# Patient Record
Sex: FEMALE | Race: WHITE | NOT HISPANIC OR LATINO | Employment: FULL TIME | URBAN - METROPOLITAN AREA
[De-identification: names, ages, dates, MRNs, and addresses within clinical notes are randomized per-mention and may not be internally consistent; named-entity substitution may affect disease eponyms.]

---

## 2017-07-21 ENCOUNTER — ALLSCRIPTS OFFICE VISIT (OUTPATIENT)
Dept: OTHER | Facility: OTHER | Age: 57
End: 2017-07-21

## 2017-07-21 DIAGNOSIS — R06.02 SHORTNESS OF BREATH: ICD-10-CM

## 2017-08-30 ENCOUNTER — TRANSCRIBE ORDERS (OUTPATIENT)
Dept: ADMINISTRATIVE | Facility: HOSPITAL | Age: 57
End: 2017-08-30

## 2017-08-30 DIAGNOSIS — R06.02 SHORTNESS OF BREATH: Primary | ICD-10-CM

## 2017-09-28 ENCOUNTER — HOSPITAL ENCOUNTER (OUTPATIENT)
Dept: NON INVASIVE DIAGNOSTICS | Facility: HOSPITAL | Age: 57
Discharge: HOME | End: 2017-09-28
Attending: INTERNAL MEDICINE
Payer: COMMERCIAL

## 2017-09-28 ENCOUNTER — HOSPITAL ENCOUNTER (OUTPATIENT)
Dept: ULTRASOUND IMAGING | Facility: HOSPITAL | Age: 57
Discharge: HOME | End: 2017-09-28
Attending: INTERNAL MEDICINE
Payer: COMMERCIAL

## 2017-09-28 DIAGNOSIS — R06.02 SHORTNESS OF BREATH: ICD-10-CM

## 2017-09-28 LAB
CHEST PAIN STATEMENT: NORMAL
MAX DIASTOLIC BP: 90 MMHG
MAX HEART RATE: 137 BPM
MAX PREDICTED HEART RATE: 161 BPM
MAX. SYSTOLIC BP: 190 MMHG
PROTOCOL NAME: NORMAL
TARGET HR FORMULA: NORMAL
TEST INDICATION: NORMAL
TIME IN EXERCISE PHASE: 350 S

## 2017-09-28 PROCEDURE — 93970 EXTREMITY STUDY: CPT

## 2017-09-28 PROCEDURE — 93350 STRESS TTE ONLY: CPT

## 2017-10-26 ENCOUNTER — ALLSCRIPTS OFFICE VISIT (OUTPATIENT)
Dept: OTHER | Facility: OTHER | Age: 57
End: 2017-10-26

## 2017-10-28 NOTE — PROGRESS NOTES
Assessment  Assessed    1  Hyperlipidemia (272 4) (E78 5)   2  Fatigue due to excessive exertion, subsequent encounter (V58 89,994 5) (T73 3XXD)   3  Abnormal stress echocardiography (794 39) (R94 39)    Plan  Abnormal stress echocardiography    · Follow-up visit in 6 months Evaluation and Treatment  Follow-up  Status: Hold For -  Scheduling  Requested for: [id number]   Ordered; For: Abnormal stress echocardiography; Ordered By: Mitzy Elena Performed:  Due: 37HEG5190    Discussion/Summary  Cardiology Discussion Summary Free Text Note Form Mart Pompa:   She is not having any anginal symptoms and I would recommend strict risk factor control  She need to get more active which I discussed with her  I will get her FLP and see if see would do better on Lipitor  No further cardiac testing is needed at this time  RTO 6 months  Chief Complaint  Chief Complaint Free Text Note Form: Patient is here for 4 month follow up  patient complaints on SOB, dizziness, swelling of left leg, donât have an appetite  History of Present Illness  Cardiology HPI Free Text Note Form [facility] Temo Morejon: She continues to c/o fatigue and is very inactive  She denies CP or chest pressure  Rest stress ECHO showed a normal EF with possible inferoseptal and apical septal ischemia  her BP is controlled  She just had an Hgb A1C and FLP done at [facility]  B/L LE venous doppler studies were negative for DVT  Review of Systems  Cardiology Female ROS:     Cardiac: No complaints of chest pain, no palpitations, no fainting  Skin: No complaints of nonhealing sores or skin rash  Genitourinary: No complaints of recurrent urinary tract infections, frequent urination at night, difficult urination, blood in urine, kidney stones, loss of bladder control, kidney problems, denies any birth control or hormone replacement, is not post menopausal, not currently pregnant     Psychological: No complaints of feeling depressed, anxiety, panic attacks, or difficulty concentrating  General: as noted in HPI  Respiratory: No complaints of shortness of breath, cough with sputum, or wheezing  HEENT: No complaints of serious problems, hearing problems, nose problems, throat problems, or snoring  Gastrointestinal: No complaints of liver problems, nausea, vomiting, heartburn, constipation, bloody stools, diarrhea, problems swallowing, adbominal pain, or rectal bleeding  Hematologic: No complaints of bleeding disorders, anemia, blood clots, or excessive brusing  Neurological: No complaints of numbness, tingling, dizziness, weakness, seizures, headaches, syncope or fainting, AM fatigue, daytime sleepiness, no witnessed apnea episodes  Musculoskeletal: No complaints of arthritis, back pain, or painfull swelling  ROS Reviewed:   ROS reviewed  Active Problems  Problems    1  Diabetes mellitus (250 00) (E11 9)   2  Dizziness (780 4) (R42)   3  Edema (782 3) (R60 9)   4  Hyperlipidemia (272 4) (E78 5)   5  Shortness of breath on exertion (786 05) (R06 02)    Past Medical History  Active Problems And Past Medical History Reviewed: The active problems and past medical history were reviewed and updated today  Surgical History  Surgical History Reviewed: The surgical history was reviewed and updated today  Family History  Mother    1  Family history of diabetes mellitus (V18 0) (Z83 3)   2  Family history of hypertension (V17 49) (Z82 49)   3  Family history of malignant neoplasm (V16 9) (Z80 9)   4  Family history of sudden cardiac death (SCD) (V17 41) (Z82 41)   5  Family history of High cholesterol  Father    6  Family history of diabetes mellitus (V18 0) (Z83 3)   7  Family history of malignant neoplasm (V16 9) (Z80 9)  Family History Reviewed: The family history was reviewed and updated today  Social History  Problems    · Never a smoker   · Single  Social History Reviewed: The social history was reviewed and updated today   The social history was reviewed and is unchanged  Current Meds   1  Aspirin 81 MG TABS; take 1 tablet every other day; Therapy: (Recorded:42Anx1962) to Recorded   2  Co Q 10 10 MG Oral Capsule; take 1 capsule daily; Therapy: (Recorded:26Oct2017) to Recorded   3  Januvia 100 MG Oral Tablet; TAKE 1 TABLET ONCE DAILY; Therapy: (9680 7842) to Recorded   4  MegaRed Omega-3 Krill Oil CAPS; take 1 capsule daily; Therapy: (Recorded:04Aiv8608) to Recorded   5  MetFORMIN HCl - 1000 MG Oral Tablet; TAKE 1 TABLET TWICE DAILY WITH MEALS; Therapy: (9680 7842) to Recorded   6  Multivitamins TABS; TAKE 3 TABLET Daily; Therapy: (9680 7842) to Recorded   7  Simvastatin 40 MG Oral Tablet; TAKE 1 TABLET DAILY AS DIRECTED; Therapy: (Recorded:48Jrs0145) to Recorded  Medication List Reviewed: The medication list was reviewed and updated today  Allergies  Medication    1  No Known Drug Allergies    Vitals  Vital Signs    Recorded: 77RPJ7194 04:03PM   Heart Rate 82   Systolic 799, RUE, Sitting   Diastolic 78, RUE, Sitting   Height 5 ft 6 in   Weight 178 lb    BMI Calculated 28 73   BSA Calculated 1 9     Physical Exam    Constitutional   General appearance: No acute distress, well appearing and well nourished  Eyes   Conjunctiva and Sclera examination: Conjunctiva pink, sclera anicteric  Ears, Nose, Mouth, and Throat - Oropharynx: Clear, nares are clear, mucous membranes are moist    Neck   Neck and thyroid: Normal, supple, trachea midline, no thyromegaly  Pulmonary   Respiratory effort: No increased work of breathing or signs of respiratory distress  Auscultation of lungs: Clear to auscultation, no rales, no rhonchi, no wheezing, good air movement  Cardiovascular   Auscultation of heart: Normal rate and rhythm, normal S1 and S2, no murmurs  Carotid pulses: Normal, 2+ bilaterally  Peripheral vascular exam: Normal pulses throughout, no tenderness, erythema or swelling      Pedal pulses: Normal, 2+ bilaterally  Examination of extremities for edema and/or varicosities: Normal     Abdomen   Abdomen: Non-tender and no distention  Liver and spleen: No hepatomegaly or splenomegaly  Musculoskeletal Gait and station: Normal gait  -- Digits and nails: Normal without clubbing or cyanosis  -- Inspection/palpation of joints, bones, and muscles: Normal, ROM normal     Skin - Skin and subcutaneous tissue: Normal without rashes or lesions  Skin is warm and well perfused, normal turgor  Neurologic - Cranial nerves: II - XII intact  -- Speech: Normal     Psychiatric - Orientation to person, place, and time: Normal -- Mood and affect: Normal       Signatures   Electronically signed by : VIOLA Manning ; Oct 27 2017  7:43AM EST                       (Author)

## 2018-01-14 VITALS
HEIGHT: 66 IN | SYSTOLIC BLOOD PRESSURE: 118 MMHG | DIASTOLIC BLOOD PRESSURE: 60 MMHG | WEIGHT: 180.13 LBS | HEART RATE: 82 BPM | BODY MASS INDEX: 28.95 KG/M2

## 2018-01-14 VITALS
DIASTOLIC BLOOD PRESSURE: 78 MMHG | WEIGHT: 178 LBS | BODY MASS INDEX: 28.61 KG/M2 | HEART RATE: 82 BPM | SYSTOLIC BLOOD PRESSURE: 126 MMHG | HEIGHT: 66 IN

## 2018-07-30 ENCOUNTER — OFFICE VISIT (OUTPATIENT)
Dept: CARDIOLOGY CLINIC | Facility: CLINIC | Age: 58
End: 2018-07-30
Payer: COMMERCIAL

## 2018-07-30 VITALS
OXYGEN SATURATION: 98 % | BODY MASS INDEX: 27.9 KG/M2 | DIASTOLIC BLOOD PRESSURE: 62 MMHG | SYSTOLIC BLOOD PRESSURE: 118 MMHG | WEIGHT: 173.6 LBS | HEART RATE: 60 BPM | HEIGHT: 66 IN

## 2018-07-30 DIAGNOSIS — T73.3XXD FATIGUE DUE TO EXCESSIVE EXERTION, SUBSEQUENT ENCOUNTER: Primary | ICD-10-CM

## 2018-07-30 DIAGNOSIS — R94.39 ABNORMAL STRESS ECHO: ICD-10-CM

## 2018-07-30 DIAGNOSIS — E78.2 MIXED HYPERLIPIDEMIA: ICD-10-CM

## 2018-07-30 PROBLEM — T73.3XXA FATIGUE DUE TO EXCESSIVE EXERTION: Status: ACTIVE | Noted: 2018-07-30

## 2018-07-30 PROCEDURE — 93000 ELECTROCARDIOGRAM COMPLETE: CPT | Performed by: INTERNAL MEDICINE

## 2018-07-30 PROCEDURE — 99213 OFFICE O/P EST LOW 20 MIN: CPT | Performed by: INTERNAL MEDICINE

## 2018-07-30 RX ORDER — INSULIN DEGLUDEC INJECTION 100 U/ML
20 INJECTION, SOLUTION SUBCUTANEOUS DAILY
Refills: 3 | COMMUNITY
Start: 2018-07-11 | End: 2020-05-07 | Stop reason: SDUPTHER

## 2018-07-30 RX ORDER — VITAMIN E 200 UNIT
1 CAPSULE ORAL DAILY
COMMUNITY
End: 2021-04-16

## 2018-07-30 RX ORDER — SIMVASTATIN 40 MG
40 TABLET ORAL DAILY
Refills: 3 | COMMUNITY
Start: 2018-05-21 | End: 2020-05-11 | Stop reason: SDUPTHER

## 2018-07-30 RX ORDER — ASCORBIC ACID 1000 MG
1 TABLET ORAL DAILY
COMMUNITY
End: 2021-04-16

## 2018-07-31 NOTE — PROGRESS NOTES
Cardiology Follow Up    Nader Forte  1960  [id number]  [facility] [id number]    1  Fatigue due to excessive exertion, subsequent encounter  POCT ECG   2  Mixed hyperlipidemia     3  Abnormal stress echo  POCT ECG         Discussion/Summary:  All of her assessed cardiac problems are stable  I have reviewed her medications and made no changes  No cardiac testing is ordered  RTO 1 year  Interval History:  She has not had any cardiac problems since her last OV  She is more active and doing a little bit more walking  She denies CP, SOB  Her BP and cholesterol levels are controlled  Hgb A1C is > 10  Stress ECHO in 2017 showed possible ischemia  Patient Active Problem List   Diagnosis    Fatigue due to excessive exertion    Mixed hyperlipidemia    Abnormal stress echo     Past Medical History:   Diagnosis Date    Diabetes ([facility] [address] [facility]     Dizziness     Edema     Hyperlipidemia      Social History     Social History    Marital status: Single     Spouse name: N/A    Number of children: N/A    Years of education: N/A     Occupational History    Not on file       Social History Main Topics    Smoking status: Never Smoker    Smokeless tobacco: Never Used    Alcohol use No    Drug use: No    Sexual activity: Not on file     Other Topics Concern    Not on file     Social History Narrative    No narrative on file      Family History   Problem Relation Age of Onset    Diabetes Mother     Hypertension Mother     Sudden death Mother         SCD    Hyperlipidemia Mother     Diabetes Father     Arrhythmia Father         pacemaker/ defib    Clotting disorder Father         eliquis    Heart failure Father     Anuerysm Neg Hx      Past Surgical History:   Procedure Laterality Date    BREAST LUMPECTOMY Right 2009    COLONOSCOPY  04/2018       Current Outpatient Prescriptions:     aspirin 81 MG tablet, Take 1 tablet by mouth every other day, Disp: , Rfl:     Coenzyme Q10 (CO Q 10) 10 MG CAPS, Take 1 capsule by mouth daily, Disp: , Rfl:     esomeprazole (NexIUM) 20 mg capsule, Take 20 mg by mouth every morning before breakfast, Disp: , Rfl:     MEGARED OMEGA-3 KRILL  MG CAPS, Take 1 capsule by mouth daily, Disp: , Rfl:     metFORMIN (GLUCOPHAGE) 1000 MG tablet, Take 1 tablet by mouth Twice daily, Disp: , Rfl:     Multiple Vitamin (MULTI VITAMIN DAILY PO), Take 2 tablets by mouth daily, Disp: , Rfl:     simvastatin (ZOCOR) 40 mg tablet, Take 40 mg by mouth daily, Disp: , Rfl: 3    sitaGLIPtin (JANUVIA) 100 mg tablet, Take 1 tablet by mouth daily, Disp: , Rfl:     TRESIBA FLEXTOUCH 100 units/mL injection pen, Inject 20 Units as directed daily, Disp: , Rfl: 3  No Known Allergies  Vitals:    07/30/18 1347   BP: 118/62   BP Location: Left arm   Patient Position: Sitting   Cuff Size: Adult   Pulse: 60   SpO2: 98%   Weight: 78 7 kg (173 lb 9 6 oz)   Height: 5' 6" (1 676 m)     Weight (last 2 days)     Date/Time   Weight    07/30/18 1347  78 7 (173 6)             Blood pressure 118/62, pulse 60, height 5' 6" (1 676 m), weight 78 7 kg (173 lb 9 6 oz), SpO2 98 %  , Body mass index is 28 02 kg/m²  Labs:  No visits with results within 6 Month(s) from this visit  Latest known visit with results is:   Hospital Outpatient Visit on 09/28/2017   Component Date Value    Protocol Name 09/28/2017 Inge Living Time In Exercise Phase 09/28/2017 350     MAX  SYSTOLIC BP 11/94/5397 891     Max Diastolic Bp 99/27/4668 90     Max Heart Rate 09/28/2017 137     Max Predicted Heart Rate 09/28/2017 161     Reason for Termination 09/28/2017                      Value:Fatigue  Leg discomfort      Test Indication 09/28/2017 BARROS/R/O CAD     Target Hr Formular 09/28/2017 (220 - Age)*100%     Chest Pain Statement 09/28/2017 none      Imaging: No results found      Review of Systems:  Review of Systems   Constitutional: Negative for diaphoresis, fatigue, fever and unexpected weight change  HENT: Negative  Respiratory: Negative for cough, shortness of breath and wheezing  Cardiovascular: Negative for chest pain, palpitations and leg swelling  Gastrointestinal: Negative for abdominal pain, diarrhea and nausea  Musculoskeletal: Negative for gait problem and myalgias  Skin: Negative for rash  Neurological: Negative for dizziness and numbness  Psychiatric/Behavioral: Negative  Physical Exam:  Physical Exam   Constitutional: She is oriented to person, place, and time  She appears well-developed and well-nourished  HENT:   Head: Normocephalic and atraumatic  Eyes: Pupils are equal, round, and reactive to light  Neck: Normal range of motion  Neck supple  No JVD present  Cardiovascular: Regular rhythm, S1 normal, S2 normal and normal pulses  Pulses:       Carotid pulses are 2+ on the right side, and 2+ on the left side  Pulmonary/Chest: Effort normal and breath sounds normal  She has no wheezes  She has no rales  Abdominal: Soft  Bowel sounds are normal  There is no tenderness  Musculoskeletal: Normal range of motion  She exhibits no edema or tenderness  Neurological: She is alert and oriented to person, place, and time  She has normal reflexes  No cranial nerve deficit  Skin: Skin is warm  Psychiatric: She has a normal mood and affect

## 2019-09-17 ENCOUNTER — OFFICE VISIT (OUTPATIENT)
Dept: CARDIOLOGY CLINIC | Facility: CLINIC | Age: 59
End: 2019-09-17
Payer: COMMERCIAL

## 2019-09-17 VITALS
SYSTOLIC BLOOD PRESSURE: 90 MMHG | BODY MASS INDEX: 27.64 KG/M2 | OXYGEN SATURATION: 96 % | DIASTOLIC BLOOD PRESSURE: 56 MMHG | WEIGHT: 172 LBS | HEART RATE: 85 BPM | HEIGHT: 66 IN

## 2019-09-17 DIAGNOSIS — E78.2 MIXED HYPERLIPIDEMIA: Primary | ICD-10-CM

## 2019-09-17 DIAGNOSIS — T73.3XXD FATIGUE DUE TO EXCESSIVE EXERTION, SUBSEQUENT ENCOUNTER: ICD-10-CM

## 2019-09-17 DIAGNOSIS — R94.39 ABNORMAL STRESS ECHO: ICD-10-CM

## 2019-09-17 PROCEDURE — 93000 ELECTROCARDIOGRAM COMPLETE: CPT | Performed by: INTERNAL MEDICINE

## 2019-09-17 PROCEDURE — 99213 OFFICE O/P EST LOW 20 MIN: CPT | Performed by: INTERNAL MEDICINE

## 2019-09-17 NOTE — PROGRESS NOTES
Cardiology Follow Up    Juan Francisco Lanier  1960 [address] [address] [address]  [address] [place] [address] [address]  [address]  [address]  [id number] [phone]    1  Mixed hyperlipidemia  POCT ECG   2  Fatigue due to excessive exertion, subsequent encounter  Stress test only, exercise   3  Abnormal stress echo  Stress test only, exercise         Discussion/Summary: I will order a regular EST to compare to her stress test from 9/2017  I have reviewed her medications and made no changes  Her Hgb A1C is 13 and she needs to get her DM under much better control  Interval History: She has not had any cardiac problems since her last OV  She is not very active  She get occasional heart burn mostly at night and SOB at low to moderate levels of activity  She had a stress echo study in 2017 -  5 minutes 50 seconds Doni protocol  EF 65%, ECG response negative for ischemia  Possible ischemia of the inferoseptal wall      Patient Active Problem List   Diagnosis    Fatigue due to excessive exertion    Mixed hyperlipidemia    Abnormal stress echo     Past Medical History:   Diagnosis Date    Diabetes ([facility] [address] )     Dizziness     Edema     Hyperlipidemia      Social History     Socioeconomic History    Marital status: Single     Spouse name: Not on file    Number of children: Not on file    Years of education: Not on file    Highest education level: Not on file   Occupational History    Not on file   Social Needs    Financial resource strain: Not on file    Food insecurity:     Worry: Not on file     Inability: Not on file    Transportation needs:     Medical: Not on file     Non-medical: Not on file   Tobacco Use    Smoking status: Never Smoker    Smokeless tobacco: Never Used   Substance and Sexual Activity    Alcohol use: No    Drug use: No    Sexual activity: Not on file   Lifestyle    Physical activity:     Days per week: Not on file     Minutes per session: Not on file    Stress: Not on file   Relationships    Social connections:     Talks on phone: Not on file     Gets together: Not on file     Attends Jewish service: Not on file     Active member of club or organization: Not on file     Attends meetings of clubs or organizations: Not on file     Relationship status: Not on file    Intimate partner violence:     Fear of current or ex partner: Not on file     Emotionally abused: Not on file     Physically abused: Not on file     Forced sexual activity: Not on file   Other Topics Concern    Not on file   Social History Narrative    Not on file      Family History   Problem Relation Age of Onset    Diabetes Mother     Hypertension Mother     Sudden death Mother         SCD    Hyperlipidemia Mother     Diabetes Father     Arrhythmia Father         pacemaker/ defib    Clotting disorder Father         eliquis    Heart failure Father     Anuerysm Neg Hx      Past Surgical History:   Procedure Laterality Date    BREAST LUMPECTOMY Right 2009    COLONOSCOPY  04/2018       Current Outpatient Medications:     aspirin 81 MG tablet, Take 1 tablet by mouth every other day, Disp: , Rfl:     Coenzyme Q10 (CO Q 10) 10 MG CAPS, Take 1 capsule by mouth daily, Disp: , Rfl:     esomeprazole (NexIUM) 20 mg capsule, Take 20 mg by mouth every morning before breakfast, Disp: , Rfl:     MEGARED OMEGA-3 KRILL  MG CAPS, Take 1 capsule by mouth daily, Disp: , Rfl:     metFORMIN (GLUCOPHAGE) 1000 MG tablet, Take 1 tablet by mouth Twice daily, Disp: , Rfl:     Multiple Vitamin (MULTI VITAMIN DAILY PO), Take 2 tablets by mouth daily, Disp: , Rfl:     simvastatin (ZOCOR) 40 mg tablet, Take 40 mg by mouth daily, Disp: , Rfl: 3    sitaGLIPtin (JANUVIA) 100 mg tablet, Take 1 tablet by mouth daily, Disp: , Rfl:     TRESIBA FLEXTOUCH 100 units/mL injection pen, Inject 20 Units as directed daily, Disp: , Rfl: 3  No Known Allergies  Vitals:    09/17/19 1452 BP: 90/56   BP Location: Right arm   Patient Position: Sitting   Cuff Size: Standard   Pulse: 85   SpO2: 96%   Weight: 78 kg (172 lb)   Height: 5' 6" (1 676 m)     Weight (last 2 days)     Date/Time   Weight    09/17/19 1452   78 (172)             Blood pressure 90/56, pulse 85, height 5' 6" (1 676 m), weight 78 kg (172 lb), SpO2 96 %  , Body mass index is 27 76 kg/m²  Labs:  No visits with results within 6 Month(s) from this visit  Latest known visit with results is:   Office Visit on 07/30/2018   Component Date Value    INTERPRETATIONS 07/31/2018       Imaging: No results found  Review of Systems:  Review of Systems   Constitutional: Negative for diaphoresis, fatigue, fever and unexpected weight change  HENT: Negative  Respiratory: Positive for shortness of breath  Negative for cough and wheezing  Cardiovascular: Negative for chest pain, palpitations and leg swelling  Gastrointestinal: Negative for abdominal pain, diarrhea and nausea  Musculoskeletal: Negative for gait problem and myalgias  Skin: Negative for rash  Neurological: Negative for dizziness and numbness  Psychiatric/Behavioral: Negative  Physical Exam:  Physical Exam   Constitutional: She is oriented to person, place, and time  She appears well-developed and well-nourished  HENT:   Head: Normocephalic and atraumatic  Eyes: Pupils are equal, round, and reactive to light  Neck: Normal range of motion  Neck supple  No JVD present  Cardiovascular: Regular rhythm, S1 normal, S2 normal and normal pulses  Pulses:       Carotid pulses are 2+ on the right side, and 2+ on the left side  Pulmonary/Chest: Effort normal and breath sounds normal  She has no wheezes  She has no rales  Abdominal: Soft  Bowel sounds are normal  There is no tenderness  Musculoskeletal: Normal range of motion  She exhibits no edema or tenderness  Neurological: She is alert and oriented to person, place, and time  She has normal reflexes  No cranial nerve deficit  Skin: Skin is warm  Psychiatric: She has a normal mood and affect

## 2019-11-11 ENCOUNTER — HOSPITAL ENCOUNTER (OUTPATIENT)
Dept: NON INVASIVE DIAGNOSTICS | Facility: CLINIC | Age: 59
Discharge: HOME | End: 2019-11-11
Payer: COMMERCIAL

## 2019-11-11 DIAGNOSIS — T73.3XXD FATIGUE DUE TO EXCESSIVE EXERTION, SUBSEQUENT ENCOUNTER: ICD-10-CM

## 2019-11-11 DIAGNOSIS — R94.39 ABNORMAL STRESS ECHO: ICD-10-CM

## 2019-11-11 LAB
CHEST PAIN STATEMENT: NORMAL
MAX DIASTOLIC BP: 86 MMHG
MAX HEART RATE: 139 BPM
MAX PREDICTED HEART RATE: 159 BPM
MAX. SYSTOLIC BP: 198 MMHG
PROTOCOL NAME: NORMAL
TARGET HR FORMULA: NORMAL
TEST INDICATION: NORMAL
TIME IN EXERCISE PHASE: NORMAL

## 2019-11-11 PROCEDURE — 93018 CV STRESS TEST I&R ONLY: CPT | Performed by: INTERNAL MEDICINE

## 2019-11-11 PROCEDURE — 93017 CV STRESS TEST TRACING ONLY: CPT

## 2019-11-11 PROCEDURE — 93016 CV STRESS TEST SUPVJ ONLY: CPT | Performed by: INTERNAL MEDICINE

## 2020-05-07 ENCOUNTER — TELEPHONE (OUTPATIENT)
Dept: FAMILY MEDICINE CLINIC | Facility: CLINIC | Age: 60
End: 2020-05-07

## 2020-05-07 DIAGNOSIS — Z79.4 CONTROLLED TYPE 2 DIABETES MELLITUS WITHOUT COMPLICATION, WITH LONG-TERM CURRENT USE OF INSULIN (HCC): Primary | ICD-10-CM

## 2020-05-07 DIAGNOSIS — E11.9 CONTROLLED TYPE 2 DIABETES MELLITUS WITHOUT COMPLICATION, WITH LONG-TERM CURRENT USE OF INSULIN (HCC): Primary | ICD-10-CM

## 2020-05-11 ENCOUNTER — TELEPHONE (OUTPATIENT)
Dept: FAMILY MEDICINE CLINIC | Facility: CLINIC | Age: 60
End: 2020-05-11

## 2020-05-11 DIAGNOSIS — E11.69 TYPE 2 DIABETES MELLITUS WITH OTHER SPECIFIED COMPLICATION, WITH LONG-TERM CURRENT USE OF INSULIN (HCC): Primary | ICD-10-CM

## 2020-05-11 DIAGNOSIS — Z79.4 TYPE 2 DIABETES MELLITUS WITH OTHER SPECIFIED COMPLICATION, WITH LONG-TERM CURRENT USE OF INSULIN (HCC): Primary | ICD-10-CM

## 2020-05-11 RX ORDER — SIMVASTATIN 40 MG
40 TABLET ORAL DAILY
Qty: 90 TABLET | Refills: 3 | Status: SHIPPED | OUTPATIENT
Start: 2020-05-11 | End: 2021-06-25

## 2020-05-12 RX ORDER — INSULIN DEGLUDEC INJECTION 100 U/ML
30 INJECTION, SOLUTION SUBCUTANEOUS DAILY
Qty: 5 PEN | Refills: 5 | Status: SHIPPED | OUTPATIENT
Start: 2020-05-12 | End: 2020-10-15

## 2020-05-22 DIAGNOSIS — I10 ESSENTIAL HYPERTENSION: Primary | ICD-10-CM

## 2020-05-22 RX ORDER — LISINOPRIL 2.5 MG/1
TABLET ORAL
Qty: 90 TABLET | Refills: 1 | Status: SHIPPED | OUTPATIENT
Start: 2020-05-22 | End: 2020-06-03 | Stop reason: SDUPTHER

## 2020-06-03 DIAGNOSIS — I10 ESSENTIAL HYPERTENSION: ICD-10-CM

## 2020-06-03 RX ORDER — LISINOPRIL 2.5 MG/1
2.5 TABLET ORAL DAILY
Qty: 90 TABLET | Refills: 1 | Status: SHIPPED | OUTPATIENT
Start: 2020-06-03 | End: 2020-10-15

## 2020-06-09 LAB
LEFT EYE DIABETIC RETINOPATHY: NORMAL
RIGHT EYE DIABETIC RETINOPATHY: NORMAL

## 2020-06-28 DIAGNOSIS — R60.0 LOCALIZED EDEMA: ICD-10-CM

## 2020-06-28 RX ORDER — FUROSEMIDE 20 MG/1
TABLET ORAL
Qty: 90 TABLET | Refills: 1 | Status: SHIPPED | OUTPATIENT
Start: 2020-06-28 | End: 2021-11-18 | Stop reason: SDUPTHER

## 2020-08-04 LAB
LEFT EYE DIABETIC RETINOPATHY: NORMAL
RIGHT EYE DIABETIC RETINOPATHY: NORMAL

## 2020-09-15 ENCOUNTER — TELEPHONE (OUTPATIENT)
Dept: FAMILY MEDICINE CLINIC | Facility: CLINIC | Age: 60
End: 2020-09-15

## 2020-09-15 DIAGNOSIS — Z00.00 ANNUAL PHYSICAL EXAM: ICD-10-CM

## 2020-09-15 DIAGNOSIS — E11.9 CONTROLLED TYPE 2 DIABETES MELLITUS WITHOUT COMPLICATION, WITH LONG-TERM CURRENT USE OF INSULIN (HCC): ICD-10-CM

## 2020-09-15 DIAGNOSIS — Z79.4 CONTROLLED TYPE 2 DIABETES MELLITUS WITHOUT COMPLICATION, WITH LONG-TERM CURRENT USE OF INSULIN (HCC): ICD-10-CM

## 2020-09-15 DIAGNOSIS — E78.2 MIXED HYPERLIPIDEMIA: Primary | ICD-10-CM

## 2020-09-15 NOTE — TELEPHONE ENCOUNTER
LM on our VM asking for A1C lab script OR if Jacquelyn can do it in our office, because she wants to schedule appt for yearly chk

## 2020-09-15 NOTE — TELEPHONE ENCOUNTER
I would think we can just do the A1c here and if she wants other labs (CBC  CMP, Lipid panel TSH and urine alb/cr) wed have to order

## 2020-09-16 ENCOUNTER — TRANSCRIBE ORDERS (OUTPATIENT)
Dept: LAB | Facility: HOSPITAL | Age: 60
End: 2020-09-16

## 2020-09-16 DIAGNOSIS — Z79.4 CONTROLLED TYPE 2 DIABETES MELLITUS WITHOUT COMPLICATION, WITH LONG-TERM CURRENT USE OF INSULIN (HCC): ICD-10-CM

## 2020-09-16 DIAGNOSIS — E11.9 CONTROLLED TYPE 2 DIABETES MELLITUS WITHOUT COMPLICATION, WITH LONG-TERM CURRENT USE OF INSULIN (HCC): ICD-10-CM

## 2020-09-16 DIAGNOSIS — Z00.00 ANNUAL PHYSICAL EXAM: ICD-10-CM

## 2020-09-16 DIAGNOSIS — E78.2 MIXED HYPERLIPIDEMIA: Primary | ICD-10-CM

## 2020-09-17 ENCOUNTER — APPOINTMENT (OUTPATIENT)
Dept: LAB | Facility: HOSPITAL | Age: 60
End: 2020-09-17
Payer: COMMERCIAL

## 2020-09-17 DIAGNOSIS — Z00.00 ANNUAL PHYSICAL EXAM: ICD-10-CM

## 2020-09-17 DIAGNOSIS — E78.2 MIXED HYPERLIPIDEMIA: ICD-10-CM

## 2020-09-17 DIAGNOSIS — Z79.4 CONTROLLED TYPE 2 DIABETES MELLITUS WITHOUT COMPLICATION, WITH LONG-TERM CURRENT USE OF INSULIN (HCC): ICD-10-CM

## 2020-09-17 DIAGNOSIS — E11.9 CONTROLLED TYPE 2 DIABETES MELLITUS WITHOUT COMPLICATION, WITH LONG-TERM CURRENT USE OF INSULIN (HCC): ICD-10-CM

## 2020-09-17 LAB
ALBUMIN SERPL BCP-MCNC: 3.7 G/DL (ref 3.4–4.8)
ALP SERPL-CCNC: 51.5 U/L (ref 35–140)
ALT SERPL W P-5'-P-CCNC: 18 U/L (ref 5–54)
ANION GAP SERPL CALCULATED.3IONS-SCNC: 6 MMOL/L (ref 4–13)
AST SERPL W P-5'-P-CCNC: 13 U/L (ref 15–41)
BASOPHILS # BLD AUTO: 0.02 THOUSANDS/ΜL (ref 0–0.1)
BASOPHILS NFR BLD AUTO: 0 % (ref 0–1)
BILIRUB SERPL-MCNC: 0.43 MG/DL (ref 0.3–1.2)
BUN SERPL-MCNC: 17 MG/DL (ref 6–20)
CALCIUM SERPL-MCNC: 9 MG/DL (ref 8.4–10.2)
CHLORIDE SERPL-SCNC: 101 MMOL/L (ref 96–108)
CHOLEST SERPL-MCNC: 92 MG/DL
CO2 SERPL-SCNC: 30 MMOL/L (ref 22–33)
CREAT SERPL-MCNC: 0.63 MG/DL (ref 0.4–1.1)
EOSINOPHIL # BLD AUTO: 0.12 THOUSAND/ΜL (ref 0–0.61)
EOSINOPHIL NFR BLD AUTO: 2 % (ref 0–6)
ERYTHROCYTE [DISTWIDTH] IN BLOOD BY AUTOMATED COUNT: 12.3 % (ref 11.6–15.1)
EST. AVERAGE GLUCOSE BLD GHB EST-MCNC: 246 MG/DL
GFR SERPL CREATININE-BSD FRML MDRD: 96 ML/MIN/1.73SQ M
GLUCOSE P FAST SERPL-MCNC: 145 MG/DL (ref 70–100)
HBA1C MFR BLD: 10.2 %
HCT VFR BLD AUTO: 38.6 % (ref 34.8–46.1)
HDLC SERPL-MCNC: 30 MG/DL
HGB BLD-MCNC: 13.2 G/DL (ref 11.5–15.4)
IMM GRANULOCYTES # BLD AUTO: 0.02 THOUSAND/UL (ref 0–0.2)
IMM GRANULOCYTES NFR BLD AUTO: 0 % (ref 0–2)
LDLC SERPL CALC-MCNC: 43 MG/DL (ref 0–100)
LYMPHOCYTES # BLD AUTO: 1.94 THOUSANDS/ΜL (ref 0.6–4.47)
LYMPHOCYTES NFR BLD AUTO: 26 % (ref 14–44)
MCH RBC QN AUTO: 30.2 PG (ref 26.8–34.3)
MCHC RBC AUTO-ENTMCNC: 34.2 G/DL (ref 31.4–37.4)
MCV RBC AUTO: 88 FL (ref 82–98)
MONOCYTES # BLD AUTO: 0.46 THOUSAND/ΜL (ref 0.17–1.22)
MONOCYTES NFR BLD AUTO: 6 % (ref 4–12)
NEUTROPHILS # BLD AUTO: 4.91 THOUSANDS/ΜL (ref 1.85–7.62)
NEUTS SEG NFR BLD AUTO: 66 % (ref 43–75)
NONHDLC SERPL-MCNC: 62 MG/DL
PLATELET # BLD AUTO: 242 THOUSANDS/UL (ref 149–390)
PMV BLD AUTO: 10.5 FL (ref 8.9–12.7)
POTASSIUM SERPL-SCNC: 3.8 MMOL/L (ref 3.5–5)
PROT SERPL-MCNC: 6.3 G/DL (ref 6.4–8.3)
RBC # BLD AUTO: 4.37 MILLION/UL (ref 3.81–5.12)
SODIUM SERPL-SCNC: 137 MMOL/L (ref 133–145)
TRIGL SERPL-MCNC: 95.5 MG/DL
TSH SERPL DL<=0.05 MIU/L-ACNC: 2.62 UIU/ML (ref 0.34–5.6)
WBC # BLD AUTO: 7.47 THOUSAND/UL (ref 4.31–10.16)

## 2020-09-17 PROCEDURE — 80053 COMPREHEN METABOLIC PANEL: CPT

## 2020-09-17 PROCEDURE — 84443 ASSAY THYROID STIM HORMONE: CPT

## 2020-09-17 PROCEDURE — 85025 COMPLETE CBC W/AUTO DIFF WBC: CPT

## 2020-09-17 PROCEDURE — 83036 HEMOGLOBIN GLYCOSYLATED A1C: CPT

## 2020-09-17 PROCEDURE — 80061 LIPID PANEL: CPT

## 2020-09-17 PROCEDURE — 36415 COLL VENOUS BLD VENIPUNCTURE: CPT

## 2020-09-18 ENCOUNTER — TELEPHONE (OUTPATIENT)
Dept: FAMILY MEDICINE CLINIC | Facility: CLINIC | Age: 60
End: 2020-09-18

## 2020-09-18 ENCOUNTER — APPOINTMENT (OUTPATIENT)
Dept: LAB | Facility: HOSPITAL | Age: 60
End: 2020-09-18
Payer: COMMERCIAL

## 2020-09-18 LAB
CREAT UR-MCNC: 100 MG/DL
MICROALBUMIN UR-MCNC: 283 MG/L (ref 0–20)
MICROALBUMIN/CREAT 24H UR: 283 MG/G CREATININE (ref 0–30)

## 2020-09-18 PROCEDURE — 82043 UR ALBUMIN QUANTITATIVE: CPT

## 2020-09-18 PROCEDURE — 82570 ASSAY OF URINE CREATININE: CPT

## 2020-10-09 ENCOUNTER — OFFICE VISIT (OUTPATIENT)
Dept: CARDIOLOGY CLINIC | Facility: CLINIC | Age: 60
End: 2020-10-09
Payer: COMMERCIAL

## 2020-10-09 VITALS
HEART RATE: 88 BPM | SYSTOLIC BLOOD PRESSURE: 130 MMHG | BODY MASS INDEX: 29.02 KG/M2 | OXYGEN SATURATION: 97 % | DIASTOLIC BLOOD PRESSURE: 68 MMHG | HEIGHT: 66 IN | WEIGHT: 180.6 LBS

## 2020-10-09 DIAGNOSIS — E78.2 MIXED HYPERLIPIDEMIA: ICD-10-CM

## 2020-10-09 DIAGNOSIS — I10 ESSENTIAL HYPERTENSION: ICD-10-CM

## 2020-10-09 DIAGNOSIS — R94.39 ABNORMAL STRESS ECHO: Primary | ICD-10-CM

## 2020-10-09 PROCEDURE — 99213 OFFICE O/P EST LOW 20 MIN: CPT | Performed by: INTERNAL MEDICINE

## 2020-10-15 ENCOUNTER — OFFICE VISIT (OUTPATIENT)
Dept: FAMILY MEDICINE CLINIC | Facility: CLINIC | Age: 60
End: 2020-10-15
Payer: COMMERCIAL

## 2020-10-15 VITALS
RESPIRATION RATE: 16 BRPM | HEIGHT: 66 IN | DIASTOLIC BLOOD PRESSURE: 80 MMHG | OXYGEN SATURATION: 98 % | BODY MASS INDEX: 29.27 KG/M2 | SYSTOLIC BLOOD PRESSURE: 122 MMHG | TEMPERATURE: 97.2 F | WEIGHT: 182.13 LBS | HEART RATE: 76 BPM

## 2020-10-15 DIAGNOSIS — R80.9 MICROALBUMINURIA: ICD-10-CM

## 2020-10-15 DIAGNOSIS — I10 ESSENTIAL HYPERTENSION: ICD-10-CM

## 2020-10-15 DIAGNOSIS — Z00.00 ANNUAL PHYSICAL EXAM: ICD-10-CM

## 2020-10-15 DIAGNOSIS — R94.39 ABNORMAL STRESS ECHO: ICD-10-CM

## 2020-10-15 DIAGNOSIS — R32 URINARY INCONTINENCE, UNSPECIFIED TYPE: ICD-10-CM

## 2020-10-15 DIAGNOSIS — Z23 INFLUENZA VACCINE ADMINISTERED: Primary | ICD-10-CM

## 2020-10-15 DIAGNOSIS — Z11.59 NEED FOR HEPATITIS C SCREENING TEST: ICD-10-CM

## 2020-10-15 DIAGNOSIS — E11.9 CONTROLLED TYPE 2 DIABETES MELLITUS WITHOUT COMPLICATION, WITH LONG-TERM CURRENT USE OF INSULIN (HCC): ICD-10-CM

## 2020-10-15 DIAGNOSIS — Z79.4 TYPE 2 DIABETES MELLITUS WITHOUT COMPLICATION, WITH LONG-TERM CURRENT USE OF INSULIN (HCC): ICD-10-CM

## 2020-10-15 DIAGNOSIS — Z79.4 CONTROLLED TYPE 2 DIABETES MELLITUS WITHOUT COMPLICATION, WITH LONG-TERM CURRENT USE OF INSULIN (HCC): ICD-10-CM

## 2020-10-15 DIAGNOSIS — E11.9 TYPE 2 DIABETES MELLITUS WITHOUT COMPLICATION, WITH LONG-TERM CURRENT USE OF INSULIN (HCC): ICD-10-CM

## 2020-10-15 DIAGNOSIS — E78.2 MIXED HYPERLIPIDEMIA: ICD-10-CM

## 2020-10-15 PROCEDURE — 90471 IMMUNIZATION ADMIN: CPT | Performed by: INTERNAL MEDICINE

## 2020-10-15 PROCEDURE — 99396 PREV VISIT EST AGE 40-64: CPT | Performed by: INTERNAL MEDICINE

## 2020-10-15 PROCEDURE — 90682 RIV4 VACC RECOMBINANT DNA IM: CPT | Performed by: INTERNAL MEDICINE

## 2020-10-15 RX ORDER — LISINOPRIL 2.5 MG/1
5 TABLET ORAL DAILY
Qty: 90 TABLET | Refills: 1 | Status: SHIPPED | OUTPATIENT
Start: 2020-10-15 | End: 2020-12-20

## 2020-10-15 RX ORDER — INSULIN DEGLUDEC INJECTION 100 U/ML
35 INJECTION, SOLUTION SUBCUTANEOUS DAILY
Qty: 5 PEN | Refills: 5 | Status: SHIPPED | OUTPATIENT
Start: 2020-10-15 | End: 2021-09-17 | Stop reason: SDUPTHER

## 2020-10-27 LAB
LEFT EYE DIABETIC RETINOPATHY: NORMAL
RIGHT EYE DIABETIC RETINOPATHY: NORMAL

## 2020-12-08 LAB
LEFT EYE DIABETIC RETINOPATHY: NORMAL
RIGHT EYE DIABETIC RETINOPATHY: NORMAL

## 2020-12-19 DIAGNOSIS — I10 ESSENTIAL HYPERTENSION: ICD-10-CM

## 2020-12-20 RX ORDER — LISINOPRIL 2.5 MG/1
TABLET ORAL
Qty: 90 TABLET | Refills: 1 | Status: SHIPPED | OUTPATIENT
Start: 2020-12-20 | End: 2021-02-15

## 2020-12-21 ENCOUNTER — TELEPHONE (OUTPATIENT)
Dept: FAMILY MEDICINE CLINIC | Facility: CLINIC | Age: 60
End: 2020-12-21

## 2020-12-21 DIAGNOSIS — Z20.822 EXPOSURE TO COVID-19 VIRUS: ICD-10-CM

## 2020-12-21 DIAGNOSIS — Z20.822 EXPOSURE TO COVID-19 VIRUS: Primary | ICD-10-CM

## 2020-12-21 PROCEDURE — U0003 INFECTIOUS AGENT DETECTION BY NUCLEIC ACID (DNA OR RNA); SEVERE ACUTE RESPIRATORY SYNDROME CORONAVIRUS 2 (SARS-COV-2) (CORONAVIRUS DISEASE [COVID-19]), AMPLIFIED PROBE TECHNIQUE, MAKING USE OF HIGH THROUGHPUT TECHNOLOGIES AS DESCRIBED BY CMS-2020-01-R: HCPCS | Performed by: INTERNAL MEDICINE

## 2020-12-22 ENCOUNTER — TELEPHONE (OUTPATIENT)
Dept: FAMILY MEDICINE CLINIC | Facility: CLINIC | Age: 60
End: 2020-12-22

## 2020-12-22 LAB — SARS-COV-2 RNA SPEC QL NAA+PROBE: NOT DETECTED

## 2020-12-22 NOTE — TELEPHONE ENCOUNTER
Pt called needing a letter to be released back to work tomorrow  Would like the letter to be emailed to her

## 2020-12-22 NOTE — RESULT ENCOUNTER NOTE
I called Ten Fernandez and let her know that her COVID-19 swab was negative  I advised her to continue social distancing procedures

## 2021-01-27 ENCOUNTER — TELEPHONE (OUTPATIENT)
Dept: FAMILY MEDICINE CLINIC | Facility: CLINIC | Age: 61
End: 2021-01-27

## 2021-01-27 DIAGNOSIS — Z20.822 EXPOSURE TO COVID-19 VIRUS: Primary | ICD-10-CM

## 2021-01-27 NOTE — TELEPHONE ENCOUNTER
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

## 2021-01-29 DIAGNOSIS — Z20.822 EXPOSURE TO COVID-19 VIRUS: ICD-10-CM

## 2021-01-29 PROCEDURE — 87635 SARS-COV-2 COVID-19 AMP PRB: CPT | Performed by: INTERNAL MEDICINE

## 2021-01-30 LAB — SARS-COV-2 RNA RESP QL NAA+PROBE: NEGATIVE

## 2021-02-10 ENCOUNTER — LAB (OUTPATIENT)
Dept: LAB | Facility: HOSPITAL | Age: 61
End: 2021-02-10
Attending: INTERNAL MEDICINE
Payer: COMMERCIAL

## 2021-02-10 DIAGNOSIS — E11.9 CONTROLLED TYPE 2 DIABETES MELLITUS WITHOUT COMPLICATION, WITH LONG-TERM CURRENT USE OF INSULIN (HCC): ICD-10-CM

## 2021-02-10 DIAGNOSIS — Z11.59 NEED FOR HEPATITIS C SCREENING TEST: ICD-10-CM

## 2021-02-10 DIAGNOSIS — Z79.4 CONTROLLED TYPE 2 DIABETES MELLITUS WITHOUT COMPLICATION, WITH LONG-TERM CURRENT USE OF INSULIN (HCC): ICD-10-CM

## 2021-02-10 DIAGNOSIS — Z00.00 ANNUAL PHYSICAL EXAM: ICD-10-CM

## 2021-02-10 LAB
ALBUMIN SERPL BCP-MCNC: 3.9 G/DL (ref 3.4–4.8)
ALP SERPL-CCNC: 46.7 U/L (ref 35–140)
ALT SERPL W P-5'-P-CCNC: 26 U/L (ref 5–54)
ANION GAP SERPL CALCULATED.3IONS-SCNC: 9 MMOL/L (ref 4–13)
AST SERPL W P-5'-P-CCNC: 23 U/L (ref 15–41)
BASOPHILS # BLD AUTO: 0.03 THOUSANDS/ΜL (ref 0–0.1)
BASOPHILS NFR BLD AUTO: 0 % (ref 0–1)
BILIRUB SERPL-MCNC: 0.55 MG/DL (ref 0.3–1.2)
BUN SERPL-MCNC: 17 MG/DL (ref 6–20)
CALCIUM SERPL-MCNC: 9 MG/DL (ref 8.4–10.2)
CHLORIDE SERPL-SCNC: 101 MMOL/L (ref 96–108)
CHOLEST SERPL-MCNC: 110 MG/DL
CO2 SERPL-SCNC: 29 MMOL/L (ref 22–33)
CREAT SERPL-MCNC: 0.76 MG/DL (ref 0.4–1.1)
CREAT UR-MCNC: 145 MG/DL
EOSINOPHIL # BLD AUTO: 0.12 THOUSAND/ΜL (ref 0–0.61)
EOSINOPHIL NFR BLD AUTO: 2 % (ref 0–6)
ERYTHROCYTE [DISTWIDTH] IN BLOOD BY AUTOMATED COUNT: 12.1 % (ref 11.6–15.1)
EST. AVERAGE GLUCOSE BLD GHB EST-MCNC: 229 MG/DL
GFR SERPL CREATININE-BSD FRML MDRD: 84 ML/MIN/1.73SQ M
GLUCOSE P FAST SERPL-MCNC: 99 MG/DL (ref 70–105)
HBA1C MFR BLD: 9.6 %
HCT VFR BLD AUTO: 40.9 % (ref 34.8–46.1)
HCV AB SER QL: NORMAL
HDLC SERPL-MCNC: 36 MG/DL
HGB BLD-MCNC: 13.7 G/DL (ref 11.5–15.4)
IMM GRANULOCYTES # BLD AUTO: 0.01 THOUSAND/UL (ref 0–0.2)
IMM GRANULOCYTES NFR BLD AUTO: 0 % (ref 0–2)
LDLC SERPL CALC-MCNC: 51 MG/DL (ref 0–100)
LYMPHOCYTES # BLD AUTO: 2.12 THOUSANDS/ΜL (ref 0.6–4.47)
LYMPHOCYTES NFR BLD AUTO: 28 % (ref 14–44)
MCH RBC QN AUTO: 30.2 PG (ref 26.8–34.3)
MCHC RBC AUTO-ENTMCNC: 33.5 G/DL (ref 31.4–37.4)
MCV RBC AUTO: 90 FL (ref 82–98)
MICROALBUMIN UR-MCNC: 251 MG/L (ref 0–20)
MICROALBUMIN/CREAT 24H UR: 173 MG/G CREATININE (ref 0–30)
MONOCYTES # BLD AUTO: 0.51 THOUSAND/ΜL (ref 0.17–1.22)
MONOCYTES NFR BLD AUTO: 7 % (ref 4–12)
NEUTROPHILS # BLD AUTO: 4.68 THOUSANDS/ΜL (ref 1.85–7.62)
NEUTS SEG NFR BLD AUTO: 63 % (ref 43–75)
NONHDLC SERPL-MCNC: 74 MG/DL
PLATELET # BLD AUTO: 223 THOUSANDS/UL (ref 149–390)
PMV BLD AUTO: 10.5 FL (ref 8.9–12.7)
POTASSIUM SERPL-SCNC: 3.6 MMOL/L (ref 3.5–5)
PROT SERPL-MCNC: 7 G/DL (ref 6.4–8.3)
RBC # BLD AUTO: 4.54 MILLION/UL (ref 3.81–5.12)
SODIUM SERPL-SCNC: 139 MMOL/L (ref 133–145)
TRIGL SERPL-MCNC: 113.3 MG/DL
TSH SERPL DL<=0.05 MIU/L-ACNC: 3.2 UIU/ML (ref 0.34–5.6)
WBC # BLD AUTO: 7.47 THOUSAND/UL (ref 4.31–10.16)

## 2021-02-10 PROCEDURE — 86803 HEPATITIS C AB TEST: CPT

## 2021-02-10 PROCEDURE — 80053 COMPREHEN METABOLIC PANEL: CPT

## 2021-02-10 PROCEDURE — 85025 COMPLETE CBC W/AUTO DIFF WBC: CPT

## 2021-02-10 PROCEDURE — 84443 ASSAY THYROID STIM HORMONE: CPT

## 2021-02-10 PROCEDURE — 82043 UR ALBUMIN QUANTITATIVE: CPT | Performed by: INTERNAL MEDICINE

## 2021-02-10 PROCEDURE — 80061 LIPID PANEL: CPT

## 2021-02-10 PROCEDURE — 83036 HEMOGLOBIN GLYCOSYLATED A1C: CPT

## 2021-02-10 PROCEDURE — 36415 COLL VENOUS BLD VENIPUNCTURE: CPT

## 2021-02-10 PROCEDURE — 82570 ASSAY OF URINE CREATININE: CPT | Performed by: INTERNAL MEDICINE

## 2021-02-15 ENCOUNTER — OFFICE VISIT (OUTPATIENT)
Dept: FAMILY MEDICINE CLINIC | Facility: CLINIC | Age: 61
End: 2021-02-15
Payer: COMMERCIAL

## 2021-02-15 VITALS
RESPIRATION RATE: 16 BRPM | HEIGHT: 66 IN | BODY MASS INDEX: 30.59 KG/M2 | WEIGHT: 190.38 LBS | TEMPERATURE: 97 F | OXYGEN SATURATION: 99 % | SYSTOLIC BLOOD PRESSURE: 118 MMHG | HEART RATE: 80 BPM | DIASTOLIC BLOOD PRESSURE: 60 MMHG

## 2021-02-15 DIAGNOSIS — E11.9 TYPE 2 DIABETES MELLITUS WITHOUT COMPLICATION, WITH LONG-TERM CURRENT USE OF INSULIN (HCC): ICD-10-CM

## 2021-02-15 DIAGNOSIS — Z23 NEED FOR DIPHTHERIA-TETANUS-PERTUSSIS (TDAP) VACCINE: ICD-10-CM

## 2021-02-15 DIAGNOSIS — I10 ESSENTIAL HYPERTENSION: ICD-10-CM

## 2021-02-15 DIAGNOSIS — E11.9 CONTROLLED TYPE 2 DIABETES MELLITUS WITHOUT COMPLICATION, WITH LONG-TERM CURRENT USE OF INSULIN (HCC): Primary | ICD-10-CM

## 2021-02-15 DIAGNOSIS — Z12.31 ENCOUNTER FOR SCREENING MAMMOGRAM FOR MALIGNANT NEOPLASM OF BREAST: ICD-10-CM

## 2021-02-15 DIAGNOSIS — Z12.11 COLON CANCER SCREENING: ICD-10-CM

## 2021-02-15 DIAGNOSIS — R60.0 BILATERAL LEG EDEMA: ICD-10-CM

## 2021-02-15 DIAGNOSIS — E78.2 MIXED HYPERLIPIDEMIA: ICD-10-CM

## 2021-02-15 DIAGNOSIS — Z79.4 TYPE 2 DIABETES MELLITUS WITHOUT COMPLICATION, WITH LONG-TERM CURRENT USE OF INSULIN (HCC): ICD-10-CM

## 2021-02-15 DIAGNOSIS — R80.9 MICROALBUMINURIA: ICD-10-CM

## 2021-02-15 DIAGNOSIS — M79.89 LEG SWELLING: ICD-10-CM

## 2021-02-15 DIAGNOSIS — Z79.4 CONTROLLED TYPE 2 DIABETES MELLITUS WITHOUT COMPLICATION, WITH LONG-TERM CURRENT USE OF INSULIN (HCC): Primary | ICD-10-CM

## 2021-02-15 PROCEDURE — 99214 OFFICE O/P EST MOD 30 MIN: CPT | Performed by: INTERNAL MEDICINE

## 2021-02-15 PROCEDURE — 90715 TDAP VACCINE 7 YRS/> IM: CPT | Performed by: INTERNAL MEDICINE

## 2021-02-15 PROCEDURE — 90471 IMMUNIZATION ADMIN: CPT | Performed by: INTERNAL MEDICINE

## 2021-02-15 RX ORDER — LISINOPRIL 5 MG/1
5 TABLET ORAL DAILY
Qty: 90 TABLET | Refills: 3 | Status: SHIPPED | OUTPATIENT
Start: 2021-02-15 | End: 2021-03-31 | Stop reason: SDUPTHER

## 2021-02-15 NOTE — ASSESSMENT & PLAN NOTE
Continue Giorgio Bleacher and metformin-will bump up lisinopril to 5 mg daily as I think she's only taking 2 5 mg currently daily-try to get some daily exercise

## 2021-02-15 NOTE — ASSESSMENT & PLAN NOTE
Lab Results   Component Value Date    HGBA1C 9 6 (H) 02/10/2021   A1c is improved to 9 6%-this is a great improvement!  Still have a ways to go but improving-continue consistent [place] use, metformin and Gabrielle Roya is going to watch her diet and try to get some exercise-sees ophthalmology

## 2021-02-15 NOTE — ASSESSMENT & PLAN NOTE
Pt would like to get doppler US done on right leg-also reinforced sodium restriction, leg elevation, weight loss and exercise

## 2021-02-15 NOTE — PROGRESS NOTES
BMI Counseling: Body mass index is 30 73 kg/m²  The BMI is above normal  Nutrition recommendations include reducing portion sizes, decreasing overall calorie intake, 3-5 servings of fruits/vegetables daily, reducing fast food intake, consuming healthier snacks, decreasing soda and/or juice intake, moderation in carbohydrate intake, increasing intake of lean protein, reducing intake of saturated fat and trans fat and reducing intake of cholesterol  Assessment/Plan:    Microalbuminuria  Continue Lysle Commander and metformin-will bump up lisinopril to 5 mg daily as I think she's only taking 2 5 mg currently daily-try to get some daily exercise     Type 2 diabetes mellitus without complication, with long-term current use of insulin (HCC)    Lab Results   Component Value Date    HGBA1C 9 6 (H) 02/10/2021   A1c is improved to 9 6%-this is a great improvement! Still have a ways to go but improving-continue consistent [place] use, metformin and Aubrey Lazcano is going to watch her diet and try to get some exercise-sees ophthalmology    Bilateral leg edema  Pt would like to get doppler US done on right leg-also reinforced sodium restriction, leg elevation, weight loss and exercise    Mixed hyperlipidemia  LDL is 51 on simvastatin 40 mg daily       Diagnoses and all orders for this visit:    Controlled type 2 diabetes mellitus without complication, with long-term current use of insulin (HCC)    Essential hypertension    Mixed hyperlipidemia    Need for diphtheria-tetanus-pertussis (Tdap) vaccine  -     TDAP VACCINE GREATER THAN OR EQUAL TO 6YO IM    Colon cancer screening  Comments:  Repeat colonoscopy in April  Orders:  -     Ambulatory referral for colonoscopy; Future    BMI 30 0-30 9,adult    Type 2 diabetes mellitus without complication, with long-term current use of insulin (HCC)    Microalbuminuria  -     lisinopril (ZESTRIL) 5 mg tablet;  Take 1 tablet (5 mg total) by mouth daily    Leg swelling  -     VAS lower limb venous duplex study, unilateral/limited; Future    Bilateral leg edema    Encounter for screening mammogram for malignant neoplasm of breast  Comments:  Mammogram in May       Is interested in getting Covid and Shingrix vaccines, so will look into that    Subjective:      Patient ID: Lita Flood is a 56 y o  female  Haven Epley here for her interval checkup -she has a hx of poorly controlled DM II, HL, peripheral edema, elevated alb/cr-doing ok-is now using her [place] consistently and her A1c is down to 9 6% from 10 2%-that's great-she did gain some weight but she attributes that to insulin and pandemic-her LDL cholesterol is better too at 51-she does complain about her chronic venous stasis changes and that her right leg sometimes looks more swollen and pinker than the left leg-told her we'd do a Doppler just to rule out DVT-she is interested in getting both the Covid and Shingrix vaccines and will look into them both-urine alb/cr ratio still high so will increase her lisinopril a bit-has podiatrist appointment this week, and is due for mammogram in May and repeat colonoscopy in April      The following portions of the patient's history were reviewed and updated as appropriate: allergies, past family history, past social history and past surgical history  Review of Systems   Constitutional: Negative  HENT: Negative  Respiratory: Negative  Cardiovascular: Positive for leg swelling  Negative for chest pain and palpitations  Endocrine: Negative  Genitourinary:        Stress incontinence   Musculoskeletal: Negative  Neurological: Negative  Hematological: Negative  Psychiatric/Behavioral: Negative            Objective:      /60 (BP Location: Left arm, Patient Position: Sitting, Cuff Size: Adult)   Pulse 80   Temp (!) 97 °F (36 1 °C) (Temporal)   Resp 16   Ht 5' 6" (1 676 m)   Wt 86 4 kg (190 lb 6 oz)   SpO2 99%   BMI 30 73 kg/m²          Physical Exam  Constitutional: Appearance: Normal appearance  She is obese  HENT:      Head: Normocephalic and atraumatic  Right Ear: External ear normal       Left Ear: External ear normal       Nose: Nose normal       Mouth/Throat:      Mouth: Mucous membranes are moist    Eyes:      General: No scleral icterus  Conjunctiva/sclera: Conjunctivae normal       Pupils: Pupils are equal, round, and reactive to light  Neck:      Musculoskeletal: Normal range of motion and neck supple  Vascular: No carotid bruit  Cardiovascular:      Rate and Rhythm: Normal rate and regular rhythm  Pulses: no weak pulses          Dorsalis pedis pulses are 2+ on the right side and 2+ on the left side  Posterior tibial pulses are 2+ on the right side and 2+ on the left side  Pulmonary:      Effort: Pulmonary effort is normal       Breath sounds: Normal breath sounds  Abdominal:      General: Abdomen is flat  Palpations: Abdomen is soft  Musculoskeletal:      Right lower leg: Edema present  Left lower leg: Edema present  Feet:      Right foot:      Skin integrity: Dry skin present  Left foot:      Skin integrity: Dry skin present  Skin:     Comments: Chronic venous stasis color changes legs b/l, dry skin   Neurological:      General: No focal deficit present  Mental Status: She is alert and oriented to person, place, and time  Psychiatric:         Mood and Affect: Mood normal          Behavior: Behavior normal          Thought Content: Thought content normal          Judgment: Judgment normal        Patient's shoes and socks removed  Right Foot/Ankle   Right Foot Inspection  Skin Exam: skin intact and dry skin                          Toe Exam: ROM and strength within normal limitsno swelling  Sensory       Monofilament testing: intact  Vascular  Capillary refills: < 3 seconds  The right DP pulse is 2+  The right PT pulse is 2+       Left Foot/Ankle  Left Foot Inspection  Skin Exam: skin intact and dry skin Sensory       Monofilament: intact  Vascular  Capillary refills: < 3 seconds  The left DP pulse is 2+  The left PT pulse is 2+  Assign Risk Category:  No deformity present; No loss of protective sensation;  No weak pulses       Risk: 0

## 2021-02-16 ENCOUNTER — TELEPHONE (OUTPATIENT)
Dept: GASTROENTEROLOGY | Facility: CLINIC | Age: 61
End: 2021-02-16

## 2021-02-16 NOTE — TELEPHONE ENCOUNTER
Pt returned call, ariana for me to call her back to schedule her procedure  I returned call, started to go over pt's allergies and meds and could not hear pt fully when going over them  Finally got through meds and asked another question and then got disconnected from pt  I will wait for her to call back as such a back connection  If do not hear back from her will call her again in one week to try to schedule

## 2021-02-16 NOTE — TELEPHONE ENCOUNTER
Received order from Dr Ryann Ross for pt to be scheduled for colon  Pt is due 4/11/21 for recall colon for hx of polyps with Dr Raudel Ahuja  I lmom for pt to please call back to schedule procedure with Dr Raudel Ahuja  Will call pt again in one week if do not hear back from her

## 2021-02-17 NOTE — TELEPHONE ENCOUNTER
Patient returned call asking for a call at work([phone])  I returned her call and had to leave message for her to call back

## 2021-02-22 ENCOUNTER — TELEPHONE (OUTPATIENT)
Dept: GASTROENTEROLOGY | Facility: CLINIC | Age: 61
End: 2021-02-22

## 2021-02-22 NOTE — TELEPHONE ENCOUNTER
Returned pts call to schedule colon with Dr Dee Hunter  It is her work izabella  I was unable to leave message  If she calls back please get her scheduled for colon  Recall for April 21  Hx of polyps

## 2021-02-23 DIAGNOSIS — Z79.4 TYPE 2 DIABETES MELLITUS WITHOUT COMPLICATION, WITH LONG-TERM CURRENT USE OF INSULIN (HCC): Primary | ICD-10-CM

## 2021-02-23 DIAGNOSIS — E11.9 TYPE 2 DIABETES MELLITUS WITHOUT COMPLICATION, WITH LONG-TERM CURRENT USE OF INSULIN (HCC): Primary | ICD-10-CM

## 2021-03-17 ENCOUNTER — TELEPHONE (OUTPATIENT)
Dept: FAMILY MEDICINE CLINIC | Facility: CLINIC | Age: 61
End: 2021-03-17

## 2021-03-24 ENCOUNTER — IMMUNIZATIONS (OUTPATIENT)
Dept: FAMILY MEDICINE CLINIC | Facility: HOSPITAL | Age: 61
End: 2021-03-24

## 2021-03-24 DIAGNOSIS — Z23 ENCOUNTER FOR IMMUNIZATION: Primary | ICD-10-CM

## 2021-03-24 PROCEDURE — 91300 SARS-COV-2 / COVID-19 MRNA VACCINE (PFIZER-BIONTECH) 30 MCG: CPT

## 2021-03-24 PROCEDURE — 0001A SARS-COV-2 / COVID-19 MRNA VACCINE (PFIZER-BIONTECH) 30 MCG: CPT

## 2021-03-31 ENCOUNTER — TELEPHONE (OUTPATIENT)
Dept: FAMILY MEDICINE CLINIC | Facility: CLINIC | Age: 61
End: 2021-03-31

## 2021-03-31 DIAGNOSIS — R80.9 MICROALBUMINURIA: ICD-10-CM

## 2021-03-31 RX ORDER — LISINOPRIL 5 MG/1
5 TABLET ORAL DAILY
Qty: 90 TABLET | Refills: 3 | Status: CANCELLED | OUTPATIENT
Start: 2021-03-31

## 2021-03-31 RX ORDER — LISINOPRIL 5 MG/1
5 TABLET ORAL DAILY
Qty: 90 TABLET | Refills: 3 | Status: SHIPPED | OUTPATIENT
Start: 2021-03-31 | End: 2021-09-03 | Stop reason: HOSPADM

## 2021-03-31 NOTE — TELEPHONE ENCOUNTER
[facility]      They need a new script for Lisinopril 5mg, 1x a day (90 day)    Patient ph # [phone]

## 2021-04-14 ENCOUNTER — IMMUNIZATIONS (OUTPATIENT)
Dept: FAMILY MEDICINE CLINIC | Facility: HOSPITAL | Age: 61
End: 2021-04-14

## 2021-04-14 DIAGNOSIS — Z23 ENCOUNTER FOR IMMUNIZATION: Primary | ICD-10-CM

## 2021-04-14 PROCEDURE — 0002A SARS-COV-2 / COVID-19 MRNA VACCINE (PFIZER-BIONTECH) 30 MCG: CPT

## 2021-04-14 PROCEDURE — 91300 SARS-COV-2 / COVID-19 MRNA VACCINE (PFIZER-BIONTECH) 30 MCG: CPT

## 2021-04-16 ENCOUNTER — HOSPITAL ENCOUNTER (OUTPATIENT)
Dept: GASTROENTEROLOGY | Facility: AMBULATORY SURGERY CENTER | Age: 61
Discharge: HOME | End: 2021-04-16
Payer: COMMERCIAL

## 2021-04-16 ENCOUNTER — ANESTHESIA (OUTPATIENT)
Dept: GASTROENTEROLOGY | Facility: AMBULATORY SURGERY CENTER | Age: 61
End: 2021-04-16

## 2021-04-16 ENCOUNTER — ANESTHESIA EVENT (OUTPATIENT)
Dept: GASTROENTEROLOGY | Facility: AMBULATORY SURGERY CENTER | Age: 61
End: 2021-04-16

## 2021-04-16 VITALS
WEIGHT: 180 LBS | OXYGEN SATURATION: 98 % | RESPIRATION RATE: 18 BRPM | DIASTOLIC BLOOD PRESSURE: 85 MMHG | HEART RATE: 73 BPM | SYSTOLIC BLOOD PRESSURE: 161 MMHG | BODY MASS INDEX: 28.93 KG/M2 | HEIGHT: 66 IN | TEMPERATURE: 95.4 F

## 2021-04-16 DIAGNOSIS — Z86.010 HX OF COLONIC POLYPS: ICD-10-CM

## 2021-04-16 PROBLEM — K22.70 BARRETT'S ESOPHAGUS WITHOUT DYSPLASIA: Status: RESOLVED | Noted: 2021-04-16 | Resolved: 2021-04-16

## 2021-04-16 PROBLEM — K63.5 COLON POLYPS: Status: ACTIVE | Noted: 2021-04-16

## 2021-04-16 PROBLEM — K25.9 GASTRIC ULCER: Status: ACTIVE | Noted: 2021-04-16

## 2021-04-16 PROBLEM — K31.7 MULTIPLE GASTRIC POLYPS: Status: RESOLVED | Noted: 2021-04-16 | Resolved: 2021-04-16

## 2021-04-16 PROBLEM — K21.00 GASTROESOPHAGEAL REFLUX DISEASE WITH ESOPHAGITIS WITHOUT HEMORRHAGE: Status: ACTIVE | Noted: 2021-04-16

## 2021-04-16 PROBLEM — K31.7 MULTIPLE GASTRIC POLYPS: Status: ACTIVE | Noted: 2021-04-16

## 2021-04-16 PROBLEM — K22.70 BARRETT'S ESOPHAGUS WITHOUT DYSPLASIA: Status: ACTIVE | Noted: 2021-04-16

## 2021-04-16 PROBLEM — K25.9 GASTRIC ULCER: Status: RESOLVED | Noted: 2021-04-16 | Resolved: 2021-04-16

## 2021-04-16 PROCEDURE — 88305 TISSUE EXAM BY PATHOLOGIST: CPT | Performed by: PATHOLOGY

## 2021-04-16 PROCEDURE — 45380 COLONOSCOPY AND BIOPSY: CPT | Performed by: INTERNAL MEDICINE

## 2021-04-16 PROCEDURE — 45385 COLONOSCOPY W/LESION REMOVAL: CPT | Performed by: INTERNAL MEDICINE

## 2021-04-16 PROCEDURE — 00811 ANES LWR INTST NDSC NOS: CPT | Performed by: NURSE ANESTHETIST, CERTIFIED REGISTERED

## 2021-04-16 RX ORDER — SODIUM CHLORIDE 9 MG/ML
INJECTION, SOLUTION INTRAVENOUS CONTINUOUS PRN
Status: DISCONTINUED | OUTPATIENT
Start: 2021-04-16 | End: 2021-04-16

## 2021-04-16 RX ORDER — PROPOFOL 10 MG/ML
INJECTION, EMULSION INTRAVENOUS AS NEEDED
Status: DISCONTINUED | OUTPATIENT
Start: 2021-04-16 | End: 2021-04-16

## 2021-04-16 RX ORDER — SODIUM CHLORIDE 9 MG/ML
30 INJECTION, SOLUTION INTRAVENOUS CONTINUOUS
Status: DISCONTINUED | OUTPATIENT
Start: 2021-04-16 | End: 2021-04-20 | Stop reason: HOSPADM

## 2021-04-16 RX ORDER — SODIUM CHLORIDE 9 MG/ML
20 INJECTION, SOLUTION INTRAVENOUS CONTINUOUS
Status: DISCONTINUED | OUTPATIENT
Start: 2021-04-16 | End: 2021-04-20 | Stop reason: HOSPADM

## 2021-04-16 RX ADMIN — PROPOFOL 50 MG: 10 INJECTION, EMULSION INTRAVENOUS at 08:39

## 2021-04-16 RX ADMIN — PROPOFOL 50 MG: 10 INJECTION, EMULSION INTRAVENOUS at 08:41

## 2021-04-16 RX ADMIN — SODIUM CHLORIDE: 9 INJECTION, SOLUTION INTRAVENOUS at 08:37

## 2021-04-16 RX ADMIN — PROPOFOL 50 MG: 10 INJECTION, EMULSION INTRAVENOUS at 08:49

## 2021-04-16 NOTE — ANESTHESIA PREPROCEDURE EVALUATION
Procedure:  COLONOSCOPY    Relevant Problems   CARDIO   (+) Essential hypertension   (+) Mixed hyperlipidemia      ENDO   (+) Type 2 diabetes mellitus without complication, with long-term current use of insulin (HCC)        Physical Exam    Airway    Mallampati score: II  TM Distance: <3 FB  Neck ROM: full     Dental   No notable dental hx     Cardiovascular  Rhythm: regular, Rate: normal, Cardiovascular exam normal    Pulmonary  Pulmonary exam normal     Other Findings        Anesthesia Plan  ASA Score- 2     Anesthesia Type- IV sedation with anesthesia with ASA Monitors  Additional Monitors:   Airway Plan:           Plan Factors-    Induction- intravenous  Postoperative Plan-     Informed Consent- Anesthetic plan and risks discussed with patient

## 2021-04-16 NOTE — DISCHARGE INSTRUCTIONS
Colonoscopy   WHAT YOU NEED TO KNOW:   A colonoscopy is a procedure to examine the inside of your colon (intestine) with a scope  Polyps or tissue growths may have been removed during your colonoscopy  It is normal to feel bloated and to have some abdominal discomfort  You should be passing gas  If you have hemorrhoids or you had polyps removed, you may have a small amount of bleeding  DISCHARGE INSTRUCTIONS:   Seek care immediately if:    You have sudden, severe abdominal pain   You have problems swallowing   You have a large amount of black, sticky bowel movements or blood in your bowel movements   You have sudden trouble breathing   You feel weak, lightheaded, or faint or your heart beats faster than normal for you  Contact your healthcare provider if:    You have a fever and chills   You have nausea or are vomiting   Your abdomen is bloated or feels full and hard   You have abdominal pain   You have black, sticky bowel movements or blood in your bowel movements   You have not had a bowel movement for 3 days after your procedure   You have rash or hives   You have questions or concerns about your procedure  Activity:    Do not lift, strain, or run for 24 hours after your procedure   Rest after your procedure  You have been given medicine to relax you  Do not drive or make important decisions until the day after your procedure  Return to your normal activity as directed   Relieve gas and discomfort from bloating by lying on your right side with a heating pad on your abdomen  You may need to take short walks to help the gas move out  Eat small meals until bloating is relieved  Follow up with your healthcare provider as directed: Write down your questions so you remember to ask them during your visits  If you take a blood thinner, please review the specific instructions from your endoscopist about when you should resume it   These can be found in the Recommendation and Your Medication list sections of this After Visit Summary  High Fiber Diet   WHAT YOU NEED TO KNOW:   What is a high-fiber diet? A high-fiber diet includes foods that have a high amount of fiber  Fiber is the part of fruits, vegetables, and grains that is not broken down by your body  Fiber keeps your bowel movements regular  Fiber can also help lower your cholesterol level, control blood sugar in people with diabetes, and relieve constipation  Fiber can also help you control your weight because it helps you feel full faster  Most adults should eat 25 to 35 grams of fiber each day  Talk to your dietitian or healthcare provider about the amount of fiber you need  What foods are good sources of fiber? · Foods with at least 4 grams of fiber per serving:      ? ? to ½ cup of high-fiber cereal (check the nutrition label on the box)    ? ½ cup of blackberries or raspberries    ? 4 dried prunes    ? 1 cooked artichoke    ? ½ cup of cooked legumes, such as lentils, or red, kidney, and churchill beans    · Foods with 1 to 3 grams of fiber per serving:      ? 1 slice of whole-wheat, pumpernickel, or rye bread    ? ½ cup of cooked brown rice    ? 4 whole-wheat crackers    ? 1 cup of oatmeal    ? ½ cup of cereal with 1 to 3 grams of fiber per serving (check the nutrition label on the box)    ? 1 small piece of fruit, such as an apple, banana, pear, kiwi, or orange    ? 3 dates    ? ½ cup of canned apricots, fruit cocktail, peaches, or pears    ? ½ cup of raw or cooked vegetables, such as carrots, cauliflower, cabbage, spinach, squash, or corn  What are some ways that I can increase fiber in my diet? · Choose brown or wild rice instead of white rice  · Use whole wheat flour in recipes instead of white or all-purpose flour  · Add beans and peas to casseroles or soups  · Choose fresh fruit and vegetables with peels or skins on instead of juices      What other guidelines should I follow? · Add fiber to your diet slowly  You may have abdominal discomfort, bloating, and gas if you add fiber to your diet too quickly  · Drink plenty of liquids as you add fiber to your diet  You may have nausea or develop constipation if you do not drink enough water  Ask how much liquid to drink each day and which liquids are best for you  CARE AGREEMENT:   You have the right to help plan your care  Discuss treatment options with your healthcare provider to decide what care you want to receive  You always have the right to refuse treatment  The above information is an  only  It is not intended as medical advice for individual conditions or treatments  Talk to your doctor, nurse or pharmacist before following any medical regimen to see if it is safe and effective for you  © Copyright [address] Information is for End User's use only and may not be sold, redistributed or otherwise used for commercial purposes  All illustrations and images included in CareNotes® are the copyrighted property of A D A M , Inc  or [address] [place], [place] [address]anila Barksdale   Diverticulosis   WHAT YOU NEED TO KNOW:   What is diverticulosis? Diverticulosis is a condition that causes small pockets called diverticula to form in your intestine  These pockets make it difficult for bowel movements to pass through your digestive system  What causes diverticulosis? Diverticula form when muscles have to work hard to move bowel movements through the intestine  The force causes bulges to form at weak areas in the intestine  This may happen if you eat foods that are low in fiber  Fiber helps give your bowel movements more bulk so they are larger and easier to move through your colon  The following may increase your risk of diverticulosis:  · A history of constipation    · Age 36 or older    · Obesity    · Lack of exercise    What are the signs and symptoms of diverticulosis?   Diverticulosis usually does not cause any signs or symptoms  It may cause any of the following in some people:  · Pain or discomfort in your lower abdomen    · Abdominal bloating    · Constipation or diarrhea    How is diverticulosis diagnosed? Your healthcare provider will examine you and ask about your bowel movements, diet, and symptoms  He or she will also ask about any medical conditions you have or medicines you take  You may need any of the following:  · Blood tests  may be done to check for signs of inflammation  · A barium enema  is an x-ray of your colon that may show diverticula  A tube is put into your anus, and a liquid called barium is put through the tube  Barium is used so that healthcare providers can see your colon more clearly  · Flexible sigmoidoscopy  is a test to look for any changes in your lower intestines and rectum  It may also show the cause of any bleeding or pain  A soft, bendable tube with a light on the end will be put into your anus  It will then be moved forward into your intestine  · A colonoscopy  is used to look at your whole colon  A scope (long bendable tube with a light on the end) is used to take pictures  This test may show diverticula  · A CT scan , or CAT scan, may show diverticula  You may be given contrast liquid before the scan  Tell the healthcare provider if you have ever had an allergic reaction to contrast liquid  How is diverticulosis managed? The goal of treatment is to manage any symptoms you have and prevent other problems such as diverticulitis  Diverticulitis is swelling or infection of the diverticula  Your healthcare provider may recommend any of the following:  · Eat a variety of high-fiber foods  High-fiber foods help you have regular bowel movements  High-fiber foods include cooked beans, fruits, vegetables, and some cereals  Most adults need 25 to 35 grams of fiber each day  Your healthcare provider may recommend that you have more   Ask your healthcare provider how much fiber you need  Increase fiber slowly  You may have abdominal discomfort, bloating, and gas if you add fiber to your diet too quickly  You may need to take a fiber supplement if you are not getting enough fiber from food  · Medicines  to soften your bowel movements may be given  You may also need medicines to treat symptoms such as bloating and pain  · Drink liquids as directed  You may need to drink 2 to 3 liters (8 to 12 cups) of liquids every day  Ask your healthcare provider how much liquid to drink each day and which liquids are best for you  · Apply heat  on your abdomen for 20 to 30 minutes every 2 hours for as many days as directed  Heat helps decrease pain and muscle spasms  How can I help prevent diverticulitis or other symptoms? The following may help decrease your risk for diverticulitis or symptoms, such as bleeding  Talk to your provider about these or other things you can do to prevent problems that may occur with diverticulosis  · Exercise regularly  Ask your healthcare provider about the best exercise plan for you  Exercise can help you have regular bowel movements  Get 30 minutes of exercise on most days of the week  · Maintain a healthy weight  Ask your healthcare provider how much you should weigh  Ask him or her to help you create a weight loss plan if you are overweight  · Do not smoke  Nicotine and other chemicals in cigarettes increase your risk for diverticulitis  Ask your healthcare provider for information if you currently smoke and need help to quit  E-cigarettes or smokeless tobacco still contain nicotine  Talk to your healthcare provider before you use these products  · Ask your healthcare provider if it is safe to take NSAIDs  NSAIDs may increase your risk of diverticulitis  When should I seek immediate care? · You have severe pain on the left side of your lower abdomen  · Your bowel movements are bright or dark red      When should I contact my healthcare provider? · You have a fever and chills  · You feel dizzy or lightheaded  · You have nausea, or you are vomiting  · You have a change in your bowel movements  · You have questions or concerns about your condition or care  CARE AGREEMENT:   You have the right to help plan your care  Learn about your health condition and how it may be treated  Discuss treatment options with your healthcare providers to decide what care you want to receive  You always have the right to refuse treatment  The above information is an  only  It is not intended as medical advice for individual conditions or treatments  Talk to your doctor, nurse or pharmacist before following any medical regimen to see if it is safe and effective for you  © Copyright [address] Information is for End User's use only and may not be sold, redistributed or otherwise used for commercial purposes  All illustrations and images included in CareNotes® are the copyrighted property of A D A M , Inc  or [address] [place] [address] Shamir   Colorectal Polyps   WHAT YOU NEED TO KNOW:   What are colorectal polyps? Colorectal polyps are small growths of tissue in the lining of the colon and rectum  Most polyps are hyperplastic polyps and are usually benign (noncancerous)  Certain types of polyps, called adenomatous polyps, may turn into cancer  What increases my risk of colorectal polyps? The exact cause of colorectal polyps is unknown  The following may increase your risk:  · Older age    · A diet of foods high in fat and low in fiber     · Family history of polyps    · Intestinal diseases, such as Crohn's disease or ulcerative colitis    · An unhealthy lifestyle, such as physical inactivity, smoking, or drinking alcohol    · Obesity    What are the signs and symptoms of colorectal polyps?    · Blood in your bowel movement or bleeding from the rectum    · Change in bowel movement habits, such as diarrhea and constipation    · Abdominal pain    How are colorectal polyps diagnosed? You should have fecal blood screening once a year for colorectal disease if you are over 48years old  You should be screened earlier if you have an intestinal disease or a family history of polyps or colorectal cancer  During this screening, a sample of your bowel movement is checked for blood, which may be an early sign of colorectal polyps or cancer  You may also need any of the following tests:  · Digital rectal exam:  Your healthcare provider will examine your anus and use a finger to check your rectum for polyps  · Barium enema: A barium enema is an x-ray of the colon  A tube is put into your anus, and a liquid called barium is put through the tube  Barium is used so that healthcare providers can see your colon better on the x-ray film  · Virtual colonoscopy: This is a CT scan that takes pictures of the inside of your colon and rectum  A small, flexible tube is put into your rectum and air or carbon dioxide (gas) is used to expand your colon  This lets healthcare providers clearly see your colon and any polyps on a monitor  · Colonoscopy or sigmoidoscopy: These procedures help your healthcare provider see the inside of your colon using a flexible tube with a small light and camera on the end  During a sigmoidoscopy, your healthcare provider will only look at rectum and lower colon  During a colonoscopy, healthcare providers will look at the full length of your colon  Healthcare providers may remove a small amount of tissue from the colon for a biopsy  How are colorectal polyps treated? A polypectomy is a minimally invasive procedure to remove your polyps  They may be removed during a colonoscopy or sigmoidoscopy  Your healthcare provider may need to remove the polyps with a laparoscope  Laparoscopy is done by inserting a small, flexible scope into incisions made on your abdomen  What are the risks of colorectal polyps? You may bleed during a colonoscopy procedure  Your bowel may be perforated (torn) when polyps are removed  This may lead to an open abdominal surgery  During surgery, you may bleed too much or get an infection  Adenomatous polyps that are not removed may turn into cancer and become more difficult to treat  Where can I find support and more information? · [address] ([facility])  [address] , [place] [address]  Phone: [phone]  Web Address: Merline Moron  [facility] gov    When should I contact my healthcare provider? · You have a fever  · You have chills, a cough, or feel weak and achy  · You have abdominal pain that does not go away or gets worse after you take medicine  · Your abdomen is swollen  · You are losing weight without trying  · You have questions or concerns about your condition or care  When should I seek immediate care or call 911? · You have sudden shortness of breath  · You have a fast heart rate, fast breathing, or are too dizzy to stand up  · You have severe abdominal pain  · You see blood in your bowel movement  CARE AGREEMENT:   You have the right to help plan your care  Learn about your health condition and how it may be treated  Discuss treatment options with your healthcare providers to decide what care you want to receive  You always have the right to refuse treatment  The above information is an  only  It is not intended as medical advice for individual conditions or treatments  Talk to your doctor, nurse or pharmacist before following any medical regimen to see if it is safe and effective for you  © Copyright [address] Information is for End User's use only and may not be sold, redistributed or otherwise used for commercial purposes   All illustrations and images included in CareNotes® are the copyrighted property of [employer]  or [facility] [employer]

## 2021-04-16 NOTE — ANESTHESIA POSTPROCEDURE EVALUATION
Post-Op Assessment Note    CV Status:  Stable  Pain Score: 0    Pain management: adequate     Mental Status:  Sleepy   Hydration Status:  Stable   PONV Controlled:  None   Airway Patency:  Patent      Post Op Vitals Reviewed: Yes      Staff: CRNA         No complications documented      BP   121/56   Temp     Pulse  72   Resp   14   SpO2   98

## 2021-05-25 LAB
LEFT EYE DIABETIC RETINOPATHY: NORMAL
RIGHT EYE DIABETIC RETINOPATHY: NORMAL

## 2021-06-15 ENCOUNTER — TELEPHONE (OUTPATIENT)
Dept: FAMILY MEDICINE CLINIC | Facility: CLINIC | Age: 61
End: 2021-06-15

## 2021-06-15 DIAGNOSIS — Z12.31 BREAST CANCER SCREENING BY MAMMOGRAM: Primary | ICD-10-CM

## 2021-06-25 DIAGNOSIS — E11.69 TYPE 2 DIABETES MELLITUS WITH OTHER SPECIFIED COMPLICATION, WITH LONG-TERM CURRENT USE OF INSULIN (HCC): ICD-10-CM

## 2021-06-25 DIAGNOSIS — Z79.4 TYPE 2 DIABETES MELLITUS WITH OTHER SPECIFIED COMPLICATION, WITH LONG-TERM CURRENT USE OF INSULIN (HCC): ICD-10-CM

## 2021-06-25 RX ORDER — SITAGLIPTIN 100 MG/1
TABLET, FILM COATED ORAL
Qty: 90 TABLET | Refills: 3 | Status: SHIPPED | OUTPATIENT
Start: 2021-06-25 | End: 2021-09-03 | Stop reason: HOSPADM

## 2021-06-25 RX ORDER — SIMVASTATIN 40 MG
TABLET ORAL
Qty: 90 TABLET | Refills: 3 | Status: SHIPPED | OUTPATIENT
Start: 2021-06-25 | End: 2021-09-03 | Stop reason: HOSPADM

## 2021-07-16 DIAGNOSIS — E11.9 TYPE 2 DIABETES MELLITUS WITHOUT COMPLICATIONS (HCC): ICD-10-CM

## 2021-07-21 ENCOUNTER — TELEPHONE (OUTPATIENT)
Dept: FAMILY MEDICINE CLINIC | Facility: CLINIC | Age: 61
End: 2021-07-21

## 2021-07-21 NOTE — TELEPHONE ENCOUNTER
Candice Elaine fell on 7/5, hurt L leg shin  Has brush burn & at times looks pink & infected  Is using Neosporin but not healing  She is asking if there's something else she should be doing differently  Offered her an appt OR V V  with someone else (you are booked) but she refused

## 2021-07-28 ENCOUNTER — HOSPITAL ENCOUNTER (OUTPATIENT)
Dept: RADIOLOGY | Age: 61
Discharge: HOME | End: 2021-07-28
Payer: COMMERCIAL

## 2021-07-28 VITALS — HEIGHT: 66 IN | WEIGHT: 180 LBS | BODY MASS INDEX: 28.93 KG/M2

## 2021-07-28 DIAGNOSIS — Z12.31 BREAST CANCER SCREENING BY MAMMOGRAM: ICD-10-CM

## 2021-07-28 PROCEDURE — 77063 BREAST TOMOSYNTHESIS BI: CPT

## 2021-07-28 PROCEDURE — 77067 SCR MAMMO BI INCL CAD: CPT

## 2021-08-05 ENCOUNTER — VBI (OUTPATIENT)
Dept: ADMINISTRATIVE | Facility: OTHER | Age: 61
End: 2021-08-05

## 2021-08-29 ENCOUNTER — HOSPITAL ENCOUNTER (INPATIENT)
Facility: HOSPITAL | Age: 61
LOS: 2 days | DRG: 286 | End: 2021-09-02
Attending: EMERGENCY MEDICINE | Admitting: INTERNAL MEDICINE
Payer: COMMERCIAL

## 2021-08-29 ENCOUNTER — APPOINTMENT (EMERGENCY)
Dept: CT IMAGING | Facility: HOSPITAL | Age: 61
DRG: 286 | End: 2021-08-29
Payer: COMMERCIAL

## 2021-08-29 ENCOUNTER — APPOINTMENT (EMERGENCY)
Dept: RADIOLOGY | Facility: HOSPITAL | Age: 61
DRG: 286 | End: 2021-08-29
Payer: COMMERCIAL

## 2021-08-29 DIAGNOSIS — R42 DIZZINESS: Primary | ICD-10-CM

## 2021-08-29 DIAGNOSIS — D32.9 MENINGIOMA (HCC): ICD-10-CM

## 2021-08-29 DIAGNOSIS — I44.7 LEFT BUNDLE BRANCH BLOCK: ICD-10-CM

## 2021-08-29 LAB
ALBUMIN SERPL BCP-MCNC: 3.6 G/DL (ref 3.5–5)
ALP SERPL-CCNC: 71 U/L (ref 46–116)
ALT SERPL W P-5'-P-CCNC: 40 U/L (ref 12–78)
ANION GAP SERPL CALCULATED.3IONS-SCNC: 11 MMOL/L (ref 4–13)
AST SERPL W P-5'-P-CCNC: 18 U/L (ref 5–45)
BASOPHILS # BLD AUTO: 0.04 THOUSANDS/ΜL (ref 0–0.1)
BASOPHILS NFR BLD AUTO: 1 % (ref 0–1)
BILIRUB SERPL-MCNC: 0.72 MG/DL (ref 0.2–1)
BUN SERPL-MCNC: 22 MG/DL (ref 5–25)
CALCIUM SERPL-MCNC: 9.3 MG/DL (ref 8.3–10.1)
CHLORIDE SERPL-SCNC: 102 MMOL/L (ref 100–108)
CO2 SERPL-SCNC: 25 MMOL/L (ref 21–32)
CREAT SERPL-MCNC: 1.05 MG/DL (ref 0.6–1.3)
CRP SERPL QL: <3 MG/L
EOSINOPHIL # BLD AUTO: 0.06 THOUSAND/ΜL (ref 0–0.61)
EOSINOPHIL NFR BLD AUTO: 1 % (ref 0–6)
ERYTHROCYTE [DISTWIDTH] IN BLOOD BY AUTOMATED COUNT: 12.1 % (ref 11.6–15.1)
GFR SERPL CREATININE-BSD FRML MDRD: 57 ML/MIN/1.73SQ M
GLUCOSE SERPL-MCNC: 223 MG/DL (ref 65–140)
GLUCOSE SERPL-MCNC: 390 MG/DL (ref 65–140)
HCT VFR BLD AUTO: 42.4 % (ref 34.8–46.1)
HGB BLD-MCNC: 14.3 G/DL (ref 11.5–15.4)
IMM GRANULOCYTES # BLD AUTO: 0.03 THOUSAND/UL (ref 0–0.2)
IMM GRANULOCYTES NFR BLD AUTO: 1 % (ref 0–2)
LYMPHOCYTES # BLD AUTO: 1.63 THOUSANDS/ΜL (ref 0.6–4.47)
LYMPHOCYTES NFR BLD AUTO: 29 % (ref 14–44)
MCH RBC QN AUTO: 30.3 PG (ref 26.8–34.3)
MCHC RBC AUTO-ENTMCNC: 33.7 G/DL (ref 31.4–37.4)
MCV RBC AUTO: 90 FL (ref 82–98)
MONOCYTES # BLD AUTO: 0.44 THOUSAND/ΜL (ref 0.17–1.22)
MONOCYTES NFR BLD AUTO: 8 % (ref 4–12)
NEUTROPHILS # BLD AUTO: 3.52 THOUSANDS/ΜL (ref 1.85–7.62)
NEUTS SEG NFR BLD AUTO: 60 % (ref 43–75)
NRBC BLD AUTO-RTO: 0 /100 WBCS
PLATELET # BLD AUTO: 190 THOUSANDS/UL (ref 149–390)
PMV BLD AUTO: 11.2 FL (ref 8.9–12.7)
POTASSIUM SERPL-SCNC: 4.7 MMOL/L (ref 3.5–5.3)
PROT SERPL-MCNC: 7.4 G/DL (ref 6.4–8.2)
RBC # BLD AUTO: 4.72 MILLION/UL (ref 3.81–5.12)
SODIUM SERPL-SCNC: 138 MMOL/L (ref 136–145)
TROPONIN I SERPL-MCNC: <0.02 NG/ML
TROPONIN I SERPL-MCNC: <0.02 NG/ML
WBC # BLD AUTO: 5.72 THOUSAND/UL (ref 4.31–10.16)

## 2021-08-29 PROCEDURE — 84484 ASSAY OF TROPONIN QUANT: CPT | Performed by: NURSE PRACTITIONER

## 2021-08-29 PROCEDURE — 83036 HEMOGLOBIN GLYCOSYLATED A1C: CPT | Performed by: NURSE PRACTITIONER

## 2021-08-29 PROCEDURE — 93005 ELECTROCARDIOGRAM TRACING: CPT

## 2021-08-29 PROCEDURE — 82948 REAGENT STRIP/BLOOD GLUCOSE: CPT

## 2021-08-29 PROCEDURE — 85025 COMPLETE CBC W/AUTO DIFF WBC: CPT | Performed by: EMERGENCY MEDICINE

## 2021-08-29 PROCEDURE — 36415 COLL VENOUS BLD VENIPUNCTURE: CPT

## 2021-08-29 PROCEDURE — 99285 EMERGENCY DEPT VISIT HI MDM: CPT

## 2021-08-29 PROCEDURE — 86140 C-REACTIVE PROTEIN: CPT | Performed by: NURSE PRACTITIONER

## 2021-08-29 PROCEDURE — 80053 COMPREHEN METABOLIC PANEL: CPT | Performed by: EMERGENCY MEDICINE

## 2021-08-29 PROCEDURE — 99220 PR INITIAL OBSERVATION CARE/DAY 70 MINUTES: CPT | Performed by: NURSE PRACTITIONER

## 2021-08-29 PROCEDURE — 84484 ASSAY OF TROPONIN QUANT: CPT | Performed by: EMERGENCY MEDICINE

## 2021-08-29 PROCEDURE — 99285 EMERGENCY DEPT VISIT HI MDM: CPT | Performed by: EMERGENCY MEDICINE

## 2021-08-29 PROCEDURE — 70498 CT ANGIOGRAPHY NECK: CPT

## 2021-08-29 PROCEDURE — G1004 CDSM NDSC: HCPCS

## 2021-08-29 PROCEDURE — 71045 X-RAY EXAM CHEST 1 VIEW: CPT

## 2021-08-29 PROCEDURE — 70496 CT ANGIOGRAPHY HEAD: CPT

## 2021-08-29 RX ORDER — ATORVASTATIN CALCIUM 40 MG/1
40 TABLET, FILM COATED ORAL
Status: DISCONTINUED | OUTPATIENT
Start: 2021-08-30 | End: 2021-09-02 | Stop reason: HOSPADM

## 2021-08-29 RX ORDER — INSULIN GLARGINE 100 [IU]/ML
35 INJECTION, SOLUTION SUBCUTANEOUS
Status: DISCONTINUED | OUTPATIENT
Start: 2021-08-29 | End: 2021-09-01

## 2021-08-29 RX ORDER — SODIUM CHLORIDE, SODIUM LACTATE, POTASSIUM CHLORIDE, CALCIUM CHLORIDE 600; 310; 30; 20 MG/100ML; MG/100ML; MG/100ML; MG/100ML
100 INJECTION, SOLUTION INTRAVENOUS ONCE
Status: COMPLETED | OUTPATIENT
Start: 2021-08-29 | End: 2021-08-30

## 2021-08-29 RX ORDER — ASPIRIN 81 MG/1
324 TABLET, CHEWABLE ORAL ONCE
Status: COMPLETED | OUTPATIENT
Start: 2021-08-29 | End: 2021-08-29

## 2021-08-29 RX ORDER — MECLIZINE HCL 12.5 MG/1
12.5 TABLET ORAL EVERY 8 HOURS PRN
Status: DISCONTINUED | OUTPATIENT
Start: 2021-08-29 | End: 2021-09-02 | Stop reason: HOSPADM

## 2021-08-29 RX ORDER — PANTOPRAZOLE SODIUM 40 MG/1
40 TABLET, DELAYED RELEASE ORAL
Status: DISCONTINUED | OUTPATIENT
Start: 2021-08-30 | End: 2021-09-02 | Stop reason: HOSPADM

## 2021-08-29 RX ORDER — ASPIRIN 81 MG/1
81 TABLET, CHEWABLE ORAL DAILY
Status: DISCONTINUED | OUTPATIENT
Start: 2021-08-30 | End: 2021-09-02 | Stop reason: HOSPADM

## 2021-08-29 RX ADMIN — IOHEXOL 85 ML: 350 INJECTION, SOLUTION INTRAVENOUS at 19:37

## 2021-08-29 RX ADMIN — INSULIN GLARGINE 35 UNITS: 100 INJECTION, SOLUTION SUBCUTANEOUS at 23:45

## 2021-08-29 RX ADMIN — SODIUM CHLORIDE, SODIUM LACTATE, POTASSIUM CHLORIDE, AND CALCIUM CHLORIDE 100 ML/HR: .6; .31; .03; .02 INJECTION, SOLUTION INTRAVENOUS at 23:52

## 2021-08-29 RX ADMIN — ASPIRIN 324 MG: 81 TABLET, CHEWABLE ORAL at 23:47

## 2021-08-29 RX ADMIN — INSULIN LISPRO 1 UNITS: 100 INJECTION, SOLUTION INTRAVENOUS; SUBCUTANEOUS at 23:46

## 2021-08-29 NOTE — ED PROVIDER NOTES
History  Chief Complaint   Patient presents with    Dizziness     pt states she started feeling lightheaded approx 1 5-2 hours ago with nausea, states drank water and vomited x 2      HPI     61yF with hx of DM2, HTN, HLD presenting to the ED with dizziness  Patient states she was sitting around her patio at 12:30 p m  when she suddenly became lightheaded and felt the room spinning for several hours  Her sisters were able to help her back into the room and she had 2 episodes of vomiting and nausea  Shortly after she received a bed in the ER, her lightheadedness symptoms started resolving  Denies vision changes, headache, LOC, SOB, chest pain, abdominal pain  Neuro exam was unremarkable with no deficits  Prior to Admission Medications   Prescriptions Last Dose Informant Patient Reported? Taking?    Januvia 100 MG tablet   No No   Sig: TAKE 1 TABLET BY MOUTH EVERY DAY   NovoFine 32G X 6 MM MISC   No No   Sig: USE AS DIRECTED ONCE A DAY WITH INSULIN   TURMERIC PO   Yes No   Sig: Take 1 capsule by mouth daily   Tresiba FlexTouch 100 units/mL injection pen   No No   Sig: Inject 35 Units under the skin daily   aspirin 81 MG tablet  Self Yes No   Sig: Take 1 tablet by mouth every other day   esomeprazole (NexIUM) 20 mg capsule  Self Yes No   Sig: Take 20 mg by mouth every morning before breakfast   furosemide (LASIX) 20 mg tablet  Self No No   Sig: TAKE 1 TABLET BY MOUTH DAILY AS NEEDED   lisinopril (ZESTRIL) 5 mg tablet   No No   Sig: Take 1 tablet (5 mg total) by mouth daily   metFORMIN (GLUCOPHAGE) 1000 MG tablet   No No   Sig: Take 1 tablet (1,000 mg total) by mouth 2 (two) times a day with meals   simvastatin (ZOCOR) 40 mg tablet   No No   Sig: TAKE 1 TABLET BY MOUTH EVERY DAY      Facility-Administered Medications: None       Past Medical History:   Diagnosis Date    Colon polyp     Dizziness     Edema     Hyperlipidemia     Hypertension     Type 2 diabetes mellitus (HCC)     Vitamin D deficiency     Wears glasses        Past Surgical History:   Procedure Laterality Date    BREAST EXCISIONAL BIOPSY Right 2009    COLONOSCOPY  2018    Colonoscpoy due in 3 years    EGD  2018    MAMMO (HISTORICAL) Bilateral 2020    MAMMO NEEDLE LOCALIZATION RIGHT (ALL INC) Right 2009    WISDOM TOOTH EXTRACTION         Family History   Problem Relation Age of Onset    Diabetes Mother     Hypertension Mother     Sudden death Mother         SCD    Hyperlipidemia Mother     Heart attack Mother     Breast cancer Mother 79    Diabetes Father     Arrhythmia Father         pacemaker/ defib    Clotting disorder Father         eliquis    Heart failure Father     Heart disease Father     Melanoma Father     Cancer Father [facility]        bladder cancer    Breast cancer Sister 44    BRCA1 Negative Sister     No Known Problems Maternal Grandmother     No Known Problems Maternal Grandfather     No Known Problems Paternal Grandmother     No Known Problems Paternal Grandfather     No Known Problems Sister     No Known Problems Sister     Breast cancer Maternal Aunt         unknown age   [employer] Esophageal cancer Maternal Aunt [facility]    Cancer Maternal Aunt         unknown age or type    No Known Problems Maternal Aunt     Lung cancer Paternal Aunt         unknonw age- in her [de-identified]    Colon cancer Maternal Uncle         unknwon age   [employer] Pancreatic cancer Maternal Uncle 61    Anuerysm Neg Hx      I have reviewed and agree with the history as documented      E-Cigarette/Vaping    E-Cigarette Use Never User      E-Cigarette/Vaping Substances    Nicotine No     THC No     CBD No     Flavoring No     Other No     Unknown No      Social History     Tobacco Use    Smoking status: Never Smoker    Smokeless tobacco: Never Used   Vaping Use    Vaping Use: Never used   Substance Use Topics    Alcohol use: No    Drug use: No        Review of Systems   Constitutional: Negative for chills and fever  HENT: Negative for ear pain and hearing loss  Eyes: Negative for pain and visual disturbance  Respiratory: Negative for chest tightness and shortness of breath  Cardiovascular: Negative for chest pain and leg swelling  Gastrointestinal: Negative for abdominal pain and constipation  Genitourinary: Negative for dysuria and hematuria  Musculoskeletal: Negative for back pain and neck pain  Neurological: Positive for dizziness and light-headedness  Negative for headaches  Psychiatric/Behavioral: Negative for confusion and hallucinations  Physical Exam  ED Triage Vitals   Temperature Pulse Respirations Blood Pressure SpO2   08/29/21 1423 08/29/21 1423 08/29/21 1423 08/29/21 1423 08/29/21 1423   98 4 °F (36 9 °C) 70 18 (!) 196/91 99 %      Temp Source Heart Rate Source Patient Position - Orthostatic VS BP Location FiO2 (%)   08/29/21 1423 08/29/21 1423 08/29/21 1915 08/29/21 1915 --   Oral Monitor Lying Right arm       Pain Score       --                    Orthostatic Vital Signs  Vitals:    08/29/21 1423 08/29/21 1915 08/29/21 2145   BP: (!) 196/91 (!) 178/81 (!) 171/78   Pulse: 70 68 66   Patient Position - Orthostatic VS:  Lying Lying       Physical Exam  Vitals and nursing note reviewed  Constitutional:       General: She is not in acute distress  Appearance: She is well-developed  HENT:      Head: Normocephalic and atraumatic  Eyes:      Conjunctiva/sclera: Conjunctivae normal    Cardiovascular:      Rate and Rhythm: Normal rate and regular rhythm  Heart sounds: No murmur heard  Pulmonary:      Effort: Pulmonary effort is normal  No respiratory distress  Breath sounds: Normal breath sounds  No wheezing  Abdominal:      Palpations: Abdomen is soft  Tenderness: There is no abdominal tenderness  There is no guarding  Musculoskeletal:      Cervical back: Neck supple  Skin:     General: Skin is warm and dry     Neurological:      General: No focal deficit present  Mental Status: She is alert and oriented to person, place, and time  Cranial Nerves: No cranial nerve deficit  Sensory: No sensory deficit  Motor: No weakness        Coordination: Coordination normal          ED Medications  Medications   iohexol (OMNIPAQUE) 350 MG/ML injection (SINGLE-DOSE) 85 mL (85 mL Intravenous Given 8/29/21 1937)       Diagnostic Studies  Results Reviewed     Procedure Component Value Units Date/Time    Troponin I [[id number]]  (Normal) Collected: 08/29/21 1427    Lab Status: Final result Specimen: Blood from Arm, Left Updated: 08/29/21 1516     Troponin I <0 02 ng/mL     Comprehensive metabolic panel [[id number]]  (Abnormal) Collected: 08/29/21 1427    Lab Status: Final result Specimen: Blood from Arm, Left Updated: 08/29/21 1515     Sodium 138 mmol/L      Potassium 4 7 mmol/L      Chloride 102 mmol/L      CO2 25 mmol/L      ANION GAP 11 mmol/L      BUN 22 mg/dL      Creatinine 1 05 mg/dL      Glucose 390 mg/dL      Calcium 9 3 mg/dL      AST 18 U/L      ALT 40 U/L      Alkaline Phosphatase 71 U/L      Total Protein 7 4 g/dL      Albumin 3 6 g/dL      Total Bilirubin 0 72 mg/dL      eGFR 57 ml/min/1 73sq m     Narrative:      Ashish guidelines for Chronic Kidney Disease (CKD):     Stage 1 with normal or high GFR (GFR > 90 mL/min/1 73 square meters)    Stage 2 Mild CKD (GFR = 60-89 mL/min/1 73 square meters)    Stage 3A Moderate CKD (GFR = 45-59 mL/min/1 73 square meters)    Stage 3B Moderate CKD (GFR = 30-44 mL/min/1 73 square meters)    Stage 4 Severe CKD (GFR = 15-29 mL/min/1 73 square meters)    Stage 5 End Stage CKD (GFR <15 mL/min/1 73 square meters)  Note: GFR calculation is accurate only with a steady state creatinine    CBC and differential [[id number]] Collected: 08/29/21 1427    Lab Status: Final result Specimen: Blood from Arm, Left Updated: 08/29/21 1448     WBC 5 72 Thousand/uL      RBC 4 72 Million/uL Hemoglobin 14 3 g/dL      Hematocrit 42 4 %      MCV 90 fL      MCH 30 3 pg      MCHC 33 7 g/dL      RDW 12 1 %      MPV 11 2 fL      Platelets 392 Thousands/uL      nRBC 0 /100 WBCs      Neutrophils Relative 60 %      Immat GRANS % 1 %      Lymphocytes Relative 29 %      Monocytes Relative 8 %      Eosinophils Relative 1 %      Basophils Relative 1 %      Neutrophils Absolute 3 52 Thousands/µL      Immature Grans Absolute 0 03 Thousand/uL      Lymphocytes Absolute 1 63 Thousands/µL      Monocytes Absolute 0 44 Thousand/µL      Eosinophils Absolute 0 06 Thousand/µL      Basophils Absolute 0 04 Thousands/µL                  CTA head and neck with and without contrast   Final Result by Tyrus Apley, MD (08/29 2114)         1  Right anterior clinoid process rim calcified 1 9 cm lesion, suggestive of a meningioma  No mass effect or vasogenic edema  Thrombosed aneurysm less likely in the absence of clear evidence of a vascular pedicle, though not entirely excluded  Recommend MRI of the brain with gadolinium for further evaluation  2   No evidence of acute infarct or intracranial hemorrhage  3   No stenosis, dissection or occlusion of the carotid or vertebral arteries or major vessels of the [place]  The study was marked in [facility] for immediate notification  Workstation performed: [id number]         XR chest 1 view portable   ED Interpretation by Noé Antony MD (08/29 2118)   No cardiopulmonary abnormalities  Procedures  Procedures      ED Course  ED Course as of Aug 29 2209   Ruthie Bush Aug 29, 2021   1849 XR chest 1 view portable   2031 CTA head and neck with and without contrast   2115 CTA head and neck with and without contrast   2118 XR chest 1 view portable         Due to pt's high risk and presentation, obtained CTA head and neck with observation admission for concerns of TIA   CTA showed a meningioma with mass effect and no evidence of acute infarct or intracranial hemorrhage  Will most likely need a MRI and neuro follow up  All findings explained to patient at bedside and pt verbalized understanding and all questions answered  Pt was admitted to observation with tele for further workup  SBIRT 20yo+      Most Recent Value   SBIRT (22 yo +)   In order to provide better care to our patients, we are screening all of our patients for alcohol and drug use  Would it be okay to ask you these screening questions? Yes Filed at: 08/29/2021 1749   Initial Alcohol Screen: US AUDIT-C    1  How often do you have a drink containing alcohol?  0 Filed at: 08/29/2021 1749   2  How many drinks containing alcohol do you have on a typical day you are drinking? 0 Filed at: 08/29/2021 1749   3a  Male UNDER 65: How often do you have five or more drinks on one occasion? 0 Filed at: 08/29/2021 1749   3b  FEMALE Any Age, or MALE 65+: How often do you have 4 or more drinks on one occassion? 0 Filed at: 08/29/2021 1749   Audit-C Score  0 Filed at: 08/29/2021 1749   HÉCTOR: How many times in the past year have you    Used an illegal drug or used a prescription medication for non-medical reasons?   Never Filed at: 08/29/2021 1749                MDM  Number of Diagnoses or Management Options     Amount and/or Complexity of Data Reviewed  Clinical lab tests: ordered and reviewed  Tests in the radiology section of CPT®: ordered and reviewed  Review and summarize past medical records: yes    Patient Progress  Patient progress: stable      Disposition  Final diagnoses:   Dizziness     Time reflects when diagnosis was documented in both MDM as applicable and the Disposition within this note     Time User Action Codes Description Comment    8/29/2021  9:51 PM Ryley Wilkes Add [R42] Dizziness       ED Disposition     ED Disposition Condition Date/Time Comment    Admit Stable Sun Aug 29, 2021  9:52 PM Case was discussed with Brittney Luis and the patient's admission status was agreed to be Admission Status: observation status to the service of Dr Aubrey Mills   Follow-up Information    None         Patient's Medications   Discharge Prescriptions    No medications on file     No discharge procedures on file  PDMP Review     None           ED Provider  Attending physically available and evaluated Prabha Larose I managed the patient along with the ED Attending      Electronically Signed by         Cesario Riedel, DO  08/29/21 3421

## 2021-08-29 NOTE — ED ATTENDING ATTESTATION
8/29/2021  IArminda MD, saw and evaluated the patient  I have discussed the patient with the resident/non-physician practitioner and agree with the resident's/non-physician practitioner's findings, Plan of Care, and MDM as documented in the resident's/non-physician practitioner's note, except where noted  All available labs and Radiology studies were reviewed  I was present for key portions of any procedure(s) performed by the resident/non-physician practitioner and I was immediately available to provide assistance  At this point I agree with the current assessment done in the Emergency Department  I have conducted an independent evaluation of this patient a history and physical is as follows:    58 y/o female with history of hypertension, hyperlipidemia, diabetes, presenting for evaluation of dizziness  The patient was sitting at home when she had sudden onset dizziness which she describes as both lightheadedness and a feeling of being off balance with subtle room spinning  She felt unsteady on her feet at the time but was able to ambulate with assistance from her sisters  Sensation of dizziness lasted for several hours before resolving  She had 2 episodes of vomiting and was nauseous when the symptoms were present  Denies change in her vision, headache, chest pain, or shortness of breath  No abdominal pain  No history of stroke or cardiac issues  I personally interpreted the patient's EKG which reveals normal rate, normal sinus rhythm, left axis deviation, prolonged QRS duration to 140 milliseconds, prolonged QTC to 496 milliseconds, left bundle branch block with Q-waves inferiorly and in leads V1 through V4, T-wave inversion in lead aVL, no ST segment deviation  No prior EKG available for comparison, however stress strips from stress testing in 2019 did not show evidence of left bundle-branch block  Labs reveal hyperglycemia 390 without evidence of DKA    Patient admits to not checking her blood sugar on any frequent basis  On exam heart is regular rate and rhythm, no murmurs, rubs, or gallops  No increased work of breathing and lungs are clear to auscultation bilaterally  Positive bowel sounds present without abdominal distention or tenderness to palpation in all 4 quadrants  5/5 strength in the bilateral upper and lower extremities  Normal finger-to-nose, rapid alternating movements, and heel-to-shin bilaterally  No pronator drift  Normal speech  Neuro exam unremarkable, doubt stroke, however sudden onset of symptoms with subtle sensation of movement lasting for several hours is concerning for TIA, especially given patient's risk factors  Plan to obtain CTA of the head and neck and admit for observation  Cardiogenic cause is also on the differential though thought to be less likely given description of symptoms  Troponin obtained in the setting of risk stratification which is undetectable          ED Course         Critical Care Time  Procedures

## 2021-08-30 ENCOUNTER — APPOINTMENT (OUTPATIENT)
Dept: MRI IMAGING | Facility: HOSPITAL | Age: 61
DRG: 286 | End: 2021-08-30
Payer: COMMERCIAL

## 2021-08-30 ENCOUNTER — APPOINTMENT (OUTPATIENT)
Dept: NON INVASIVE DIAGNOSTICS | Facility: HOSPITAL | Age: 61
DRG: 286 | End: 2021-08-30
Payer: COMMERCIAL

## 2021-08-30 LAB
ANION GAP SERPL CALCULATED.3IONS-SCNC: 11 MMOL/L (ref 4–13)
BUN SERPL-MCNC: 16 MG/DL (ref 5–25)
CALCIUM SERPL-MCNC: 8.6 MG/DL (ref 8.3–10.1)
CHLORIDE SERPL-SCNC: 105 MMOL/L (ref 100–108)
CHOLEST SERPL-MCNC: 119 MG/DL (ref 50–200)
CO2 SERPL-SCNC: 25 MMOL/L (ref 21–32)
CREAT SERPL-MCNC: 0.97 MG/DL (ref 0.6–1.3)
ERYTHROCYTE [DISTWIDTH] IN BLOOD BY AUTOMATED COUNT: 12.3 % (ref 11.6–15.1)
EST. AVERAGE GLUCOSE BLD GHB EST-MCNC: 246 MG/DL
GFR SERPL CREATININE-BSD FRML MDRD: 62 ML/MIN/1.73SQ M
GLUCOSE SERPL-MCNC: 198 MG/DL (ref 65–140)
GLUCOSE SERPL-MCNC: 235 MG/DL (ref 65–140)
GLUCOSE SERPL-MCNC: 253 MG/DL (ref 65–140)
GLUCOSE SERPL-MCNC: 262 MG/DL (ref 65–140)
GLUCOSE SERPL-MCNC: 286 MG/DL (ref 65–140)
HBA1C MFR BLD: 10.2 %
HCT VFR BLD AUTO: 37.5 % (ref 34.8–46.1)
HDLC SERPL-MCNC: 31 MG/DL
HGB BLD-MCNC: 12.8 G/DL (ref 11.5–15.4)
LDLC SERPL CALC-MCNC: 64 MG/DL (ref 0–100)
MCH RBC QN AUTO: 31.2 PG (ref 26.8–34.3)
MCHC RBC AUTO-ENTMCNC: 34.1 G/DL (ref 31.4–37.4)
MCV RBC AUTO: 92 FL (ref 82–98)
PLATELET # BLD AUTO: 183 THOUSANDS/UL (ref 149–390)
PMV BLD AUTO: 11 FL (ref 8.9–12.7)
POTASSIUM SERPL-SCNC: 3.7 MMOL/L (ref 3.5–5.3)
RBC # BLD AUTO: 4.1 MILLION/UL (ref 3.81–5.12)
SODIUM SERPL-SCNC: 141 MMOL/L (ref 136–145)
TRIGL SERPL-MCNC: 119 MG/DL
TROPONIN I SERPL-MCNC: <0.02 NG/ML
WBC # BLD AUTO: 6.34 THOUSAND/UL (ref 4.31–10.16)

## 2021-08-30 PROCEDURE — 99204 OFFICE O/P NEW MOD 45 MIN: CPT | Performed by: PHYSICIAN ASSISTANT

## 2021-08-30 PROCEDURE — A9585 GADOBUTROL INJECTION: HCPCS | Performed by: NURSE PRACTITIONER

## 2021-08-30 PROCEDURE — G1004 CDSM NDSC: HCPCS

## 2021-08-30 PROCEDURE — 82948 REAGENT STRIP/BLOOD GLUCOSE: CPT

## 2021-08-30 PROCEDURE — 80061 LIPID PANEL: CPT | Performed by: NURSE PRACTITIONER

## 2021-08-30 PROCEDURE — 70553 MRI BRAIN STEM W/O & W/DYE: CPT

## 2021-08-30 PROCEDURE — 97116 GAIT TRAINING THERAPY: CPT

## 2021-08-30 PROCEDURE — 97163 PT EVAL HIGH COMPLEX 45 MIN: CPT

## 2021-08-30 PROCEDURE — 36415 COLL VENOUS BLD VENIPUNCTURE: CPT | Performed by: NURSE PRACTITIONER

## 2021-08-30 PROCEDURE — 85027 COMPLETE CBC AUTOMATED: CPT | Performed by: NURSE PRACTITIONER

## 2021-08-30 PROCEDURE — 80048 BASIC METABOLIC PNL TOTAL CA: CPT | Performed by: NURSE PRACTITIONER

## 2021-08-30 PROCEDURE — 93005 ELECTROCARDIOGRAM TRACING: CPT

## 2021-08-30 PROCEDURE — 99225 PR SBSQ OBSERVATION CARE/DAY 25 MINUTES: CPT | Performed by: INTERNAL MEDICINE

## 2021-08-30 PROCEDURE — 99245 OFF/OP CONSLTJ NEW/EST HI 55: CPT | Performed by: PSYCHIATRY & NEUROLOGY

## 2021-08-30 PROCEDURE — 84484 ASSAY OF TROPONIN QUANT: CPT | Performed by: NURSE PRACTITIONER

## 2021-08-30 PROCEDURE — 93306 TTE W/DOPPLER COMPLETE: CPT

## 2021-08-30 RX ADMIN — INSULIN GLARGINE 35 UNITS: 100 INJECTION, SOLUTION SUBCUTANEOUS at 22:07

## 2021-08-30 RX ADMIN — INSULIN LISPRO 4 UNITS: 100 INJECTION, SOLUTION INTRAVENOUS; SUBCUTANEOUS at 17:04

## 2021-08-30 RX ADMIN — ASPIRIN 81 MG: 81 TABLET, CHEWABLE ORAL at 10:19

## 2021-08-30 RX ADMIN — GADOBUTROL 8 ML: 604.72 INJECTION INTRAVENOUS at 09:54

## 2021-08-30 RX ADMIN — INSULIN LISPRO 1 UNITS: 100 INJECTION, SOLUTION INTRAVENOUS; SUBCUTANEOUS at 22:07

## 2021-08-30 RX ADMIN — PANTOPRAZOLE SODIUM 40 MG: 40 TABLET, DELAYED RELEASE ORAL at 05:33

## 2021-08-30 RX ADMIN — ENOXAPARIN SODIUM 40 MG: 40 INJECTION SUBCUTANEOUS at 10:19

## 2021-08-30 RX ADMIN — INSULIN LISPRO 2 UNITS: 100 INJECTION, SOLUTION INTRAVENOUS; SUBCUTANEOUS at 10:22

## 2021-08-30 RX ADMIN — ATORVASTATIN CALCIUM 40 MG: 40 TABLET, FILM COATED ORAL at 17:29

## 2021-08-30 NOTE — ASSESSMENT & PLAN NOTE
Most recent blood pressure 163/79  On lisinopril 5 mg q d , Lasix 20 mg q d  As needed      · Hold antihypertensives and allow for permissive hypertension

## 2021-08-30 NOTE — ASSESSMENT & PLAN NOTE
Sudden onset lightheadedness and N/V which started 1-1/2 to 2 hours prior to presentation  Additionally felt cold, clammy, and unsteady  No visual changes, headache, loss of consciousness  Neuro exam was unremarkable the ED  Imaging was unremarkable with exception to CTA which demonstrated a 2 cm meningioma in the right anterior clinoid process  Symptoms resolved while in the ED  MRI was also negative for any diffusion restriction      Workup:  · CTA head and neck:  No acute findings, calcified 2 cm meningioma  · MRI brain: no acute findings, same as CTA  · Labs:  A1c of 10 2  · BP on admission >190 SBP    Impression: Most likely orthostatic versus vasovagal presycope versus HTN urgency, no evidence to suggest vertigo or other neurologic cause    Plan:  · Okay to continue with meclizine for any perception of vertigo  · Anti emetics  · Hydration and electrolytes as per primary team  · BP control to normotension  · Glucose control and home medication management  · Would recommend orthostatic vitals

## 2021-08-30 NOTE — CONSULTS
Consultation - Neurosurgery   Nader Forte 59 y o  female MRN: [id number]  Unit/Bed#: ED 29 Encounter: [id number]        Inpatient consult to Neurosurgery  Consult performed by: Gurjit Wade PA-C  Consult ordered by: Andres Shrestha DO          Assessment/Plan               Assessment:  1  Right paraclinoid meningioma  2  History of dizziness and lightheadedness      Plan:   · Exam:  Patient alert and oriented x3  PERRL, EOMI 2mm conj bilaterally  VF appears intact SF  Moves all extremities  Finger-to-nose tests normal and without drift bilaterally  Strength is 5/5 and sensation to light touch intact throughout  DTR 2+ without clonus bilaterally  · MRI of brain with and without contrast on 08/30/2021 demonstrates a 1 2 x 1 5 x 1 7 cm diffusely enhancing extra-axial mass right paraclinoid mass consistent with benign meningioma  · Pain control:  Per primary team  · DVT ppx: SCDs bilateral legs  · Seizure ppx:  None  · Activity:  As tolerated  · PT/OT evaluation & treatment  · Medical Mx:  Per primary team  · Neurocheck:  Routine  · NSx evaluated the patient  Patient reports 1 day history of dizziness, lightheadedness and nausea  History of similar episodes about 2-3 weeks ago  Otherwise, exam non-focal   MRI finding as described above  Discussed with Dr Travon Trevino  From Neurosurgery perspective, no emergency surgery is anticipated at this juncture  Recommend formal visual field tests prior to the 2-3 weeks follow-up appointment at [facility]  S/O  Call with questions or concerns  History of Present Illness     HPI: Nader Forte is a 59 y o  female with PMHx of diabetes mellitus complicated with diabetic retinopathy, getting injection and feeling better with her blurry vision  Patient experience id dizziness and lightheadedness associated with nausea 1 day  Similar episodes about 2-3 weeks ago that resolves spontaneously    Patient denies any headache, blurry vision, diplopia, weakness in the extremities, or gait instability  Denies swallowing or speech issues  Denies bowel/bladder dysfunction  Denies history of seizure, stroke, bleeding disorder or taking anticoagulant medication except baby aspirin  Scattered, if she had previous image of her brain patient denies  Patient denies history of fever, chills, rigors, cough or chest pain  She denies history of hypertension,, congestive heart failure, or bleeding disorder  Denies history of smoking cigarettes or drinking alcohol  Review of Systems   Constitutional: Negative for activity change, chills, fatigue and fever  HENT: Negative for trouble swallowing and voice change  Eyes: Negative for photophobia and visual disturbance  Respiratory: Negative for cough and wheezing  Cardiovascular: Negative for chest pain, palpitations and leg swelling  Gastrointestinal: Positive for nausea  Negative for vomiting  Genitourinary: Negative for difficulty urinating  Musculoskeletal: Negative for gait problem  Neurological: Positive for dizziness and light-headedness  Negative for tremors, seizures, syncope, facial asymmetry, speech difficulty, weakness, numbness and headaches  Hematological: Does not bruise/bleed easily  Psychiatric/Behavioral: Negative for confusion and decreased concentration         Historical Information   Past Medical History:   Diagnosis Date    Colon polyp     Dizziness     Edema     Hyperlipidemia     Hypertension     Type 2 diabetes mellitus ([facility] [address] )     Vitamin D deficiency     Wears glasses      Past Surgical History:   Procedure Laterality Date    BREAST EXCISIONAL BIOPSY Right 2009    COLONOSCOPY  04/11/2018    Colonoscpoy due in 3 years    EGD  04/11/2018    MAMMO (HISTORICAL) Bilateral 05/04/2020 2018    MAMMO NEEDLE LOCALIZATION RIGHT (ALL INC) Right 7/8/2009    WISDOM TOOTH EXTRACTION       Social History     Substance and Sexual Activity   Alcohol Use No     Social History     Substance and Sexual Activity   Drug Use No     Social History     Tobacco Use   Smoking Status Never Smoker   Smokeless Tobacco Never Used     Family History   Problem Relation Age of Onset    Diabetes Mother     Hypertension Mother     Sudden death Mother         SCD    Hyperlipidemia Mother     Heart attack Mother     Breast cancer Mother 79    Diabetes Father     Arrhythmia Father         pacemaker/ defib    Clotting disorder Father         eliquis    Heart failure Father     Heart disease Father     Melanoma Father     Cancer Father 61        bladder cancer    Breast cancer Sister 44    BRCA1 Negative Sister     No Known Problems Maternal Grandmother     No Known Problems Maternal Grandfather     No Known Problems Paternal Grandmother     No Known Problems Paternal Grandfather     No Known Problems Sister     No Known Problems Sister     Breast cancer Maternal Aunt         unknown age   [facility] Esophageal cancer Maternal Aunt 61    Cancer Maternal Aunt         unknown age or type    No Known Problems Maternal Aunt     Lung cancer Paternal Aunt         unknonw age- in her [de-identified]    Colon cancer Maternal Uncle         unknwon age   [facility] Pancreatic cancer Maternal Uncle 61    Anuerysm Neg Hx        Meds/Allergies   all current active meds have been reviewed, current meds:   Current Facility-Administered Medications   Medication Dose Route Frequency    aspirin chewable tablet 81 mg  81 mg Oral Daily    atorvastatin (LIPITOR) tablet 40 mg  40 mg Oral Daily With Dinner    enoxaparin (LOVENOX) subcutaneous injection 40 mg  40 mg Subcutaneous Daily    insulin glargine (LANTUS) subcutaneous injection 35 Units 0 35 mL  35 Units Subcutaneous HS    insulin lispro (HumaLOG) 100 units/mL subcutaneous injection 1-5 Units  1-5 Units Subcutaneous HS    insulin lispro (HumaLOG) 100 units/mL subcutaneous injection 1-6 Units  1-6 Units Subcutaneous TID AC    meclizine (ANTIVERT) tablet 12 5 mg  12 5 mg Oral Q8H PRN    pantoprazole (PROTONIX) EC tablet 40 mg  40 mg Oral Early Morning    and PTA meds:   Prior to Admission Medications   Prescriptions Last Dose Informant Patient Reported? Taking? Januvia 100 MG tablet   No No   Sig: TAKE 1 TABLET BY MOUTH EVERY DAY   NovoFine 32G X 6 MM MISC   No No   Sig: USE AS DIRECTED ONCE A DAY WITH INSULIN   TURMERIC PO   Yes No   Sig: Take 1 capsule by mouth daily   Tresiba FlexTouch 100 units/mL injection pen   No No   Sig: Inject 35 Units under the skin daily   aspirin 81 MG tablet  Self Yes No   Sig: Take 1 tablet by mouth every other day   esomeprazole (NexIUM) 20 mg capsule  Self Yes No   Sig: Take 20 mg by mouth every morning before breakfast   furosemide (LASIX) 20 mg tablet  Self No No   Sig: TAKE 1 TABLET BY MOUTH DAILY AS NEEDED   lisinopril (ZESTRIL) 5 mg tablet   No No   Sig: Take 1 tablet (5 mg total) by mouth daily   metFORMIN (GLUCOPHAGE) 1000 MG tablet   No No   Sig: Take 1 tablet (1,000 mg total) by mouth 2 (two) times a day with meals   simvastatin (ZOCOR) 40 mg tablet   No No   Sig: TAKE 1 TABLET BY MOUTH EVERY DAY      Facility-Administered Medications: None     No Known Allergies    Objective   I/O     None          Physical Exam  Constitutional:       Appearance: She is obese  HENT:      Head: Normocephalic and atraumatic  Eyes:      Extraocular Movements: Extraocular movements intact  Pupils: Pupils are equal, round, and reactive to light  Cardiovascular:      Rate and Rhythm: Normal rate and regular rhythm  Pulmonary:      Effort: Pulmonary effort is normal    Musculoskeletal:      Cervical back: Normal range of motion  Right lower leg: Edema present  Left lower leg: Edema present  Neurological:      General: No focal deficit present  Mental Status: She is alert and oriented to person, place, and time  GCS: GCS eye subscore is 4  GCS verbal subscore is 5  GCS motor subscore is 6  Cranial Nerves: Cranial nerves are intact  Sensory: Sensation is intact  Motor: Motor function is intact  Coordination: Finger-Nose-Finger Test normal       Deep Tendon Reflexes: Reflexes are normal and symmetric  Reflex Scores:       Tricep reflexes are 2+ on the right side and 2+ on the left side  Bicep reflexes are 2+ on the right side and 2+ on the left side  Brachioradialis reflexes are 2+ on the right side and 2+ on the left side  Patellar reflexes are 2+ on the right side and 2+ on the left side  Achilles reflexes are 2+ on the right side and 2+ on the left side  Psychiatric:         Speech: Speech normal        Neurologic Exam     Mental Status   Oriented to person, place, and time  Speech: speech is normal   Level of consciousness: alert    Cranial Nerves     CN III, IV, VI   Pupils are equal, round, and reactive to light  Right pupil: Size: 2 mm  Shape: regular  Reactivity: brisk  Left pupil: Size: 2 mm  Shape: regular  Reactivity: brisk  Nystagmus: none     CN XI   CN XI normal      Motor Exam   Muscle bulk: normal  Overall muscle tone: normal  Right arm tone: normal  Left arm tone: normal  Right arm pronator drift: absent  Left arm pronator drift: absent  Right leg tone: normal  Left leg tone: normal    Sensory Exam   Light touch normal      Gait, Coordination, and Reflexes     Coordination   Finger to nose coordination: normal    Reflexes   Right brachioradialis: 2+  Left brachioradialis: 2+  Right biceps: 2+  Left biceps: 2+  Right triceps: 2+  Left triceps: 2+  Right patellar: 2+  Left patellar: 2+  Right achilles: 2+  Left achilles: 2+  Right : 2+  Left : 2+  Right Marte: absent  Left Marte: absent  Right ankle clonus: absent  Left pendular knee jerk: absent      Vitals:Blood pressure 163/79, pulse 74, temperature 98 4 °F (36 9 °C), temperature source Oral, resp  rate 18, SpO2 96 %, not currently breastfeeding  ,There is no height or weight on file to calculate BMI       Lab Results: Results from last 7 days   Lab Units 08/30/21  0445 08/29/21  1427   WBC Thousand/uL 6 34 5 72   HEMOGLOBIN g/dL 12 8 14 3   HEMATOCRIT % 37 5 42 4   PLATELETS Thousands/uL 183 190   NEUTROS PCT %  --  60   MONOS PCT %  --  8     Results from last 7 days   Lab Units 08/30/21  0445 08/29/21  1427   POTASSIUM mmol/L 3 7 4 7   CHLORIDE mmol/L 105 102   CO2 mmol/L 25 25   BUN mg/dL 16 22   CREATININE mg/dL 0 97 1 05   CALCIUM mg/dL 8 6 9 3   ALK PHOS U/L  --  71   ALT U/L  --  40   AST U/L  --  18                 No results found for: TROPONINT  ABG:No results found for: PHART, CRV0KXC, PO2ART, LGZ9JZY, B5GYZQLI, BEART, SOURCE    Imaging Studies: I have personally reviewed pertinent reports   , I have personally reviewed pertinent films in PACS and I have personally reviewed pertinent films in PACS with a Radiologist     EKG, Pathology, and Other Studies: I have personally reviewed pertinent reports   , I have personally reviewed pertinent films in PACS and I have personally reviewed pertinent films in PACS with a Radiologist     VTE Prophylaxis: Sequential compression device (Venodyne)     Code Status: Level 1 - Full Code  Advance Directive and Living Will:      Power of :    POLST:      Counseling / Coordination of Care  I spent 20 minutes with the patient

## 2021-08-30 NOTE — ASSESSMENT & PLAN NOTE
Presents to ED with lightheadedness, nausea, vomiting that occurred while she was outside this afternoon  Complains of a burning sensation for several hours  On arrival to emergency department, symptoms have resolved  CTA head neck with likely meningioma  No evidence of infarct or intracranial hemorrhage  No stenosis, dissection, occlusion  · Telemetry monitoring  · Echocardiogram-follow up  · Neuro checks  · Change statin to atorvastatin  · PT OT, speech consult  · Check orthostatics  P r n  Meclizine  · Neurology consulted  · MRI (8/30) No acute intracranial pathology  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, as mentioned on recent CT    · Neurosurgery consulted

## 2021-08-30 NOTE — ED NOTES
Contacted Medical surgical floor 3 to perform NIH stroke scale        Kiel Peralta RN  08/29/21 8989

## 2021-08-30 NOTE — ASSESSMENT & PLAN NOTE
Presents to ED with lightheadedness, nausea, vomiting that occurred while she was outside this afternoon  Complains of a burning sensation for several hours  On arrival to emergency department, symptoms have resolved  CTA head neck with likely meningioma  No evidence of infarct or intracranial hemorrhage  No stenosis, dissection, occlusion  · Telemetry monitoring  · Echocardiogram, MRI  · Neuro checks  · Start aspirin  · Change statin to atorvastatin  · PT OT, speech consult  · Check orthostatics  P r n   Meclizine  · Neurology consult

## 2021-08-30 NOTE — PROGRESS NOTES
[facility]  Progress Note - Nahed Schneider 1960, 59 y o  female MRN: [id number]  Unit/Bed#: ED 29 Encounter: [id number]  Primary Care Provider: Sima Richey MD   Date and time admitted to hospital: 8/29/2021  5:48 PM    * Dizziness  Assessment & Plan  Presents to ED with lightheadedness, nausea, vomiting that occurred while she was outside this afternoon  Complains of a burning sensation for several hours  On arrival to emergency department, symptoms have resolved  CTA head neck with likely meningioma  No evidence of infarct or intracranial hemorrhage  No stenosis, dissection, occlusion  · Telemetry monitoring  · Echocardiogram-follow up  · Neuro checks  · Change statin to atorvastatin  · PT OT, speech consult  · Check orthostatics  P r n  Meclizine  · Neurology consulted  · MRI (8/30) No acute intracranial pathology  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, as mentioned on recent CT  · Neurosurgery consulted       Left bundle branch block  Assessment & Plan  Patient is without complaints of chest pain  There are no prior EKGs for evaluation  She does have chronic exertional shortness of breath which she notices mostly while going up steps  She notes this is not new or worsening  Normal stress test in 2017 and 2019  Family hx: father with MI at age 45, mother with MI age [de-identified]   · Troponin negative x3  · Telemetry monitoring  · Echocardiogram-Follow up     Essential hypertension  Assessment & Plan  Most recent blood pressure 163/79  On lisinopril 5 mg q d , Lasix 20 mg q d  As needed  · Hold antihypertensives and allow for permissive hypertension    Mixed hyperlipidemia  Assessment & Plan  · On simvastatin as outpatient    Change to atorvastatin 40 mg given stroke workup  ·  lipid panel (8/30)- HDL low 31, cholesterol, triglycerides, and LDL all within normal range        VTE Pharmacologic Prophylaxis: VTE Score: 7 High Risk (Score >/= 5) - Pharmacological DVT Prophylaxis Ordered: enoxaparin (Lovenox)  Sequential Compression Devices Ordered  Patient Centered Rounds: I performed bedside rounds with nursing staff today  Discussions with Specialists or Other Care Team Provider:  Neurology, Neurosurgery     Education and Discussions with Family / Patient: Updated  (sister) at bedside  Current Length of Stay: 0 day(s)  Current Patient Status: Observation   Discharge Plan: Anticipate discharge in 48-72 hrs to home  Code Status: Level 1 - Full Code    Subjective:   No over night events  The patient is feeling well today  She stated she felt dizzy when being brought down for her MRI  No complaints  She denies vision changes,headache, loss of consciousness, nausea and vomitting    Objective:     Vitals:   No data recorded  HR:  [62-74] 74  Resp:  [16-20] 18  BP: (144-180)/(68-86) 163/79  SpO2:  [95 %-98 %] 96 %  There is no height or weight on file to calculate BMI  Input and Output Summary (last 24 hours):   No intake or output data in the 24 hours ending 08/30/21 1458    Physical Exam:   Physical Exam  Vitals reviewed  Constitutional:       Appearance: Normal appearance  HENT:      Head: Normocephalic and atraumatic  Cardiovascular:      Rate and Rhythm: Normal rate and regular rhythm  Pulses: Normal pulses  Heart sounds: Normal heart sounds  Pulmonary:      Effort: Pulmonary effort is normal       Breath sounds: Normal breath sounds  Abdominal:      General: Abdomen is flat  Bowel sounds are normal       Palpations: Abdomen is soft  Musculoskeletal:         General: Normal range of motion  Skin:     General: Skin is warm and dry  Neurological:      General: No focal deficit present  Mental Status: She is alert and oriented to person, place, and time  Cranial Nerves: No cranial nerve deficit  Sensory: No sensory deficit  Motor: No weakness     Psychiatric:         Mood and Affect: Mood normal          Behavior: Behavior normal           Additional Data:     Labs:  Results from last 7 days   Lab Units 08/30/21  0445 08/29/21  1427   WBC Thousand/uL 6 34 5 72   HEMOGLOBIN g/dL 12 8 14 3   HEMATOCRIT % 37 5 42 4   PLATELETS Thousands/uL 183 190   NEUTROS PCT %  --  60   LYMPHS PCT %  --  29   MONOS PCT %  --  8   EOS PCT %  --  1     Results from last 7 days   Lab Units 08/30/21  0445 08/29/21  1427   SODIUM mmol/L 141 138   POTASSIUM mmol/L 3 7 4 7   CHLORIDE mmol/L 105 102   CO2 mmol/L 25 25   BUN mg/dL 16 22   CREATININE mg/dL 0 97 1 05   ANION GAP mmol/L 11 11   CALCIUM mg/dL 8 6 9 3   ALBUMIN g/dL  --  3 6   TOTAL BILIRUBIN mg/dL  --  0 72   ALK PHOS U/L  --  71   ALT U/L  --  40   AST U/L  --  18   GLUCOSE RANDOM mg/dL 235* 390*         Results from last 7 days   Lab Units 08/30/21  0748 08/29/21  2320   POC GLUCOSE mg/dl 253* 223*     Results from last 7 days   Lab Units 08/29/21  1427   HEMOGLOBIN A1C % 10 2*           Lines/Drains:  Invasive Devices     Peripheral Intravenous Line            Peripheral IV 08/29/21 Left Antecubital <1 day                  Telemetry:  Telemetry Orders (From admission, onward)             48 Hour Telemetry Monitoring  Continuous x 48 hours     Question:  Reason for 48 Hour Telemetry  Answer:  Acute CVA (<24 hrs old, hemispheric strokes, selected brainstem strokes, cardiac arrhythmias)                 Telemetry Reviewed: Normal Sinus Rhythm  Indication for Continued Telemetry Use: Syncope           Imaging: Reviewed radiology reports from this admission including: CT head and MRI brain    Recent Cultures (last 7 days):         Last 24 Hours Medication List:   Current Facility-Administered Medications   Medication Dose Route Frequency Provider Last Rate    aspirin  81 mg Oral Daily ASH Masters      [facility]  [facility]      enoxaparin  40 mg Subcutaneous Daily ASH Masters      insulin glargine  35 Units Subcutaneous HS Yusuf Pert, CRNP      insulin lispro  1-5 Units Subcutaneous HS Yusuf Pert, CRNP      insulin lispro  1-6 Units Subcutaneous TID [facility] Yusuf Pert, CRNP      meclizine  12 5 mg Oral Q8H PRN Yusuf Pert, CRNP      pantoprazole  40 mg Oral Early Morning JOSE MANUEL CrossNP          Today, Patient Was Seen By: Marychuy Herring    **Please Note: This note may have been constructed using a voice recognition system  **

## 2021-08-30 NOTE — ED NOTES
Pt eating breakfast now   FSBS not checked as pt was just given am dose due to delayed food trays     Fadumo Perez RN  08/30/21 [id number]

## 2021-08-30 NOTE — ASSESSMENT & PLAN NOTE
Lab Results   Component Value Date    HGBA1C 9 6 (H) 02/10/2021     · Follow-up repeat A1c  · Continue home dose of Lantus 35 units HS  · Add sliding scale insulin  · Hold p o   Metformin and Januvia, may resume on discharge  ·

## 2021-08-30 NOTE — H&P
[facility]  H&P- Nader Forte 1960, 59 y o  female MRN: [id number]  Unit/Bed#: ED 29 Encounter: [id number]  Primary Care Provider: Reena Gay MD   Date and time admitted to hospital: 8/29/2021  5:48 PM    * Dizziness  Assessment & Plan  Presents to ED with lightheadedness, nausea, vomiting that occurred while she was outside this afternoon  Complains of a burning sensation for several hours  On arrival to emergency department, symptoms have resolved  CTA head neck with likely meningioma  No evidence of infarct or intracranial hemorrhage  No stenosis, dissection, occlusion  · Telemetry monitoring  · Echocardiogram, MRI  · Neuro checks  · Start aspirin  · Change statin to atorvastatin  · PT OT, speech consult  · Check orthostatics  P r n  Meclizine  · Neurology consult    Left bundle branch block  Assessment & Plan  Patient is without complaints of chest pain  There are no prior EKGs for evaluation  She does have chronic exertional shortness of breath which she notices mostly while going up steps  She notes this is not new or worsening  Normal stress test in 2017 and 2019  Family hx: father with MI at age 45, mother with MI age [de-identified]   · Trend troponin, serial EKG  · Telemetry monitoring  · Echocardiogram    Type 2 diabetes mellitus without complication, with long-term current use of insulin [facility])  Assessment & Plan  Lab Results   Component Value Date    HGBA1C 9 6 (H) 02/10/2021     · Follow-up repeat A1c  · Continue home dose of Lantus 35 units HS  · Add sliding scale insulin  · Hold p o  Metformin and Januvia, may resume on discharge  ·       Essential hypertension  Assessment & Plan  Most recent blood pressure 170/70  On lisinopril 5 mg q d , Lasix 20 mg q d  As needed  · Hold antihypertensives and allow for permissive hypertension    Mixed hyperlipidemia  Assessment & Plan  · On simvastatin as outpatient    Change to atorvastatin 40 mg given stroke workup  · Follow-up fasting lipid panel in a m  VTE Pharmacologic Prophylaxis: VTE Score: 7 High Risk (Score >/= 5) - Pharmacological DVT Prophylaxis Ordered: enoxaparin (Lovenox)  Sequential Compression Devices Ordered  Code Status: Level 1 - Full Code full  Discussion with family: Patient declined call to   Anticipated Length of Stay: Patient will be admitted on an observation basis with an anticipated length of stay of less than 2 midnights secondary to Stroke workup  Total Time for Visit, including Counseling / Coordination of Care: 70 minutes Greater than 50% of this total time spent on direct patient counseling and coordination of care  Chief Complaint:  Dizziness    History of Present Illness: Rasheed Cabrera is a 59 y o  female with a PMH of hypertension, hyperlipidemia, type 2 diabetes, chronic lower extremity edema, who presents with dizziness, lightheadedness, room spinning sensation, nausea, vomiting that occurred this afternoon while she was outside  Her symptoms improved shortly after arriving to the emergency department  She notes very minimal dizziness when she first goes to ambulate  She also recalls a similar episode of lightheadedness and dizziness that occurred about a month ago  It lasted about a day and resolved with increased fluid intake  She reports chronic exertional shortness of breath but attributes this to lack of exercise  Otherwise, patient is without complaints  Denies any fevers, chills, chest pain, palpitations, abdominal pain, constipation, diarrhea, urinary symptoms, calf tenderness  Patient admitted as observation under stroke pathway  Neurology consult is requested  CT imaging reviewed with patient  Patient does inquire about coverage of inpatient MRI  CM is consulted  Needs MRI for stroke work up and new meningioma  Review of Systems:  Review of Systems   Gastrointestinal: Positive for nausea ( now resolved) and vomiting  Neurological: Positive for dizziness ( now resolved) and light-headedness (Now resolved)  Negative for tremors, seizures, syncope, facial asymmetry, speech difficulty, weakness, numbness and headaches  All other systems reviewed and are negative  Past Medical and Surgical History:   Past Medical History:   Diagnosis Date    Colon polyp     Dizziness     Edema     Hyperlipidemia     Hypertension     Type 2 diabetes mellitus ([address] )     Vitamin D deficiency     Wears glasses        Past Surgical History:   Procedure Laterality Date    BREAST EXCISIONAL BIOPSY Right 2009    COLONOSCOPY  04/11/2018    Colonoscpoy due in 3 years    EGD  04/11/2018    MAMMO (HISTORICAL) Bilateral 05/04/2020 2018    MAMMO NEEDLE LOCALIZATION RIGHT (ALL INC) Right 7/8/2009    WISDOM TOOTH EXTRACTION         Meds/Allergies:  Prior to Admission medications    Medication Sig Start Date End Date Taking?  Authorizing Provider   aspirin 81 MG tablet Take 1 tablet by mouth every other day    Historical Provider, MD   esomeprazole (NexIUM) 20 mg capsule Take 20 mg by mouth every morning before breakfast    Historical Provider, MD   furosemide (LASIX) 20 mg tablet TAKE 1 TABLET BY MOUTH DAILY AS NEEDED 6/28/20   Jacque Huerta MD   Januvia 100 MG tablet TAKE 1 TABLET BY MOUTH EVERY DAY 6/25/21   Jacque Huerta MD   lisinopril (ZESTRIL) 5 mg tablet Take 1 tablet (5 mg total) by mouth daily 3/31/21   Jacque Huerta MD   metFORMIN (GLUCOPHAGE) 1000 MG tablet Take 1 tablet (1,000 mg total) by mouth 2 (two) times a day with meals 2/23/21 5/24/21  Jacque Huerta MD   NovoFine 32G X 6 MM MISC USE AS DIRECTED ONCE A DAY WITH INSULIN 7/16/21   Jacque Huerta MD   simvastatin (ZOCOR) 40 mg tablet TAKE 1 TABLET BY MOUTH EVERY DAY 6/25/21   MD Ernesto Birchip Sat FlexTouch 100 units/mL injection pen Inject 35 Units under the skin daily 10/15/20   Jacque Huerta MD   TURMERIC PO Take 1 capsule by mouth daily    Historical Provider, MD     I have reviewed home medications with a medical source (PCP, Pharmacy, other)      Allergies: No Known Allergies    Social History:  Marital Status: Single   Occupation:   Patient Pre-hospital Living Situation: Home  Patient Pre-hospital Level of Mobility: walks  Patient Pre-hospital Diet Restrictions:   Substance Use History:   Social History     Substance and Sexual Activity   Alcohol Use No     Social History     Tobacco Use   Smoking Status Never Smoker   Smokeless Tobacco Never Used     Social History     Substance and Sexual Activity   Drug Use No       Family History:  Family History   Problem Relation Age of Onset    Diabetes Mother     Hypertension Mother     Sudden death Mother         SCD    Hyperlipidemia Mother     Heart attack Mother     Breast cancer Mother 79    Diabetes Father     Arrhythmia Father         pacemaker/ defib    Clotting disorder Father         eliquis    Heart failure Father     Heart disease Father     Melanoma Father     Cancer Father 61        bladder cancer    Breast cancer Sister 44    BRCA1 Negative Sister     No Known Problems Maternal Grandmother     No Known Problems Maternal Grandfather     No Known Problems Paternal Grandmother     No Known Problems Paternal Grandfather     No Known Problems Sister     No Known Problems Sister     Breast cancer Maternal Aunt         unknown age   Xenia Michael Esophageal cancer Maternal Aunt 61    Cancer Maternal Aunt         unknown age or type    No Known Problems Maternal Aunt     Lung cancer Paternal Aunt         unknonw age- in her [de-identified]    Colon cancer Maternal Uncle         unknwon age   Xenia Michael Pancreatic cancer Maternal Uncle 61    Anuerysm Neg Hx        Physical Exam:     Vitals:   Blood Pressure: 162/77 (21 2230)  Pulse: 66 (21 2230)  Temperature: 98 4 °F (36 9 °C) (21 1423)  Temp Source: Oral (21 1423)  Respirations: 18 (21 2230)  SpO2: 98 % (21 2230)    Physical Exam  Constitutional:       Appearance: Normal appearance  HENT:      Head: Normocephalic and atraumatic  Right Ear: External ear normal       Left Ear: External ear normal       Nose: Nose normal       Mouth/Throat:      Mouth: Mucous membranes are moist       Pharynx: Oropharynx is clear  Eyes:      General: No scleral icterus  Extraocular Movements: Extraocular movements intact  Conjunctiva/sclera: Conjunctivae normal       Pupils: Pupils are equal, round, and reactive to light  Cardiovascular:      Rate and Rhythm: Normal rate and regular rhythm  Pulses: Normal pulses  Heart sounds: Normal heart sounds  Pulmonary:      Effort: Pulmonary effort is normal       Breath sounds: Normal breath sounds  Abdominal:      General: Bowel sounds are normal       Palpations: Abdomen is soft  Musculoskeletal:         General: Normal range of motion  Cervical back: Normal range of motion  No rigidity  Right lower leg: No edema  Left lower leg: No edema  Skin:     General: Skin is warm and dry  Capillary Refill: Capillary refill takes less than 2 seconds  Neurological:      General: No focal deficit present  Mental Status: She is alert and oriented to person, place, and time  Mental status is at baseline  Cranial Nerves: No cranial nerve deficit  Sensory: No sensory deficit  Motor: No weakness     Psychiatric:         Mood and Affect: Mood normal          Behavior: Behavior normal           Additional Data:     Lab Results:  Results from last 7 days   Lab Units 08/29/21  1427   WBC Thousand/uL 5 72   HEMOGLOBIN g/dL 14 3   HEMATOCRIT % 42 4   PLATELETS Thousands/uL 190   NEUTROS PCT % 60   LYMPHS PCT % 29   MONOS PCT % 8   EOS PCT % 1     Results from last 7 days   Lab Units 08/29/21  1427   SODIUM mmol/L 138   POTASSIUM mmol/L 4 7   CHLORIDE mmol/L 102   CO2 mmol/L 25   BUN mg/dL 22   CREATININE mg/dL 1 05   ANION GAP mmol/L 11   CALCIUM mg/dL 9 3 ALBUMIN g/dL 3 6   TOTAL BILIRUBIN mg/dL 0 72   ALK PHOS U/L 71   ALT U/L 40   AST U/L 18   GLUCOSE RANDOM mg/dL 390*         Results from last 7 days   Lab Units 08/29/21  2320   POC GLUCOSE mg/dl 223*               Imaging: Reviewed radiology reports from this admission including: CT head  CTA head and neck with and without contrast   Final Result by Paulie Louis MD (08/29 2114)         1  Right anterior clinoid process rim calcified 1 9 cm lesion, suggestive of a meningioma  No mass effect or vasogenic edema  Thrombosed aneurysm less likely in the absence of clear evidence of a vascular pedicle, though not entirely excluded  Recommend MRI of the brain with gadolinium for further evaluation  2   No evidence of acute infarct or intracranial hemorrhage  3   No stenosis, dissection or occlusion of the carotid or vertebral arteries or major vessels of the [place]  The study was marked in [facility] for immediate notification  Workstation performed: [id number]         XR chest 1 view portable   ED Interpretation by Pippa Bartlett MD (08/29 2118)   No cardiopulmonary abnormalities  MRI Inpatient Order    (Results Pending)       EKG and Other Studies Reviewed on Admission:   · EKG: NSR  HR 68  Left bundle-branch block  Left axis deviation      · No prior EKGs for evaluation    ** Please Note: This note has been constructed using a voice recognition system   **

## 2021-08-30 NOTE — PLAN OF CARE
Problem: PHYSICAL THERAPY ADULT  Goal: Performs mobility at highest level of function for planned discharge setting  See evaluation for individualized goals  Description: Treatment/Interventions: Functional transfer training, Elevations, Therapeutic exercise, Endurance training, Patient/family training, Equipment eval/education, Bed mobility, Gait training  Equipment Recommended: Harvey [place]       See flowsheet documentation for full assessment, interventions and recommendations  Note: Prognosis: Good  Problem List: Decreased endurance, Impaired balance, Decreased mobility  Assessment: Nader Forte is a 59 y o  Female who [facility] on 8/29/2021 from home w/ c/o dizziness/lightheadedness and diagnosis of dizziness  Orders for PT eval and treat received, w/ activity orders of up w/ A   Pt presents w/ comorbidities of h/o dizziness, edema, HTN, DM, hyperlipidemia, and personal factors including: living in 3 story house, stair(s) to enter home, limited home support and positive fall history  At baseline, pt mobilizes independently w/ no AD, and reports 1 fall in the last 6 months  Upon evaluation, pt presents w/ the following deficits: impaired coordination, impaired balance, decreased endurance and gait deviations  Pt requires Mod I for bed mobility, supervision for transfers, and Min Ax/CGA fluctuating w/ close supervision for gait  Pt's clinical presentation is unstable/unpredictable due to gait deviations, fall risk, ongoing medical monitoring/management, need for input for mobility technique/safety, and recent history of falls  Pt is at an increased risk of falls due to impaired balance and decreased endurance  Given the above findings, discharge recommendation is for Home w/ OPPT at this time  During this admission, pt would benefit from skilled acute inpatient PT in order to address the abovementioned deficits to maximize function and mobility before DC from acute care        Barriers to Discharge Comments: Pt reports having 1 SAQIB through basement/garage and a stair-glide to 2nd floor living area; pt reports her bed/bathroom is on the 3rd floor, but there is a full bathroom and bedroom on the 2nd floor available to use upon DC  Pt lives alone; however, pt's sister who was present during eval reports she can stay w/ the pt upon DC home to provide assistance as needed     PT Discharge Recommendation: Home with outpatient rehabilitation          See flowsheet documentation for full assessment

## 2021-08-30 NOTE — PHYSICAL THERAPY NOTE
PHYSICAL THERAPY EVALUATION NOTE          Patient Name: Gordon Echeverria  YCGPX'A Date: 2021       AGE:   59 y o  Mrn:   [id number]  ADMIT DX:  No admission diagnoses are documented for this encounter  Past Medical History:   Diagnosis Date    Colon polyp     Dizziness     Edema     Hyperlipidemia     Hypertension     Type 2 diabetes mellitus (HCC)     Vitamin D deficiency     Wears glasses      Length Of Stay: 0  PHYSICAL THERAPY EVALUATION :   Patient's identity confirmed via 2 patient identifiers (full name and ) at start of session       21 1432   PT Last Visit   PT Visit Date 21   Note Type   Note type Evaluation   Pain Assessment   Pain Assessment Tool Pain Assessment not indicated - pt denies pain   Pain Score No Pain   Home Living   Type of Home House   Home Layout Multi-level  (3 floors; 1 step down through garage)   Bathroom Shower/Tub Walk-in shower   Bathroom Toilet Raised   Bathroom Equipment Grab bars in shower;Grab bars around toilet; Shower chair   Bathroom Accessibility Accessible   Home Equipment Walker;Stair glide;Grab bars  (rolling walker)   Additional Comments Pt reports living in 3 story house w/ 1 step down through the garage/basement entrance, pt reports stair-glide from basement to 2nd floor main living area  Pt confirmed there is a full bathroom and bedroom on the 2nd floor that she can use      Prior Function   Level of [place] Independent with ADLs and functional mobility   Lives With Alone   Receives Help From Family;Friend(s)   ADL Assistance Independent   IADLs Independent   Falls in the last 6 months 1 to 4  (1 fall outside in garden)   Vocational Full time employment  (Real estate )   Comments Pt's sister present during eval confirmed she is able to stay w/ the pt for a few days upon DC if needed  (pt drives)   Restrictions/Precautions   Weight Bearing Precautions Per Order No   Braces or Orthoses Other (Comment)  (none per pt)   Other Precautions Multiple lines; Fall Risk;Telemetry  (seen in ED on stretcher)   General   Additional Pertinent History Pt reports feeling "almost like I was car sick" when being transferred to MRI earlier today, but otherwise, pt reports symptoms have subsided since arriving to ED; pt BP prior to mobility: 175/88, RN updated post   Family/Caregiver Present Yes   Cognition   Overall Cognitive Status WFL   Arousal/Participation Alert   Orientation Level Oriented X4   Memory Within functional limits   Following Commands Follows all commands and directions without difficulty   Comments pt ID via name and ; pt agreeable to PT eval   Strength RLE   R Hip Flexion 3+/5   R Knee Flexion 3+/5   R Knee Extension 3+/5   R Ankle Dorsiflexion 4/5   R Ankle Plantar Flexion 4/5   Strength LLE   L Hip Flexion 3+/5   L Knee Flexion 3+/5   L Knee Extension 3+/5   L Ankle Dorsiflexion 4/5   L Ankle Plantar Flexion 4/5   Coordination   Movements are Fluid and Coordinated 0   Coordination and Movement Description pt mvmts are gaurded   Sensation WFL   Light Touch   RLE Light Touch Grossly intact   LLE Light Touch Grossly intact   Bed Mobility   Supine to Sit 6  Modified independent   Additional items HOB elevated; Increased time required;Verbal cues   Sit to Supine 6  Modified independent   Additional items HOB elevated; Increased time required;Verbal cues   Additional Comments Pt sat at EOB for about 5 min; pt denied feeling light-headed, dizzy, or SOB throughout or w/ change in positions   Transfers   Sit to Stand 5  Supervision   Additional items Increased time required;Verbal cues   Stand to Sit 5  Supervision   Additional items Increased time required;Verbal cues   Ambulation/Elevation   Gait pattern Improper Weight shift; Wide DONAL; Decreased foot clearance; Inconsistent sherly; Short stride; Excessively slow   Gait Assistance 4  Minimal assist Additional items Assist x 1;Verbal cues  (CGA; fluctuating w/ supervision)   Assistive Device None   Distance 80'   Balance   Static Sitting Good   Dynamic Sitting Good   Static Standing Fair   Dynamic Standing Fair   Ambulatory Poor +  (fluctuating w/ Fair -)   Endurance Deficit   Endurance Deficit Yes   Endurance Deficit Description pt w/ limited ambulatory endurance   Activity Tolerance   Activity Tolerance Patient limited by fatigue   Medical Staff Made Aware CM Movel; Spoke w/ Neurosurgery ALESSIA Walker   Nurse Made Aware FELIPA Thompson confirmed pt appropriate for PT eval; post eval/tx session pt returned supine in bed w/ HOB elevated, all needs w/in reach, in no apparent distress w/ Neurosurgery PA present in pt's room; FELIPA Thompson updated post   Assessment   Prognosis Good   Problem List Decreased endurance; Impaired balance;Decreased mobility   Assessment Bobbi Chen is a 59 y o  Female who [facility] on 8/29/2021 from home w/ c/o dizziness/lightheadedness and diagnosis of dizziness  Orders for PT eval and treat received, w/ activity orders of up w/ A   Pt presents w/ comorbidities of h/o dizziness, edema, HTN, DM, hyperlipidemia, and personal factors including: living in 3 story house, stair(s) to enter home, limited home support and positive fall history  At baseline, pt mobilizes independently w/ no AD, and reports 1 fall in the last 6 months  Upon evaluation, pt presents w/ the following deficits: impaired coordination, impaired balance, decreased endurance and gait deviations  Pt requires Mod I for bed mobility, supervision for transfers, and Min Ax/CGA fluctuating w/ close supervision for gait  Pt's clinical presentation is unstable/unpredictable due to gait deviations, fall risk, ongoing medical monitoring/management, need for input for mobility technique/safety, and recent history of falls  Pt is at an increased risk of falls due to impaired balance and decreased endurance   Given the above findings, discharge recommendation is for Home w/ OPPT at this time  During this admission, pt would benefit from skilled acute inpatient PT in order to address the abovementioned deficits to maximize function and mobility before DC from acute care  Barriers to Discharge Comments Pt reports having 1 SAQIB through basement/garage and a stair-glide to 2nd floor living area; pt reports her bed/bathroom is on the 3rd floor, but there is a full bathroom and bedroom on the 2nd floor available to use upon DC  Pt lives alone; however, pt's sister who was present during eval reports she can stay w/ the pt upon DC home to provide assistance as needed   Goals   Patient Goals to go home   STG Expiration Date 09/09/21   Short Term Goal #1 Pt will: perform bed mobility from a flat surface independently to decrease caregiver burden; perform transfers independently to increase OOB mobility; ambulate at least 350' w/ LRAD and Mod I to increase pt's ambulatory endurance; perform 1 step w/o UE support independently to facilitate return to previous living environment; increase balance ratings by 1 grade to decrease pt's risk of falls   PT Treatment Day 1  (PT tx note below)   Plan   Treatment/Interventions Functional transfer training;Elevations; Therapeutic exercise; Endurance training;Patient/family training;Equipment eval/education; Bed mobility;Gait training   PT Frequency 1-2x/wk   Recommendation   PT Discharge Recommendation Home with outpatient rehabilitation  (w/ family support/supervision)   Equipment Recommended [facility] Recommended Wheeled walker   Change/add to [employer]?  No   AM-PAC Basic Mobility Inpatient   Turning in Bed Without Bedrails 4   Lying on Back to Sitting on Edge of Flat Bed 4   Moving Bed to Chair 3   Standing Up From Chair 3   Walk in Room 3   Climb 3-5 Stairs 3   Basic Mobility Inpatient Raw Score 20   Basic Mobility Standardized Score 43 99       The patient's AM-PAC Basic Mobility Inpatient Short Form Raw Score is 20, Standardized Score is 43 99  A standardized score greater than 42 9 suggests the patient may benefit from discharge to home  Please also refer to the recommendation of the Physical Therapist for safe discharge planning  PHYSICAL THERAPY TREATMENT NOTE    Name: Jeremiah Pastrana  : 1960  Time in: 1501  Time out: 1515  Total treat time: 14 min    S: At end of eval, pt supine in bed in no apparent distress  Pt educated on use of RW for increased ambulatory balance/endurance w/ pt agreeable to trial     O: Pt performed bed mobility w/ Mod I and transfers w/ supervision  Pt ambulated 120' w/ supervision and no evidence of LOB  Pt demonstrated improved quality of gait using RW compared to no device  A: Pt demonstrated improved quality of gait using RW compared to no device  Pt denied feeling lightheaded, dizzy, or SOB throughout tx  Pt educated on using RW upon DC for increased ambulatory balance/endurance w/ pt confirming she understood and did feel "more steady" using RW  Pt again confirmed stair-glide access from basement garage to 2nd floor main living area where the pt does have a bedroom and full bathroom available   Post eval/tx session pt returned supine in bed w/ HOB elevated, all needs w/in reach, in no apparent distress w/ Neurosurgery PA present in pt's room; FELIPA Mar updated post    P: Continue per evaluation above    Skilled inpatient PT is recommended during this admission in order to progress patient toward goals so patient is able to maximize independence    DC rec: home w/ OPPT w/ family support/supervision        Jaylan Holley, PT, DPT  21

## 2021-08-30 NOTE — CONSULTS
NEUROLOGY RESIDENCY CONSULT NOTE     Name: Levon Fonseca   Age & Sex: 59 y o  female   MRN: [id number]  Unit/Bed#: ED 29   Encounter: [id number]  Length of Stay: 0    ASSESSMENT & PLAN     * Dizziness  Assessment & Plan  Sudden onset lightheadedness and N/V which started 1-1/2 to 2 hours prior to presentation  Additionally felt cold, clammy, and unsteady  No visual changes, headache, loss of consciousness  Neuro exam was unremarkable the ED  Imaging was unremarkable with exception to CTA which demonstrated a 2 cm meningioma in the right anterior clinoid process  Symptoms resolved while in the ED  MRI was also negative for any diffusion restriction  Workup:  · CTA head and neck:  No acute findings, calcified 2 cm meningioma  · MRI brain: no acute findings, same as CTA  · Labs:  A1c of 10 2  · BP on admission >190 SBP    Impression: Most likely orthostatic versus vasovagal presycope versus HTN urgency, no evidence to suggest vertigo or other neurologic cause    Plan:  · Okay to continue with meclizine for any perception of vertigo  · Anti emetics  · Hydration and electrolytes as per primary team  · BP control to normotension  · Glucose control and home medication management  · Would recommend orthostatic vitals      Recommendations for outpatient neurological follow up have yet to be determined  SUBJECTIVE     Reason for Consult / Principal Problem: lightheadedness  Hx and PE limited by: nothing    HPI    Levon Fonseca is a 59 y o  female who presented to the ED on 8/29/2021 1748 for lightheadedness which started approximately 1 and half to 2 hours prior to presentation associated with nausea and vomiting  She had been at her sisters house when she became acutely "unwell" and felt like perhaps she was dehydrated  She had this experience once previously approximately 3 weeks prior  She denied room spinning or other vertiginous signs  She also denied tinnitus or other hearing issues   At the time of onset she felt clammy, cold, pale and unsteady on her feet  On presentation to the ED she also had a BP of 196/91 which continued to remain hypertensive in the 150-170 range  However, shortly after arriving in the ER the lightheadedness and vertigo started to resolve  She denied vision changes, headache, loss of consciousness and per ED note neuro exam was unremarkable  CTA head and neck was performed and although did not demonstrate any acute findings or stenosis there was a right anterior clinoid process calcified less than 2 cm lesion likely a meningioma  Labs were grossly within normal limits with exception to glucose which was 223 and A1c of 10 2 reportedly down from a high point of 13 a year ago         Inpatient consult to Neurology  Consult performed by: Nidia Mckeon MD  Consult ordered by: ASH Triana        Historical Information   Past Medical History:   Diagnosis Date    Colon polyp     Dizziness     Edema     Hyperlipidemia     Hypertension     Type 2 diabetes mellitus ([facility] [address] )     Vitamin D deficiency     Wears glasses      Past Surgical History:   Procedure Laterality Date    BREAST EXCISIONAL BIOPSY Right 2009    COLONOSCOPY  04/11/2018    Colonoscpoy due in 3 years    EGD  04/11/2018    MAMMO (HISTORICAL) Bilateral 05/04/2020 2018    MAMMO NEEDLE LOCALIZATION RIGHT (ALL INC) Right 7/8/2009    WISDOM TOOTH EXTRACTION       Social History   Social History     Substance and Sexual Activity   Alcohol Use No     Social History     Substance and Sexual Activity   Drug Use No     E-Cigarette/Vaping    E-Cigarette Use Never User      E-Cigarette/Vaping Substances    Nicotine No     THC No     CBD No     Flavoring No     Other No     Unknown No      Social History     Tobacco Use   Smoking Status Never Smoker   Smokeless Tobacco Never Used     Family History:   Family History   Problem Relation Age of Onset    Diabetes Mother     Hypertension Mother     Sudden death Mother SCD    Hyperlipidemia Mother     Heart attack Mother     Breast cancer Mother 79    Diabetes Father     Arrhythmia Father         pacemaker/ defib    Clotting disorder Father         eliquis    Heart failure Father     Heart disease Father     Melanoma Father     Cancer Father 61        bladder cancer    Breast cancer Sister 44    BRCA1 Negative Sister     No Known Problems Maternal Grandmother     No Known Problems Maternal Grandfather     No Known Problems Paternal Grandmother     No Known Problems Paternal Grandfather     No Known Problems Sister     No Known Problems Sister     Breast cancer Maternal Aunt         unknown age   Dillon Hero Esophageal cancer Maternal Aunt 61    Cancer Maternal Aunt         unknown age or type    No Known Problems Maternal Aunt     Lung cancer Paternal Aunt         unknonw age- in her [de-identified]    Colon cancer Maternal Uncle         unknwon age   Dillon Hero Pancreatic cancer Maternal Uncle 61    Anuerysm Neg Hx      Meds/Allergies   all current active meds have been reviewed  No Known Allergies    Review of previous medical records was completed  Review of Systems   Constitutional: Negative for fever  Respiratory: Negative for shortness of breath  Cardiovascular: Negative for chest pain  Gastrointestinal: Negative for nausea  Neurological: Negative for dizziness, weakness and numbness  All other systems reviewed and are negative  OBJECTIVE     Patient ID: Balaji Blankenship is a 59 y o  female  Vitals:   Vitals:    21 1011 21 1200 21 1300 21 1500   BP: (!) 179/86 (!) 172/81 163/79 (!) 186/86   BP Location: Right arm      Pulse: 74 72 74 72   Resp: 20 18 18    Temp:       TempSrc: Oral      SpO2: 98% 98% 96% 96%      There is no height or weight on file to calculate BMI       Intake/Output Summary (Last 24 hours) at 2021 1720  Last data filed at 2021 1658  Gross per 24 hour   Intake 1000 ml   Output --   Net 1000 ml Physical Exam  Vitals and nursing note reviewed  Constitutional:       General: She is not in acute distress  Appearance: Normal appearance  She is well-developed  HENT:      Head: Normocephalic and atraumatic  Eyes:      Extraocular Movements: Extraocular movements intact and EOM normal       Pupils: Pupils are unequal    Cardiovascular:      Rate and Rhythm: Normal rate and regular rhythm  Heart sounds: Normal heart sounds  Pulmonary:      Effort: Pulmonary effort is normal    Musculoskeletal:         General: Normal range of motion  Cervical back: Normal range of motion  Right lower leg: Edema present  Left lower leg: Edema present  Skin:     General: Skin is warm and dry  Neurological:      Mental Status: She is alert and oriented to person, place, and time  Coordination: Finger-Nose-Finger Test and Heel to [facility] Test normal       Deep Tendon Reflexes: Strength normal    Psychiatric:         Mood and Affect: Mood normal          Speech: Speech normal          Behavior: Behavior normal           Neurologic Exam     Mental Status   Oriented to person, place, and time  Attention: normal  Concentration: normal    Speech: speech is normal   Level of consciousness: alert  Knowledge: good  Normal comprehension  Cranial Nerves     CN II   Visual fields full to confrontation  CN III, IV, VI   Extraocular motions are normal    Pupils: unequal (L > R)  Right pupil: Shape: regular  Left pupil: Shape: regular  CN V   Facial sensation intact  CN VII   Facial expression full, symmetric  CN VIII   CN VIII normal      CN IX, X   CN IX normal    CN X normal      CN XI   CN XI normal      CN XII   CN XII normal      Motor Exam   Muscle bulk: normal  Overall muscle tone: normal  Right arm pronator drift: absent  Left arm pronator drift: absent    Strength   Strength 5/5 throughout       Sensory Exam   Light touch normal    Pinprick normal      Gait, Coordination, and Reflexes     Coordination   Finger to nose coordination: normal  Heel to shin coordination: normal    Reflexes   Reflexes 2+ except as noted  Right plantar: normal  Left plantar: normal       LABORATORY DATA     Labs: I have personally reviewed pertinent films in PACS  Results from last 7 days   Lab Units 08/30/21 0445 08/29/21  1427   WBC Thousand/uL 6 34 5 72   HEMOGLOBIN g/dL 12 8 14 3   HEMATOCRIT % 37 5 42 4   PLATELETS Thousands/uL 183 190   NEUTROS PCT %  --  60   MONOS PCT %  --  8      Results from last 7 days   Lab Units 08/30/21  0445 08/29/21  1427   POTASSIUM mmol/L 3 7 4 7   CHLORIDE mmol/L 105 102   CO2 mmol/L 25 25   BUN mg/dL 16 22   CREATININE mg/dL 0 97 1 05   CALCIUM mg/dL 8 6 9 3   ALK PHOS U/L  --  71   ALT U/L  --  40   AST U/L  --  18                      Results from last 7 days   Lab Units 08/30/21 0445 08/29/21  2327 08/29/21  1427   TROPONIN I ng/mL <0 02 <0 02 <0 02       IMAGING & DIAGNOSTIC TESTING     Radiology Results: I have personally reviewed pertinent films in PACS  MRI brain w wo contrast   Final Result by Ifeoma Stephens MD (08/30 1101)      No acute intracranial pathology  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, as mentioned on recent CT  Other nonemergent findings above  Workstation performed: [id number]         CTA head and neck with and without contrast   Final Result by Olegario Homans, MD (08/29 2114)         1  Right anterior clinoid process rim calcified 1 9 cm lesion, suggestive of a meningioma  No mass effect or vasogenic edema  Thrombosed aneurysm less likely in the absence of clear evidence of a vascular pedicle, though not entirely excluded  Recommend MRI of the brain with gadolinium for further evaluation  2   No evidence of acute infarct or intracranial hemorrhage  3   No stenosis, dissection or occlusion of the carotid or vertebral arteries or major vessels of the [place]        The study was marked in [facility] for immediate notification  Workstation performed: [id number]         XR chest 1 view portable   ED Interpretation by Jaciel Bridges MD (08/29 2118)   No cardiopulmonary abnormalities  Final Result by Ralph Porras MD (08/30 1016)      No acute cardiopulmonary disease  Workstation performed: [id number]             Other Diagnostic Testing: I have personally reviewed pertinent films in PACS    ACTIVE MEDICATIONS     Current Facility-Administered Medications   Medication Dose Route Frequency    aspirin chewable tablet 81 mg  81 mg Oral Daily    atorvastatin (LIPITOR) tablet 40 mg  40 mg Oral Daily With Dinner    enoxaparin (LOVENOX) subcutaneous injection 40 mg  40 mg Subcutaneous Daily    insulin glargine (LANTUS) subcutaneous injection 35 Units 0 35 mL  35 Units Subcutaneous HS    insulin lispro (HumaLOG) 100 units/mL subcutaneous injection 1-5 Units  1-5 Units Subcutaneous HS    insulin lispro (HumaLOG) 100 units/mL subcutaneous injection 1-6 Units  1-6 Units Subcutaneous TID AC    meclizine (ANTIVERT) tablet 12 5 mg  12 5 mg Oral Q8H PRN    pantoprazole (PROTONIX) EC tablet 40 mg  40 mg Oral Early Morning       Prior to Admission medications    Medication Sig Start Date End Date Taking?  Authorizing Provider   aspirin 81 MG tablet Take 1 tablet by mouth every other day    Historical Provider, MD   esomeprazole (NexIUM) 20 mg capsule Take 20 mg by mouth every morning before breakfast    Historical Provider, MD   furosemide (LASIX) 20 mg tablet TAKE 1 TABLET BY MOUTH DAILY AS NEEDED 6/28/20   Padmini Ascencio MD   Januvia 100 MG tablet TAKE 1 TABLET BY MOUTH EVERY DAY 6/25/21   Padmini Ascencio MD   lisinopril (ZESTRIL) 5 mg tablet Take 1 tablet (5 mg total) by mouth daily 3/31/21   Padmini Ascencio MD   metFORMIN (GLUCOPHAGE) 1000 MG tablet Take 1 tablet (1,000 mg total) by mouth 2 (two) times a day with meals 2/23/21 5/24/21  Dalton Ag MD Burak   NovoFine 32G X 6 MM MISC USE AS DIRECTED ONCE A DAY WITH INSULIN 7/16/21   Letha Crockett MD   simvastatin (ZOCOR) 40 mg tablet TAKE 1 TABLET BY MOUTH EVERY DAY 6/25/21   Letha Crockett MD   [place] FlexTouch 100 units/mL injection pen Inject 35 Units under the skin daily 10/15/20   Letha Crockett MD   TURMERIC PO Take 1 capsule by mouth daily    Historical Provider, MD       CODE STATUS & ADVANCED DIRECTIVES     Code Status: Level 1 - Full Code  Advance Directive and Living Will:      Power of :    POLST:        VTE Pharmacologic Prophylaxis: Enoxaparin (Lovenox)  VTE Mechanical Prophylaxis: sequential compression device    ==  MD Marcial Beaulieu's Neurology Residency, PGY-3

## 2021-08-30 NOTE — ASSESSMENT & PLAN NOTE
Patient is without complaints of chest pain  There are no prior EKGs for evaluation  She does have chronic exertional shortness of breath which she notices mostly while going up steps  She notes this is not new or worsening     Normal stress test in 2017 and 2019  Family hx: father with MI at age 45, mother with MI age [de-identified]   · Trend troponin, serial EKG  · Telemetry monitoring  · Echocardiogram

## 2021-08-30 NOTE — ASSESSMENT & PLAN NOTE
Patient is without complaints of chest pain  There are no prior EKGs for evaluation  She does have chronic exertional shortness of breath which she notices mostly while going up steps  She notes this is not new or worsening     Normal stress test in 2017 and 2019  Family hx: father with MI at age 45, mother with MI age [de-identified]   · Troponin negative x3  · Telemetry monitoring  · Echocardiogram-Follow up

## 2021-08-30 NOTE — ASSESSMENT & PLAN NOTE
Most recent blood pressure 170/70  On lisinopril 5 mg q d , Lasix 20 mg q d  As needed      · Hold antihypertensives and allow for permissive hypertension

## 2021-08-30 NOTE — ASSESSMENT & PLAN NOTE
· On simvastatin as outpatient  Change to atorvastatin 40 mg given stroke workup  · Follow-up fasting lipid panel in a m

## 2021-08-30 NOTE — ASSESSMENT & PLAN NOTE
· On simvastatin as outpatient    Change to atorvastatin 40 mg given stroke workup  ·  lipid panel (8/30)- HDL low 31, cholesterol, triglycerides, and LDL all within normal range

## 2021-08-30 NOTE — UTILIZATION REVIEW
Initial Clinical Review    Admission: Date/Time/Statement:   Admission Orders (From admission, onward)     Ordered        08/29/21 2151  Place in Observation  Once                   Orders Placed This Encounter   Procedures    Place in Observation     Standing Status:   Standing     Number of Occurrences:   1     Order Specific Question:   Level of Care     Answer:   Med Surg [16]     Order Specific Question:   Bed request comments     Answer:   tele obs     ED Arrival Information     Expected Arrival Acuity    - 8/29/2021 14:19 Urgent         Means of arrival Escorted by Service Admission type    Walk-In Family Member Hospitalist Urgent         Arrival complaint    Lightheaded/N/V - family hx of Heart Attack        Chief Complaint   Patient presents with    Dizziness     pt states she started feeling lightheaded approx 1 5-2 hours ago with nausea, states drank water and vomited x 2        Initial Presentation: 59 y o  female with a PMH of hypertension, hyperlipidemia, type 2 diabetes, chronic lower extremity edema, who presents to ED from home  with dizziness, lightheadedness, room spinning sensation, nausea, vomiting that occurred this afternoon while she was outside  Her symptoms improved shortly after arriving to the emergency department  On exam,she notes minimal dizziness when she first goes to ambulate  reports chronic exertional SOB , alert,oriented with mental status at baseline, no focal neuro deficit  CT head suggestive of meningioma, no acute infarct, hemorrhage, or stenosis  Pt given ASA and IVF in ED  Pt admitted as OBS to telemetrywith dizziness and L Bundle branch block     Plan- neurology consult, stroke path- telemetry-Echocardiogram, MRI, neuro checks,obtain lipid panel start ASA, change statin to atorvastatin, PT/OT/ST, prn meclizine , checks orthostatics, trend troponin,hold antihypertensives and allow for permissive hypertension    Date: 8/30  ST-Pt passed nsg swallow screen; tolerating regular diet w/o s/s dysphagia or aspiration  No speech/language deficits reported  NIH score is 0  No need for formal speech/swallow eval at this time  ED Triage Vitals   Temperature Pulse Respirations Blood Pressure SpO2   08/29/21 1423 08/29/21 1423 08/29/21 1423 08/29/21 1423 08/29/21 1423   98 4 °F (36 9 °C) 70 18 (!) 196/91 99 %      Temp Source Heart Rate Source Patient Position - Orthostatic VS BP Location FiO2 (%)   08/29/21 1423 08/29/21 1423 08/29/21 1915 08/29/21 1915 --   Oral Monitor Lying Right arm       Pain Score       --                 Wt Readings from Last 1 Encounters:   08/30/21 88 6 kg (195 lb 5 2 oz)     Additional Vital Signs:   Date/Time  Temp  Pulse  Resp  BP  MAP (mmHg)  SpO2    08/30/21 0600  --  70  16  166/77  110  95 %    08/30/21 0400  --  64  16  168/75  112  97 %    08/30/21 0300  --  66  18  144/68  98  96 %    08/30/21 0245  --  66  --  --  --  96 %    08/30/21 0200  --  68  18  153/76  109  96 %    08/30/21 0100  --  70  18  164/78  112  95 %    08/30/21 0000  --  62  18  159/74  107  98 %    08/29/21 2230  --  66  18  162/77  110  98 %    08/29/21 2145  --  66  18  171/78Abnormal   111  97 %    08/29/21 1915  --  68  18  178/81Abnormal   116  98 %      Date and Time Eye Opening Best Verbal Response Best Motor Response [place] Coma Scale Score   08/30/21 0700 4 5 6 15   08/30/21 0500 4 5 6 15   08/30/21 0300 4 5 6 15   08/30/21 0200 4 5 6 15   08/30/21 0100 4 5 6 15   08/30/21 0000 4 5 6 15   08/29/21 2357 4 5 6 15   08/29/21 2300 4 5 6 15         Pertinent Labs/Diagnostic Test Results:   8/29  CTA head- 1  Right anterior clinoid process rim calcified 1 9 cm lesion, suggestive of a meningioma  No mass effect or vasogenic edema  Thrombosed aneurysm less likely in the absence of clear evidence of a vascular pedicle, though not entirely excluded  Recommend MRI of the brain with gadolinium for further evaluation    2   No evidence of acute infarct or intracranial hemorrhage  3   No stenosis, dissection or occlusion of the carotid or vertebral arteries or major vessels of the [place]     CXR-No acute cardiopulmonary disease  8/30  ECHO-not read yet  MRI brain-No acute intracranial pathology    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, as mentioned on recent CT          Results from last 7 days   Lab Units 08/30/21  0445 08/29/21  1427   WBC Thousand/uL 6 34 5 72   HEMOGLOBIN g/dL 12 8 14 3   HEMATOCRIT % 37 5 42 4   PLATELETS Thousands/uL 183 190   NEUTROS ABS Thousands/µL  --  3 52         Results from last 7 days   Lab Units 08/30/21  0445 08/29/21  1427   SODIUM mmol/L 141 138   POTASSIUM mmol/L 3 7 4 7   CHLORIDE mmol/L 105 102   CO2 mmol/L 25 25   ANION GAP mmol/L 11 11   BUN mg/dL 16 22   CREATININE mg/dL 0 97 1 05   EGFR ml/min/1 73sq m 62 57   CALCIUM mg/dL 8 6 9 3     Results from last 7 days   Lab Units 08/29/21  1427   AST U/L 18   ALT U/L 40   ALK PHOS U/L 71   TOTAL PROTEIN g/dL 7 4   ALBUMIN g/dL 3 6   TOTAL BILIRUBIN mg/dL 0 72     Results from last 7 days   Lab Units 08/30/21  0748 08/29/21  2320   POC GLUCOSE mg/dl 253* 223*     Results from last 7 days   Lab Units 08/30/21  0445 08/29/21  1427   GLUCOSE RANDOM mg/dL 235* 390*         Results from last 7 days   Lab Units 08/29/21  1427   HEMOGLOBIN A1C % 10 2*   EAG mg/dl 246         Results from last 7 days   Lab Units 08/30/21  0445 08/29/21  2327 08/29/21  1427   TROPONIN I ng/mL <0 02 <0 02 <0 02               Results from last 7 days   Lab Units 08/29/21  1427   CRP mg/L <3 0                 ED Treatment:   Medication Administration from 08/29/2021 1419 to 08/30/2021 0901       Date/Time Order Dose Route Action     08/29/2021 1937 iohexol (OMNIPAQUE) 350 MG/ML injection (SINGLE-DOSE) 85 mL 85 mL Intravenous Given     08/30/2021 0533 pantoprazole (PROTONIX) EC tablet 40 mg 40 mg Oral Given     08/29/2021 2345 insulin glargine (LANTUS) subcutaneous injection 35 Units 0 35 mL 35 Units Subcutaneous Given     08/29/2021 2347 aspirin chewable tablet 324 mg 324 mg Oral Given     08/29/2021 2352 lactated ringers infusion 100 mL/hr Intravenous New Bag     08/29/2021 2346 insulin lispro (HumaLOG) 100 units/mL subcutaneous injection 1-5 Units 1 Units Subcutaneous Given        Past Medical History:   Diagnosis Date    Colon polyp     Dizziness     Edema     Hyperlipidemia     Hypertension     Type 2 diabetes mellitus (HCC)     Vitamin D deficiency     Wears glasses      Present on Admission:   Mixed hyperlipidemia   Essential hypertension      Admitting Diagnosis: No admission diagnoses are documented for this encounter  Age/Sex: 59 y o  female  Admission Orders:  Scheduled Medications:  aspirin, 81 mg, Oral, Daily  atorvastatin, 40 mg, Oral, Daily With Dinner  enoxaparin, 40 mg, Subcutaneous, Daily  insulin glargine, 35 Units, Subcutaneous, HS  insulin lispro, 1-5 Units, Subcutaneous, HS  insulin lispro, 1-6 Units, Subcutaneous, TID AC  pantoprazole, 40 mg, Oral, Early Morning      Continuous IV Infusions:     PRN Meds:  meclizine, 12 5 mg, Oral, Q8H PRN    NIH stroke scale q 24 h x 2 days  neuro checks   Telemetry  SCD's  Nursing dyspahagia assess prior to diet      IP CONSULT TO NEUROLOGY  IP CONSULT TO NUTRITION SERVICES  IP CONSULT TO CASE MANAGEMENT    Network Utilization Review Department  ATTENTION: Please call with any questions or concerns to [phone] and carefully listen to the prompts so that you are directed to the right person  All voicemails are confidential   Emilia Bridges all requests for admission clinical reviews, approved or denied determinations and any other requests to dedicated fax number below belonging to the campus where the patient is receiving treatment   List of dedicated fax numbers for the Facilities:  [address] DENIALS (Administrative/Medical Necessity) [phone]   [address] (Maternity/NICU/Pediatrics) [phone] [address] [place] [address] [address] [place] [address] [address] [place] [facility] [address] [address] [place] [address] [address] [address] [place] [id number] [phone]

## 2021-08-31 ENCOUNTER — TELEPHONE (OUTPATIENT)
Dept: NEUROSURGERY | Facility: CLINIC | Age: 61
End: 2021-08-31

## 2021-08-31 LAB
ALBUMIN SERPL BCP-MCNC: 2.6 G/DL (ref 3.5–5)
ALP SERPL-CCNC: 56 U/L (ref 46–116)
ALT SERPL W P-5'-P-CCNC: 30 U/L (ref 12–78)
ANION GAP SERPL CALCULATED.3IONS-SCNC: 8 MMOL/L (ref 4–13)
AST SERPL W P-5'-P-CCNC: 15 U/L (ref 5–45)
ATRIAL RATE: 63 BPM
ATRIAL RATE: 71 BPM
BASOPHILS # BLD AUTO: 0.03 THOUSANDS/ΜL (ref 0–0.1)
BASOPHILS NFR BLD AUTO: 1 % (ref 0–1)
BILIRUB SERPL-MCNC: 0.31 MG/DL (ref 0.2–1)
BUN SERPL-MCNC: 14 MG/DL (ref 5–25)
CALCIUM ALBUM COR SERPL-MCNC: 9.5 MG/DL (ref 8.3–10.1)
CALCIUM SERPL-MCNC: 8.4 MG/DL (ref 8.3–10.1)
CHLORIDE SERPL-SCNC: 106 MMOL/L (ref 100–108)
CO2 SERPL-SCNC: 27 MMOL/L (ref 21–32)
CREAT SERPL-MCNC: 0.95 MG/DL (ref 0.6–1.3)
EOSINOPHIL # BLD AUTO: 0.08 THOUSAND/ΜL (ref 0–0.61)
EOSINOPHIL NFR BLD AUTO: 1 % (ref 0–6)
ERYTHROCYTE [DISTWIDTH] IN BLOOD BY AUTOMATED COUNT: 12.2 % (ref 11.6–15.1)
GFR SERPL CREATININE-BSD FRML MDRD: 64 ML/MIN/1.73SQ M
GLUCOSE SERPL-MCNC: 264 MG/DL (ref 65–140)
GLUCOSE SERPL-MCNC: 272 MG/DL (ref 65–140)
GLUCOSE SERPL-MCNC: 277 MG/DL (ref 65–140)
GLUCOSE SERPL-MCNC: 283 MG/DL (ref 65–140)
GLUCOSE SERPL-MCNC: 318 MG/DL (ref 65–140)
HCT VFR BLD AUTO: 41.2 % (ref 34.8–46.1)
HGB BLD-MCNC: 13.5 G/DL (ref 11.5–15.4)
IMM GRANULOCYTES # BLD AUTO: 0.02 THOUSAND/UL (ref 0–0.2)
IMM GRANULOCYTES NFR BLD AUTO: 0 % (ref 0–2)
LYMPHOCYTES # BLD AUTO: 2.23 THOUSANDS/ΜL (ref 0.6–4.47)
LYMPHOCYTES NFR BLD AUTO: 37 % (ref 14–44)
MCH RBC QN AUTO: 30.2 PG (ref 26.8–34.3)
MCHC RBC AUTO-ENTMCNC: 32.8 G/DL (ref 31.4–37.4)
MCV RBC AUTO: 92 FL (ref 82–98)
MONOCYTES # BLD AUTO: 0.44 THOUSAND/ΜL (ref 0.17–1.22)
MONOCYTES NFR BLD AUTO: 7 % (ref 4–12)
NEUTROPHILS # BLD AUTO: 3.25 THOUSANDS/ΜL (ref 1.85–7.62)
NEUTS SEG NFR BLD AUTO: 54 % (ref 43–75)
NRBC BLD AUTO-RTO: 0 /100 WBCS
NT-PROBNP SERPL-MCNC: 1033 PG/ML
P AXIS: 56 DEGREES
P AXIS: 63 DEGREES
PLATELET # BLD AUTO: 184 THOUSANDS/UL (ref 149–390)
PMV BLD AUTO: 11.3 FL (ref 8.9–12.7)
POTASSIUM SERPL-SCNC: 3.8 MMOL/L (ref 3.5–5.3)
PR INTERVAL: 152 MS
PR INTERVAL: 160 MS
PROT SERPL-MCNC: 5.9 G/DL (ref 6.4–8.2)
QRS AXIS: -54 DEGREES
QRS AXIS: -64 DEGREES
QRSD INTERVAL: 140 MS
QRSD INTERVAL: 144 MS
QT INTERVAL: 466 MS
QT INTERVAL: 482 MS
QTC INTERVAL: 493 MS
QTC INTERVAL: 496 MS
RBC # BLD AUTO: 4.47 MILLION/UL (ref 3.81–5.12)
SODIUM SERPL-SCNC: 141 MMOL/L (ref 136–145)
T WAVE AXIS: 86 DEGREES
T WAVE AXIS: 90 DEGREES
VENTRICULAR RATE: 63 BPM
VENTRICULAR RATE: 68 BPM
WBC # BLD AUTO: 6.05 THOUSAND/UL (ref 4.31–10.16)

## 2021-08-31 PROCEDURE — 97116 GAIT TRAINING THERAPY: CPT

## 2021-08-31 PROCEDURE — 85025 COMPLETE CBC W/AUTO DIFF WBC: CPT

## 2021-08-31 PROCEDURE — 93010 ELECTROCARDIOGRAM REPORT: CPT | Performed by: INTERNAL MEDICINE

## 2021-08-31 PROCEDURE — 99222 1ST HOSP IP/OBS MODERATE 55: CPT | Performed by: INTERNAL MEDICINE

## 2021-08-31 PROCEDURE — 80053 COMPREHEN METABOLIC PANEL: CPT

## 2021-08-31 PROCEDURE — 99232 SBSQ HOSP IP/OBS MODERATE 35: CPT | Performed by: INTERNAL MEDICINE

## 2021-08-31 PROCEDURE — 82948 REAGENT STRIP/BLOOD GLUCOSE: CPT

## 2021-08-31 PROCEDURE — 83880 ASSAY OF NATRIURETIC PEPTIDE: CPT | Performed by: PHYSICIAN ASSISTANT

## 2021-08-31 PROCEDURE — 93306 TTE W/DOPPLER COMPLETE: CPT | Performed by: INTERNAL MEDICINE

## 2021-08-31 RX ORDER — LISINOPRIL 5 MG/1
5 TABLET ORAL DAILY
Status: DISCONTINUED | OUTPATIENT
Start: 2021-09-01 | End: 2021-09-01

## 2021-08-31 RX ORDER — LISINOPRIL 5 MG/1
5 TABLET ORAL DAILY
Status: DISCONTINUED | OUTPATIENT
Start: 2021-08-31 | End: 2021-08-31

## 2021-08-31 RX ORDER — FUROSEMIDE 10 MG/ML
40 INJECTION INTRAMUSCULAR; INTRAVENOUS
Status: COMPLETED | OUTPATIENT
Start: 2021-08-31 | End: 2021-08-31

## 2021-08-31 RX ORDER — CARVEDILOL 3.12 MG/1
3.12 TABLET ORAL 2 TIMES DAILY WITH MEALS
Status: DISCONTINUED | OUTPATIENT
Start: 2021-08-31 | End: 2021-09-01

## 2021-08-31 RX ADMIN — INSULIN LISPRO 4 UNITS: 100 INJECTION, SOLUTION INTRAVENOUS; SUBCUTANEOUS at 08:31

## 2021-08-31 RX ADMIN — FUROSEMIDE 40 MG: 10 INJECTION, SOLUTION INTRAMUSCULAR; INTRAVENOUS at 15:40

## 2021-08-31 RX ADMIN — INSULIN LISPRO 4 UNITS: 100 INJECTION, SOLUTION INTRAVENOUS; SUBCUTANEOUS at 12:17

## 2021-08-31 RX ADMIN — INSULIN GLARGINE 35 UNITS: 100 INJECTION, SOLUTION SUBCUTANEOUS at 21:37

## 2021-08-31 RX ADMIN — INSULIN LISPRO 3 UNITS: 100 INJECTION, SOLUTION INTRAVENOUS; SUBCUTANEOUS at 17:05

## 2021-08-31 RX ADMIN — INSULIN LISPRO 2 UNITS: 100 INJECTION, SOLUTION INTRAVENOUS; SUBCUTANEOUS at 21:38

## 2021-08-31 RX ADMIN — CARVEDILOL 3.12 MG: 3.12 TABLET, FILM COATED ORAL at 15:39

## 2021-08-31 RX ADMIN — ENOXAPARIN SODIUM 40 MG: 40 INJECTION SUBCUTANEOUS at 08:31

## 2021-08-31 RX ADMIN — PANTOPRAZOLE SODIUM 40 MG: 40 TABLET, DELAYED RELEASE ORAL at 05:55

## 2021-08-31 RX ADMIN — ASPIRIN 81 MG: 81 TABLET, CHEWABLE ORAL at 08:31

## 2021-08-31 RX ADMIN — FUROSEMIDE 40 MG: 10 INJECTION, SOLUTION INTRAMUSCULAR; INTRAVENOUS at 12:16

## 2021-08-31 RX ADMIN — ATORVASTATIN CALCIUM 40 MG: 40 TABLET, FILM COATED ORAL at 15:39

## 2021-08-31 NOTE — ASSESSMENT & PLAN NOTE
· On simvastatin as outpatient  Change to atorvastatin 40 mg given stroke workup  · As per cardiology continue stain    ·  lipid panel (8/30)- HDL low 31, cholesterol, triglycerides, and LDL all within normal range

## 2021-08-31 NOTE — PROGRESS NOTES
[facility]  Progress Note - Riley Short 1960, 59 y o  female MRN: [id number]  Unit/Bed#: S -01 Encounter: [id number]  Primary Care Provider: Alejandra Conner MD   Date and time admitted to hospital: 8/29/2021  5:48 PM    * Dizziness  Assessment & Plan  Presents to ED with lightheadedness, nausea, vomiting that occurred while she was outside sitting on her patio  She reports a similar episode of lightheadedness 3 weeks ago when turning over in bed  On arrival to emergency department, symptoms have resolved  CTA head neck with likely meningioma  No evidence of infarct or intracranial hemorrhage  No stenosis, dissection, occlusion  · Telemetry monitoring  · PT OT, speech consult  · Check orthostatics  P r n  Meclizine  · Neurology consulted  · MRI (8/30) No acute intracranial pathology  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, as mentioned on recent CT  · As per neurosurgery, recommend visual file testing out patient  · Echocardiogram (8/30) Ejection fraction 37%  · Scheduled for cardiac catheterization tomorrow given new cardiomyopathy and concerns of ischemia   · Monitor I&Os, renal function, electrolytes and standing weight       Left bundle branch block  Assessment & Plan  Patient is without complaints of chest pain  There are no prior EKGs for evaluation  Previous stress tests strips in 2017 and 2019 do not show LBBB  She does have chronic exertional shortness of breath which she notices mostly while going up steps  She notes this is not new or worsening     Normal stress test in 2017 and 2019  Family hx: father with MI at age 45, mother with MI age [de-identified]   · Troponin negative x3  · Telemetry monitoring  · Echocardiogram-Ejection fraction 37%  · Scheduled for cardiac catheterization tomorrow given new cardiomyopathy and concerns of ischemia       Type 2 diabetes mellitus without complication, with long-term current use of insulin ([facility] [address] )  Assessment & Plan  Lab Results   Component Value Date    HGBA1C 10 2 (H) 2021     · Follow-up repeat A1c  · Continue home dose of Lantus 35 units HS  · Add sliding scale insulin  · Hold p o  Metformin and Januvia, may resume on discharge    · Patient can receive 20 units of Lantus prior to being transitioned to NPO for cardiac cath tonight  · Patient can result 35 units of Lantus after cardiac cath  Will consult Endocrinology for further recommendations     Essential hypertension  Assessment & Plan  Most recent blood pressure 178/84  · As per cardiology, carvedilol 3 125 mg BID and up titrate for BP optimization and restart lisinopril 5 mg daily tomorrow after catheterization  40 mg IV Lasix x2 today  She will need a maintenance diuretic at discharge      Mixed hyperlipidemia  Assessment & Plan  · On simvastatin as outpatient  Change to atorvastatin 40 mg given stroke workup  · As per cardiology continue stain  ·  lipid panel ()- HDL low 31, cholesterol, triglycerides, and LDL all within normal range        VTE Pharmacologic Prophylaxis: VTE Score: 7 High Risk (Score >/= 5) - Pharmacological DVT Prophylaxis Ordered: enoxaparin (Lovenox)  Sequential Compression Devices Ordered  Patient Centered Rounds: I performed bedside rounds with nursing staff today  Discussions with Specialists or Other Care Team Provider: Neurology, Neurosurgery, Cardiology    Education and Discussions with Family / Patient: Updated  (sister) at bedside  Current Length of Stay: 0 day(s)  Current Patient Status: Inpatient   Discharge Plan: Anticipate discharge in 48-72 hrs to home  Code Status: Level 1 - Full Code    Subjective:   No over night events  No complaints  She reports feeling better today and that her dizziness has resolved  She denies headache, light headedness, visual changes, and shortness of breath      Objective:     Vitals:   Temp (24hrs), Av 8 °F (36 6 °C), Min:97 7 °F (36 5 °C), Max:97 9 °F (36 6 °C)    Temp:  [97 7 °F (36 5 °C)-97 9 °F (36 6 °C)] 97 9 °F (36 6 °C)  HR:  [66-80] 66  Resp:  [16-18] 18  BP: (147-186)/(72-86) 178/84  SpO2:  [96 %-98 %] 96 %  Body mass index is 29 96 kg/m²  Input and Output Summary (last 24 hours): Intake/Output Summary (Last 24 hours) at 8/31/2021 1446  Last data filed at 8/30/2021 2140  Gross per 24 hour   Intake 1120 ml   Output --   Net 1120 ml       Physical Exam:   Physical Exam  Constitutional:       Appearance: Normal appearance  HENT:      Head: Normocephalic and atraumatic  Eyes:      Conjunctiva/sclera: Conjunctivae normal    Cardiovascular:      Rate and Rhythm: Normal rate and regular rhythm  Pulses: Normal pulses  Heart sounds: Normal heart sounds  Pulmonary:      Effort: Pulmonary effort is normal       Breath sounds: Normal breath sounds  Abdominal:      General: Abdomen is flat  Bowel sounds are normal       Palpations: Abdomen is soft  Musculoskeletal:         General: No swelling  Right lower leg: Swelling present  Left lower leg: Swelling present  Neurological:      General: No focal deficit present  Mental Status: She is alert and oriented to person, place, and time  Cranial Nerves: No cranial nerve deficit  Motor: No weakness        Gait: Gait normal          Additional Data:     Labs:  Results from last 7 days   Lab Units 08/31/21  0531   WBC Thousand/uL 6 05   HEMOGLOBIN g/dL 13 5   HEMATOCRIT % 41 2   PLATELETS Thousands/uL 184   NEUTROS PCT % 54   LYMPHS PCT % 37   MONOS PCT % 7   EOS PCT % 1     Results from last 7 days   Lab Units 08/31/21  0531   SODIUM mmol/L 141   POTASSIUM mmol/L 3 8   CHLORIDE mmol/L 106   CO2 mmol/L 27   BUN mg/dL 14   CREATININE mg/dL 0 95   ANION GAP mmol/L 8   CALCIUM mg/dL 8 4   ALBUMIN g/dL 2 6*   TOTAL BILIRUBIN mg/dL 0 31   ALK PHOS U/L 56   ALT U/L 30   AST U/L 15   GLUCOSE RANDOM mg/dL 318*         Results from last 7 days   Lab Units 08/31/21  1114 08/31/21  2366 08/30/21  2043 08/30/21  1834 08/30/21  1623 08/30/21  0748 08/29/21  2320   POC GLUCOSE mg/dl 272* 277* 198* 262* 286* 253* 223*     Results from last 7 days   Lab Units 08/29/21  1427   HEMOGLOBIN A1C % 10 2*           Lines/Drains:  Invasive Devices     Peripheral Intravenous Line            Peripheral IV 08/29/21 Left Antecubital 1 day                  Telemetry:  Telemetry Orders (From admission, onward)             48 Hour Telemetry Monitoring  Continuous x 48 hours     Expiring   Question:  Reason for 48 Hour Telemetry  Answer:  Acute CVA (<24 hrs old, hemispheric strokes, selected brainstem strokes, cardiac arrhythmias)                 Telemetry Reviewed: Normal Sinus Rhythm  Indication for Continued Telemetry Use: Acute CHF on >200 mg lasix/day or equivalent dose or with new reduced EF  Imaging: Reviewed radiology reports from this admission including: CT head, MRI brain and ECHO    Recent Cultures (last 7 days):         Last 24 Hours Medication List:   Current Facility-Administered Medications   Medication Dose Route Frequency Provider Last Rate    aspirin  81 mg Oral Daily [facility]      atorvastatin  40 mg Oral Daily With [employer], ASH      carvedilol  3 125 mg Oral BID With Meals Bret Novak PA-C      enoxaparin  40 mg Subcutaneous Daily [facility]      furosemide  40 mg Intravenous BID (diuretic) Bret Novak PA-C      insulin glargine  35 Units Subcutaneous HS Yusuf Gutierrez, CRNP      insulin lispro  1-5 Units Subcutaneous HS Yusuf Gutierrez, ASH      insulin lispro  1-6 Units Subcutaneous TID [facility]      [START ON 9/1/2021] lisinopril  5 mg Oral Daily Bret Novak PA-C      meclizine  12 5 mg Oral Q8H PRN [facility]      pantoprazole  40 mg Oral Early Morning [facility]          Today, Patient Was Seen By: Marychuy Herring    **Please Note: This note may have been constructed using a voice recognition system  **

## 2021-08-31 NOTE — UTILIZATION REVIEW
ASA and statin  Telemetry  Pt has +JVD, BLE edema with RLE erythema  Vital Signs:   Date/Time  Temp  Pulse  Resp  BP  MAP (mmHg)  SpO2    08/31/21 0700  97 9 °F (36 6 °C)  66  18  178/84Abnormal   --  96 %    08/30/21 2040  97 7 °F (36 5 °C)  71  18  147/79  100  98 %    08/30/21 1832  --  72  --  153/72  104  97 %    08/30/21 1700  --  80  16  156/73  105  97 %    08/30/21 1500  --  72  17  186/86Abnormal   124  96 %    08/30/21 1300  --  74  18  163/79  114  96 %    08/30/21 1200  --  72  18  172/81Abnormal   115  98 %          Pertinent Labs/Diagnostic Results:   8/30   ECHO-LEFT VENTRICLE: Size was normal  Systolic function was moderately reduced  Ejection fraction was estimated to be 37 %  There was hypokinesis of the mid anterior, basal-mid anteroseptal, basal-mid inferoseptal, entire inferior, and apical  septal wall(s)  Wall thickness was normal  DOPPLER: Doppler parameters were consistent with abnormal left ventricular relaxation (grade 1 diastolic dysfunction)    VENTRICULAR SEPTUM: There was mild dyssynergic motion  These changes are consistent with LBBB    RIGHT VENTRICLE: The size was normal  Systolic function was normal  Wall thickness was normal    LEFT ATRIUM: The atrium was mildly dilated     RIGHT ATRIUM: Size was normal             Results from last 7 days   Lab Units 08/31/21  0531 08/30/21  0445 08/29/21  1427   WBC Thousand/uL 6 05 6 34 5 72   HEMOGLOBIN g/dL 13 5 12 8 14 3   HEMATOCRIT % 41 2 37 5 42 4   PLATELETS Thousands/uL 184 183 190   NEUTROS ABS Thousands/µL 3 25  --  3 52         Results from last 7 days   Lab Units 08/31/21  0531 08/30/21  0445 08/29/21  1427   SODIUM mmol/L 141 141 138   POTASSIUM mmol/L 3 8 3 7 4 7   CHLORIDE mmol/L 106 105 102   CO2 mmol/L 27 25 25   ANION GAP mmol/L 8 11 11   BUN mg/dL 14 16 22   CREATININE mg/dL 0 95 0 97 1 05   EGFR ml/min/1 73sq m 64 62 57   CALCIUM mg/dL 8 4 8 6 9 3     Results from last 7 days   Lab Units 08/31/21  0531 08/29/21  1428 AST U/L 15 18   ALT U/L 30 40   ALK PHOS U/L 56 71   TOTAL PROTEIN g/dL 5 9* 7 4   ALBUMIN g/dL 2 6* 3 6   TOTAL BILIRUBIN mg/dL 0 31 0 72     Results from last 7 days   Lab Units 08/31/21  0758 08/30/21  2043 08/30/21  1834 08/30/21  1623 08/30/21  0748 08/29/21  2320   POC GLUCOSE mg/dl 277* 198* 262* 286* 253* 223*     Results from last 7 days   Lab Units 08/31/21  0531 08/30/21  0445 08/29/21  1427   GLUCOSE RANDOM mg/dL 318* 235* 390*         Results from last 7 days   Lab Units 08/29/21  1427   HEMOGLOBIN A1C % 10 2*   EAG mg/dl 246         Results from last 7 days   Lab Units 08/30/21  0445 08/29/21  2327 08/29/21  1427   TROPONIN I ng/mL <0 02 <0 02 <0 02               Results from last 7 days   Lab Units 08/29/21  1427   CRP mg/L <3 0               Medications:   Scheduled Medications:  aspirin, 81 mg, Oral, Daily  atorvastatin, 40 mg, Oral, Daily With Dinner  enoxaparin, 40 mg, Subcutaneous, Daily  insulin glargine, 35 Units, Subcutaneous, HS  insulin lispro, 1-5 Units, Subcutaneous, HS  insulin lispro, 1-6 Units, Subcutaneous, TID AC  pantoprazole, 40 mg, Oral, Early Morning      Continuous IV Infusions:     PRN Meds:  meclizine, 12 5 mg, Oral, Q8H PRN        Discharge Plan: D    Network Utilization Review Department  ATTENTION: Please call with any questions or concerns to [phone] and carefully listen to the prompts so that you are directed to the right person  All voicemails are confidential   Saroj Ortiz all requests for admission clinical reviews, approved or denied determinations and any other requests to dedicated fax number below belonging to the campus where the patient is receiving treatment   List of dedicated fax numbers for the Facilities:  [address] [place] [address] DENIALS (Administrative/Medical Necessity) [phone]   [address] (Maternity/NICU/Pediatrics) [phone]   [address] [address] [facility] [address] [address] [place] [address] [address] [place] [address] 951 [phone]

## 2021-08-31 NOTE — ASSESSMENT & PLAN NOTE
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

## 2021-08-31 NOTE — PLAN OF CARE
Problem: Potential for Falls  Goal: Patient will remain free of falls  Description: INTERVENTIONS:  - Educate patient/family on patient safety including physical limitations  - Instruct patient to call for assistance with activity   - Consult OT/PT to assist with strengthening/mobility   - Keep Call bell within reach  - Keep bed low and locked with side rails adjusted as appropriate  - Keep care items and personal belongings within reach  - Initiate and maintain comfort rounds  - Make Fall Risk Sign visible to staff  - Offer Toileting every 4 Hours, in advance of need  - Initiate/Maintain bed alarm  - Apply yellow socks and bracelet for high fall risk patients  - Consider moving patient to room near nurses station  8/31/2021 1308 by Matthew Brumfield RN  Outcome: Progressing  8/31/2021 1307 by Matthew Brumfield RN  Outcome: Progressing     Problem: CARDIOVASCULAR - ADULT  Goal: Maintains optimal cardiac output and hemodynamic stability  Description: INTERVENTIONS:  - Monitor I/O, vital signs and rhythm  - Monitor for S/S and trends of decreased cardiac output  - Administer and titrate ordered vasoactive medications to optimize hemodynamic stability  - Assess quality of pulses, skin color and temperature  - Assess for signs of decreased coronary artery perfusion  - Instruct patient to report change in severity of symptoms  Outcome: Progressing  Goal: Absence of cardiac dysrhythmias or at baseline rhythm  Description: INTERVENTIONS:  - Continuous cardiac monitoring, vital signs, obtain 12 lead EKG if ordered  - Administer antiarrhythmic and heart rate control medications as ordered  - Monitor electrolytes and administer replacement therapy as ordered  Outcome: Progressing     Problem: SAFETY ADULT  Goal: Patient will remain free of falls  Description: INTERVENTIONS:  - Educate patient/family on patient safety including physical limitations  - Instruct patient to call for assistance with activity   - Consult OT/PT to assist with strengthening/mobility   - Keep Call bell within reach  - Keep bed low and locked with side rails adjusted as appropriate  - Keep care items and personal belongings within reach  - Initiate and maintain comfort rounds  - Make Fall Risk Sign visible to staff  - Offer Toileting every 4 Hours, in advance of need  - Initiate/Maintain bed alarm  - Apply yellow socks and bracelet for high fall risk patients  - Consider moving patient to room near nurses station  8/31/2021 1308 by Ramy Sam RN  Outcome: Progressing  8/31/2021 1307 by Ramy Sam RN  Outcome: Progressing  Goal: Maintain or return to baseline ADL function  Description: INTERVENTIONS:  -  Assess patient's ability to carry out ADLs; assess patient's baseline for ADL function and identify physical deficits which impact ability to perform ADLs (bathing, care of mouth/teeth, toileting, grooming, dressing, etc )  - Assess/evaluate cause of self-care deficits   - Assess range of motion  - Assess patient's mobility; develop plan if impaired  - Assess patient's need for assistive devices and provide as appropriate  - Encourage maximum independence but intervene and supervise when necessary  - Involve family in performance of ADLs  - Assess for home care needs following discharge   - Consider OT consult to assist with ADL evaluation and planning for discharge  - Provide patient education as appropriate  Outcome: Progressing  Goal: Maintains/Returns to pre admission functional level  Description: INTERVENTIONS:  - Perform BMAT or MOVE assessment daily    - Set and communicate daily mobility goal to care team and patient/family/caregiver  - Collaborate with rehabilitation services on mobility goals if consulted  - Perform Range of Motion 3 times a day  - Reposition patient every 2 hours    - Dangle patient 3 times a day  - Stand patient 3 times a day  - Ambulate patient 3 times a day  - Out of bed to chair 3 times a day   - Out of bed for meals 3 times a day  - Out of bed for toileting  - Record patient progress and toleration of activity level   Outcome: Progressing

## 2021-08-31 NOTE — ASSESSMENT & PLAN NOTE
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

## 2021-08-31 NOTE — ASSESSMENT & PLAN NOTE
Lab Results   Component Value Date    HGBA1C 10 2 (H) 08/29/2021     · Follow-up repeat A1c  · Continue home dose of Lantus 35 units HS  · Add sliding scale insulin  · Hold p o   Metformin and Januvia, may resume on discharge    · Patient can receive 20 units of Lantus prior to being transitioned to NPO for cardiac cath tonight  · Patient can result 35 units of Lantus after cardiac cath  Will consult Endocrinology for further recommendations

## 2021-08-31 NOTE — CONSULTS
Consultation - Cardiology Team One  Bobbi Chen 59 y o  female MRN: [id number]  Unit/Bed#: S -01 Encounter: [id number]    Inpatient consult to Cardiology  Consult performed by: Patrice Scott PA-C  Consult ordered by: Yadi Mckeon MD          Physician Requesting Consult: Ed Farley MD  Reason for Consult / Principal Problem: LBBB    Assessment:    1  New Cardiomyopathy:  EF 37% with RWMA  Reports chronic dyspnea on exertion that has slightly worsened in the last 6 months  Also an episode of dizziness with diaphoresis nausea and vomiting as described below  Also reports slow worsening of chronic lower extremity edema for which she takes PRN Lasix 20 mg daily  Denies any orthopnea or PND  Appears volume overloaded on exam with lower extremity edema and JVD  No evidence of low output  Chart review shows baseline weight around 180 lb  No weight this admission  2  LBBB:  Noted on EKG this admission  No prior EKG available comparison  Troponin negative x3  Patient denies any chest pain but has chronic dyspnea on exertion  Regular stress test 11/2019 was negative for ischemia  3   Dizziness:  Presented with lightheadedness, nausea, vomiting and diaphoresis  CTA and MRI with findings of meningioma  Patient was evaluated by Neurology and Neurosurgery with no urgent surgical intervention required  Currently on PRN meclizine  4  Hypertensive urgency:  BP on admission was 196/90  Typically takes lisinopril 5 mg daily which has been held for permissive hypertension with average /80  5  Hyperlipidemia: , , HDL 31, LDL 64 typically maintained on simvastatin 40 mg daily switched to atorvastatin 40 mg daily this admission    6  Uncontrolled Type 2 DM:  Hemoglobin A1c 10 point team   Management per primary team   7  Family history premature CAD:  Father had an MI at the age of 45      Plan/Recommendations:  · Given new cardiomyopathy and concerns of ischemia would recommend definitive evaluation with cardiac catheterization tomorrow  · NPO after midnight  · Given her risk factors and suspicion of multivessel CAD will avoid loading with Plavix or Brilinta  · Continue aspirin and statin  · Add carvedilol 3 125 mg BID and up titrate for BP optimization  · Restart lisinopril 5 mg daily tomorrow after catheterization for GDMT  · Will check NT proBNP  · Ask RN to obtain standing weight  · Will give 40 mg IV Lasix x2 today  She will need a maintenance diuretic at discharge  · Monitor I&Os, renal function, electrolytes and standing weight  __________________________________________________________________________________    CC:  Dizziness      History of Present Illness   HPI: Prabha Larose is a 59y o  year old female who has essential hypertension, hyperlipidemia, type 2 DM who follows with cardiologist Dr Alexandre Grubbs  Patient presented to the emergency room at [facility] on 08/29/2021 with complaints of dizziness and vomiting  Patient reported sitting on her porch when she started to feel lightheaded as it this room was spinning  Her sister helps her to her room where she had nausea and 2 episodes of vomiting  By the time she arrived to the ER her symptoms were slowly resolving  On arrival to the ED BP was 196/91 with oxygen saturation 99% on room air  In the ED EKG revealed sinus rhythm with LBBB  CXR revealed no acute cardiopulmonary disease  CTA head/neck revealed a right anterior clinoid process rim calcified 1 9 cm lesion with no evidence of acute infarct, hemorrhage stenosis, dissection or occlusion  Labs revealed glucose 390 otherwise stable CMP, stable CBC, negative troponin x3, hemoglobin A1c 10 2  MRI reported no acute intracranial pathology with right paraclinoid meningioma  Patient was evaluated by Neurology and felt her symptoms were secondary to hypertensive urgency    Patient was evaluated by Neurosurgery with no plan for urgent surgery was recommended to follow-up in the outpatient setting  An echocardiogram revealed severe LV dysfunction with an EF of 37% with RWMA  Cardiology has been consulted for abnormal echocardiogram and LBBB  Home medication regimen includes aspirin 81 mg daily, Lasix 20 mg PRN, lisinopril 5 mg daily, simvastatin 40 mg daily  Echocardiogram 08/30/2021:  EF 37% with hypokinesis of the mid anterior, basal-mid anteroseptal, basal-mid inferoseptal, entire inferior and apical septal wall, G1DD  Normal RV size and function  Mild-moderate MR  Regular stress test 11/11/2019:  Patient exercised for 6 minutes achieving 7 Mets and 87% of MPHR  There was no evidence of ischemia after maximal exercise without reproduction of symptoms  Stress echocardiogram 09/28/2017:  Cannot rule out stress-induced ischemia of the mid-distal inferoseptal wall  EKG reviewed personally: 8/28/2021-normal sinus rhythm at a rate of 63 beats per minute with LBBB  No prior EKG available for comparison  Telemetry reviewed personally:  Sinus rhythm        Review of Systems   Constitutional: Positive for malaise/fatigue  Negative for chills  Cardiovascular: Positive for dyspnea on exertion and leg swelling  Negative for chest pain, near-syncope, orthopnea, palpitations, paroxysmal nocturnal dyspnea and syncope  Respiratory: Negative  Negative for cough, shortness of breath and wheezing  Endocrine: Negative  Hematologic/Lymphatic: Negative  Skin: Negative  Musculoskeletal: Negative  Gastrointestinal: Negative  Negative for diarrhea, nausea and vomiting  Neurological: Negative for dizziness, light-headedness and weakness  Psychiatric/Behavioral: Negative  Negative for altered mental status  All other systems reviewed and are negative      Historical Information   Past Medical History:   Diagnosis Date    Colon polyp     Dizziness     Edema     Hyperlipidemia     Hypertension     Type 2 diabetes mellitus (HCC)     Vitamin D deficiency     Wears glasses      Past Surgical History:   Procedure Laterality Date    BREAST EXCISIONAL BIOPSY Right 2009    COLONOSCOPY  2018    Colonoscpoy due in 3 years    EGD  2018    MAMMO (HISTORICAL) Bilateral 2020    MAMMO NEEDLE LOCALIZATION RIGHT (ALL INC) Right 2009    WISDOM TOOTH EXTRACTION       Social History     Substance and Sexual Activity   Alcohol Use No     Social History     Substance and Sexual Activity   Drug Use No     Social History     Tobacco Use   Smoking Status Never Smoker   Smokeless Tobacco Never Used     Family History:   Family History   Problem Relation Age of Onset    Diabetes Mother     Hypertension Mother     Sudden death Mother         SCD    Hyperlipidemia Mother     Heart attack Mother     Breast cancer Mother 79    Diabetes Father     Arrhythmia Father         pacemaker/ defib    Clotting disorder Father         eliquis    Heart failure Father     Heart disease Father     Melanoma Father     Cancer Father 61        bladder cancer    Breast cancer Sister 44    BRCA1 Negative Sister     No Known Problems Maternal Grandmother     No Known Problems Maternal Grandfather     No Known Problems Paternal Grandmother     No Known Problems Paternal Grandfather     No Known Problems Sister     No Known Problems Sister     Breast cancer Maternal Aunt         unknown age   Traore Esophageal cancer Maternal Aunt 61    Cancer Maternal Aunt         unknown age or type    No Known Problems Maternal Aunt     Lung cancer Paternal Aunt         unknonw age- in her [de-identified]    Colon cancer Maternal Uncle         unknwon age   Traore Pancreatic cancer Maternal Uncle 61    Anuerysm Neg Hx        Meds/Allergies   all current active meds have been reviewed, current meds:   Current Facility-Administered Medications   Medication Dose Route Frequency    aspirin chewable tablet 81 mg  81 mg Oral Daily    atorvastatin (LIPITOR) tablet 40 mg  40 mg Oral Daily With Dinner    enoxaparin (LOVENOX) subcutaneous injection 40 mg  40 mg Subcutaneous Daily    insulin glargine (LANTUS) subcutaneous injection 35 Units 0 35 mL  35 Units Subcutaneous HS    insulin lispro (HumaLOG) 100 units/mL subcutaneous injection 1-5 Units  1-5 Units Subcutaneous HS    insulin lispro (HumaLOG) 100 units/mL subcutaneous injection 1-6 Units  1-6 Units Subcutaneous TID AC    meclizine (ANTIVERT) tablet 12 5 mg  12 5 mg Oral Q8H PRN    pantoprazole (PROTONIX) EC tablet 40 mg  40 mg Oral Early Morning    and PTA meds:   Prior to Admission Medications   Prescriptions Last Dose Informant Patient Reported? Taking? Januvia 100 MG tablet   No No   Sig: TAKE 1 TABLET BY MOUTH EVERY DAY   NovoFine 32G X 6 MM MISC   No No   Sig: USE AS DIRECTED ONCE A DAY WITH INSULIN   TURMERIC PO   Yes No   Sig: Take 1 capsule by mouth daily   Tresiba FlexTouch 100 units/mL injection pen   No No   Sig: Inject 35 Units under the skin daily   aspirin 81 MG tablet  Self Yes No   Sig: Take 1 tablet by mouth every other day   esomeprazole (NexIUM) 20 mg capsule  Self Yes No   Sig: Take 20 mg by mouth every morning before breakfast   furosemide (LASIX) 20 mg tablet  Self No No   Sig: TAKE 1 TABLET BY MOUTH DAILY AS NEEDED   lisinopril (ZESTRIL) 5 mg tablet   No No   Sig: Take 1 tablet (5 mg total) by mouth daily   metFORMIN (GLUCOPHAGE) 1000 MG tablet   No No   Sig: Take 1 tablet (1,000 mg total) by mouth 2 (two) times a day with meals   simvastatin (ZOCOR) 40 mg tablet   No No   Sig: TAKE 1 TABLET BY MOUTH EVERY DAY      Facility-Administered Medications: None          No Known Allergies    Objective   Vitals: Blood pressure (!) 178/84, pulse 66, temperature 97 9 °F (36 6 °C), temperature source Oral, resp  rate 18, SpO2 96 %, not currently breastfeeding  ,     There is no height or weight on file to calculate BMI ,     Systolic (42RQW), WQJ:365 , Min:147 , KZC:694     Diastolic (34ROM), VMH:88, Min:72, Max:86    Wt Readings from Last 3 Encounters:   08/30/21 88 6 kg (195 lb 5 2 oz)   07/28/21 81 6 kg (180 lb)   04/16/21 81 6 kg (180 lb)      Lab Results   Component Value Date    CREATININE 0 95 08/31/2021    CREATININE 0 97 08/30/2021    CREATININE 1 05 08/29/2021             Intake/Output Summary (Last 24 hours) at 8/31/2021 1093  Last data filed at 8/30/2021 2140  Gross per 24 hour   Intake 1120 ml   Output --   Net 1120 ml     Weight (last 2 days)     None        Invasive Devices     Peripheral Intravenous Line            Peripheral IV 08/29/21 Left Antecubital 1 day                  Physical Exam  Vitals and nursing note reviewed  Constitutional:       General: She is not in acute distress  Appearance: She is well-developed  She is obese  Comments: On RA in NAD   HENT:      Head: Normocephalic and atraumatic  Neck:      Vascular: JVD present  Cardiovascular:      Rate and Rhythm: Normal rate and regular rhythm  Heart sounds: Normal heart sounds  No murmur heard  No friction rub  Pulmonary:      Effort: Pulmonary effort is normal  No respiratory distress  Breath sounds: Normal breath sounds  No wheezing or rales  Abdominal:      General: Bowel sounds are normal  There is no distension  Palpations: Abdomen is soft  Tenderness: There is no abdominal tenderness  Musculoskeletal:         General: No tenderness  Normal range of motion  Cervical back: Normal range of motion and neck supple  Right lower leg: Edema present  Left lower leg: Edema present  Skin:     General: Skin is warm and dry  Findings: Erythema present  Comments: Right lower extremity erythema noted   Neurological:      Mental Status: She is alert and oriented to person, place, and time  Psychiatric:         Mood and Affect: Mood normal          Behavior: Behavior normal          Thought Content:  Thought content normal          Judgment: Judgment normal            LABORATORY RESULTS:  Results from last 7 days   Lab Units 08/30/21  0445 08/29/21  2327 08/29/21  1427   TROPONIN I ng/mL <0 02 <0 02 <0 02     CBC with diff:   Results from last 7 days   Lab Units 08/31/21  0531 08/30/21  0445 08/29/21  1427   WBC Thousand/uL 6 05 6 34 5 72   HEMOGLOBIN g/dL 13 5 12 8 14 3   HEMATOCRIT % 41 2 37 5 42 4   MCV fL 92 92 90   PLATELETS Thousands/uL 184 183 190   MCH pg 30 2 31 2 30 3   MCHC g/dL 32 8 34 1 33 7   RDW % 12 2 12 3 12 1   MPV fL 11 3 11 0 11 2   NRBC AUTO /100 WBCs 0  --  0       CMP:  Results from last 7 days   Lab Units 08/31/21  0531 08/30/21  0445 08/29/21  1427   POTASSIUM mmol/L 3 8 3 7 4 7   CHLORIDE mmol/L 106 105 102   CO2 mmol/L 27 25 25   BUN mg/dL 14 16 22   CREATININE mg/dL 0 95 0 97 1 05   CALCIUM mg/dL 8 4 8 6 9 3   AST U/L 15  --  18   ALT U/L 30  --  40   ALK PHOS U/L 56  --  71   EGFR ml/min/1 73sq m 64 62 57       BMP:  Results from last 7 days   Lab Units 08/31/21  0531 08/30/21  0445 08/29/21  1427   POTASSIUM mmol/L 3 8 3 7 4 7   CHLORIDE mmol/L 106 105 102   CO2 mmol/L 27 25 25   BUN mg/dL 14 16 22   CREATININE mg/dL 0 95 0 97 1 05   CALCIUM mg/dL 8 4 8 6 9 3          No results found for: NTBNP                Results from last 7 days   Lab Units 08/29/21  1427   HEMOGLOBIN A1C % 10 2*                  Lipid Profile:   No results found for: CHOL  Lab Results   Component Value Date    HDL 31 (L) 08/30/2021    HDL 36 (L) 02/10/2021    HDL 30 (L) 09/17/2020     Lab Results   Component Value Date    LDLCALC 64 08/30/2021    LDLCALC 51 02/10/2021    LDLCALC 43 09/17/2020     Lab Results   Component Value Date    TRIG 119 08/30/2021    TRIG 113 3 02/10/2021    TRIG 95 5 09/17/2020         Cardiac testing:   Results for orders placed during the hospital encounter of 08/29/21    Echo complete with contrast if indicated    Narrative  [facility] [facility]  [phone]    Transthoracic Echocardiogram  2D, M-mode, Doppler, and Color Doppler    Study date:  30-Aug-2021    Patient: Tylor Miranda  MR number: [id number]  Account number: [de-identified]  : 1960  Age: 59 years  Gender: Female  Status: Outpatient  Location: Emergency room  Height: 66 in  Weight: 195 lb  BP: 166/ 77 mmHg    Indications: CVA  Diagnoses: I63 9 - Cerebral infarction, unspecified    Sonographer:  JESSIE Fay  Primary Physician:  Rosa Isela Varghese MD  Referring Physician:  Adarsh Green[place]  Group:  [facility]  Interpreting Physician:  Zoey Pond MD    SUMMARY    LEFT VENTRICLE:  Systolic function was moderately reduced  Ejection fraction was estimated to be 37 %  There was hypokinesis of the mid anterior, basal-mid anteroseptal, basal-mid inferoseptal, entire inferior, and apical septal wall(s)  Doppler parameters were consistent with abnormal left ventricular relaxation (grade 1 diastolic dysfunction)  VENTRICULAR SEPTUM:  There was mild dyssynergic motion  These changes are consistent with LBBB  LEFT ATRIUM:  The atrium was mildly dilated  MITRAL VALVE:  There was mild to moderate regurgitation  AORTIC VALVE:  There was trace regurgitation  TRICUSPID VALVE:  There was trace regurgitation  PERICARDIUM:  A very small pericardial effusion was identified anterior to the heart  The fluid had no internal echoes  HISTORY: PRIOR HISTORY: Hyperlipidemia, LBBB, Abnormal Stress Echo, Hypertension, Lower Leg Edema, Diabetes Mellitus II  PROCEDURE: The procedure was performed in the emergency room  This was a routine study  The transthoracic approach was used  The study included complete 2D imaging, M-mode, complete spectral Doppler, and color Doppler  The heart rate was  70 bpm, at the start of the study  Images were obtained from the parasternal, apical, subcostal, and suprasternal notch acoustic windows  Image quality was adequate  LEFT VENTRICLE: Size was normal  Systolic function was moderately reduced  Ejection fraction was estimated to be 37 %  There was hypokinesis of the mid anterior, basal-mid anteroseptal, basal-mid inferoseptal, entire inferior, and apical  septal wall(s)  Wall thickness was normal  DOPPLER: Doppler parameters were consistent with abnormal left ventricular relaxation (grade 1 diastolic dysfunction)  VENTRICULAR SEPTUM: There was mild dyssynergic motion  These changes are consistent with LBBB  RIGHT VENTRICLE: The size was normal  Systolic function was normal  Wall thickness was normal     LEFT ATRIUM: The atrium was mildly dilated  RIGHT ATRIUM: Size was normal     MITRAL VALVE: Valve structure was normal  There was normal leaflet separation  DOPPLER: The transmitral velocity was within the normal range  There was no evidence for stenosis  There was mild to moderate regurgitation  AORTIC VALVE: The valve was trileaflet  Leaflets exhibited normal thickness and normal cuspal separation  DOPPLER: Transaortic velocity was within the normal range  There was no evidence for stenosis  There was trace regurgitation  TRICUSPID VALVE: The valve structure was normal  There was normal leaflet separation  DOPPLER: The transtricuspid velocity was within the normal range  There was no evidence for stenosis  There was trace regurgitation  PULMONIC VALVE: Leaflets exhibited normal thickness, no calcification, and normal cuspal separation  DOPPLER: The transpulmonic velocity was within the normal range  There was no significant regurgitation  PERICARDIUM: A very small pericardial effusion was identified anterior to the heart  The fluid had no internal echoes  The pericardium was normal in appearance  AORTA: The root exhibited normal size  SYSTEMIC VEINS: IVC: The inferior vena cava was normal in size      SYSTEM MEASUREMENT TABLES    2D  %FS: 17 9 %  Ao Diam: 2 86 cm  EDV(Teich): 106 05 ml  EF(Teich): 37 18 %  ESV(Teich): 66 62 ml  HR_2Ch_Q: 69 77 BPM  HR_4Ch_Q: 69 1 BPM  IVSd: 0 91 cm  LA Area: 18 23 cm2  LA Diam: 3 96 cm  LVCO_2Ch_Q: 3 91 L/min  LVCO_4Ch_Q: 3 24 L/min  LVCO_BiP_Q: 3 58 L/min  LVEDV MOD A4C: 122 92 ml  LVEF MOD A4C: 38 87 %  LVEF_2Ch_Q: 36 48 %  LVEF_4Ch_Q: 37 23 %  LVEF_BiP_Q: 37 25 %  LVESV MOD A4C: 75 14 ml  LVIDd: 4 77 cm  LVIDs: 3 92 cm  LVLd A4C: 8 14 cm  LVLd_2Ch_Q: 8 14 cm  LVLd_4Ch_Q: 7 56 cm  LVLs A4C: 6 97 cm  LVLs_2Ch_Q: 7 04 cm  LVLs_4Ch_Q: 6 71 cm  LVPWd: 0 91 cm  LVSV_2Ch_Q: 56 ml  LVSV_4Ch_Q: 46 95 ml  LVSV_BiP_Q: 52 19 ml  LVVED_2Ch_Q: 153 53 ml  LVVED_4Ch_Q: 126 09 ml  LVVED_BiP_Q: 140 11 ml  LVVES_2Ch_Q: 97 52 ml  LVVES_4Ch_Q: 79 14 ml  LVVES_BiP_Q: 87 91 ml  RA Area: 14 cm2  RVIDd: 3 29 cm  SV MOD A4C: 47 78 ml  SV(Teich): 39 44 ml    CF  MR Als  Marko: 0 42 m/s  MR Flow: 40 81 ml/s  MR Rad: 0 39 cm    CW  AV MaxP 23 mmHg  AV Vmax: 1 34 m/s  MR VTI: 234 05 cm  MR Vmax: 5 73 m/s  MR Vmean: 4 59 m/s  MR maxP 57 mmHg  MR meanP 94 mmHg  TR MaxP 21 mmHg  TR Vmax: 1 82 m/s    MM  TAPSE: 2 59 cm    PW  E' Sept: 0 05 m/s  E/E' Sept: 13 24  LVOT Vmax: 0 85 m/s  LVOT maxP 89 mmHg  MV A Marko: 1 13 m/s  MV Dec Refugio: 3 87 m/s2  MV DecT: 168 95 ms  MV E Marko: 0 65 m/s  MV E/A Ratio: 0 58  MV PHT: 49 ms  MVA By PHT: 4 49 cm2    [employer] Accredited Echocardiography Laboratory    Prepared and electronically signed by    Mimi Nguyen MD  Signed 31-Aug-2021 08:45:43    No results found for this or any previous visit  No valid procedures specified  No results found for this or any previous visit  Imaging: I have personally reviewed pertinent reports  Echo complete with contrast if indicated    Result Date: 2021  Narrative:  [address] [place] [address], [address] [place] [address] [phone] Transthoracic Echocardiogram 2D, M-mode, Doppler, and Color Doppler Study date:  30-Aug-2021 Patient: So Sepulveda MR number: [id number] Account number: [de-identified] : 1960 Age: 59 years Gender: Female Status: Outpatient Location: Emergency room Height: 66 in Weight: 195 lb BP: 166/ 77 mmHg Indications: CVA  Diagnoses: I63 9 - Cerebral infarction, unspecified Sonographer:  JESSIE Porter Primary Physician:  Abeba Morris MD Referring Physician:  Kaylan Mancilla Group:  [facility] Interpreting Physician:  Adolfo Negro MD SUMMARY LEFT VENTRICLE: Systolic function was moderately reduced  Ejection fraction was estimated to be 37 %  There was hypokinesis of the mid anterior, basal-mid anteroseptal, basal-mid inferoseptal, entire inferior, and apical septal wall(s)  Doppler parameters were consistent with abnormal left ventricular relaxation (grade 1 diastolic dysfunction)  VENTRICULAR SEPTUM: There was mild dyssynergic motion  These changes are consistent with LBBB  LEFT ATRIUM: The atrium was mildly dilated  MITRAL VALVE: There was mild to moderate regurgitation  AORTIC VALVE: There was trace regurgitation  TRICUSPID VALVE: There was trace regurgitation  PERICARDIUM: A very small pericardial effusion was identified anterior to the heart  The fluid had no internal echoes  HISTORY: PRIOR HISTORY: Hyperlipidemia, LBBB, Abnormal Stress Echo, Hypertension, Lower Leg Edema, Diabetes Mellitus II  PROCEDURE: The procedure was performed in the emergency room  This was a routine study  The transthoracic approach was used  The study included complete 2D imaging, M-mode, complete spectral Doppler, and color Doppler  The heart rate was 70 bpm, at the start of the study  Images were obtained from the parasternal, apical, subcostal, and suprasternal notch acoustic windows  Image quality was adequate  LEFT VENTRICLE: Size was normal  Systolic function was moderately reduced  Ejection fraction was estimated to be 37 %  There was hypokinesis of the mid anterior, basal-mid anteroseptal, basal-mid inferoseptal, entire inferior, and apical septal wall(s)   Wall thickness was normal  DOPPLER: Doppler parameters were consistent with abnormal left ventricular relaxation (grade 1 diastolic dysfunction)  VENTRICULAR SEPTUM: There was mild dyssynergic motion  These changes are consistent with LBBB  RIGHT VENTRICLE: The size was normal  Systolic function was normal  Wall thickness was normal  LEFT ATRIUM: The atrium was mildly dilated  RIGHT ATRIUM: Size was normal  MITRAL VALVE: Valve structure was normal  There was normal leaflet separation  DOPPLER: The transmitral velocity was within the normal range  There was no evidence for stenosis  There was mild to moderate regurgitation  AORTIC VALVE: The valve was trileaflet  Leaflets exhibited normal thickness and normal cuspal separation  DOPPLER: Transaortic velocity was within the normal range  There was no evidence for stenosis  There was trace regurgitation  TRICUSPID VALVE: The valve structure was normal  There was normal leaflet separation  DOPPLER: The transtricuspid velocity was within the normal range  There was no evidence for stenosis  There was trace regurgitation  PULMONIC VALVE: Leaflets exhibited normal thickness, no calcification, and normal cuspal separation  DOPPLER: The transpulmonic velocity was within the normal range  There was no significant regurgitation  PERICARDIUM: A very small pericardial effusion was identified anterior to the heart  The fluid had no internal echoes  The pericardium was normal in appearance  AORTA: The root exhibited normal size  SYSTEMIC VEINS: IVC: The inferior vena cava was normal in size   SYSTEM MEASUREMENT TABLES 2D %FS: 17 9 % Ao Diam: 2 86 cm EDV(Teich): 106 05 ml EF(Teich): 37 18 % ESV(Teich): 66 62 ml HR_2Ch_Q: 69 77 BPM HR_4Ch_Q: 69 1 BPM IVSd: 0 91 cm LA Area: 18 23 cm2 LA Diam: 3 96 cm LVCO_2Ch_Q: 3 91 L/min LVCO_4Ch_Q: 3 24 L/min LVCO_BiP_Q: 3 58 L/min LVEDV MOD A4C: 122 92 ml LVEF MOD A4C: 38 87 % LVEF_2Ch_Q: 36 48 % LVEF_4Ch_Q: 37 23 % LVEF_BiP_Q: 37 25 % LVESV MOD A4C: 75 14 ml LVIDd: 4 77 cm LVIDs: 3 92 cm LVLd A4C: 8 14 cm LVLd_2Ch_Q: 8 14 cm LVLd_4Ch_Q: 7 56 cm LVLs A4C: 6 97 cm LVLs_2Ch_Q: 7 04 cm LVLs_4Ch_Q: 6 71 cm LVPWd: 0 91 cm LVSV_2Ch_Q: 56 ml LVSV_4Ch_Q: 46 95 ml LVSV_BiP_Q: 52 19 ml LVVED_2Ch_Q: 153 53 ml LVVED_4Ch_Q: 126 09 ml LVVED_BiP_Q: 140 11 ml LVVES_2Ch_Q: 97 52 ml LVVES_4Ch_Q: 79 14 ml LVVES_BiP_Q: 87 91 ml RA Area: 14 cm2 RVIDd: 3 29 cm SV MOD A4C: 47 78 ml SV(Teich): 39 44 ml CF MR Als  Makro: 0 42 m/s MR Flow: 40 81 ml/s MR Rad: 0 39 cm CW AV MaxP 23 mmHg AV Vmax: 1 34 m/s MR VTI: 234 05 cm MR Vmax: 5 73 m/s MR Vmean: 4 59 m/s MR maxP 57 mmHg MR meanP 94 mmHg TR MaxP 21 mmHg TR Vmax: 1 82 m/s MM TAPSE: 2 59 cm PW E' Sept: 0 05 m/s E/E' Sept: 13 24 LVOT Vmax: 0 85 m/s LVOT maxP 89 mmHg MV A Marko: 1 13 m/s MV Dec Long: 3 87 m/s2 MV DecT: 168 95 ms MV E Marko: 0 65 m/s MV E/A Ratio: 0 58 MV PHT: 49 ms MVA By PHT: 4 49 cm2 [employer]tersoci[employer] Commission Accredited Echocardiography Laboratory Prepared and electronically signed by Rui Morse MD Signed 31-Aug-2021 08:45:43     CTA head and neck with and without contrast    Result Date: 2021  Narrative: INDICATION: Transient ischemic attack COMPARISON:   None  TECHNIQUE:  Routine CT imaging of the Brain without contrast   Post contrast imaging was performed after administration of iodinated contrast through the neck and brain  Post contrast axial 0 625 mm images timed to opacify the arterial system  3D rendering was performed on an independent workstation  MIP reconstructions performed  Coronal reconstructions were performed of the noncontrast portion of the brain  Radiation dose length product (DLP) for this visit:  1328 mGy-cm   This examination, like all CT scans performed in the [facility], was performed utilizing techniques to minimize radiation dose exposure, including the use of iterative reconstruction and automated exposure control     IV Contrast:  85 mL of iohexol (OMNIPAQUE)  IMAGE QUALITY:   Diagnostic FINDINGS: NONCONTRAST BRAIN PARENCHYMA:  Partially rim calcified lesion along the right clinoid process measuring 1 9 x 1 8 x 1 4 cm  Lesion is isointense to brain parenchyma  Questionable mild homogeneous enhancement on postcontrast images  No surrounding vasogenic edema or mass effect  Periventricular and subcortical hypoattenuating foci consistent with microangiopathic disease  No intracranial hemorrhage or mass effect  VENTRICLES AND EXTRA-AXIAL SPACES:  No hydrocephalus or extra-axial collection VISUALIZED ORBITS AND PARANASAL SINUSES:  Intact globes and orbits  Clear paranasal sinuses  CERVICAL VASCULATURE AORTIC ARCH AND GREAT VESSELS:  Three-vessel configuration aortic arch  No stenosis in the subclavian arteries  RIGHT VERTEBRAL ARTERY CERVICAL SEGMENT:  Normal origin  The vessel is normal in caliber throughout the neck  LEFT VERTEBRAL ARTERY CERVICAL SEGMENT:  Normal origin  The vessel is normal in caliber throughout the neck  RIGHT EXTRACRANIAL CAROTID SEGMENT:  Normal caliber common carotid artery  Normal bifurcation and cervical internal carotid artery  No stenosis or dissection  LEFT EXTRACRANIAL CAROTID SEGMENT:  Normal caliber common carotid artery  Normal bifurcation and cervical internal carotid artery  No stenosis or dissection  NASCET criteria was used to determine the degree of internal carotid artery diameter stenosis  INTRACRANIAL VASCULATURE INTERNAL CAROTID ARTERIES:  Supraclinoid aspect of the right ICA is adjacent to the medial aspect of the adjacent lesion without evidence of a vascular pedicle  No stenosis in the bilateral carotid siphons  Patent ophthalmic arteries ANTERIOR CIRCULATION:  Symmetric A1 segments and anterior cerebral arteries with normal enhancement  Normal anterior communicating artery  MIDDLE CEREBRAL ARTERY CIRCULATION:  Minimal posterior displacement of the right M1 segment by the clinoid mass without vessel narrowing  No stenosis in the middle cerebral arteries   DISTAL VERTEBRAL ARTERIES:  Normal distal vertebral arteries  Posterior inferior cerebellar arteries are patent  BASILAR ARTERY:  Basilar artery is normal in caliber  Patent superior cerebellar arteries  POSTERIOR CEREBRAL ARTERIES: Fetal type left posterior cerebral artery  Right posterior communicating artery identified  No stenosis in the bilateral posterior cerebral arteries  DURAL VENOUS SINUSES:  Patent  Possible small draining vein along the posterior lateral border of the right infrahyoid mass  NON VASCULAR ANATOMY BONY STRUCTURES:  No lytic or blastic lesion  SOFT TISSUES OF THE NECK:  No mass or lymphadenopathy  THORACIC INLET:  Clear lung apices  Impression: 1  Right anterior clinoid process rim calcified 1 9 cm lesion, suggestive of a meningioma  No mass effect or vasogenic edema  Thrombosed aneurysm less likely in the absence of clear evidence of a vascular pedicle, though not entirely excluded  Recommend MRI of the brain with gadolinium for further evaluation  2   No evidence of acute infarct or intracranial hemorrhage  3   No stenosis, dissection or occlusion of the carotid or vertebral arteries or major vessels of the [place]  The study was marked in [facility] for immediate notification  Workstation performed: [id number]     XR chest 1 view portable    Result Date: 8/30/2021  Narrative: CHEST INDICATION:   dizziness, elevated bp  COMPARISON:  None EXAM PERFORMED/VIEWS:  XR CHEST PORTABLE Single view FINDINGS: Cardiomediastinal silhouette appears unremarkable  The lungs are clear  No pneumothorax or pleural effusion  Osseous structures appear within normal limits for patient age  Impression: No acute cardiopulmonary disease  Workstation performed: [id number]     MRI brain w wo contrast    Result Date: 8/30/2021  Narrative: MRI BRAIN WITH AND WITHOUT CONTRAST INDICATION:  Neuro deficit, acute, stroke suspected Stroke workup, dizziness   COMPARISON:  CTA of the head and neck from 8/29/2021 TECHNIQUE:  Sagittal T1, axial T2, axial FLAIR, axial T1  Axial diffusion-weighted imaging  Axial [place] Axial [id number]  BRAVO:BRAVO/fspgr/Bravo with coronal recons  IV Contrast:  8 mL of Gadobutrol injection (SINGLE-DOSE) IMAGE QUALITY:  Diagnostic  FINDINGS: BRAIN PARENCHYMA:  No intracranial hemorrhage, extra-axial fluid collection, mass effect or midline shift  Mild, focal patchy periventricular and subcortical white matter foci of abnormal T2 and FLAIR hyperintensity are nonspecific, but usually secondary to changes of microangiopathy  No restricted diffusion  1 2 x 1 5 x 1 7 cm (length X depth X width) diffusely enhancing extra-axial mass right paraclinoid mass correlates with CT and is typical of a benign meningioma  Incidental note made of right anterior clinoid pneumatization  Otherwise no suspicious enhancement  No cerebellopontine angle mass VENTRICLES:  Within normal limits for age  SELLA AND PITUITARY GLAND:  Midline structures are within normal limits  ORBITS:  Within normal limits  PARANASAL SINUSES:  Paranasal sinus mucosal thickening  Opacity in the right lateral sphenoid most compatible with complicated mucosal retention cyst, no suspicious enhancement or osseous expansion (10/12, 8/7 and 12/7)  VASCULATURE:  Skull base flow voids are preserved  CALVARIUM AND SKULL BASE:  Within normal limits  Mastoid air cells clear  EXTRACRANIAL SOFT TISSUES:  Within normal limits  Impression: No acute intracranial pathology  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, as mentioned on recent CT  Other nonemergent findings above  Workstation performed: [id number]     Counseling / Coordination of Care  Total floor / unit time spent today 45 minutes  Greater than 50% of total time was spent with the patient and / or family counseling and / or coordination of care    A description of the counseling / coordination of care: Review of history, current assessment, development of a plan       Code Status: Level 1 - Full Code    ** Please Note: Dragon 360 Dictation voice to text software may have been used in the creation of this document   **

## 2021-08-31 NOTE — ASSESSMENT & PLAN NOTE
Most recent blood pressure 178/84  · As per cardiology, carvedilol 3 125 mg BID and up titrate for BP optimization and restart lisinopril 5 mg daily tomorrow after catheterization  40 mg IV Lasix x2 today    She will need a maintenance diuretic at discharge

## 2021-08-31 NOTE — PHYSICAL THERAPY NOTE
PHYSICAL THERAPY NOTE    Patient Name: Buck Hernandez  RZRMY'T Date: 21 1504   PT Last Visit   PT Visit Date 21   Note Type   Note Type Treatment   Pain Assessment   Pain Assessment Tool Pain Assessment not indicated - pt denies pain   Pain Score No Pain   Restrictions/Precautions   Weight Bearing Precautions Per Order No   Other Precautions Fall Risk;Telemetry   General   Family/Caregiver Present Yes  (daughter)   Subjective   Subjective Patient standing in room and is agreeable to participate in therapy session  Pt identifers obtained from name &   Bed Mobility   Supine to Sit Unable to assess   Sit to Supine Unable to assess   Additional Comments Patient seated in chair post session with call bell and belongings in reach  Transfers   Sit to Stand 6  Modified independent   Stand to Sit 6  Modified independent   Ambulation/Elevation   Gait pattern Improper Weight shift; Wide DONAL; Short stride; Excessively slow   Gait Assistance 5  Supervision   Additional items Assist x 1;Verbal cues   Assistive Device None   Distance 250' x2   Stair Management Assistance 5  Supervision   Additional items Assist x 1;Verbal cues; Increased time required   Stair Management Technique One rail L;Step to pattern; Foreward   Number of Stairs 8  (multiple trials)   Balance   Static Sitting Good   Dynamic Sitting Good   Static Standing Fair +   Dynamic Standing Fair   Ambulatory Fair -   Activity Tolerance   Activity Tolerance Patient tolerated treatment well   Nurse Made Aware Spoke to RN   Assessment   Prognosis Good   Problem List Decreased endurance; Impaired balance;Decreased mobility   Assessment Patient agreeable to participate in therapy session  Patient demonstrates progression with functional mobility with sit<>stand to mod I with good technique and safety   Patient able to ambulate increased gait distance with no assistive device and supervision  Continues with wide DONAL and slow sherly, presents with occasional lateral side step with no LOB  Pt able to ascend and descend multiple steps with unilaterl handrail and close supervision with increased time and verbal instruction for technique  Continue to focus on OOB mobility with ambulation progression and continues stair training trials as appropriate  Goals   Patient Goals none stated   STG Expiration Date 09/09/21   PT Treatment Day 2   Plan   Treatment/Interventions Functional transfer training;Elevations; Therapeutic exercise; Endurance training;Patient/family training;Equipment eval/education; Bed mobility;Gait training;Spoke to nursing   PT Frequency 1-2x/wk   Recommendation   PT Discharge Recommendation Home with outpatient rehabilitation   AM-PAC Basic Mobility Inpatient   Turning in Bed Without Bedrails 4   Lying on Back to Sitting on Edge of Flat Bed 4   Moving Bed to Chair 3   Standing Up From Chair 4   Walk in Room 3   Climb 3-5 Stairs 3   Basic Mobility Inpatient Raw Score 21   Basic Mobility Standardized Score 45 55     The patient's AM-PAC Basic Mobility Inpatient Short Form Raw Score is 21, Standardized Score is 45 55  A standardized score greater than 42 9 suggests the patient may benefit from discharge to home  Please also refer to the recommendation of the Physical Therapist for safe discharge planning          Yao Multani, PTA

## 2021-08-31 NOTE — PLAN OF CARE
Problem: PHYSICAL THERAPY ADULT  Goal: Performs mobility at highest level of function for planned discharge setting  See evaluation for individualized goals  Description: Treatment/Interventions: Functional transfer training, Elevations, Therapeutic exercise, Endurance training, Patient/family training, Equipment eval/education, Bed mobility, Gait training, Spoke to nursing  Equipment Recommended: Lebron Martin       See flowsheet documentation for full assessment, interventions and recommendations  Outcome: Progressing  Note: Prognosis: Good  Problem List: Decreased endurance, Impaired balance, Decreased mobility  Assessment: Patient agreeable to participate in therapy session  Patient demonstrates progression with functional mobility with sit<>stand to mod I with good technique and safety  Patient able to ambulate increased gait distance with no assistive device and supervision  Continues with wide DONAL and slow sherly, presents with occasional lateral side step with no LOB  Pt able to ascend and descend multiple steps with unilaterl handrail and close supervision with increased time and verbal instruction for technique  Continue to focus on OOB mobility with ambulation progression and continues stair training trials as appropriate  Barriers to Discharge Comments: Pt reports having 1 SAQIB through basement/garage and a stair-glide to 2nd floor living area; pt reports her bed/bathroom is on the 3rd floor, but there is a full bathroom and bedroom on the 2nd floor available to use upon DC  Pt lives alone; however, pt's sister who was present during eval reports she can stay w/ the pt upon DC home to provide assistance as needed     PT Discharge Recommendation: Home with outpatient rehabilitation          See flowsheet documentation for full assessment

## 2021-08-31 NOTE — ASSESSMENT & PLAN NOTE
Patient is without complaints of chest pain  There are no prior EKGs for evaluation  Previous stress tests strips in 2017 and 2019 do not show LBBB  She does have chronic exertional shortness of breath which she notices mostly while going up steps  She notes this is not new or worsening     Normal stress test in 2017 and 2019  Family hx: father with MI at age 45, mother with MI age [de-identified]   · Troponin negative x3  · Telemetry monitoring  · Echocardiogram-Ejection fraction 37%  · Scheduled for cardiac catheterization tomorrow given new cardiomyopathy and concerns of ischemia

## 2021-08-31 NOTE — TELEPHONE ENCOUNTER
9/2/21:   PATIENT BEING TRANSFERRED TO [facility]    9/1/21: [id number] Carmen Jose Maria    8/31/21: [id number] Carmen Jose Maria    8/31/21 / Thom Ac 8/30/21    HOSPITAL FOLLOW UP   3 WEEK HENRRY/ LINDA  9/22/21 / 2:Nicholas / LASHONDA    *VISUAL FIELDS TEST NEEDS TO BE COMPLETED*

## 2021-09-01 ENCOUNTER — APPOINTMENT (INPATIENT)
Dept: NON INVASIVE DIAGNOSTICS | Facility: HOSPITAL | Age: 61
DRG: 286 | End: 2021-09-01
Payer: COMMERCIAL

## 2021-09-01 PROBLEM — I16.0 HYPERTENSIVE URGENCY: Status: ACTIVE | Noted: 2020-10-09

## 2021-09-01 PROBLEM — I25.5 ISCHEMIC CARDIOMYOPATHY: Status: ACTIVE | Noted: 2021-09-01

## 2021-09-01 PROBLEM — I25.10 CAD (CORONARY ARTERY DISEASE): Status: ACTIVE | Noted: 2021-09-01

## 2021-09-01 PROBLEM — D32.9 MENINGIOMA (HCC): Status: ACTIVE | Noted: 2021-09-01

## 2021-09-01 LAB
ANION GAP SERPL CALCULATED.3IONS-SCNC: 8 MMOL/L (ref 4–13)
BUN SERPL-MCNC: 17 MG/DL (ref 5–25)
CALCIUM SERPL-MCNC: 8.7 MG/DL (ref 8.3–10.1)
CHLORIDE SERPL-SCNC: 105 MMOL/L (ref 100–108)
CO2 SERPL-SCNC: 29 MMOL/L (ref 21–32)
CREAT SERPL-MCNC: 0.94 MG/DL (ref 0.6–1.3)
GFR SERPL CREATININE-BSD FRML MDRD: 65 ML/MIN/1.73SQ M
GLUCOSE SERPL-MCNC: 206 MG/DL (ref 65–140)
GLUCOSE SERPL-MCNC: 212 MG/DL (ref 65–140)
GLUCOSE SERPL-MCNC: 221 MG/DL (ref 65–140)
GLUCOSE SERPL-MCNC: 246 MG/DL (ref 65–140)
GLUCOSE SERPL-MCNC: 256 MG/DL (ref 65–140)
MAGNESIUM SERPL-MCNC: 1.9 MG/DL (ref 1.6–2.6)
POTASSIUM SERPL-SCNC: 3.4 MMOL/L (ref 3.5–5.3)
SODIUM SERPL-SCNC: 142 MMOL/L (ref 136–145)

## 2021-09-01 PROCEDURE — 99152 MOD SED SAME PHYS/QHP 5/>YRS: CPT | Performed by: INTERNAL MEDICINE

## 2021-09-01 PROCEDURE — 99152 MOD SED SAME PHYS/QHP 5/>YRS: CPT | Performed by: PHYSICIAN ASSISTANT

## 2021-09-01 PROCEDURE — C1769 GUIDE WIRE: HCPCS | Performed by: PHYSICIAN ASSISTANT

## 2021-09-01 PROCEDURE — 99223 1ST HOSP IP/OBS HIGH 75: CPT | Performed by: HOSPITALIST

## 2021-09-01 PROCEDURE — 80048 BASIC METABOLIC PNL TOTAL CA: CPT

## 2021-09-01 PROCEDURE — 93454 CORONARY ARTERY ANGIO S&I: CPT | Performed by: INTERNAL MEDICINE

## 2021-09-01 PROCEDURE — 99232 SBSQ HOSP IP/OBS MODERATE 35: CPT | Performed by: INTERNAL MEDICINE

## 2021-09-01 PROCEDURE — 82948 REAGENT STRIP/BLOOD GLUCOSE: CPT

## 2021-09-01 PROCEDURE — C1894 INTRO/SHEATH, NON-LASER: HCPCS | Performed by: PHYSICIAN ASSISTANT

## 2021-09-01 PROCEDURE — 93005 ELECTROCARDIOGRAM TRACING: CPT

## 2021-09-01 PROCEDURE — 93454 CORONARY ARTERY ANGIO S&I: CPT | Performed by: PHYSICIAN ASSISTANT

## 2021-09-01 PROCEDURE — 83735 ASSAY OF MAGNESIUM: CPT | Performed by: INTERNAL MEDICINE

## 2021-09-01 PROCEDURE — B2111ZZ FLUOROSCOPY OF MULTIPLE CORONARY ARTERIES USING LOW OSMOLAR CONTRAST: ICD-10-PCS | Performed by: INTERNAL MEDICINE

## 2021-09-01 RX ORDER — FENTANYL CITRATE 50 UG/ML
INJECTION, SOLUTION INTRAMUSCULAR; INTRAVENOUS CODE/TRAUMA/SEDATION MEDICATION
Status: COMPLETED | OUTPATIENT
Start: 2021-09-01 | End: 2021-09-01

## 2021-09-01 RX ORDER — ATORVASTATIN CALCIUM 40 MG/1
40 TABLET, FILM COATED ORAL
Status: CANCELLED | OUTPATIENT
Start: 2021-09-01

## 2021-09-01 RX ORDER — POTASSIUM CHLORIDE 14.9 MG/ML
20 INJECTION INTRAVENOUS ONCE
Status: COMPLETED | OUTPATIENT
Start: 2021-09-01 | End: 2021-09-01

## 2021-09-01 RX ORDER — CARVEDILOL 6.25 MG/1
6.25 TABLET ORAL 2 TIMES DAILY WITH MEALS
Status: CANCELLED | OUTPATIENT
Start: 2021-09-01

## 2021-09-01 RX ORDER — SODIUM CHLORIDE 9 MG/ML
INJECTION, SOLUTION INTRAVENOUS
Status: COMPLETED | OUTPATIENT
Start: 2021-09-01 | End: 2021-09-01

## 2021-09-01 RX ORDER — SODIUM CHLORIDE 9 MG/ML
125 INJECTION, SOLUTION INTRAVENOUS CONTINUOUS
Status: DISPENSED | OUTPATIENT
Start: 2021-09-01 | End: 2021-09-01

## 2021-09-01 RX ORDER — HEPARIN SODIUM 1000 [USP'U]/ML
INJECTION, SOLUTION INTRAVENOUS; SUBCUTANEOUS CODE/TRAUMA/SEDATION MEDICATION
Status: COMPLETED | OUTPATIENT
Start: 2021-09-01 | End: 2021-09-01

## 2021-09-01 RX ORDER — POTASSIUM CHLORIDE 20 MEQ/1
40 TABLET, EXTENDED RELEASE ORAL ONCE
Status: COMPLETED | OUTPATIENT
Start: 2021-09-01 | End: 2021-09-01

## 2021-09-01 RX ORDER — CARVEDILOL 6.25 MG/1
6.25 TABLET ORAL 2 TIMES DAILY WITH MEALS
Status: DISCONTINUED | OUTPATIENT
Start: 2021-09-01 | End: 2021-09-02 | Stop reason: HOSPADM

## 2021-09-01 RX ORDER — LIDOCAINE HYDROCHLORIDE 10 MG/ML
INJECTION, SOLUTION EPIDURAL; INFILTRATION; INTRACAUDAL; PERINEURAL CODE/TRAUMA/SEDATION MEDICATION
Status: COMPLETED | OUTPATIENT
Start: 2021-09-01 | End: 2021-09-01

## 2021-09-01 RX ORDER — PANTOPRAZOLE SODIUM 40 MG/1
40 TABLET, DELAYED RELEASE ORAL
Status: CANCELLED | OUTPATIENT
Start: 2021-09-02

## 2021-09-01 RX ORDER — VERAPAMIL HYDROCHLORIDE 2.5 MG/ML
INJECTION, SOLUTION INTRAVENOUS CODE/TRAUMA/SEDATION MEDICATION
Status: COMPLETED | OUTPATIENT
Start: 2021-09-01 | End: 2021-09-01

## 2021-09-01 RX ORDER — FUROSEMIDE 10 MG/ML
40 INJECTION INTRAMUSCULAR; INTRAVENOUS ONCE
Status: DISCONTINUED | OUTPATIENT
Start: 2021-09-01 | End: 2021-09-01

## 2021-09-01 RX ORDER — INSULIN GLARGINE 100 [IU]/ML
40 INJECTION, SOLUTION SUBCUTANEOUS
Status: DISCONTINUED | OUTPATIENT
Start: 2021-09-01 | End: 2021-09-02 | Stop reason: HOSPADM

## 2021-09-01 RX ORDER — ASPIRIN 81 MG/1
324 TABLET, CHEWABLE ORAL ONCE
Status: COMPLETED | OUTPATIENT
Start: 2021-09-01 | End: 2021-09-01

## 2021-09-01 RX ORDER — INSULIN GLARGINE 100 [IU]/ML
40 INJECTION, SOLUTION SUBCUTANEOUS
Status: CANCELLED | OUTPATIENT
Start: 2021-09-01

## 2021-09-01 RX ORDER — FUROSEMIDE 10 MG/ML
40 INJECTION INTRAMUSCULAR; INTRAVENOUS ONCE
Status: CANCELLED | OUTPATIENT
Start: 2021-09-01

## 2021-09-01 RX ORDER — NITROGLYCERIN 20 MG/100ML
INJECTION INTRAVENOUS CODE/TRAUMA/SEDATION MEDICATION
Status: COMPLETED | OUTPATIENT
Start: 2021-09-01 | End: 2021-09-01

## 2021-09-01 RX ORDER — MIDAZOLAM HYDROCHLORIDE 2 MG/2ML
INJECTION, SOLUTION INTRAMUSCULAR; INTRAVENOUS CODE/TRAUMA/SEDATION MEDICATION
Status: COMPLETED | OUTPATIENT
Start: 2021-09-01 | End: 2021-09-01

## 2021-09-01 RX ORDER — ASPIRIN 81 MG/1
81 TABLET, CHEWABLE ORAL DAILY
Status: CANCELLED | OUTPATIENT
Start: 2021-09-02

## 2021-09-01 RX ADMIN — NITROGLYCERIN 200 MCG: 20 INJECTION INTRAVENOUS at 11:36

## 2021-09-01 RX ADMIN — HEPARIN SODIUM 4000 UNITS: 1000 INJECTION INTRAVENOUS; SUBCUTANEOUS at 11:37

## 2021-09-01 RX ADMIN — POTASSIUM CHLORIDE 40 MEQ: 1500 TABLET, EXTENDED RELEASE ORAL at 09:19

## 2021-09-01 RX ADMIN — VERAPAMIL HYDROCHLORIDE 2.5 MG: 2.5 INJECTION, SOLUTION INTRAVENOUS at 11:36

## 2021-09-01 RX ADMIN — SODIUM CHLORIDE 125 ML/HR: 0.9 INJECTION, SOLUTION INTRAVENOUS at 12:13

## 2021-09-01 RX ADMIN — INSULIN LISPRO 3 UNITS: 100 INJECTION, SOLUTION INTRAVENOUS; SUBCUTANEOUS at 13:08

## 2021-09-01 RX ADMIN — ATORVASTATIN CALCIUM 40 MG: 40 TABLET, FILM COATED ORAL at 15:52

## 2021-09-01 RX ADMIN — POTASSIUM CHLORIDE 20 MEQ: 14.9 INJECTION, SOLUTION INTRAVENOUS at 09:19

## 2021-09-01 RX ADMIN — INSULIN LISPRO 2 UNITS: 100 INJECTION, SOLUTION INTRAVENOUS; SUBCUTANEOUS at 13:08

## 2021-09-01 RX ADMIN — INSULIN LISPRO 1 UNITS: 100 INJECTION, SOLUTION INTRAVENOUS; SUBCUTANEOUS at 21:01

## 2021-09-01 RX ADMIN — INSULIN LISPRO 3 UNITS: 100 INJECTION, SOLUTION INTRAVENOUS; SUBCUTANEOUS at 18:24

## 2021-09-01 RX ADMIN — LIDOCAINE HYDROCHLORIDE 1 ML: 10 INJECTION, SOLUTION EPIDURAL; INFILTRATION; INTRACAUDAL; PERINEURAL at 11:30

## 2021-09-01 RX ADMIN — CARVEDILOL 6.25 MG: 6.25 TABLET, FILM COATED ORAL at 15:52

## 2021-09-01 RX ADMIN — INSULIN GLARGINE 40 UNITS: 100 INJECTION, SOLUTION SUBCUTANEOUS at 21:00

## 2021-09-01 RX ADMIN — FENTANYL CITRATE 25 MCG: 50 INJECTION, SOLUTION INTRAMUSCULAR; INTRAVENOUS at 11:20

## 2021-09-01 RX ADMIN — FUROSEMIDE 40 MG: 10 INJECTION, SOLUTION INTRAMUSCULAR; INTRAVENOUS at 15:52

## 2021-09-01 RX ADMIN — IOHEXOL 50 ML: 350 INJECTION, SOLUTION INTRAVENOUS at 11:45

## 2021-09-01 RX ADMIN — SODIUM CHLORIDE 100 ML/HR: 0.9 INJECTION, SOLUTION INTRAVENOUS at 11:19

## 2021-09-01 RX ADMIN — CARVEDILOL 3.12 MG: 3.12 TABLET, FILM COATED ORAL at 07:31

## 2021-09-01 RX ADMIN — MIDAZOLAM HYDROCHLORIDE 2 MG: 1 INJECTION, SOLUTION INTRAMUSCULAR; INTRAVENOUS at 11:20

## 2021-09-01 RX ADMIN — ASPIRIN 324 MG: 81 TABLET, CHEWABLE ORAL at 05:10

## 2021-09-01 RX ADMIN — ASPIRIN 81 MG: 81 TABLET, CHEWABLE ORAL at 07:31

## 2021-09-01 RX ADMIN — PANTOPRAZOLE SODIUM 40 MG: 40 TABLET, DELAYED RELEASE ORAL at 05:10

## 2021-09-01 NOTE — ASSESSMENT & PLAN NOTE
Lab Results   Component Value Date    HGBA1C 10 2 (H) 08/29/2021     · Lantus increased to 40 units  · Sliding scale insulin  · Hold p o   Metformin and Januvia, may resume on discharge  · Will consult nutrition

## 2021-09-01 NOTE — ASSESSMENT & PLAN NOTE
Lab Results   Component Value Date    HGBA1C 10 2 (H) 08/29/2021     · Continue Lantus 40 units, humalog 3 U TIDAC  · Sliding scale insulin  · Hold p o   Metformin and Januvia, may resume on discharge  · OP Endo referral

## 2021-09-01 NOTE — PLAN OF CARE
Problem: Potential for Falls  Goal: Patient will remain free of falls  Description: INTERVENTIONS:  - Educate patient/family on patient safety including physical limitations  - Instruct patient to call for assistance with activity   - Consult OT/PT to assist with strengthening/mobility   - Keep Call bell within reach  - Keep bed low and locked with side rails adjusted as appropriate  - Keep care items and personal belongings within reach  - Initiate and maintain comfort rounds  - Make Fall Risk Sign visible to staff  - Offer Toileting every 2 Hours, in advance of need  - Initiate/Maintain 2alarm  - Obtain necessary fall risk management equipment: 2  - Apply yellow socks and bracelet for high fall risk patients  - Consider moving patient to room near nurses station  Outcome: Progressing     Problem: CARDIOVASCULAR - ADULT  Goal: Maintains optimal cardiac output and hemodynamic stability  Description: INTERVENTIONS:  - Monitor I/O, vital signs and rhythm  - Monitor for S/S and trends of decreased cardiac output  - Administer and titrate ordered vasoactive medications to optimize hemodynamic stability  - Assess quality of pulses, skin color and temperature  - Assess for signs of decreased coronary artery perfusion  - Instruct patient to report change in severity of symptoms  Outcome: Progressing  Goal: Absence of cardiac dysrhythmias or at baseline rhythm  Description: INTERVENTIONS:  - Continuous cardiac monitoring, vital signs, obtain 12 lead EKG if ordered  - Administer antiarrhythmic and heart rate control medications as ordered  - Monitor electrolytes and administer replacement therapy as ordered  Outcome: Progressing     Problem: SAFETY ADULT  Goal: Patient will remain free of falls  Description: INTERVENTIONS:  - Educate patient/family on patient safety including physical limitations  - Instruct patient to call for assistance with activity   - Consult OT/PT to assist with strengthening/mobility   - Keep Call bell within reach  - Keep bed low and locked with side rails adjusted as appropriate  - Keep care items and personal belongings within reach  - Initiate and maintain comfort rounds  - Make Fall Risk Sign visible to staff  - Offer Toileting every 2 Hours, in advance of need  - Initiate/Maintain 2alarm  - Obtain necessary fall risk management equipment: 2  - Apply yellow socks and bracelet for high fall risk patients  - Consider moving patient to room near nurses station  Outcome: Progressing  Goal: Maintain or return to baseline ADL function  Description: INTERVENTIONS:  -  Assess patient's ability to carry out ADLs; assess patient's baseline for ADL function and identify physical deficits which impact ability to perform ADLs (bathing, care of mouth/teeth, toileting, grooming, dressing, etc )  - Assess/evaluate cause of self-care deficits   - Assess range of motion  - Assess patient's mobility; develop plan if impaired  - Assess patient's need for assistive devices and provide as appropriate  - Encourage maximum independence but intervene and supervise when necessary  - Involve family in performance of ADLs  - Assess for home care needs following discharge   - Consider OT consult to assist with ADL evaluation and planning for discharge  - Provide patient education as appropriate  Outcome: Progressing  Goal: Maintains/Returns to pre admission functional level  Description: INTERVENTIONS:  - Perform BMAT or MOVE assessment daily    - Set and communicate daily mobility goal to care team and patient/family/caregiver  - Collaborate with rehabilitation services on mobility goals if consulted  - Perform Range of Motion 2 times a day  - Reposition patient every 2 hours    - Dangle patient 2 times a day  - Stand patient 2 times a day  - Ambulate patient 2 times a day  - Out of bed to chair 2 times a day   - Out of bed for meals 2 times a day  - Out of bed for toileting  - Record patient progress and toleration of activity level   Outcome: Progressing     Problem: Nutrition/Hydration-ADULT  Goal: Nutrient/Hydration intake appropriate for improving, restoring or maintaining nutritional needs  Description: Monitor and assess patient's nutrition/hydration status for malnutrition  Collaborate with interdisciplinary team and initiate plan and interventions as ordered  Monitor patient's weight and dietary intake as ordered or per policy  Utilize nutrition screening tool and intervene as necessary  Determine patient's food preferences and provide high-protein, high-caloric foods as appropriate       INTERVENTIONS:  - Monitor oral intake, urinary output, labs, and treatment plans  - Assess nutrition and hydration status and recommend course of action  - Evaluate amount of meals eaten  - Assist patient with eating if necessary   - Allow adequate time for meals  - Recommend/ encourage appropriate diets, oral nutritional supplements, and vitamin/mineral supplements  - Order, calculate, and assess calorie counts as needed  - Recommend, monitor, and adjust tube feedings and TPN/PPN based on assessed needs  - Assess need for intravenous fluids  - Provide specific nutrition/hydration education as appropriate  - Include patient/family/caregiver in decisions related to nutrition  Outcome: Progressing

## 2021-09-01 NOTE — ASSESSMENT & PLAN NOTE
Presents to ED with lightheadedness, nausea, vomiting that occurred while she was outside sitting on her patio  She reports a similar episode of lightheadedness 3 weeks ago when turning over in bed  On arrival to emergency department, symptoms have resolved  CTA head neck with likely meningioma  No evidence of infarct or intracranial hemorrhage  No stenosis, dissection, occlusion  · Telemetry monitoring  · PT OT, speech consult  · P r n  Meclizine  · Neurology consulted  · MRI (8/30) No acute intracranial pathology  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, as mentioned on recent CT    · As per neurosurgery, recommend visual field testing out patient with ophthalmology before neurosurgery appointment in 2-3 weeks  · Echocardiogram (8/30) Ejection fraction 37%  · Monitor I&Os, renal function, electrolytes and standing weight

## 2021-09-01 NOTE — DISCHARGE SUMMARY
[facility]  Discharge- Juan Francisco Lanier 1960, 59 y o  female MRN: [id number]  Unit/Bed#: S -01 Encounter: [id number]  Primary Care Provider: Pedrito Baez MD   Date and time admitted to hospital: 8/29/2021  5:48 PM    No new Assessment & Plan notes have been filed under this hospital service since the last note was generated  Service: Hospitalist      Medical Problems     Resolved Problems  Date Reviewed: 9/1/2021    None              Discharging Resident: El Maxwell, DO  Discharging Attending: Keyana Marquez MD  PCP: Pedrito Baez MD  Admission Date:   Admission Orders (From admission, onward)     Ordered        08/31/21 1203  Inpatient Admission  Once         08/29/21 2151  Place in Observation  Once                   Discharge/Transfer Date: 09/02/21    Disposition:   Transfer to: [facility]  Reason for Transfer: PCI with rotational atherectomy or shockwave to the mid LAD lesion  Accepting Provider at [facility]: TBD    Consultations During Hospital Stay:  · Neurology, Neurosurgery, and Cardiology    Procedures Performed:   · Cardiac cath    Significant Findings / Test Results:   · New Ischemic Cardiomyopathy/Systolic CHF, CAD 81% LAD, 70% D2    Incidental Findings:   · Meningioma on CT/MRI     Test Results Pending at Discharge (will require follow up):   · none     Outpatient Tests Requested:  · Formal visual fields test     Complications:  none    Reason for Admission: dizziness    Hospital Course: Juan Francisco Lanier is a 59 y o  female patient who originally presented to the hospital on 8/29/2021 due to sudden onset dizziness, lightheadedness, vertigo sensation, nausea/vomiting while she was sitting on her patio  In the ED, her CTA head neck showed a meningioma with no mass effect, no ischemia, no hemorrhage  EKG showed LBBB, with no prior for comparison, no cardiac complaints  Patient was admitted for concerns of TIA workup    Neurology was consulted with MRI showing no acute findings  Neurosurgery was consulted for the incidental meningioma finding with no plan for urgent surgery and follow-up outpatient  Cardiology was consulted with echo showing severe LV dysfunction with EF 37%  On 09/01 cardiac catheterization was performed in the setting of new onset cardiomyopathy, LBBB on EKG, and high risk factors of uncontrolled type 2 DM, HTN/HLD  Cardiac catheterization showed 90% occlusion of mid LAD and 70% occlusion in the 2nd diagonal artery  Patient was transferred to [facility] for interventional cardiology assessment for rotational atherectomy or shockwave therapy to the mid LAD      Please see above list of diagnoses and related plan for additional information  Condition at Discharge: stable    Discharge Day Visit / Exam:   Subjective:  Patient has no complaints this evening  Discussed with patient at bedside about her cardiac catheterization finding and plans to transfer her to [address] [place] [address]  Vitals: Blood Pressure: 146/67 (09/02/21 0725)  Pulse: 69 (09/02/21 0725)  Temperature: 98 1 °F (36 7 °C) (09/02/21 0725)  Temp Source: Oral (09/02/21 0725)  Respirations: 18 (09/02/21 0725)  Height: 5' 6" (167 6 cm) (08/31/21 1546)  Weight - Scale: 84 2 kg (185 lb 10 oz) (08/31/21 1546)  SpO2: 98 % (09/02/21 0725)  Exam:      Discussion with Family: Updated  (friend) at bedside  ** Please Note: This note may have been constructed using a voice recognition system  **

## 2021-09-01 NOTE — ASSESSMENT & PLAN NOTE
Cardiac catheterization 90% mid LAD, 70% D2  Plan:  · Transfer to [place] for PCI with rotational atherectomy or shockwave to the mid LAD lesion

## 2021-09-01 NOTE — CASE MANAGEMENT
CM made aware patient is transferring to [facility] for cardiology intervention  Medical necessity completed and placed in chart  No transportation authorization required if in network provider is used for transportation  CM called and spoke to [facility] with PACS to make him aware of in network providers  CM will be available for further care coordination needs

## 2021-09-01 NOTE — UTILIZATION REVIEW
Continued Stay Review    Date: 9/1/21                      Current Patient Class: IP  Current Level of Care: MS    HPI:61 y o  female initially admitted on 8/29 as OBS , converted to IP 8/31 with new onset cardiomyopathy, likely ischemic  Plan for cardiac cath 9/1    Assessment/Plan: 9/1  Cardiology-Output 24 hours +190 mL  Net output +1 3 L   Weight 185 lb today  Pt feels her LE edema has improved  Denies chest pain  Cardiac catheterization 90% mid LAD, 70% D2   Recommended for transfer to [place] for PCI with rotational atherectomy or shockwave to the mid LAD lesion  Increase carvedilol dosing to 6 25  mg BID  Give a dose of IV Lasix 40 mg now  Hold Lisinopril until after intervention    Medicine-  Continue with anti-platelet, statin, beta-blocker, diuretics  ACE-inhibitor per Cardiology  Insulin regimen adjusted based on blood glucose  levels    Vital Signs:   Date/Time  Temp  Pulse  Resp  BP  MAP (mmHg)  SpO2    09/01/21 1432  --  72  --  127/71  94  97 %    09/01/21 1400  --  68  --  169/83  112  --    09/01/21 1330  --  68  --  137/68  103  --    09/01/21 1315  --  68  16  137/78  103  --    09/01/21 1300  --  70  16  119/70  91  --    09/01/21 1231  --  67  --  133/79  101  --    09/01/21 1215  --  69  16  128/76  97  --    09/01/21 0700  97 7 °F (36 5 °C)  73  16  153/74  --  97 %          Pertinent Labs/Diagnostic Results:   9/1 cardiac cathPROCEDURES PERFORMED   --  Left coronary angiography  --  Right coronary angiography  CORONARY CIRCULATION:  Left main: The vessel was large sized  Angiography showed minor luminal irregularities  LAD: The vessel was medium sized  Mid LAD: There was a tubular 90 % stenosis just after D1  The lesion was heavily calcified and without evidence of thrombus  There was BANDAR grade 3 flow through the vessel (brisk flow)  This is a likely culprit for the patient's clinical  presentation  It appears amenable to percutaneous intervention   In a second lesion, there was a discrete 80 % stenosis at the origin of S2  The lesion was mildly calcified and without evidence of thrombus  There was BANDAR grade 3 flow  through the vessel (brisk flow)  2nd diagonal: There was a tubular 70 % stenosis at the ostium of the vessel segment  Circumflex: The vessel was medium sized  Angiography showed mild atherosclerosis  RCA: The vessel was medium to large sized  Angiography showed mild atherosclerosis  RECOMMENDATIONS  The patient has a severe, heavily calcified mid LAD lesion   The patient will require PCI with rotational atherectomy or shock wave     Results from last 7 days   Lab Units 08/31/21  0531 08/30/21  0445 08/29/21  1427   WBC Thousand/uL 6 05 6 34 5 72   HEMOGLOBIN g/dL 13 5 12 8 14 3   HEMATOCRIT % 41 2 37 5 42 4   PLATELETS Thousands/uL 184 183 190   NEUTROS ABS Thousands/µL 3 25  --  3 52         Results from last 7 days   Lab Units 09/01/21  0432 08/31/21  0531 08/30/21  0445 08/29/21  1427   SODIUM mmol/L 142 141 141 138   POTASSIUM mmol/L 3 4* 3 8 3 7 4 7   CHLORIDE mmol/L 105 106 105 102   CO2 mmol/L 29 27 25 25   ANION GAP mmol/L 8 8 11 11   BUN mg/dL 17 14 16 22   CREATININE mg/dL 0 94 0 95 0 97 1 05   EGFR ml/min/1 73sq m 65 64 62 57   CALCIUM mg/dL 8 7 8 4 8 6 9 3   MAGNESIUM mg/dL 1 9  --   --   --      Results from last 7 days   Lab Units 08/31/21  0531 08/29/21  1427   AST U/L 15 18   ALT U/L 30 40   ALK PHOS U/L 56 71   TOTAL PROTEIN g/dL 5 9* 7 4   ALBUMIN g/dL 2 6* 3 6   TOTAL BILIRUBIN mg/dL 0 31 0 72     Results from last 7 days   Lab Units 09/01/21  1213 09/01/21  0740 08/31/21  2057 08/31/21  1551 08/31/21  1114 08/31/21  0758 08/30/21  2043 08/30/21  1834 08/30/21  1623 08/30/21  0748 08/29/21  2320   POC GLUCOSE mg/dl 246* 221* 283* 264* 272* 277* 198* 262* 286* 253* 223*     Results from last 7 days   Lab Units 09/01/21  0432 08/31/21  0531 08/30/21  0445 08/29/21  1427   GLUCOSE RANDOM mg/dL 206* 318* 235* 390*         Results from last 7 days   Lab Units 08/29/21  1427   HEMOGLOBIN A1C % 10 2*   EAG mg/dl 246       Results from last 7 days   Lab Units 08/30/21  0445 08/29/21  2327 08/29/21  1427   TROPONIN I ng/mL <0 02 <0 02 <0 02           Results from last 7 days   Lab Units 08/31/21  0531   NT-PRO BNP pg/mL 1,033*           Results from last 7 days   Lab Units 08/29/21  1427   CRP mg/L <3 0           Medications:   Scheduled Medications:  aspirin, 81 mg, Oral, Daily  atorvastatin, 40 mg, Oral, Daily With Dinner  carvedilol, 6 25 mg, Oral, BID With Meals  enoxaparin, 40 mg, Subcutaneous, Daily  furosemide, 40 mg, Intravenous, Once  insulin glargine, 40 Units, Subcutaneous, HS  insulin lispro, 1-5 Units, Subcutaneous, HS  insulin lispro, 1-6 Units, Subcutaneous, TID AC  insulin lispro, 3 Units, Subcutaneous, TID With Meals  pantoprazole, 40 mg, Oral, Early Morning  potassium chloride 20 mEq IVPB (premix)   Dose: 20 mEq  Freq: Once Route: IV x1 9/1  potassium chloride (K-DUR,KLOR-CON) CR tablet 40 mEq   Dose: 40 mEq  Freq: Once Route: PO x1 9/1  furosemide (LASIX) injection 40 mg   Dose: 40 mg  Freq: Once Route: IV x1 9/1  aspirin chewable tablet 324 mg   Dose: 324 mg  Freq: Once Route: PO x1 9/1      Continuous IV Infusions:  sodium chloride, 125 mL/hr, Intravenous, Continuous -d/c @1612 9/1      PRN Meds:  meclizine, 12 5 mg, Oral, Q8H PRN        Discharge Plan: TBD  Network Utilization Review Department  ATTENTION: Please call with any questions or concerns to [phone] and carefully listen to the prompts so that you are directed to the right person  All voicemails are confidential   Ila Query all requests for admission clinical reviews, approved or denied determinations and any other requests to dedicated fax number below belonging to the campus where the patient is receiving treatment   List of dedicated fax numbers for the Facilities:  [address] [place] [address] DENIALS (Administrative/Medical Necessity) [phone]   [address] (Maternity/NICU/Pediatrics) 261 [facility] [facility] [address] [place] [address] [place] [address] Sydni Lopez 1481 [address] [facility] [address] [facility] [phone]

## 2021-09-01 NOTE — ASSESSMENT & PLAN NOTE
MRI of brain with and without contrast on 08/30/2021 demonstrates a 1 2 x 1 5 x 1 7 cm diffusely enhancing extra-axial mass right paraclinoid mass   Neurosurgery consulted at [facility]: with recommendations of no emergency surgery is anticipated at this juncture  Recommend formal visual field test prior to the 2-3 weeks follow-up appointment at outpatient neurosurgery clinic

## 2021-09-01 NOTE — ASSESSMENT & PLAN NOTE
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

## 2021-09-01 NOTE — ASSESSMENT & PLAN NOTE
EF 37% with RWMA   Reports chronic dyspnea on exertion that has slightly worsened in the last 6 months   Also an episode of dizziness with diaphoresis nausea and vomiting as described below   Also reports slow worsening of chronic lower extremity edema for which she takes PRN Lasix 20 mg daily    NT proBNP 1033  Baseline weight around 180 lb    Started on IV Lasix 40 mg BID  then 40 IV x 1 yesterday  · Monitor I/os, weight  · Appears euvolemic  · F/u cardio recs on OP diuretics  · Cont PO coreg, home lisinopril 5 mg on hold, consider restart tomorrow

## 2021-09-01 NOTE — ASSESSMENT & PLAN NOTE
MRI of brain with and without contrast on 08/30/2021 demonstrates a 1 2 x 1 5 x 1 7 cm diffusely enhancing extra-axial mass right paraclinoid mass     Neurosurgery consulted with recommendations of no emergency surgery is anticipated at this juncture  Plan:  · Recommend formal visual field test prior to the 2-3 weeks follow-up appointment at outpatient neurosurgery clinic

## 2021-09-01 NOTE — ASSESSMENT & PLAN NOTE
Patient is without complaints of chest pain  There are no prior EKGs for evaluation  Previous stress tests strips in 2017 and 2019 do not show LBBB  She does have chronic exertional shortness of breath which she notices mostly while going up steps  She notes this is not new or worsening     Normal stress test in 2017 and 2019  Family hx: father with MI at age 45, mother with MI age [de-identified]   · Troponin negative x3  · Telemetry monitoring  Echocardiogram-Ejection fraction 37%

## 2021-09-01 NOTE — PROGRESS NOTES
[facility]  Progress Note - Joey Patel 1960, 59 y o  female MRN: [id number]  Unit/Bed#: S -01 Encounter: [id number]  Primary Care Provider: Nitish Oliver MD   Date and time admitted to hospital: 8/29/2021  5:48 PM    * Dizziness  Assessment & Plan  Presents to ED with lightheadedness, nausea, vomiting that occurred while she was outside sitting on her patio  She reports a similar episode of lightheadedness 3 weeks ago when turning over in bed  On arrival to emergency department, symptoms have resolved  CTA head neck with likely meningioma  No evidence of infarct or intracranial hemorrhage  No stenosis, dissection, occlusion  · Telemetry monitoring  · PT OT, speech consult  · Check orthostatics  P r n  Meclizine  · Neurology consulted  · MRI (8/30) No acute intracranial pathology  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, as mentioned on recent CT  · As per neurosurgery, recommend visual field testing out patient with ophthalmology before neurosurgery appointment in 2-3 weeks  · Echocardiogram (8/30) Ejection fraction 37%  · Scheduled for cardiac catheterization today given new cardiomyopathy and concerns of ischemia   · Monitor I&Os, renal function, electrolytes and standing weight       Left bundle branch block  Assessment & Plan  Patient is without complaints of chest pain  There are no prior EKGs for evaluation  Previous stress tests strips in 2017 and 2019 do not show LBBB  She does have chronic exertional shortness of breath which she notices mostly while going up steps  She notes this is not new or worsening     Normal stress test in 2017 and 2019  Family hx: father with MI at age 45, mother with MI age [de-identified]   · Troponin negative x3  · Telemetry monitoring  · Echocardiogram-Ejection fraction 37%  · Scheduled for cardiac catheterization today given new cardiomyopathy and concerns of ischemia       Type 2 diabetes mellitus without complication, with long-term current use of insulin [facility])  Assessment & Plan  Lab Results   Component Value Date    HGBA1C 10 2 (H) 2021     · Lantus increased to 40 units  · Sliding scale insulin  · Hold p o  Metformin and Januvia, may resume on discharge  · Will consult nutrition    Essential hypertension  Assessment & Plan  Most recent blood pressure 161/77  · As per cardiology:  ·  increase carvedilol dosing to 6 25  mg BID   · restart lisinopril 5 mg daily tomorrow for GDMT  · Continue IV Lasix 80 mg BID starting this afternoon  · She will need a maintenance diuretic at discharge      Mixed hyperlipidemia  Assessment & Plan  · On simvastatin as outpatient  Change to atorvastatin 40 mg given stroke workup  · As per cardiology continue stain  ·  lipid panel ()- HDL low 31, cholesterol, triglycerides, and LDL all within normal range        VTE Pharmacologic Prophylaxis: VTE Score: 7 High Risk (Score >/= 5) - Pharmacological DVT Prophylaxis Ordered: enoxaparin (Lovenox)  Sequential Compression Devices Ordered  Patient Centered Rounds: I performed bedside rounds with nursing staff today  Discussions with Specialists or Other Care Team Provider:  Neurology, Neurosurgery, Cardiology    Education and Discussions with Family / Patient: Updated  (sister) at bedside  Current Length of Stay: 1 day(s)  Current Patient Status: Inpatient   Discharge Plan: Anticipate discharge in 24-48 hrs to home  Code Status: Level 1 - Full Code    Subjective:   Patient seen and examined  She reports feeling dizzy last night when walking around her room  She denies shortness of breath and chest pain  Objective:     Vitals:   Temp (24hrs), Av 2 °F (36 8 °C), Min:97 7 °F (36 5 °C), Max:98 4 °F (36 9 °C)    Temp:  [97 7 °F (36 5 °C)-98 4 °F (36 9 °C)] 97 7 °F (36 5 °C)  HR:  [69-81] 69  Resp:  [16-18] 16  BP: (128-164)/(74-84) 128/76  SpO2:  [95 %-98 %] 97 %  Body mass index is 29 96 kg/m²       Input and Output Summary (last 24 hours): Intake/Output Summary (Last 24 hours) at 9/1/2021 1223  Last data filed at 9/1/2021 0434  Gross per 24 hour   Intake 390 ml   Output 200 ml   Net 190 ml       Physical Exam:   Physical Exam  Constitutional:       Appearance: Normal appearance  HENT:      Head: Normocephalic and atraumatic  Eyes:      Conjunctiva/sclera: Conjunctivae normal    Cardiovascular:      Rate and Rhythm: Normal rate and regular rhythm  Pulses: Normal pulses  Heart sounds: Normal heart sounds  Pulmonary:      Effort: Pulmonary effort is normal       Breath sounds: Normal breath sounds  Abdominal:      General: Abdomen is flat  Bowel sounds are normal       Palpations: Abdomen is soft  Musculoskeletal:      Right lower leg: Swelling present  Left lower leg: Swelling present  Skin:     General: Skin is warm and dry  Neurological:      General: No focal deficit present  Mental Status: She is alert and oriented to person, place, and time     Psychiatric:         Mood and Affect: Mood normal          Behavior: Behavior normal          Additional Data:     Labs:  Results from last 7 days   Lab Units 08/31/21  0531   WBC Thousand/uL 6 05   HEMOGLOBIN g/dL 13 5   HEMATOCRIT % 41 2   PLATELETS Thousands/uL 184   NEUTROS PCT % 54   LYMPHS PCT % 37   MONOS PCT % 7   EOS PCT % 1     Results from last 7 days   Lab Units 09/01/21  0432 08/31/21  0531   SODIUM mmol/L 142 141   POTASSIUM mmol/L 3 4* 3 8   CHLORIDE mmol/L 105 106   CO2 mmol/L 29 27   BUN mg/dL 17 14   CREATININE mg/dL 0 94 0 95   ANION GAP mmol/L 8 8   CALCIUM mg/dL 8 7 8 4   ALBUMIN g/dL  --  2 6*   TOTAL BILIRUBIN mg/dL  --  0 31   ALK PHOS U/L  --  56   ALT U/L  --  30   AST U/L  --  15   GLUCOSE RANDOM mg/dL 206* 318*         Results from last 7 days   Lab Units 09/01/21  1213 09/01/21  0740 08/31/21  2057 08/31/21  1551 08/31/21  1114 08/31/21  0758 08/30/21  2043 08/30/21  1834 08/30/21  1623 08/30/21  9536 08/29/21  2320   POC GLUCOSE mg/dl 246* 221* 283* 264* 272* 277* 198* 262* 286* 253* 223*     Results from last 7 days   Lab Units 08/29/21  1427   HEMOGLOBIN A1C % 10 2*           Lines/Drains:  Invasive Devices     Peripheral Intravenous Line            Peripheral IV 08/29/21 Left Antecubital 2 days                  Telemetry:  Telemetry Orders (From admission, onward)             48 Hour Telemetry Monitoring  Continuous x 48 hours     Question:  Reason for 48 Hour Telemetry  Answer:  Acute Decompensated CHF (continuous diuretic infusion or total diuretic dose > 200 mg daily, associated electrolyte derangement, ionotropic drip, history of ventricular arrhythmia, or new EF <35%)                 Telemetry Reviewed: Normal Sinus Rhythm  Indication for Continued Telemetry Use: Acute CHF on >200 mg lasix/day or equivalent dose or with new reduced EF              Imaging: Reviewed radiology reports from this admission including: CT head, MRI brain and ECHO    Recent Cultures (last 7 days):         Last 24 Hours Medication List:   Current Facility-Administered Medications   Medication Dose Route Frequency Provider Last Rate    aspirin  81 mg Oral Daily ASH Masters      atorvastatin  40 mg Oral Daily With [employer], ASH      carvedilol  3 125 mg Oral BID With Meals Paul Munoz PA-C      enoxaparin  40 mg Subcutaneous Daily ASH Masters      insulin glargine  40 Units Subcutaneous HS Elijah Marrufo MD      insulin lispro  1-5 Units Subcutaneous HS ASH Masters      insulin lispro  1-6 Units Subcutaneous TID [facility] ASH Masters      insulin lispro  3 Units Subcutaneous TID With Meals Elijah Marrufo MD      lisinopril  5 mg Oral Daily Paul Munoz PA-C      meclizine  12 5 mg Oral Q8H PRN ASH Masters      pantoprazole  40 mg Oral Early Morning ASH Masters      sodium chloride  125 mL/hr Intravenous Continuous Van Mack  mL/hr (09/01/21 1213)        Today, Patient Was Seen By: Ivone Post    **Please Note: This note may have been constructed using a voice recognition system  **

## 2021-09-01 NOTE — ASSESSMENT & PLAN NOTE
Patient is without complaints of chest pain  There are no prior EKGs for evaluation  Previous stress tests strips in 2017 and 2019 do not show LBBB  She does have chronic exertional shortness of breath which she notices mostly while going up steps  She notes this is not new or worsening     Normal stress test in 2017 and 2019  Family hx: father with MI at age 45, mother with MI age [de-identified]   · Troponin negative x3  · Telemetry monitoring  · Echocardiogram-Ejection fraction 37%  · Scheduled for cardiac catheterization today given new cardiomyopathy and concerns of ischemia

## 2021-09-01 NOTE — ASSESSMENT & PLAN NOTE
Cardiac catheterization 90% mid LAD, 70% D2  Transfer to [facility] for PCI with rotational atherectomy or shockwave to the mid LAD lesion    S/p [facility] with balloon angio to mid LAD, PCI with HUNTER to mid & prox LAD  Cont aspirin, plavix added, cont statin

## 2021-09-01 NOTE — H&P
[address] [facility]  H&P- Ida Hummel 1960, 59 y o  female MRN: [id number]  Unit/Bed#: CW2 216-02 Encounter: [id number]  Primary Care Provider: Vanessa Ibarra MD   Date and time admitted to hospital: 9/2/2021 11:06 AM    * CAD (coronary artery disease)  Assessment & Plan  Cardiac catheterization 90% mid LAD, 70% D2  Transfer to [place] for PCI with rotational atherectomy or shockwave to the mid LAD lesion  S/p [facility] with balloon angio to mid LAD, PCI with HUNTER to mid & prox LAD  Cont aspirin, plavix added, cont statin      Right paraclinoid meningioma  Assessment & Plan  MRI of brain with and without contrast on 08/30/2021 demonstrates a 1 2 x 1 5 x 1 7 cm diffusely enhancing extra-axial mass right paraclinoid mass   Neurosurgery consulted at [facility]: with recommendations of no emergency surgery is anticipated at this juncture  Recommend formal visual field test prior to the 2-3 weeks follow-up appointment at outpatient neurosurgery clinic  New Ischemic Cardiomyopathy/Systolic CHF  Assessment & Plan  EF 37% with RWMA   Reports chronic dyspnea on exertion that has slightly worsened in the last 6 months   Also an episode of dizziness with diaphoresis nausea and vomiting as described below   Also reports slow worsening of chronic lower extremity edema for which she takes PRN Lasix 20 mg daily    NT proBNP 1033  Baseline weight around 180 lb  Started on IV Lasix 40 mg BID  then 40 IV x 1 yesterday  · Monitor I/os, weight  · Appears euvolemic  · F/u cardio recs on OP diuretics  · Cont PO coreg, home lisinopril 5 mg on hold, consider restart tomorrow      Left bundle branch block  Assessment & Plan  Patient is without complaints of chest pain  There are no prior EKGs for evaluation  Previous stress tests strips in 2017 and 2019 do not show LBBB  She does have chronic exertional shortness of breath which she notices mostly while going up steps    She notes this is not new or worsening  Normal stress test in 2017 and 2019  Family hx: father with MI at age 45, mother with MI age [de-identified]   · Troponin negative x3  · Telemetry monitoring  Echocardiogram-Ejection fraction 37%      Dizziness  Assessment & Plan  Presents to ED with lightheadedness, nausea, vomiting that occurred while she was outside sitting on her patio  She reports a similar episode of lightheadedness 3 weeks ago when turning over in bed  On arrival to emergency department, symptoms have resolved  CTA head neck with likely meningioma  No evidence of infarct or intracranial hemorrhage  No stenosis, dissection, occlusion  · Telemetry monitoring  · PT OT, speech consult  · P r n  Meclizine  · Neurology consulted  · MRI (8/30) No acute intracranial pathology  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, as mentioned on recent CT  · As per neurosurgery, recommend visual field testing out patient with ophthalmology before neurosurgery appointment in 2-3 weeks  · Echocardiogram (8/30) Ejection fraction 37%  · Monitor I&Os, renal function, electrolytes and standing weight       Type 2 diabetes mellitus without complication, with long-term current use of insulin ([facility])  Assessment & Plan  Lab Results   Component Value Date    HGBA1C 10 2 (H) 08/29/2021     · Continue Lantus 40 units, humalog 3 U TIDAC  · Sliding scale insulin  · Hold p o  Metformin and Januvia, may resume on discharge  · OP Endo referral    VTE Prophylaxis: Enoxaparin (Lovenox)  / sequential compression device   Code Status: FC  POLST: There is no POLST form on file for this patient (pre-hospital)  Discussion with family: sister    Anticipated Length of Stay:  Patient will be admitted on an Inpatient basis with an anticipated length of stay of  > 2 midnights  Justification for Hospital Stay: high risk PCI    Total Time for Visit, including Counseling / Coordination of Care: 45 minutes    Greater than 50% of this total time spent on direct patient counseling and coordination of care  Chief Complaint:   Transfer for high risk PCI    History of Present Illness: Bobbi Chen is a 59 y o  female originally presented to [address] [place] [address] with complaint of dizziness, lightheadedness  Patient initially was admitted under stroke pathway neurology was consulted  Under stroke workup, patient underwent echocardiogram that revealed ejection fraction of 37% with global hypokinesis which prompted patient to undergo cardiac catheterization  She underwent cardiac catheterization today that revealed high risk LAD lesion  Patient has been transferred to [facility] for high risk PCI  Underwent cath after arrival, underwent PCI  Saw her post procedure, has no complains, feels well      Review of Systems:    Review of Systems   Constitutional: Negative for activity change, appetite change, chills and fever  HENT: Negative for congestion and rhinorrhea  Respiratory: Negative for cough and shortness of breath  Cardiovascular: Negative for chest pain, palpitations and leg swelling  Gastrointestinal: Negative for abdominal pain, nausea and vomiting  Genitourinary: Negative for difficulty urinating and dysuria  Neurological: Positive for headaches  Negative for dizziness  All other systems reviewed and are negative        Past Medical and Surgical History:     Past Medical History:   Diagnosis Date    Colon polyp     Dizziness     Edema     Hyperlipidemia     Hypertension     Type 2 diabetes mellitus ([facility] [address] )     Vitamin D deficiency     Wears glasses        Past Surgical History:   Procedure Laterality Date    BREAST EXCISIONAL BIOPSY Right 2009    COLONOSCOPY  04/11/2018    Colonoscpoy due in 3 years    EGD  04/11/2018    MAMMO (HISTORICAL) Bilateral 05/04/2020 2018    MAMMO NEEDLE LOCALIZATION RIGHT (ALL INC) Right 7/8/2009    WISDOM TOOTH EXTRACTION         Meds/Allergies:    Prior to Admission medications Medication Sig Start Date End Date Taking? Authorizing Provider   aspirin 81 MG tablet Take 1 tablet by mouth every other day    Historical Provider, MD   esomeprazole (NexIUM) 20 mg capsule Take 20 mg by mouth every morning before breakfast    Historical Provider, MD   furosemide (LASIX) 20 mg tablet TAKE 1 TABLET BY MOUTH DAILY AS NEEDED 6/28/20   Dequan Laguna MD   Januvia 100 MG tablet TAKE 1 TABLET BY MOUTH EVERY DAY 6/25/21   Dequan Laguna MD   lisinopril (ZESTRIL) 5 mg tablet Take 1 tablet (5 mg total) by mouth daily 3/31/21   Dequan Laguna MD   metFORMIN (GLUCOPHAGE) 1000 MG tablet Take 1 tablet (1,000 mg total) by mouth 2 (two) times a day with meals 2/23/21 5/24/21  Dequan Laguna MD   NovoFine 32G X 6 MM MISC USE AS DIRECTED ONCE A DAY WITH INSULIN 7/16/21   Dequan Laguna MD   simvastatin (ZOCOR) 40 mg tablet TAKE 1 TABLET BY MOUTH EVERY DAY 6/25/21   MD Laverne Braden FlexTouch 100 units/mL injection pen Inject 35 Units under the skin daily 10/15/20   MD ANITA Braden PO Take 1 capsule by mouth daily    Historical Provider, MD SKELTON have reviewed home medications using allscripts      Allergies: No Known Allergies    Social History:     Marital Status: Single     Substance Use History:   Social History     Substance and Sexual Activity   Alcohol Use No     Social History     Tobacco Use   Smoking Status Never Smoker   Smokeless Tobacco Never Used     Social History     Substance and Sexual Activity   Drug Use No       Family History:    Family History   Problem Relation Age of Onset    Diabetes Mother     Hypertension Mother    Cayden Craig Sudden death Mother         SCD    Hyperlipidemia Mother     Heart attack Mother     Breast cancer Mother 79    Diabetes Father     Arrhythmia Father         pacemaker/ defib    Clotting disorder Father         eliquis    Heart failure Father     Heart disease Father     Melanoma Father     Cancer Father 61        bladder cancer    Breast cancer Sister 44    BRCA1 Negative Sister     No Known Problems Maternal Grandmother     No Known Problems Maternal Grandfather     No Known Problems Paternal Grandmother     No Known Problems Paternal Grandfather     No Known Problems Sister     No Known Problems Sister     Breast cancer Maternal Aunt         unknown age   Areta Emmer Esophageal cancer Maternal Aunt 61    Cancer Maternal Aunt         unknown age or type    No Known Problems Maternal Aunt     Lung cancer Paternal Aunt         unknonw age- in her [de-identified]    Colon cancer Maternal Uncle         unknwon age   Areta Emmer Pancreatic cancer Maternal Uncle 61    Anuerysm Neg Hx        Physical Exam:     Vitals:   Blood Pressure: 143/76 (21 1700)  Pulse: 72 (21 1700)  Temperature: 98 1 °F (36 7 °C) (21 1630)  Respirations: 17 (21 1630)  Height: 5' 6" (167 6 cm) (21 1700)  SpO2: 99 % (21 170)    Physical Exam  Vitals reviewed  Constitutional:       Appearance: Normal appearance  HENT:      Head: Normocephalic and atraumatic  Mouth/Throat:      Mouth: Mucous membranes are moist    Eyes:      Conjunctiva/sclera: Conjunctivae normal    Cardiovascular:      Rate and Rhythm: Normal rate and regular rhythm  Heart sounds: Normal heart sounds  Pulmonary:      Effort: Pulmonary effort is normal  No respiratory distress  Breath sounds: Normal breath sounds  Abdominal:      General: There is no distension  Palpations: Abdomen is soft  Tenderness: There is no abdominal tenderness  Musculoskeletal:      Right lower leg: No edema  Left lower leg: No edema  Skin:     General: Skin is warm  Neurological:      Mental Status: She is alert and oriented to person, place, and time  Psychiatric:         Mood and Affect: Mood normal            Additional Data:     Lab Results: I have personally reviewed pertinent reports        Results from last 7 days   Lab Units 21  0531   WBC Thousand/uL 6  05   HEMOGLOBIN g/dL 13 5   HEMATOCRIT % 41 2   PLATELETS Thousands/uL 184   NEUTROS PCT % 54   LYMPHS PCT % 37   MONOS PCT % 7   EOS PCT % 1     Results from last 7 days   Lab Units 09/02/21  1307 08/31/21  0531   SODIUM mmol/L 140 141   POTASSIUM mmol/L 3 8 3 8   CHLORIDE mmol/L 107 106   CO2 mmol/L 29 27   BUN mg/dL 16 14   CREATININE mg/dL 0 76 0 95   ANION GAP mmol/L 4 8   CALCIUM mg/dL 8 6 8 4   ALBUMIN g/dL  --  2 6*   TOTAL BILIRUBIN mg/dL  --  0 31   ALK PHOS U/L  --  56   ALT U/L  --  30   AST U/L  --  15   GLUCOSE RANDOM mg/dL 147* 318*         Results from last 7 days   Lab Units 09/02/21  1631 09/02/21  1115 09/02/21  0730 09/01/21  2045 09/01/21  1602 09/01/21  1213 09/01/21  0740 08/31/21  2057 08/31/21  1551 08/31/21  1114 08/31/21  0758 08/30/21  2043   POC GLUCOSE mg/dl 131 171* 167* 212* 256* 246* 221* 283* 264* 272* 277* 198*     Results from last 7 days   Lab Units 08/29/21  1427   HEMOGLOBIN A1C % 10 2*           Imaging: I have personally reviewed pertinent reports  No orders to display       ·     [employer] / [employer] Records Reviewed: Yes     ** Please Note: This note has been constructed using a voice recognition system   **

## 2021-09-01 NOTE — PROGRESS NOTES
General Cardiology   Progress Note -  Team One   Fior Herrera 59 y o  female MRN: [id number]    Unit/Bed#: S -01 Encounter: [id number]    Assessment:    1  New Ischemic Cardiomyopathy/Systolic CHF:  EF 82% with RWMA  Reports chronic dyspnea on exertion that has slightly worsened in the last 6 months  Also an episode of dizziness with diaphoresis nausea and vomiting as described below  Also reports slow worsening of chronic lower extremity edema for which she takes PRN Lasix 20 mg daily  NT proBNP 1033  Baseline weight around 180 lb  Started on IV Lasix 40 mg BID    · Output 24 hours +190 mL  Net output +1 3 L  · Weight 185 lb today  · Appears volume overloaded on exam with lower extremity edema and JVD  No evidence of low output  2  CAD:  Cardiac catheterization 90% mid LAD, 70% D2   Recommended for transfer to [place] for PCI with rotational atherectomy or shockwave to the mid LAD lesion  3  LBBB:  Noted on EKG this admission  No prior EKG available comparison  Troponin negative x3    4   Dizziness:  Presented with lightheadedness, nausea, vomiting and diaphoresis  CTA and MRI with findings of meningioma  Patient was evaluated by Neurology and Neurosurgery with no urgent surgical intervention required  Currently on PRN meclizine  5  Hypertensive urgency:  BP on admission was 196/90  Average /77 with initiation of carvedilol 3 125 mg BID  6  Hyperlipidemia: , , HDL 31, LDL 64 typically maintained on simvastatin 40 mg daily switched to atorvastatin 40 mg daily this admission    7  Uncontrolled Type 2 DM:  Hemoglobin A1c 10 point team   Management per primary team   8  Family history premature CAD:  Father had an MI at the age of 45        Plan/Recommendations:  · Transferred to [facility] today for LAD intervention with rotational atherectomy or shockwave  · Continue aspirin and statin  · Increase carvedilol dosing to 6 25  mg BID   · Hold lisinopril until after intervention · Give a dose of IV Lasix 40 mg now  · Monitor I&Os, renal function, electrolytes and standing weight  __________________________________________________________________________________    Subjective    Patient seen and examined  No acute events overnight  She denies any chest pain, shortness of breath palpitations  She feels her lower extremity edema has improved  Review of Systems   Constitutional: Negative  Negative for chills  Cardiovascular: Positive for leg swelling  Negative for chest pain, dyspnea on exertion, near-syncope, orthopnea, palpitations, paroxysmal nocturnal dyspnea and syncope  Respiratory: Negative  Negative for cough, shortness of breath and wheezing  Endocrine: Negative  Hematologic/Lymphatic: Negative  Skin: Negative  Musculoskeletal: Negative  Gastrointestinal: Negative  Negative for diarrhea, nausea and vomiting  Neurological: Negative for dizziness, light-headedness and weakness  Psychiatric/Behavioral: Negative  Negative for altered mental status  All other systems reviewed and are negative  Objective:   Vitals: Blood pressure 153/74, pulse 73, temperature 97 7 °F (36 5 °C), temperature source Oral, resp  rate 16, height 5' 6" (1 676 m), weight 84 2 kg (185 lb 10 oz), SpO2 97 %, not currently breastfeeding ,     Wt Readings from Last 3 Encounters:   21 84 2 kg (185 lb 10 oz)   21 88 6 kg (195 lb 5 2 oz)   21 81 6 kg (180 lb)        Lab Results   Component Value Date    CREATININE 0 94 2021    CREATININE 0 95 2021    CREATININE 0 97 2021         Body mass index is 29 96 kg/m²  ,     Systolic (96MFV), BZ , Min:146 , QMF:751     Diastolic (37MOJ), SOLITARIO:18, Min:74, Max:84          Intake/Output Summary (Last 24 hours) at 2021 1040  Last data filed at 2021 0434  Gross per 24 hour   Intake 390 ml   Output 200 ml   Net 190 ml     Weight (last 2 days)     Date/Time   Weight    21 1546   84 2 (185 63) 08/31/21 1222   84 2 (185 63)                Telemetry Review: No significant arrhythmias seen on telemetry review  Physical Exam  Vitals and nursing note reviewed  Constitutional:       General: She is not in acute distress  Appearance: She is well-developed  She is obese  Comments: On RA in NAD   HENT:      Head: Normocephalic and atraumatic  Neck:      Vascular: JVD present  Cardiovascular:      Rate and Rhythm: Normal rate and regular rhythm  Heart sounds: Normal heart sounds  No murmur heard  No friction rub  Pulmonary:      Effort: Pulmonary effort is normal  No respiratory distress  Breath sounds: Normal breath sounds  No wheezing or rales  Abdominal:      General: Bowel sounds are normal  There is no distension  Palpations: Abdomen is soft  Tenderness: There is no abdominal tenderness  Musculoskeletal:         General: No tenderness  Normal range of motion  Cervical back: Normal range of motion and neck supple  Right lower leg: Edema present  Left lower leg: Edema present  Skin:     General: Skin is warm and dry  Findings: No erythema  Neurological:      Mental Status: She is alert and oriented to person, place, and time  Psychiatric:         Mood and Affect: Mood normal          Behavior: Behavior normal          Thought Content:  Thought content normal          Judgment: Judgment normal          LABORATORY RESULTS  Results from last 7 days   Lab Units 08/30/21  0445 08/29/21  2327 08/29/21  1427   TROPONIN I ng/mL <0 02 <0 02 <0 02     CBC with diff:   Results from last 7 days   Lab Units 08/31/21  0531 08/30/21  0445 08/29/21  1427   WBC Thousand/uL 6 05 6 34 5 72   HEMOGLOBIN g/dL 13 5 12 8 14 3   HEMATOCRIT % 41 2 37 5 42 4   MCV fL 92 92 90   PLATELETS Thousands/uL 184 183 190   MCH pg 30 2 31 2 30 3   MCHC g/dL 32 8 34 1 33 7   RDW % 12 2 12 3 12 1   MPV fL 11 3 11 0 11 2   NRBC AUTO /100 WBCs 0  --  0       CMP:  Results from last 7 days   Lab Units 21  0432 21  0531 21  0445 21  1427   POTASSIUM mmol/L 3 4* 3 8 3 7 4 7   CHLORIDE mmol/L 105 106 105 102   CO2 mmol/L 29 27 25 25   BUN mg/dL 17 14 16 22   CREATININE mg/dL 0 94 0 95 0 97 1 05   CALCIUM mg/dL 8 7 8 4 8 6 9 3   AST U/L  --  15  --  18   ALT U/L  --  30  --  40   ALK PHOS U/L  --  56  --  71   EGFR ml/min/1 73sq m 65 64 62 57       BMP:  Results from last 7 days   Lab Units 21  0432 21  0531 21  0445 21  1427   POTASSIUM mmol/L 3 4* 3 8 3 7 4 7   CHLORIDE mmol/L 105 106 105 102   CO2 mmol/L 29 27 25 25   BUN mg/dL 17 14 16 22   CREATININE mg/dL 0 94 0 95 0 97 1 05   CALCIUM mg/dL 8 7 8 4 8 6 9 3       Lab Results   Component Value Date    NTBNP 1,033 (H) 2021             Results from last 7 days   Lab Units 21  0432   MAGNESIUM mg/dL 1 9          Results from last 7 days   Lab Units 21  1427   HEMOGLOBIN A1C % 10 2*                    Lipid Profile:   No results found for: CHOL  Lab Results   Component Value Date    HDL 31 (L) 2021    HDL 36 (L) 02/10/2021    HDL 30 (L) 2020     Lab Results   Component Value Date    LDLCALC 64 2021    LDLCALC 51 02/10/2021    LDLCALC 43 2020     Lab Results   Component Value Date    TRIG 119 2021    TRIG 113 3 02/10/2021    TRIG 95 5 2020       Cardiac testing:   Results for orders placed during the hospital encounter of 21    Echo complete with contrast if indicated    Narrative  [facility] [address], [address] [facility]  [phone]    Transthoracic Echocardiogram  2D, M-mode, Doppler, and Color Doppler    Study date:  30-Aug-2021    Patient: Ilda Shea  MR number: [id number]  Account number: [de-identified]  : 1960  Age: 59 years  Gender: Female  Status: Outpatient  Location: Emergency room  Height: 66 in  Weight: 195 lb  BP: 166/ 77 mmHg    Indications: CVA      Diagnoses: I63 9 - Cerebral infarction, unspecified    Sonographer:  JESSIE Lindsey  Primary Physician:  Hayden Rea MD  Referring Physician:  Arabella Franklin  Group:  [id number]  Interpreting Physician:  Home Elaine MD    SUMMARY    LEFT VENTRICLE:  Systolic function was moderately reduced  Ejection fraction was estimated to be 37 %  There was hypokinesis of the mid anterior, basal-mid anteroseptal, basal-mid inferoseptal, entire inferior, and apical septal wall(s)  Doppler parameters were consistent with abnormal left ventricular relaxation (grade 1 diastolic dysfunction)  VENTRICULAR SEPTUM:  There was mild dyssynergic motion  These changes are consistent with LBBB  LEFT ATRIUM:  The atrium was mildly dilated  MITRAL VALVE:  There was mild to moderate regurgitation  AORTIC VALVE:  There was trace regurgitation  TRICUSPID VALVE:  There was trace regurgitation  PERICARDIUM:  A very small pericardial effusion was identified anterior to the heart  The fluid had no internal echoes  HISTORY: PRIOR HISTORY: Hyperlipidemia, LBBB, Abnormal Stress Echo, Hypertension, Lower Leg Edema, Diabetes Mellitus II  PROCEDURE: The procedure was performed in the emergency room  This was a routine study  The transthoracic approach was used  The study included complete 2D imaging, M-mode, complete spectral Doppler, and color Doppler  The heart rate was  70 bpm, at the start of the study  Images were obtained from the parasternal, apical, subcostal, and suprasternal notch acoustic windows  Image quality was adequate  LEFT VENTRICLE: Size was normal  Systolic function was moderately reduced  Ejection fraction was estimated to be 37 %  There was hypokinesis of the mid anterior, basal-mid anteroseptal, basal-mid inferoseptal, entire inferior, and apical  septal wall(s)   Wall thickness was normal  DOPPLER: Doppler parameters were consistent with abnormal left ventricular relaxation (grade 1 diastolic dysfunction)  VENTRICULAR SEPTUM: There was mild dyssynergic motion  These changes are consistent with LBBB  RIGHT VENTRICLE: The size was normal  Systolic function was normal  Wall thickness was normal     LEFT ATRIUM: The atrium was mildly dilated  RIGHT ATRIUM: Size was normal     MITRAL VALVE: Valve structure was normal  There was normal leaflet separation  DOPPLER: The transmitral velocity was within the normal range  There was no evidence for stenosis  There was mild to moderate regurgitation  AORTIC VALVE: The valve was trileaflet  Leaflets exhibited normal thickness and normal cuspal separation  DOPPLER: Transaortic velocity was within the normal range  There was no evidence for stenosis  There was trace regurgitation  TRICUSPID VALVE: The valve structure was normal  There was normal leaflet separation  DOPPLER: The transtricuspid velocity was within the normal range  There was no evidence for stenosis  There was trace regurgitation  PULMONIC VALVE: Leaflets exhibited normal thickness, no calcification, and normal cuspal separation  DOPPLER: The transpulmonic velocity was within the normal range  There was no significant regurgitation  PERICARDIUM: A very small pericardial effusion was identified anterior to the heart  The fluid had no internal echoes  The pericardium was normal in appearance  AORTA: The root exhibited normal size  SYSTEMIC VEINS: IVC: The inferior vena cava was normal in size      SYSTEM MEASUREMENT TABLES    2D  %FS: 17 9 %  Ao Diam: 2 86 cm  EDV(Teich): 106 05 ml  EF(Teich): 37 18 %  ESV(Teich): 66 62 ml  HR_2Ch_Q: 69 77 BPM  HR_4Ch_Q: 69 1 BPM  IVSd: 0 91 cm  LA Area: 18 23 cm2  LA Diam: 3 96 cm  LVCO_2Ch_Q: 3 91 L/min  LVCO_4Ch_Q: 3 24 L/min  LVCO_BiP_Q: 3 58 L/min  LVEDV MOD A4C: 122 92 ml  LVEF MOD A4C: 38 87 %  LVEF_2Ch_Q: 36 48 %  LVEF_4Ch_Q: 37 23 %  LVEF_BiP_Q: 37 25 %  LVESV MOD A4C: 75 14 ml  LVIDd: 4 77 cm  LVIDs: 3 92 cm  LVLd A4C: 8 14 cm  LVLd_2Ch_Q: 8 14 cm  LVLd_4Ch_Q: 7 56 cm  LVLs A4C: 6 97 cm  LVLs_2Ch_Q: 7 04 cm  LVLs_4Ch_Q: 6 71 cm  LVPWd: 0 91 cm  LVSV_2Ch_Q: 56 ml  LVSV_4Ch_Q: 46 95 ml  LVSV_BiP_Q: 52 19 ml  LVVED_2Ch_Q: 153 53 ml  LVVED_4Ch_Q: 126 09 ml  LVVED_BiP_Q: 140 11 ml  LVVES_2Ch_Q: 97 52 ml  LVVES_4Ch_Q: 79 14 ml  LVVES_BiP_Q: 87 91 ml  RA Area: 14 cm2  RVIDd: 3 29 cm  SV MOD A4C: 47 78 ml  SV(Teich): 39 44 ml    CF  MR Als  Marko: 0 42 m/s  MR Flow: 40 81 ml/s  MR Rad: 0 39 cm    CW  AV MaxP 23 mmHg  AV Vmax: 1 34 m/s  MR VTI: 234 05 cm  MR Vmax: 5 73 m/s  MR Vmean: 4 59 m/s  MR maxP 57 mmHg  MR meanP 94 mmHg  TR MaxP 21 mmHg  TR Vmax: 1 82 m/s    MM  TAPSE: 2 59 cm    PW  E' Sept: 0 05 m/s  E/E' Sept: 13 24  LVOT Vmax: 0 85 m/s  LVOT maxP 89 mmHg  MV A Marko: 1 13 m/s  MV Dec [place]: 3 87 m/s2  MV DecT: 168 95 ms  MV E Marko: 0 65 m/s  MV E/A Ratio: 0 58  MV PHT: 49 ms  MVA By PHT: 4 49 cm2    [facility] Accredited [facility]    Prepared and electronically signed by    Nehal Bianchi MD  Signed 31-Aug-2021 08:45:43    No results found for this or any previous visit  No results found for this or any previous visit  No valid procedures specified  No results found for this or any previous visit          Meds/Allergies   all current active meds have been reviewed, current meds:   Current Facility-Administered Medications   Medication Dose Route Frequency    aspirin chewable tablet 81 mg  81 mg Oral Daily    atorvastatin (LIPITOR) tablet 40 mg  40 mg Oral Daily With Dinner    carvedilol (COREG) tablet 3 125 mg  3 125 mg Oral BID With Meals    enoxaparin (LOVENOX) subcutaneous injection 40 mg  40 mg Subcutaneous Daily    insulin glargine (LANTUS) subcutaneous injection 40 Units 0 4 mL  40 Units Subcutaneous HS    insulin lispro (HumaLOG) 100 units/mL subcutaneous injection 1-5 Units  1-5 Units Subcutaneous HS    insulin lispro (HumaLOG) 100 units/mL subcutaneous injection 1-6 Units  1-6 Units Subcutaneous TID AC    insulin lispro (HumaLOG) 100 units/mL subcutaneous injection 3 Units  3 Units Subcutaneous TID With Meals    lisinopril (ZESTRIL) tablet 5 mg  5 mg Oral Daily    meclizine (ANTIVERT) tablet 12 5 mg  12 5 mg Oral Q8H PRN    pantoprazole (PROTONIX) EC tablet 40 mg  40 mg Oral Early Morning    and PTA meds:   Prior to Admission Medications   Prescriptions Last Dose Informant Patient Reported? Taking? Januvia 100 MG tablet   No No   Sig: TAKE 1 TABLET BY MOUTH EVERY DAY   NovoFine 32G X 6 MM MISC   No No   Sig: USE AS DIRECTED ONCE A DAY WITH INSULIN   TURMERIC PO   Yes No   Sig: Take 1 capsule by mouth daily   Tresiba FlexTouch 100 units/mL injection pen   No No   Sig: Inject 35 Units under the skin daily   aspirin 81 MG tablet  Self Yes No   Sig: Take 1 tablet by mouth every other day   esomeprazole (NexIUM) 20 mg capsule  Self Yes No   Sig: Take 20 mg by mouth every morning before breakfast   furosemide (LASIX) 20 mg tablet  Self No No   Sig: TAKE 1 TABLET BY MOUTH DAILY AS NEEDED   lisinopril (ZESTRIL) 5 mg tablet   No No   Sig: Take 1 tablet (5 mg total) by mouth daily   metFORMIN (GLUCOPHAGE) 1000 MG tablet   No No   Sig: Take 1 tablet (1,000 mg total) by mouth 2 (two) times a day with meals   simvastatin (ZOCOR) 40 mg tablet   No No   Sig: TAKE 1 TABLET BY MOUTH EVERY DAY      Facility-Administered Medications: None     Medications Prior to Admission   Medication    aspirin 81 MG tablet    esomeprazole (NexIUM) 20 mg capsule    furosemide (LASIX) 20 mg tablet    Januvia 100 MG tablet    lisinopril (ZESTRIL) 5 mg tablet    metFORMIN (GLUCOPHAGE) 1000 MG tablet    NovoFine 32G X 6 MM MISC    simvastatin (ZOCOR) 40 mg tablet    Ofelia Haver FlexTouch 100 units/mL injection pen    TURMERIC PO            Counseling / Coordination of Care  Total floor / unit time spent today 20 minutes    Greater than 50% of total time was spent with the patient and / or family counseling and / or coordination of care  ** Please Note: Dragon 360 Dictation voice to text software may have been used in the creation of this document   **

## 2021-09-01 NOTE — ASSESSMENT & PLAN NOTE
Most recent blood pressure 161/77  · As per cardiology:  ·  increase carvedilol dosing to 6 25  mg BID   · restart lisinopril 5 mg daily tomorrow for GDMT  · Continue IV Lasix 80 mg BID starting this afternoon  · She will need a maintenance diuretic at discharge

## 2021-09-02 ENCOUNTER — HOSPITAL ENCOUNTER (INPATIENT)
Facility: HOSPITAL | Age: 61
LOS: 1 days | Discharge: HOME | DRG: 247 | End: 2021-09-03
Attending: FAMILY MEDICINE | Admitting: HOSPITALIST
Payer: COMMERCIAL

## 2021-09-02 ENCOUNTER — APPOINTMENT (INPATIENT)
Dept: NON INVASIVE DIAGNOSTICS | Facility: HOSPITAL | Age: 61
DRG: 247 | End: 2021-09-02
Attending: INTERNAL MEDICINE
Payer: COMMERCIAL

## 2021-09-02 VITALS
HEIGHT: 66 IN | OXYGEN SATURATION: 98 % | BODY MASS INDEX: 29.83 KG/M2 | WEIGHT: 185.63 LBS | RESPIRATION RATE: 18 BRPM | TEMPERATURE: 98.1 F | SYSTOLIC BLOOD PRESSURE: 146 MMHG | DIASTOLIC BLOOD PRESSURE: 67 MMHG | HEART RATE: 69 BPM

## 2021-09-02 DIAGNOSIS — I25.5 ISCHEMIC CARDIOMYOPATHY: Primary | ICD-10-CM

## 2021-09-02 DIAGNOSIS — E11.9 TYPE 2 DIABETES MELLITUS WITHOUT COMPLICATION, WITH LONG-TERM CURRENT USE OF INSULIN (HCC): ICD-10-CM

## 2021-09-02 DIAGNOSIS — Z79.4 TYPE 2 DIABETES MELLITUS WITHOUT COMPLICATION, WITH LONG-TERM CURRENT USE OF INSULIN (HCC): ICD-10-CM

## 2021-09-02 DIAGNOSIS — I25.110 CORONARY ARTERY DISEASE INVOLVING NATIVE HEART WITH UNSTABLE ANGINA PECTORIS, UNSPECIFIED VESSEL OR LESION TYPE (HCC): ICD-10-CM

## 2021-09-02 LAB
ANION GAP SERPL CALCULATED.3IONS-SCNC: 4 MMOL/L (ref 4–13)
ANION GAP SERPL CALCULATED.3IONS-SCNC: 9 MMOL/L (ref 4–13)
ATRIAL RATE: 67 BPM
BUN SERPL-MCNC: 16 MG/DL (ref 5–25)
BUN SERPL-MCNC: 16 MG/DL (ref 5–25)
CALCIUM SERPL-MCNC: 8.6 MG/DL (ref 8.3–10.1)
CALCIUM SERPL-MCNC: 8.8 MG/DL (ref 8.3–10.1)
CHLORIDE SERPL-SCNC: 105 MMOL/L (ref 100–108)
CHLORIDE SERPL-SCNC: 107 MMOL/L (ref 100–108)
CO2 SERPL-SCNC: 28 MMOL/L (ref 21–32)
CO2 SERPL-SCNC: 29 MMOL/L (ref 21–32)
CREAT SERPL-MCNC: 0.76 MG/DL (ref 0.6–1.3)
CREAT SERPL-MCNC: 0.97 MG/DL (ref 0.6–1.3)
GFR SERPL CREATININE-BSD FRML MDRD: 62 ML/MIN/1.73SQ M
GFR SERPL CREATININE-BSD FRML MDRD: 84 ML/MIN/1.73SQ M
GLUCOSE SERPL-MCNC: 131 MG/DL (ref 65–140)
GLUCOSE SERPL-MCNC: 147 MG/DL (ref 65–140)
GLUCOSE SERPL-MCNC: 167 MG/DL (ref 65–140)
GLUCOSE SERPL-MCNC: 171 MG/DL (ref 65–140)
GLUCOSE SERPL-MCNC: 176 MG/DL (ref 65–140)
GLUCOSE SERPL-MCNC: 191 MG/DL (ref 65–140)
KCT BLD-ACNC: 243 SEC (ref 89–137)
KCT BLD-ACNC: 280 SEC (ref 89–137)
P AXIS: 46 DEGREES
POTASSIUM SERPL-SCNC: 3.8 MMOL/L (ref 3.5–5.3)
POTASSIUM SERPL-SCNC: 3.9 MMOL/L (ref 3.5–5.3)
PR INTERVAL: 170 MS
QRS AXIS: -45 DEGREES
QRSD INTERVAL: 142 MS
QT INTERVAL: 494 MS
QTC INTERVAL: 521 MS
SODIUM SERPL-SCNC: 140 MMOL/L (ref 136–145)
SODIUM SERPL-SCNC: 142 MMOL/L (ref 136–145)
SPECIMEN SOURCE: ABNORMAL
SPECIMEN SOURCE: ABNORMAL
T WAVE AXIS: 74 DEGREES
VENTRICULAR RATE: 67 BPM

## 2021-09-02 PROCEDURE — C1769 GUIDE WIRE: HCPCS | Performed by: INTERNAL MEDICINE

## 2021-09-02 PROCEDURE — 93454 CORONARY ARTERY ANGIO S&I: CPT | Performed by: INTERNAL MEDICINE

## 2021-09-02 PROCEDURE — C9600 PERC DRUG-EL COR STENT SING: HCPCS | Performed by: INTERNAL MEDICINE

## 2021-09-02 PROCEDURE — C1725 CATH, TRANSLUMIN NON-LASER: HCPCS | Performed by: INTERNAL MEDICINE

## 2021-09-02 PROCEDURE — C1874 STENT, COATED/COV W/DEL SYS: HCPCS

## 2021-09-02 PROCEDURE — 85347 COAGULATION TIME ACTIVATED: CPT

## 2021-09-02 PROCEDURE — 80048 BASIC METABOLIC PNL TOTAL CA: CPT | Performed by: HOSPITALIST

## 2021-09-02 PROCEDURE — 82948 REAGENT STRIP/BLOOD GLUCOSE: CPT

## 2021-09-02 PROCEDURE — C1887 CATHETER, GUIDING: HCPCS | Performed by: INTERNAL MEDICINE

## 2021-09-02 PROCEDURE — 99153 MOD SED SAME PHYS/QHP EA: CPT | Performed by: INTERNAL MEDICINE

## 2021-09-02 PROCEDURE — C1894 INTRO/SHEATH, NON-LASER: HCPCS | Performed by: INTERNAL MEDICINE

## 2021-09-02 PROCEDURE — C1753 CATH, INTRAVAS ULTRASOUND: HCPCS | Performed by: INTERNAL MEDICINE

## 2021-09-02 PROCEDURE — 027035Z DILATION OF CORONARY ARTERY, ONE ARTERY WITH TWO DRUG-ELUTING INTRALUMINAL DEVICES, PERCUTANEOUS APPROACH: ICD-10-PCS | Performed by: INTERNAL MEDICINE

## 2021-09-02 PROCEDURE — 99239 HOSP IP/OBS DSCHRG MGMT >30: CPT | Performed by: INTERNAL MEDICINE

## 2021-09-02 PROCEDURE — 80048 BASIC METABOLIC PNL TOTAL CA: CPT | Performed by: INTERNAL MEDICINE

## 2021-09-02 PROCEDURE — 99152 MOD SED SAME PHYS/QHP 5/>YRS: CPT | Performed by: INTERNAL MEDICINE

## 2021-09-02 PROCEDURE — 93010 ELECTROCARDIOGRAM REPORT: CPT | Performed by: INTERNAL MEDICINE

## 2021-09-02 PROCEDURE — B2151ZZ FLUOROSCOPY OF LEFT HEART USING LOW OSMOLAR CONTRAST: ICD-10-PCS | Performed by: INTERNAL MEDICINE

## 2021-09-02 PROCEDURE — B2111ZZ FLUOROSCOPY OF MULTIPLE CORONARY ARTERIES USING LOW OSMOLAR CONTRAST: ICD-10-PCS | Performed by: INTERNAL MEDICINE

## 2021-09-02 PROCEDURE — 92978 ENDOLUMINL IVUS OCT C 1ST: CPT | Performed by: INTERNAL MEDICINE

## 2021-09-02 PROCEDURE — NS001 PR NO SIGNATURE OR ATTESTATION: Performed by: INTERNAL MEDICINE

## 2021-09-02 RX ORDER — HEPARIN SODIUM 1000 [USP'U]/ML
INJECTION, SOLUTION INTRAVENOUS; SUBCUTANEOUS CODE/TRAUMA/SEDATION MEDICATION
Status: COMPLETED | OUTPATIENT
Start: 2021-09-02 | End: 2021-09-02

## 2021-09-02 RX ORDER — MIDAZOLAM HYDROCHLORIDE 2 MG/2ML
INJECTION, SOLUTION INTRAMUSCULAR; INTRAVENOUS CODE/TRAUMA/SEDATION MEDICATION
Status: COMPLETED | OUTPATIENT
Start: 2021-09-02 | End: 2021-09-02

## 2021-09-02 RX ORDER — CLOPIDOGREL BISULFATE 75 MG/1
75 TABLET ORAL DAILY
Status: DISCONTINUED | OUTPATIENT
Start: 2021-09-04 | End: 2021-09-03 | Stop reason: HOSPADM

## 2021-09-02 RX ORDER — ATORVASTATIN CALCIUM 80 MG/1
80 TABLET, FILM COATED ORAL
Status: DISCONTINUED | OUTPATIENT
Start: 2021-09-02 | End: 2021-09-03 | Stop reason: HOSPADM

## 2021-09-02 RX ORDER — CARVEDILOL 6.25 MG/1
6.25 TABLET ORAL 2 TIMES DAILY WITH MEALS
Status: DISCONTINUED | OUTPATIENT
Start: 2021-09-02 | End: 2021-09-03 | Stop reason: HOSPADM

## 2021-09-02 RX ORDER — FENTANYL CITRATE 50 UG/ML
INJECTION, SOLUTION INTRAMUSCULAR; INTRAVENOUS CODE/TRAUMA/SEDATION MEDICATION
Status: COMPLETED | OUTPATIENT
Start: 2021-09-02 | End: 2021-09-02

## 2021-09-02 RX ORDER — PANTOPRAZOLE SODIUM 40 MG/1
40 TABLET, DELAYED RELEASE ORAL
Status: DISCONTINUED | OUTPATIENT
Start: 2021-09-03 | End: 2021-09-03 | Stop reason: HOSPADM

## 2021-09-02 RX ORDER — FUROSEMIDE 10 MG/ML
40 INJECTION INTRAMUSCULAR; INTRAVENOUS ONCE
Status: DISCONTINUED | OUTPATIENT
Start: 2021-09-02 | End: 2021-09-02

## 2021-09-02 RX ORDER — VERAPAMIL HCL 2.5 MG/ML
AMPUL (ML) INTRAVENOUS CODE/TRAUMA/SEDATION MEDICATION
Status: COMPLETED | OUTPATIENT
Start: 2021-09-02 | End: 2021-09-02

## 2021-09-02 RX ORDER — ATORVASTATIN CALCIUM 40 MG/1
40 TABLET, FILM COATED ORAL
Status: DISCONTINUED | OUTPATIENT
Start: 2021-09-02 | End: 2021-09-02

## 2021-09-02 RX ORDER — CLOPIDOGREL BISULFATE 75 MG/1
300 TABLET ORAL ONCE
Status: COMPLETED | OUTPATIENT
Start: 2021-09-03 | End: 2021-09-03

## 2021-09-02 RX ORDER — INSULIN GLARGINE 100 [IU]/ML
40 INJECTION, SOLUTION SUBCUTANEOUS
Status: DISCONTINUED | OUTPATIENT
Start: 2021-09-02 | End: 2021-09-03 | Stop reason: HOSPADM

## 2021-09-02 RX ORDER — ASPIRIN 81 MG/1
81 TABLET, CHEWABLE ORAL DAILY
Status: DISCONTINUED | OUTPATIENT
Start: 2021-09-03 | End: 2021-09-03 | Stop reason: HOSPADM

## 2021-09-02 RX ORDER — NITROGLYCERIN 20 MG/100ML
INJECTION INTRAVENOUS CODE/TRAUMA/SEDATION MEDICATION
Status: COMPLETED | OUTPATIENT
Start: 2021-09-02 | End: 2021-09-02

## 2021-09-02 RX ORDER — SODIUM CHLORIDE 9 MG/ML
INJECTION, SOLUTION INTRAVENOUS
Status: DISCONTINUED | OUTPATIENT
Start: 2021-09-02 | End: 2021-09-02

## 2021-09-02 RX ORDER — LIDOCAINE HYDROCHLORIDE 10 MG/ML
INJECTION, SOLUTION EPIDURAL; INFILTRATION; INTRACAUDAL; PERINEURAL CODE/TRAUMA/SEDATION MEDICATION
Status: COMPLETED | OUTPATIENT
Start: 2021-09-02 | End: 2021-09-02

## 2021-09-02 RX ADMIN — INSULIN LISPRO 3 UNITS: 100 INJECTION, SOLUTION INTRAVENOUS; SUBCUTANEOUS at 20:00

## 2021-09-02 RX ADMIN — VERAPAMIL HYDROCHLORIDE 1.25 MG: 2.5 INJECTION, SOLUTION INTRAVENOUS at 14:39

## 2021-09-02 RX ADMIN — CARVEDILOL 6.25 MG: 6.25 TABLET, FILM COATED ORAL at 19:59

## 2021-09-02 RX ADMIN — HEPARIN SODIUM 2500 UNITS: 1000 INJECTION INTRAVENOUS; SUBCUTANEOUS at 15:15

## 2021-09-02 RX ADMIN — ASPIRIN 81 MG: 81 TABLET, CHEWABLE ORAL at 08:09

## 2021-09-02 RX ADMIN — ATORVASTATIN CALCIUM 80 MG: 80 TABLET, FILM COATED ORAL at 19:59

## 2021-09-02 RX ADMIN — INSULIN LISPRO 3 UNITS: 100 INJECTION, SOLUTION INTRAVENOUS; SUBCUTANEOUS at 08:09

## 2021-09-02 RX ADMIN — HEPARIN SODIUM 7000 UNITS: 1000 INJECTION INTRAVENOUS; SUBCUTANEOUS at 14:40

## 2021-09-02 RX ADMIN — IOHEXOL 130 ML: 350 INJECTION, SOLUTION INTRAVENOUS at 15:51

## 2021-09-02 RX ADMIN — INSULIN GLARGINE 40 UNITS: 100 INJECTION, SOLUTION SUBCUTANEOUS at 22:05

## 2021-09-02 RX ADMIN — INSULIN LISPRO 1 UNITS: 100 INJECTION, SOLUTION INTRAVENOUS; SUBCUTANEOUS at 22:05

## 2021-09-02 RX ADMIN — PANTOPRAZOLE SODIUM 40 MG: 40 TABLET, DELAYED RELEASE ORAL at 05:28

## 2021-09-02 RX ADMIN — HEPARIN SODIUM 2500 UNITS: 1000 INJECTION INTRAVENOUS; SUBCUTANEOUS at 15:45

## 2021-09-02 RX ADMIN — MIDAZOLAM 2 MG: 1 INJECTION INTRAMUSCULAR; INTRAVENOUS at 14:35

## 2021-09-02 RX ADMIN — CARVEDILOL 6.25 MG: 6.25 TABLET, FILM COATED ORAL at 08:09

## 2021-09-02 RX ADMIN — FENTANYL CITRATE 50 MCG: 50 INJECTION INTRAMUSCULAR; INTRAVENOUS at 14:35

## 2021-09-02 RX ADMIN — ENOXAPARIN SODIUM 40 MG: 40 INJECTION SUBCUTANEOUS at 08:09

## 2021-09-02 RX ADMIN — FENTANYL CITRATE 25 MCG: 50 INJECTION INTRAMUSCULAR; INTRAVENOUS at 15:42

## 2021-09-02 RX ADMIN — SODIUM CHLORIDE 125 ML/HR: 0.9 INJECTION, SOLUTION INTRAVENOUS at 14:23

## 2021-09-02 RX ADMIN — LIDOCAINE HYDROCHLORIDE 1 ML: 10 INJECTION, SOLUTION EPIDURAL; INFILTRATION; INTRACAUDAL; PERINEURAL at 14:39

## 2021-09-02 RX ADMIN — TICAGRELOR 180 MG: 90 TABLET ORAL at 14:24

## 2021-09-02 RX ADMIN — Medication 200 MCG: at 14:40

## 2021-09-02 RX ADMIN — MIDAZOLAM 1 MG: 1 INJECTION INTRAMUSCULAR; INTRAVENOUS at 15:42

## 2021-09-02 NOTE — PROGRESS NOTES
[facility]  Progress Note - Aliya Painter 1960, 59 y o  female MRN: [id number]  Unit/Bed#: S -01 Encounter: [id number]  Primary Care Provider: Jordon Winter MD   Date and time admitted to hospital: 8/29/2021  5:48 PM    * CAD (coronary artery disease)  Assessment & Plan  Cardiac catheterization 90% mid LAD, 70% D2  Plan:  · Transfer to [place] for PCI with rotational atherectomy or shockwave to the mid LAD lesion  New Ischemic Cardiomyopathy/Systolic CHF  Assessment & Plan  EF 37% with RWMA   Reports chronic dyspnea on exertion that has slightly worsened in the last 6 months   Also an episode of dizziness with diaphoresis nausea and vomiting as described below   Also reports slow worsening of chronic lower extremity edema for which she takes PRN Lasix 20 mg daily    NT proBNP 1033  Baseline weight around 180 lb  Started on IV Lasix 40 mg BID    · Output 24 hours +190 mL  Net output +1 3 L  · Weight 185 lb today  · Appears volume overloaded on exam with lower extremity edema and JVD   No evidence of low output         Right paraclinoid meningioma  Assessment & Plan  MRI of brain with and without contrast on 08/30/2021 demonstrates a 1 2 x 1 5 x 1 7 cm diffusely enhancing extra-axial mass right paraclinoid mass     Neurosurgery consulted with recommendations of no emergency surgery is anticipated at this juncture  Plan:  · Recommend formal visual field test prior to the 2-3 weeks follow-up appointment at outpatient neurosurgery clinic  Dizziness  Assessment & Plan  Presents to ED with lightheadedness, nausea, vomiting that occurred while she was outside sitting on her patio  She reports a similar episode of lightheadedness 3 weeks ago when turning over in bed  On arrival to emergency department, symptoms have resolved  CTA head neck with likely meningioma  No evidence of infarct or intracranial hemorrhage    No stenosis, dissection, occlusion  · Telemetry monitoring  · PT OT, speech consult  · Check orthostatics  P r n  Meclizine  · Neurology consulted  · MRI (8/30) No acute intracranial pathology  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, as mentioned on recent CT  · As per neurosurgery, recommend visual field testing out patient with ophthalmology before neurosurgery appointment in 2-3 weeks  · Echocardiogram (8/30) Ejection fraction 37%  · Monitor I&Os, renal function, electrolytes and standing weight       Left bundle branch block  Assessment & Plan  Patient is without complaints of chest pain  There are no prior EKGs for evaluation  Previous stress tests strips in 2017 and 2019 do not show LBBB  She does have chronic exertional shortness of breath which she notices mostly while going up steps  She notes this is not new or worsening  Normal stress test in 2017 and 2019  Family hx: father with MI at age 45, mother with MI age [de-identified]   · Troponin negative x3  · Telemetry monitoring  Echocardiogram-Ejection fraction 37%      Type 2 diabetes mellitus without complication, with long-term current use of insulin ([facility])  Assessment & Plan  Lab Results   Component Value Date    HGBA1C 10 2 (H) 08/29/2021     · Lantus increased to 40 units  · Sliding scale insulin  · Hold p o  Metformin and Januvia, may resume on discharge  · Will consult nutrition    Hypertensive urgency  Assessment & Plan  BP on admission was 196/90  Average /77 with initiation of carvedilol 3 125 mg BID      Mixed hyperlipidemia  Assessment & Plan  · On simvastatin as outpatient  Change to atorvastatin 40 mg given stroke workup  · As per cardiology continue stain  ·  lipid panel (8/30)- HDL low 31, cholesterol, triglycerides, and LDL all within normal range            VTE Pharmacologic Prophylaxis: VTE Score: 7 High Risk (Score >/= 5) - Pharmacological DVT Prophylaxis Ordered: enoxaparin (Lovenox)  Sequential Compression Devices Ordered      Patient Centered Rounds: I performed bedside rounds with nursing staff today  Discussions with Specialists or Other Care Team Provider:  Cardiology    Education and Discussions with Family / Patient: Updated  (friend) at bedside  Current Length of Stay: 2 day(s)  Current Patient Status: Inpatient   Discharge Plan: Transferred to [facility]    Code Status: Level 1 - Full Code    Subjective:   Patient doing well with no complaints  Denies shortness of breath, chest pain, lightheadedness, dizziness  [facility]    Objective:     Vitals:   Temp (24hrs), Av 1 °F (36 7 °C), Min:98 °F (36 7 °C), Max:98 1 °F (36 7 °C)    Temp:  [98 °F (36 7 °C)-98 1 °F (36 7 °C)] 98 1 °F (36 7 °C)  HR:  [67-77] 69  Resp:  [16-18] 18  BP: (119-169)/(60-83) 146/67  SpO2:  [96 %-98 %] 98 %  Body mass index is 29 96 kg/m²  Input and Output Summary (last 24 hours): Intake/Output Summary (Last 24 hours) at 2021 1017  Last data filed at 2021 0500  Gross per 24 hour   Intake 2740 ml   Output 400 ml   Net 2340 ml       Physical Exam:   Physical Exam  Constitutional:       Appearance: Normal appearance  She is not diaphoretic  HENT:      Head: Normocephalic and atraumatic  Eyes:      Conjunctiva/sclera: Conjunctivae normal    Cardiovascular:      Rate and Rhythm: Normal rate and regular rhythm  Pulses: Normal pulses  Heart sounds: Normal heart sounds  No murmur heard  Pulmonary:      Effort: Pulmonary effort is normal       Breath sounds: Normal breath sounds  No wheezing or rales  Abdominal:      General: Abdomen is flat  Bowel sounds are normal  There is no distension  Palpations: Abdomen is soft  Tenderness: There is no abdominal tenderness  Musculoskeletal:         General: No deformity  Right lower leg: Swelling present  Edema present  Left lower leg: Swelling present  Edema present        Comments: Mild lower extremity edema  Skin:     General: Skin is warm and dry  Coloration: Skin is not jaundiced  Neurological:      General: No focal deficit present  Mental Status: She is alert and oriented to person, place, and time  Cranial Nerves: No cranial nerve deficit     Psychiatric:         Mood and Affect: Mood normal          Behavior: Behavior normal           Additional Data:     Labs:  Results from last 7 days   Lab Units 08/31/21  0531   WBC Thousand/uL 6 05   HEMOGLOBIN g/dL 13 5   HEMATOCRIT % 41 2   PLATELETS Thousands/uL 184   NEUTROS PCT % 54   LYMPHS PCT % 37   MONOS PCT % 7   EOS PCT % 1     Results from last 7 days   Lab Units 09/02/21  0512 08/31/21  0531   SODIUM mmol/L 142 141   POTASSIUM mmol/L 3 9 3 8   CHLORIDE mmol/L 105 106   CO2 mmol/L 28 27   BUN mg/dL 16 14   CREATININE mg/dL 0 97 0 95   ANION GAP mmol/L 9 8   CALCIUM mg/dL 8 8 8 4   ALBUMIN g/dL  --  2 6*   TOTAL BILIRUBIN mg/dL  --  0 31   ALK PHOS U/L  --  56   ALT U/L  --  30   AST U/L  --  15   GLUCOSE RANDOM mg/dL 191* 318*         Results from last 7 days   Lab Units 09/02/21  0730 09/01/21  2045 09/01/21  1602 09/01/21  1213 09/01/21  0740 08/31/21  2057 08/31/21  1551 08/31/21  1114 08/31/21  0758 08/30/21  2043 08/30/21  1834 08/30/21  1623   POC GLUCOSE mg/dl 167* 212* 256* 246* 221* 283* 264* 272* 277* 198* 262* 286*     Results from last 7 days   Lab Units 08/29/21  1427   HEMOGLOBIN A1C % 10 2*           Lines/Drains:  Invasive Devices     Peripheral Intravenous Line            Peripheral IV 08/29/21 Left Antecubital 3 days                  Telemetry:  Telemetry Orders (From admission, onward)             48 Hour Telemetry Monitoring  Continuous x 48 hours     Expiring   Question:  Reason for 48 Hour Telemetry  Answer:  Acute Decompensated CHF (continuous diuretic infusion or total diuretic dose > 200 mg daily, associated electrolyte derangement, ionotropic drip, history of ventricular arrhythmia, or new EF <35%) Telemetry Reviewed: Left bundle branch block  Indication for Continued Telemetry Use: Awaiting PCI/EP Study/CABG           Imaging: No pertinent imaging reviewed  Recent Cultures (last 7 days):         Last 24 Hours Medication List:   Current Facility-Administered Medications   Medication Dose Route Frequency Provider Last Rate    aspirin  81 mg Oral Daily [place] Hotter, CRNP      atorvastatin  40 mg Oral Daily With [employer], CRNP      carvedilol  6 25 mg Oral BID With Meals Stephanie Choudhury PA-C      enoxaparin  40 mg Subcutaneous Daily Bradley Hotter, CRNP      insulin glargine  40 Units Subcutaneous HS Pankaj Estrella MD      insulin lispro  1-5 Units Subcutaneous HS [place] Hotter, CRNP      insulin lispro  1-6 Units Subcutaneous TID [facility] [place] Hotter, CRNP      insulin lispro  3 Units Subcutaneous TID With Meals Pankaj Estrella MD      meclizine  12 5 mg Oral Q8H PRN [place] Hotter, CRNP      pantoprazole  40 mg Oral Early Morning [place] Hotter, CRNP          Today, Patient Was Seen By: Paulo Batres DO    **Please Note: This note may have been constructed using a voice recognition system  **

## 2021-09-02 NOTE — DISCHARGE SUMMARY
[facility]  Discharge- Chilton Raring 1960, 59 y o  female MRN: [id number]  Unit/Bed#: Susannah Dotson 216-02 Encounter: [id number]  Primary Care Provider: Pedrito Baez MD   Date and time admitted to hospital: 9/2/2021 11:06 AM    * CAD (coronary artery disease)  Assessment & Plan  Cardiac catheterization 90% mid LAD, 70% D2  Transfer to [address] for PCI with rotational atherectomy or shockwave to the mid LAD lesion  New Ischemic Cardiomyopathy/Systolic CHF  Assessment & Plan  EF 37% with RWMA   Reports chronic dyspnea on exertion that has slightly worsened in the last 6 months   Also an episode of dizziness with diaphoresis nausea and vomiting as described below   Also reports slow worsening of chronic lower extremity edema for which she takes PRN Lasix 20 mg daily    NT proBNP 1033  Baseline weight around 180 lb  Started on IV Lasix 40 mg BID    · Output 24 hours +190 mL  Net output +1 3 L  · Weight 185 lb today  · Appears volume overloaded on exam with lower extremity edema and JVD   No evidence of low output         Right paraclinoid meningioma  Assessment & Plan  MRI of brain with and without contrast on 08/30/2021 demonstrates a 1 2 x 1 5 x 1 7 cm diffusely enhancing extra-axial mass right paraclinoid mass   Neurosurgery consulted at [facility]: with recommendations of no emergency surgery is anticipated at this juncture  Recommend formal visual field test prior to the 2-3 weeks follow-up appointment at outpatient neurosurgery clinic  Dizziness  Assessment & Plan  Presents to ED with lightheadedness, nausea, vomiting that occurred while she was outside sitting on her patio  She reports a similar episode of lightheadedness 3 weeks ago when turning over in bed  On arrival to emergency department, symptoms have resolved  CTA head neck with likely meningioma  No evidence of infarct or intracranial hemorrhage    No stenosis, dissection, occlusion  · Telemetry monitoring  · PT OT, speech consult  · Check orthostatics  P r n  Meclizine  · Neurology consulted  · MRI (8/30) No acute intracranial pathology  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, as mentioned on recent CT  · As per neurosurgery, recommend visual field testing out patient with ophthalmology before neurosurgery appointment in 2-3 weeks  · Echocardiogram (8/30) Ejection fraction 37%  · Monitor I&Os, renal function, electrolytes and standing weight       Left bundle branch block  Assessment & Plan  Patient is without complaints of chest pain  There are no prior EKGs for evaluation  Previous stress tests strips in 2017 and 2019 do not show LBBB  She does have chronic exertional shortness of breath which she notices mostly while going up steps  She notes this is not new or worsening  Normal stress test in 2017 and 2019  Family hx: father with MI at age 45, mother with MI age [de-identified]   · Troponin negative x3  · Telemetry monitoring  Echocardiogram-Ejection fraction 37%      Type 2 diabetes mellitus without complication, with long-term current use of insulin ([facility])  Assessment & Plan  Lab Results   Component Value Date    HGBA1C 10 2 (H) 08/29/2021     · Lantus increased to 40 units  · Sliding scale insulin  · Hold p o  Metformin and Januvia, may resume on discharge  · Will consult nutrition        Medical Problems     Resolved Problems  Date Reviewed: 9/1/2021    None              Discharging Resident: Aidan Ayoub DO  Discharging Attending: Leticia Hanley MD  PCP: Faith Grove MD  Admission Date:   Admission Orders (From admission, onward)     Ordered        09/02/21 1159  Inpatient Admission  Once                   Discharge/Transfer Date: 09/02/21    Disposition:   Transfer to:  [facility]  Reason for Transfer: PCI with rotational atherectomy or shockwave to the mid LAD lesion  Accepting Provider at [facility]: [place] Stay:  · Neurology, Neurosurgery, and Cardiology     Procedures Performed:   · Cardiac cath     Significant Findings / Test Results:   · New Ischemic Cardiomyopathy/Systolic CHF, CAD 55% LAD, 70% D2     Incidental Findings:   · Meningioma on CT/MRI      Test Results Pending at Discharge (will require follow up):   · none     Outpatient Tests Requested:  · Formal visual fields test      Complications:  none     Reason for Admission: dizziness     Hospital Course: Nader Forte is a 59 y o  female patient who originally presented to the hospital on 8/29/2021 due to sudden onset dizziness, lightheadedness, vertigo sensation, nausea/vomiting while she was sitting on her patio  In the ED, her CTA head neck showed a meningioma with no mass effect, no ischemia, no hemorrhage  EKG showed LBBB, with no prior for comparison, no cardiac complaints  Patient was admitted for concerns of TIA workup  Neurology was consulted with MRI showing no acute findings  Neurosurgery was consulted for the incidental meningioma finding with no plan for urgent surgery and follow-up outpatient  Cardiology was consulted with echo showing severe LV dysfunction with EF 37%  On 09/01 cardiac catheterization was performed in the setting of new onset cardiomyopathy, LBBB on EKG, and high risk factors of uncontrolled type 2 DM, HTN/HLD  Cardiac catheterization showed 90% occlusion of mid LAD and 70% occlusion in the 2nd diagonal artery  Patient was transferred to [facility] for interventional cardiology assessment for rotational atherectomy or shockwave therapy to the mid LAD        Please see above list of diagnoses and related plan for additional information       Condition at Discharge: stable     Discharge Day Visit / Exam:   Subjective:  Patient has no complaints this evening    Discussed with patient at bedside about her cardiac catheterization finding and plans to transfer her to [address] [place] [address] Ave: Blood Pressure: 146/67 (09/02/21 0725)  Pulse: 69 (09/02/21 0725)  Temperature: 98 1 °F (36 7 °C) (09/02/21 0725)  Temp Source: Oral (09/02/21 0725)  Respirations: 18 (09/02/21 0725)  Height: 5' 6" (167 6 cm) (08/31/21 1546)  Weight - Scale: 84 2 kg (185 lb 10 oz) (08/31/21 1546)  SpO2: 98 % (09/02/21 0725)  Exam:    Discussion with Family: Updated  (friend) at bedside  ** Please Note: This note may have been constructed using a voice recognition system  **

## 2021-09-02 NOTE — ASSESSMENT & PLAN NOTE
EF 37% with RWMA   Reports chronic dyspnea on exertion that has slightly worsened in the last 6 months   Also an episode of dizziness with diaphoresis nausea and vomiting as described below   Also reports slow worsening of chronic lower extremity edema for which she takes PRN Lasix 20 mg daily    NT proBNP 1033  Baseline weight around 180 lb  Started on IV Lasix 40 mg BID    · Output 24 hours +190 mL    Net output +1 3 L  · Weight 185 lb today  · Appears volume overloaded on exam with lower extremity edema and JVD   No evidence of low output

## 2021-09-02 NOTE — PROGRESS NOTES
Cardiology Progress Note - Aliya Painter 59 y o  female MRN: [id number]    Unit/Bed#: CW2 216-02 Encounter: [id number]      Assessment & Plan:  Principal Problem:    CAD (coronary artery disease)  Active Problems:    Type 2 diabetes mellitus without complication, with long-term current use of insulin (HCC)    Dizziness    Left bundle branch block    New Ischemic Cardiomyopathy/Systolic CHF    Right paraclinoid meningioma    # Ischemic cardiomyopathy - EF 37%  # CAD with calcified 90% mid LAD lesion, 70% ostial diag disease s/p overlapping HUNTER today  - continue with aspirin 81 mg  - atorvastatin increased to 80 mg once a day  - continue Coreg 6 25 mg twice a day  - Lisinopril held for cath  - post cath hydration  - Escalate GDMT tomorrow    # Hypertension  # Diabetes   - A1C 10 2  - patient on Metformin, Januvia and insulin at home  - Needs escalation of outpatient therapy    Subjective:   Patient seen and examined  No significant events overnight  Denies chest pain, pnd, orthopnea, abdominal pain, nausea vomiting, fever, chills, headache, dizziness or palpitations  Objective:     Vitals: Blood pressure 140/80, pulse 68, temperature 98 2 °F (36 8 °C), resp  rate 20, SpO2 95 %, not currently breastfeeding  , There is no height or weight on file to calculate BMI ,   Orthostatic Blood Pressures      Most Recent Value   Blood Pressure  140/80 filed at 09/02/2021 1116          No intake or output data in the 24 hours ending 09/02/21 1311        Physical Exam:    GEN: Aliya Painter appears well, alert and oriented x 3, pleasant and cooperative   HEENT: anicteric, mucous membranes moist  NECK: no jvd, carotid bruits   HEART: regular rhythm, normal S1 and S2, no murmurs, clicks, gallops or rubs   LUNGS: clear to auscultation bilaterally; no wheezes, rales, or rhonchi   ABDOMEN: normal bowel sounds, soft, no tenderness, no distention  EXTREMITIES: peripheral pulses normal; Radial band right wrist  NEURO: no focal findings SKIN: normal without suspicious lesions on exposed skin      Current Facility-Administered Medications:     [START ON 9/3/2021] aspirin chewable tablet 81 mg, 81 mg, Oral, Daily, Hyacinth Mojica MD    atorvastatin (LIPITOR) tablet 40 mg, 40 mg, Oral, Daily With Mario Palomino MD    carvedilol (COREG) tablet 6 25 mg, 6 25 mg, Oral, BID With Meals, Melo Davidson MD    [START ON 9/3/2021] enoxaparin (LOVENOX) subcutaneous injection 40 mg, 40 mg, Subcutaneous, Daily, Hyacinth Mojica MD    insulin glargine (LANTUS) subcutaneous injection 40 Units 0 4 mL, 40 Units, Subcutaneous, HS, Hyacinth Mojica MD    insulin lispro (HumaLOG) 100 units/mL subcutaneous injection 1-5 Units, 1-5 Units, Subcutaneous, HS, Hyacinth Mojica MD    insulin lispro (HumaLOG) 100 units/mL subcutaneous injection 1-6 Units, 1-6 Units, Subcutaneous, TID AC **AND** Fingerstick Glucose (POCT), , , TID AC, Hyacinth Mojica MD    insulin lispro (HumaLOG) 100 units/mL subcutaneous injection 3 Units, 3 Units, Subcutaneous, TID With Meals, Melo Davidson MD    [START ON 9/3/2021] pantoprazole (PROTONIX) EC tablet 40 mg, 40 mg, Oral, Early Morning, Melo Davidson MD    Labs & Results:    Lab Results   Component Value Date    TROPONINI <0 02 08/30/2021    TROPONINI <0 02 08/29/2021    TROPONINI <0 02 08/29/2021       Lab Results   Component Value Date    CALCIUM 8 8 09/02/2021    K 3 9 09/02/2021    CO2 28 09/02/2021     09/02/2021    BUN 16 09/02/2021    CREATININE 0 97 09/02/2021       Lab Results   Component Value Date    WBC 6 05 08/31/2021    HGB 13 5 08/31/2021    HCT 41 2 08/31/2021    MCV 92 08/31/2021     08/31/2021           No results found for: CHOL  Lab Results   Component Value Date    HDL 31 (L) 08/30/2021    HDL 36 (L) 02/10/2021     Lab Results   Component Value Date    LDLCALC 64 08/30/2021    LDLCALC 51 02/10/2021     Lab Results   Component Value Date    TRIG 119 08/30/2021 TRIG 113 3 02/10/2021       Lab Results   Component Value Date    ALT 30 08/31/2021    AST 15 08/31/2021         EKG personally reviewed by )Mason Chavez MD  No acute changes   TELE: No significant arrhythmias seen on telemetry review
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
## 2021-09-02 NOTE — PLAN OF CARE
Problem: PAIN - ADULT  Goal: Verbalizes/displays adequate comfort level or baseline comfort level  Description: Interventions:  - Encourage patient to monitor pain and request assistance  - Assess pain using appropriate pain scale  - Administer analgesics based on type and severity of pain and evaluate response  - Implement non-pharmacological measures as appropriate and evaluate response  - Consider cultural and social influences on pain and pain management  - Notify physician/advanced practitioner if interventions unsuccessful or patient reports new pain  Outcome: Progressing     Problem: INFECTION - ADULT  Goal: Absence or prevention of progression during hospitalization  Description: INTERVENTIONS:  - Assess and monitor for signs and symptoms of infection  - Monitor lab/diagnostic results  - Monitor all insertion sites, i e  indwelling lines, tubes, and drains  - Monitor endotracheal if appropriate and nasal secretions for changes in amount and color  - [place] appropriate cooling/warming therapies per order  - Administer medications as ordered  - Instruct and encourage patient and family to use good hand hygiene technique  - Identify and instruct in appropriate isolation precautions for identified infection/condition  Outcome: Progressing  Goal: Absence of fever/infection during neutropenic period  Description: INTERVENTIONS:  - Monitor WBC    Outcome: Progressing     Problem: SAFETY ADULT  Goal: Patient will remain free of falls  Description: INTERVENTIONS:  - Educate patient/family on patient safety including physical limitations  - Instruct patient to call for assistance with activity   - Consult OT/PT to assist with strengthening/mobility   - Keep Call bell within reach  - Keep bed low and locked with side rails adjusted as appropriate  - Keep care items and personal belongings within reach  - Initiate and maintain comfort rounds  - Make Fall Risk Sign visible to staff  - Offer Toileting every  Hours, in advance of need  - Initiate/Maintain alarm  - Obtain necessary fall risk management equipment:   - Apply yellow socks and bracelet for high fall risk patients  - Consider moving patient to room near nurses station  Outcome: Progressing  Goal: Maintain or return to baseline ADL function  Description: INTERVENTIONS:  -  Assess patient's ability to carry out ADLs; assess patient's baseline for ADL function and identify physical deficits which impact ability to perform ADLs (bathing, care of mouth/teeth, toileting, grooming, dressing, etc )  - Assess/evaluate cause of self-care deficits   - Assess range of motion  - Assess patient's mobility; develop plan if impaired  - Assess patient's need for assistive devices and provide as appropriate  - Encourage maximum independence but intervene and supervise when necessary  - Involve family in performance of ADLs  - Assess for home care needs following discharge   - Consider OT consult to assist with ADL evaluation and planning for discharge  - Provide patient education as appropriate  Outcome: Progressing  Goal: Maintains/Returns to pre admission functional level  Description: INTERVENTIONS:  - Perform BMAT or MOVE assessment daily    - Set and communicate daily mobility goal to care team and patient/family/caregiver  - Collaborate with rehabilitation services on mobility goals if consulted  - Perform Range of Motion  times a day  - Reposition patient every  hours    - Dangle patient  times a day  - Stand patient  times a day  - Ambulate patient  times a day  - Out of bed to chair  times a day   - Out of bed for meals times a day  - Out of bed for toileting  - Record patient progress and toleration of activity level   Outcome: Progressing     Problem: DISCHARGE PLANNING  Goal: Discharge to home or other facility with appropriate resources  Description: INTERVENTIONS:  - Identify barriers to discharge w/patient and caregiver  - Arrange for needed discharge resources and transportation as appropriate  - Identify discharge learning needs (meds, wound care, etc )  - Arrange for interpretive services to assist at discharge as needed  - Refer to Case Management Department for coordinating discharge planning if the patient needs post-hospital services based on physician/advanced practitioner order or complex needs related to functional status, cognitive ability, or social support system  Outcome: Progressing     Problem: Knowledge Deficit  Goal: Patient/family/caregiver demonstrates understanding of disease process, treatment plan, medications, and discharge instructions  Description: Complete learning assessment and assess knowledge base    Interventions:  - Provide teaching at level of understanding  - Provide teaching via preferred learning methods  Outcome: Progressing

## 2021-09-02 NOTE — ASSESSMENT & PLAN NOTE
Cardiac catheterization 90% mid LAD, 70% D2  Plan:  · Transfer to [place] for PCI with rotational atherectomy or shockwave to the mid LAD lesion

## 2021-09-02 NOTE — DISCHARGE SUMMARY
[facility]  Discharge- Dom Caruso 1960, 59 y o  female MRN: [id number]  Unit/Bed#: S -01 Encounter: [id number]  Primary Care Provider: Faith Grove MD   Date and time admitted to hospital: 8/29/2021  5:48 PM     * CAD (coronary artery disease)  Assessment & Plan  Cardiac catheterization 90% mid LAD, 70% D2    Transfer to [facility] for PCI with rotational atherectomy or shockwave to the mid LAD lesion          New Ischemic Cardiomyopathy/Systolic CHF  Assessment & Plan  EF 37% with RWMA   Reports chronic dyspnea on exertion that has slightly worsened in the last 6 months   Also an episode of dizziness with diaphoresis nausea and vomiting as described below   Also reports slow worsening of chronic lower extremity edema for which she takes PRN Lasix 20 mg daily    NT proBNP 1033  Baseline weight around 180 lb   Started on IV Lasix 40 mg BID    · Output 24 hours +190 mL   Net output +1 3 L  · Weight 185 lb today  · Appears volume overloaded on exam with lower extremity edema and JVD   No evidence of low output           Right paraclinoid meningioma  Assessment & Plan  MRI of brain with and without contrast on 08/30/2021 demonstrates a 1 2 x 1 5 x 1 7 cm diffusely enhancing extra-axial mass right paraclinoid mass   Neurosurgery consulted at [place]: with recommendations of no emergency surgery is anticipated at this juncture  Recommend formal visual field test prior to the 2-3 weeks follow-up appointment at outpatient neurosurgery clinic         Dizziness  Assessment & Plan  Presents to ED with lightheadedness, nausea, vomiting that occurred while she was outside sitting on her patio  She reports a similar episode of lightheadedness 3 weeks ago when turning over in bed  On arrival to emergency department, symptoms have resolved  CTA head neck with likely meningioma  No evidence of infarct or intracranial hemorrhage    No stenosis, dissection, occlusion      · Telemetry monitoring  · PT OT, speech consult  · Check orthostatics  P r n  Meclizine  · Neurology consulted  · MRI (8/30) No acute intracranial pathology  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, as mentioned on recent CT  · As per neurosurgery, recommend visual field testing out patient with ophthalmology before neurosurgery appointment in 2-3 weeks  · Echocardiogram (8/30) Ejection fraction 37%  · Monitor I&Os, renal function, electrolytes and standing weight        Left bundle branch block  Assessment & Plan  Patient is without complaints of chest pain  There are no prior EKGs for evaluation  Previous stress tests strips in 2017 and 2019 do not show LBBB  She does have chronic exertional shortness of breath which she notices mostly while going up steps  She notes this is not new or worsening  Normal stress test in 2017 and 2019  Family hx: father with MI at age 45, mother with MI age [de-identified]   · Troponin negative x3  · Telemetry monitoring  Echocardiogram-Ejection fraction 37%        Type 2 diabetes mellitus without complication, with long-term current use of insulin ([facility])  Assessment & Plan        Lab Results   Component Value Date     HGBA1C 10 2 (H) 08/29/2021      · Lantus increased to 40 units  · Sliding scale insulin  · Hold p o   Metformin and Januvia, may resume on discharge  · Will consult nutrition               Medical Problems            Resolved Problems  Date Reviewed: 9/1/2021           None                  Discharging Resident: El Maxwell DO  Discharging Attending: Eleazar Pennington MD  PCP: Pedrito Baez MD  Admission Date:   Admission Orders (From admission, onward)              Ordered          09/02/21 1159   Inpatient Admission  Once                       Discharge/Transfer Date: 09/02/21     Disposition:   [place] [facility] [place]  Reason for Transfer: PCI with rotational atherectomy or shockwave to the mid LAD lesion  Accepting Provider at Receiving [place]: TBD     Consultations During Hospital Stay:  · Neurology, Neurosurgery, and Cardiology     Procedures Performed:   · Cardiac cath     Significant Findings / Test Results:   · New Ischemic Cardiomyopathy/Systolic CHF, CAD 62% LAD, 70% D2     Incidental Findings:   · Meningioma on CT/MRI      Test Results Pending at Discharge (will require follow up):   · none     Outpatient Tests Requested:  · Formal visual fields test      Complications:  none     Reason for [address] [place] José Acosta is a 59 y  o  female patient who originally presented to the hospital on 8/29/2021 due to sudden onset dizziness, lightheadedness, vertigo sensation, nausea/vomiting while she was sitting on her patio   In the ED, her CTA head neck showed a meningioma with no mass effect, no ischemia, no hemorrhage   EKG showed LBBB, with no prior for comparison, no cardiac complaints   Patient was admitted for concerns of TIA workup   Neurology was consulted with MRI showing no acute findings   Neurosurgery was consulted for the incidental meningioma finding with no plan for urgent surgery and follow-up outpatient   Cardiology was consulted with echo showing severe LV dysfunction with EF 37%   On 09/01 cardiac catheterization was performed in the setting of new onset cardiomyopathy, LBBB on EKG, and high risk factors of uncontrolled type 2 DM, HTN/HLD   Cardiac catheterization showed 90% occlusion of mid LAD and 70% occlusion in the 2nd diagonal artery   Patient was transferred to [facility] for interventional cardiology assessment for rotational atherectomy or shockwave therapy to the mid LAD        Please see above list of diagnoses and related plan for additional information       Condition at [address]     Discharge Day Visit / Exam:   Denae Dennis has no complaints this evening   Discussed with patient at bedside about her cardiac catheterization finding and plans to transfer her to [facility]       Vitals: Blood Pressure: 146/67 (09/02/21 0725)  Pulse: 69 (09/02/21 0725)  Temperature: 98 1 °F (36 7 °C) (09/02/21 0725)  Temp Source: Oral (09/02/21 0725)  Respirations: 18 (09/02/21 0725)  Height: 5' 6" (167 6 cm) (08/31/21 1546)  Weight - Scale: 84 2 kg (185 lb 10 oz) (08/31/21 1546)  SpO2: 98 % (09/02/21 0725)  Exam:     Discussion with Family: Updated  (friend) at bedside      ** Please Note: This note may have been constructed using a voice recognition system  **

## 2021-09-03 ENCOUNTER — TELEPHONE (OUTPATIENT)
Dept: OTHER | Facility: OTHER | Age: 61
End: 2021-09-03

## 2021-09-03 ENCOUNTER — TRANSITIONAL CARE MANAGEMENT (OUTPATIENT)
Dept: FAMILY MEDICINE CLINIC | Facility: CLINIC | Age: 61
End: 2021-09-03

## 2021-09-03 ENCOUNTER — TELEPHONE (OUTPATIENT)
Dept: FAMILY MEDICINE CLINIC | Facility: CLINIC | Age: 61
End: 2021-09-03

## 2021-09-03 VITALS
HEART RATE: 65 BPM | HEIGHT: 66 IN | DIASTOLIC BLOOD PRESSURE: 58 MMHG | BODY MASS INDEX: 29.96 KG/M2 | RESPIRATION RATE: 16 BRPM | TEMPERATURE: 98.9 F | OXYGEN SATURATION: 96 % | SYSTOLIC BLOOD PRESSURE: 109 MMHG

## 2021-09-03 LAB
ANION GAP SERPL CALCULATED.3IONS-SCNC: 3 MMOL/L (ref 4–13)
BUN SERPL-MCNC: 17 MG/DL (ref 5–25)
CALCIUM SERPL-MCNC: 8.9 MG/DL (ref 8.3–10.1)
CHLORIDE SERPL-SCNC: 108 MMOL/L (ref 100–108)
CO2 SERPL-SCNC: 27 MMOL/L (ref 21–32)
CREAT SERPL-MCNC: 0.81 MG/DL (ref 0.6–1.3)
GFR SERPL CREATININE-BSD FRML MDRD: 78 ML/MIN/1.73SQ M
GLUCOSE SERPL-MCNC: 121 MG/DL (ref 65–140)
GLUCOSE SERPL-MCNC: 132 MG/DL (ref 65–140)
GLUCOSE SERPL-MCNC: 152 MG/DL (ref 65–140)
POTASSIUM SERPL-SCNC: 3.8 MMOL/L (ref 3.5–5.3)
SODIUM SERPL-SCNC: 138 MMOL/L (ref 136–145)

## 2021-09-03 PROCEDURE — NC001 PR NO CHARGE: Performed by: INTERNAL MEDICINE

## 2021-09-03 PROCEDURE — 80048 BASIC METABOLIC PNL TOTAL CA: CPT | Performed by: HOSPITALIST

## 2021-09-03 PROCEDURE — 82948 REAGENT STRIP/BLOOD GLUCOSE: CPT

## 2021-09-03 PROCEDURE — 99239 HOSP IP/OBS DSCHRG MGMT >30: CPT | Performed by: FAMILY MEDICINE

## 2021-09-03 PROCEDURE — 99232 SBSQ HOSP IP/OBS MODERATE 35: CPT | Performed by: INTERNAL MEDICINE

## 2021-09-03 RX ORDER — CARVEDILOL 6.25 MG/1
6.25 TABLET ORAL 2 TIMES DAILY WITH MEALS
Qty: 30 TABLET | Refills: 0 | Status: SHIPPED | OUTPATIENT
Start: 2021-09-03 | End: 2021-09-15 | Stop reason: SDUPTHER

## 2021-09-03 RX ORDER — ATORVASTATIN CALCIUM 80 MG/1
80 TABLET, FILM COATED ORAL
Qty: 30 TABLET | Refills: 0 | Status: SHIPPED | OUTPATIENT
Start: 2021-09-03 | End: 2021-09-15 | Stop reason: SDUPTHER

## 2021-09-03 RX ORDER — LISINOPRIL 2.5 MG/1
5 TABLET ORAL DAILY
Status: DISCONTINUED | OUTPATIENT
Start: 2021-09-03 | End: 2021-09-03

## 2021-09-03 RX ORDER — CLOPIDOGREL BISULFATE 75 MG/1
75 TABLET ORAL DAILY
Qty: 90 TABLET | Refills: 3 | Status: SHIPPED | OUTPATIENT
Start: 2021-09-04 | End: 2021-09-15 | Stop reason: SDUPTHER

## 2021-09-03 RX ADMIN — CARVEDILOL 6.25 MG: 6.25 TABLET, FILM COATED ORAL at 07:37

## 2021-09-03 RX ADMIN — SACUBITRIL AND VALSARTAN 1 TABLET: 24; 26 TABLET, FILM COATED ORAL at 13:53

## 2021-09-03 RX ADMIN — CLOPIDOGREL BISULFATE 300 MG: 75 TABLET ORAL at 08:45

## 2021-09-03 RX ADMIN — ENOXAPARIN SODIUM 40 MG: 40 INJECTION SUBCUTANEOUS at 08:44

## 2021-09-03 RX ADMIN — ASPIRIN 81 MG: 81 TABLET, CHEWABLE ORAL at 08:45

## 2021-09-03 RX ADMIN — INSULIN LISPRO 3 UNITS: 100 INJECTION, SOLUTION INTRAVENOUS; SUBCUTANEOUS at 11:08

## 2021-09-03 RX ADMIN — INSULIN LISPRO 3 UNITS: 100 INJECTION, SOLUTION INTRAVENOUS; SUBCUTANEOUS at 07:37

## 2021-09-03 RX ADMIN — PANTOPRAZOLE SODIUM 40 MG: 40 TABLET, DELAYED RELEASE ORAL at 06:20

## 2021-09-03 RX ADMIN — INSULIN LISPRO 1 UNITS: 100 INJECTION, SOLUTION INTRAVENOUS; SUBCUTANEOUS at 11:08

## 2021-09-03 NOTE — PLAN OF CARE
Problem: PAIN - ADULT  Goal: Verbalizes/displays adequate comfort level or baseline comfort level  Description: Interventions:  - Encourage patient to monitor pain and request assistance  - Assess pain using appropriate pain scale  - Administer analgesics based on type and severity of pain and evaluate response  - Implement non-pharmacological measures as appropriate and evaluate response  - Consider cultural and social influences on pain and pain management  - Notify physician/advanced practitioner if interventions unsuccessful or patient reports new pain  Outcome: Adequate for Discharge     Problem: INFECTION - ADULT  Goal: Absence or prevention of progression during hospitalization  Description: INTERVENTIONS:  - Assess and monitor for signs and symptoms of infection  - Monitor lab/diagnostic results  - Monitor all insertion sites, i e  indwelling lines, tubes, and drains  - Monitor endotracheal if appropriate and nasal secretions for changes in amount and color  - [place] appropriate cooling/warming therapies per order  - Administer medications as ordered  - Instruct and encourage patient and family to use good hand hygiene technique  - Identify and instruct in appropriate isolation precautions for identified infection/condition  Outcome: Adequate for Discharge  Goal: Absence of fever/infection during neutropenic period  Description: INTERVENTIONS:  - Monitor WBC    Outcome: Adequate for Discharge     Problem: SAFETY ADULT  Goal: Patient will remain free of falls  Description: INTERVENTIONS:  - Educate patient/family on patient safety including physical limitations  - Instruct patient to call for assistance with activity   - Consult OT/PT to assist with strengthening/mobility   - Keep Call bell within reach  - Keep bed low and locked with side rails adjusted as appropriate  - Keep care items and personal belongings within reach  - Initiate and maintain comfort rounds  - Make Fall Risk Sign visible to staff  - Apply yellow socks and bracelet for high fall risk patients  - Consider moving patient to room near nurses station  Outcome: Adequate for Discharge  Goal: Maintain or return to baseline ADL function  Description: INTERVENTIONS:  -  Assess patient's ability to carry out ADLs; assess patient's baseline for ADL function and identify physical deficits which impact ability to perform ADLs (bathing, care of mouth/teeth, toileting, grooming, dressing, etc )  - Assess/evaluate cause of self-care deficits   - Assess range of motion  - Assess patient's mobility; develop plan if impaired  - Assess patient's need for assistive devices and provide as appropriate  - Encourage maximum independence but intervene and supervise when necessary  - Involve family in performance of ADLs  - Assess for home care needs following discharge   - Consider OT consult to assist with ADL evaluation and planning for discharge  - Provide patient education as appropriate  Outcome: Adequate for Discharge  Goal: Maintains/Returns to pre admission functional level  Description: INTERVENTIONS:  - Perform BMAT or MOVE assessment daily    - Set and communicate daily mobility goal to care team and patient/family/caregiver     - Collaborate with rehabilitation services on mobility goals if consulted  - Out of bed for toileting  - Record patient progress and toleration of activity level   Outcome: Adequate for Discharge     Problem: DISCHARGE PLANNING  Goal: Discharge to home or other facility with appropriate resources  Description: INTERVENTIONS:  - Identify barriers to discharge w/patient and caregiver  - Arrange for needed discharge resources and transportation as appropriate  - Identify discharge learning needs (meds, wound care, etc )  - Arrange for interpretive services to assist at discharge as needed  - Refer to Case Management Department for coordinating discharge planning if the patient needs post-hospital services based on physician/advanced practitioner order or complex needs related to functional status, cognitive ability, or social support system  Outcome: Adequate for Discharge     Problem: Knowledge Deficit  Goal: Patient/family/caregiver demonstrates understanding of disease process, treatment plan, medications, and discharge instructions  Description: Complete learning assessment and assess knowledge base    Interventions:  - Provide teaching at level of understanding  - Provide teaching via preferred learning methods  Outcome: Adequate for Discharge     Problem: Potential for Falls  Goal: Patient will remain free of falls  Description: INTERVENTIONS:  - Educate patient/family on patient safety including physical limitations  - Instruct patient to call for assistance with activity   - Consult OT/PT to assist with strengthening/mobility   - Keep Call bell within reach  - Keep bed low and locked with side rails adjusted as appropriate  - Keep care items and personal belongings within reach  - Initiate and maintain comfort rounds  - Make Fall Risk Sign visible to staff  - Apply yellow socks and bracelet for high fall risk patients  - Consider moving patient to room near nurses station  Outcome: Adequate for Discharge

## 2021-09-03 NOTE — ASSESSMENT & PLAN NOTE
Lab Results   Component Value Date    HGBA1C 10 2 (H) 08/29/2021     · Continue Lantus 40 units, humalog 3 U TIDAC  · Sliding scale insulin  · OP Endo referral

## 2021-09-03 NOTE — ASSESSMENT & PLAN NOTE
Cardiac catheterization 90% mid LAD, 70% D2  Transfer to [place] for PCI with rotational atherectomy or shockwave to the mid LAD lesion    S/p [facility] with balloon angio to mid LAD, PCI with HUNTER to mid & prox LAD  Cont aspirin, plavix added, cont statin  Cardiology following

## 2021-09-03 NOTE — TELEPHONE ENCOUNTER
Patient is being discharged today and needs to be referred to an endocrinologist  Can you give her a name and number

## 2021-09-03 NOTE — UTILIZATION REVIEW
Notification of Discharge   This is a Notification of Discharge from our facility [address]  Please be advised that this patient has been discharge from our facility  Below you will find the admission and discharge date and time including the patients disposition  UTILIZATION REVIEW CONTACT:  Piotr Christine  Utilization   Network Utilization Review Department  Phone: [phone] x carefully listen to the prompts  All voicemails are confidential   Email: [email or web address]     PHYSICIAN ADVISORY SERVICES:  FOR [id number] REVIEW - MEDICAL NECESSITY DENIAL  Phone: [phone]  Fax: [phone]  Email: [email or web address]     PRESENTATION DATE: 8/29/2021  5:48 PM  OBERVATION ADMISSION DATE:   INPATIENT ADMISSION DATE: 8/31/21 12:03 PM   DISCHARGE DATE: 9/2/2021 10:51 AM  DISPOSITION: [address] [facility]      IMPORTANT INFORMATION:  Send all requests for admission clinical reviews, approved or denied determinations and any other requests to dedicated fax number below belonging to the campus where the patient is receiving treatment   List of dedicated fax numbers:  [address] DENIALS (Administrative/Medical Necessity) [phone]   [address] (Maternity/NICU/Pediatrics) [phone]   Jo Forbes [phone]   Mumtaz Hartley [phone]   Ethan Lauren [phone]   [facility] [address] [place] [address] [phone]   [facility]  [phone]   [address] [facility] [address]  [address] [facility] 1000 [facility] [phone]

## 2021-09-03 NOTE — DISCHARGE INSTR - AVS FIRST PAGE
Dear Al Moore,     It was our pleasure to care for you here at [facility], [facility]  It is our hope that we were always able to exceed the expected standards for your care during your stay  You were hospitalized due to cardiac catheterization  You were cared for on the medical floor by Katlin Carballo MD with the [facility] who covers for your primary care physician (PCP), Deshaun Sanchez MD, while you were hospitalized  If you have any questions or concerns related to this hospitalization, you may contact us at [phone]  For follow up as well as any medication refills, we recommend that you follow up with your primary care physician  A registered nurse will reach out to you by phone within a few days after your discharge to answer any additional questions that you may have after going home  However, at this time we provide for you here, the most important instructions / recommendations at discharge:     · Notable Medication Adjustments -   · Stop taking lisinopril and Januvia  Start Joleen Motta, cardiac, clopidogrel  · Testing Required after Discharge -   · Follow-up with cardiology  · Follow-up with PCP  · Follow-up with neurosurgery  · Follow-up with ophthalmology  · Important follow up information -   · None  · Other Instructions -   · None  · Please review this entire after visit summary as additional general instructions including medication list, appointments, activity, diet, any pertinent wound care, and other additional recommendations from your care team that may be provided for you        Sincerely,     Katlin Carballo MD

## 2021-09-03 NOTE — ASSESSMENT & PLAN NOTE
Cardiac catheterization 90% mid LAD, 70% D2  Transfer to [place] for PCI with rotational atherectomy or shockwave to the mid LAD lesion  S/p [facility] with balloon angio to mid LAD, PCI with HUNTER to mid & prox LAD  Cont aspirin, plavix added, cont statin  Cardiology following:  Advised to stop lisinopril and Januvia    Started on [place]

## 2021-09-03 NOTE — TELEPHONE ENCOUNTER
09/08/2021-CALLED PT, LEFT MESSAGE ON MACHINE CONFIRMING 09/22/2021 APT IN [facility] OFFICE AND TO COMPLETE VISUAL FIELDS       09/03/2021-PT STILL IN HOSPITAL  09/22/2021 APT AFTER VISUAL ESTRELLA [place] IN [facility]

## 2021-09-03 NOTE — PHYSICAL THERAPY NOTE
Physical Therapy Screen    Patient Name: Nader Forte    Today's Date: 9/3/2021     Problem List  Principal Problem:    CAD (coronary artery disease)  Active Problems:    Type 2 diabetes mellitus without complication, with long-term current use of insulin (HCC)    Dizziness    Left bundle branch block    New Ischemic Cardiomyopathy/Systolic CHF    Right paraclinoid meningioma       Past Medical History  Past Medical History:   Diagnosis Date    Colon polyp     Dizziness     Edema     Hyperlipidemia     Hypertension     Type 2 diabetes mellitus ([address] )     Vitamin D deficiency     Wears glasses         Past Surgical History  Past Surgical History:   Procedure Laterality Date    BREAST EXCISIONAL BIOPSY Right 2009    COLONOSCOPY  04/11/2018    Colonoscpoy due in 3 years    EGD  04/11/2018    MAMMO (HISTORICAL) Bilateral 05/04/2020 2018    MAMMO NEEDLE LOCALIZATION RIGHT (ALL INC) Right 7/8/2009    WISDOM TOOTH EXTRACTION          SUBJECT:   I have been walking by myself  I was seen at [facility] and they had me do whole bonilla and stairs  I don't need PT  OBJECTIVE:  Chart reviewed as well as notes from ra, pt was indep without device and performed stairs    Transferred here for cardiac care  No change in mobility status  Confirmed with nursing that pt was indep, no dizziness ( pt stated was lightheaded on admission)  ASSESSMENT:no skilled PT needs    PLAN:    d/c PT  RECOMMENDATION:return home    Delmy Yo, PT

## 2021-09-03 NOTE — PROGRESS NOTES
Cardiology Progress Note - Rasheed Cabrera 59 y o  female MRN: [id number]    Unit/Bed#: CW2 216-02 Encounter: [id number]      Assessment & Plan:  Principal Problem:    CAD (coronary artery disease)  Active Problems:    Type 2 diabetes mellitus without complication, with long-term current use of insulin (HCC)    Dizziness    Left bundle branch block    New Ischemic Cardiomyopathy/Systolic CHF    Right paraclinoid meningioma    # Ischemic cardiomyopathy - EF 37%  # CAD with calcified 90% mid LAD lesion, 70% ostial diag disease s/p overlapping HUNTER today  - continue with aspirin 81 mg  - c/w plavix 75 mg QD  - atorvastatin increased to 80 mg once a day  - continue Coreg 6 25 mg twice a day  - start entresto 26/24 mg BID  - BMP before next appointment  - Cardiac rehab  - cardiology follow up    # Hypertension  # Diabetes   - A1C 10 2  - Patient should be on Metformin and SGLT2 inhibitor on discharge  - recommend Addition of Jardiance on discharge  - Endocrine follow up    Subjective:   Patient seen and examined  No significant events overnight  Denies chest pain, pnd, orthopnea, abdominal pain, nausea vomiting, fever, chills, headache, dizziness or palpitations  Objective:     Vitals: Blood pressure 158/78, pulse 68, temperature 98 °F (36 7 °C), temperature source Oral, resp   rate 16, height 5' 6" (1 676 m), SpO2 98 %, not currently breastfeeding , Body mass index is 29 96 kg/m² ,   Orthostatic Blood Pressures      Most Recent Value   Blood Pressure  158/78 filed at 09/03/2021 0801   Patient Position - Orthostatic VS  Lying filed at 09/03/2021 0801            Intake/Output Summary (Last 24 hours) at 9/3/2021 1007  Last data filed at 9/3/2021 0830  Gross per 24 hour   Intake 180 ml   Output --   Net 180 ml           Physical Exam:    GEN: Rasheed Cabrera appears well, alert and oriented x 3, pleasant and cooperative   HEENT: anicteric, mucous membranes moist  NECK: no jvd, carotid bruits   HEART: regular rhythm, normal S1 and S2, no murmurs, clicks, gallops or rubs   LUNGS: clear to auscultation bilaterally; no wheezes, rales, or rhonchi   ABDOMEN: normal bowel sounds, soft, no tenderness, no distention  EXTREMITIES: peripheral pulses normal; Radial band right wrist  NEURO: no focal findings   SKIN: normal without suspicious lesions on exposed skin      Current Facility-Administered Medications:     aspirin chewable tablet 81 mg, 81 mg, Oral, Daily, Hyacinth Mojica MD, 81 mg at 09/03/21 0845    atorvastatin (LIPITOR) tablet 80 mg, 80 mg, Oral, Daily With Pat Francis MD, 80 mg at 09/02/21 1959    carvedilol (COREG) tablet 6 25 mg, 6 25 mg, Oral, BID With Meals, Jose Raul Gamino MD, 6 25 mg at 09/03/21 0737    [START ON 9/4/2021] clopidogrel (PLAVIX) tablet 75 mg, 75 mg, Oral, Daily, Pavel Saldivar MD    enoxaparin (LOVENOX) subcutaneous injection 40 mg, 40 mg, Subcutaneous, Daily, Hyacinth Mojica MD, 40 mg at 09/03/21 0844    insulin glargine (LANTUS) subcutaneous injection 40 Units 0 4 mL, 40 Units, Subcutaneous, HS, Jose Raul Gamino MD, 40 Units at 09/02/21 2205    insulin lispro (HumaLOG) 100 units/mL subcutaneous injection 1-5 Units, 1-5 Units, Subcutaneous, HS, Jose Raul Gamino MD, 1 Units at 09/02/21 2205    insulin lispro (HumaLOG) 100 units/mL subcutaneous injection 1-6 Units, 1-6 Units, Subcutaneous, TID AC **AND** Fingerstick Glucose (POCT), , , TID AC, Hyacinth Mojica MD    insulin lispro (HumaLOG) 100 units/mL subcutaneous injection 3 Units, 3 Units, Subcutaneous, TID With Meals, Jose Raul Gamino MD, 3 Units at 09/03/21 0737    pantoprazole (PROTONIX) EC tablet 40 mg, 40 mg, Oral, Early Morning, Jose Raul Gamino MD, 40 mg at 09/03/21 0620    Labs & Results:    Lab Results   Component Value Date    TROPONINI <0 02 08/30/2021    TROPONINI <0 02 08/29/2021    TROPONINI <0 02 08/29/2021       Lab Results   Component Value Date    CALCIUM 8 9 09/03/2021    K 3 8 09/03/2021    CO2 27 09/03/2021     09/03/2021    BUN 17 09/03/2021    CREATININE 0 81 09/03/2021       Lab Results   Component Value Date    WBC 6 05 08/31/2021    HGB 13 5 08/31/2021    HCT 41 2 08/31/2021    MCV 92 08/31/2021     08/31/2021           No results found for: CHOL  Lab Results   Component Value Date    HDL 31 (L) 08/30/2021    HDL 36 (L) 02/10/2021     Lab Results   Component Value Date    LDLCALC 64 08/30/2021    LDLCALC 51 02/10/2021     Lab Results   Component Value Date    TRIG 119 08/30/2021    TRIG 113 3 02/10/2021       Lab Results   Component Value Date    ALT 30 08/31/2021    AST 15 08/31/2021         EKG personally reviewed by )Joselo Chow MD  No acute changes   TELE: No significant arrhythmias seen on telemetry review

## 2021-09-03 NOTE — ASSESSMENT & PLAN NOTE
Presents to ED with lightheadedness, nausea, vomiting that occurred while she was outside sitting on her patio  She reports a similar episode of lightheadedness 3 weeks ago when turning over in bed  On arrival to emergency department, symptoms have resolved  CTA head neck with likely meningioma  No evidence of infarct or intracranial hemorrhage  No stenosis, dissection, occlusion  · Telemetry monitoring  · P r n  Meclizine  · Neurology consulted  · MRI (8/30) No acute intracranial pathology  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, as mentioned on recent CT    · As per neurosurgery, recommend visual field testing out patient with ophthalmology before neurosurgery appointment in 2-3 weeks  · Echocardiogram (8/30) Ejection fraction 37%  · Monitor I&Os, renal function, electrolytes and standing weight

## 2021-09-03 NOTE — ASSESSMENT & PLAN NOTE
EF 37% with RWMA   Reports chronic dyspnea on exertion that has slightly worsened in the last 6 months   Also an episode of dizziness with diaphoresis nausea and vomiting as described below   Also reports slow worsening of chronic lower extremity edema for which she takes PRN Lasix 20 mg daily    NT proBNP 1033  Baseline weight around 180 lb  Started on IV Lasix 40 mg BID  then 40 IV x 1 yesterday  · Monitor I/os, weight  · Appears euvolemic  · F/u cardio recs on OP diuretics, lisinopril  · Cont PO coreg,  · Cardiology consult appreciated; Advised to stop lisinopril and Januvia    Started on [place]

## 2021-09-03 NOTE — UTILIZATION REVIEW
Initial Clinical Review    Admission: Date/Time/Statement:   Admission Orders (From admission, onward)     Ordered        09/02/21 1159  Inpatient Admission  Once                   Orders Placed This Encounter   Procedures    Inpatient Admission     Standing Status:   Standing     Number of Occurrences:   1     Order Specific Question:   Level of Care     Answer:   Med Surg [16]     Order Specific Question:   Estimated length of stay     Answer:   More than 2 Midnights     Order Specific Question:   Certification     Answer:   I certify that inpatient services are medically necessary for this patient for a duration of greater than two midnights  See H&P and MD Progress Notes for additional information about the patient's course of treatment  Initial Presentation:   59 y o  female originally presented to [address] [place] [address] with complaint of dizziness, lightheadedness  Patient initially was admitted under stroke pathway neurology was consulted  Under stroke workup, patient underwent echocardiogram that revealed ejection fraction of 37% with global hypokinesis which prompted patient to undergo cardiac catheterization  She underwent cardiac catheterization today that revealed high risk LAD lesion  Patient has been transferred to [facility] for high risk PCI  Underwent cath after arrival, underwent PCI S/p LHC with balloon angio to mid LAD, PCI with HUNTER to mid & prox LAD  Cont aspirin, plavix added, cont statin  New ischemic cardiomyopathy /systolic CHF   Reports chronic dyspnea on exertion  BNP 1033 started on IV Lasix 40 bid then IV 40 qd  Monitor I/o continue coreg lisinopril on hold consider restart tomorrow  Dizziness MRI (8/30) No acute intracranial pathology  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, as mentioned on recent CT  Right paraclinoid meningioma seen by neurosurgery at [facility] with recommendations of no emergency surgery is anticipated at this juncture   F/u oupatient  DM ssi  Per Cardiology   Ischemic cardiomyopathy - EF 37%  # CAD with calcified 90% mid LAD lesion, 70% ostial diag disease s/p overlapping HUNTER today  - continue with aspirin 81 mg  - atorvastatin increased to 80 mg once a day  - continue Coreg 6 25 mg twice a day  - Lisinopril held for cath  - post cath hydration  - Escalate GDMT tomorrow   NECK: no jvd, carotid bruits   HEART: regular rhythm, normal S1 and S2, no murmurs, clicks, gallops or rubs   LUNGS: clear to auscultation bilaterally; no wheezes, rales, or rhonchi   ABDOMEN: normal bowel sounds, soft, no tenderness, no distention  EXTREMITIES: peripheral pulses normal; Radial band right wrist  NEURO: no focal findings   SKIN: normal without suspicious lesions on exposed skin  # Hypertension  # Diabetes   - A1C 10 2  - patient on Metformin, Januvia and insulin at home     - Needs escalation of outpatient therapy    Date: 9/3/21   Day 2:   Per Cardiology Active Problems:    Type 2 diabetes mellitus without complication, with long-term current use of insulin (HCC)    Dizziness    Left bundle branch block    New Ischemic Cardiomyopathy/Systolic CHF    Right paraclinoid meningioma  # Ischemic cardiomyopathy - EF 37%  # CAD with calcified 90% mid LAD lesion, 70% ostial diag disease s/p overlapping HUNTER today  - continue with aspirin 81 mg  - c/w plavix 75 mg QD  - atorvastatin increased to 80 mg once a day  - continue Coreg 6 25 mg twice a day  - start entresto 26/24 mg BID  - BMP before next appointment  - Cardiac rehab  - cardiology follow up   # Hypertension  # Diabetes   - A1C 10 2  - Patient should be on Metformin and SGLT2 inhibitor on discharge  - recommend Addition of Jardiance on discharge  - Endocrine follow up     ED Triage Vitals   Temperature Pulse Respirations Blood Pressure SpO2   09/02/21 1116 09/02/21 1116 09/02/21 1116 09/02/21 1116 09/02/21 1116   98 2 °F (36 8 °C) 68 20 140/80 95 %      Temp Source Heart Rate Source Patient Position - Orthostatic VS BP Location FiO2 (%)   09/03/21 0801 -- 09/03/21 0801 09/03/21 0801 --   Oral  Lying Right arm       Pain Score       09/02/21 1713       No Pain          Wt Readings from Last 1 Encounters:   08/31/21 84 2 kg (185 lb 10 oz)     Additional Vital Signs:   09/03/21 05:54:47  97 6 °F (36 4 °C)  70  18  166/84  111  100 %  --  --   09/03/21 01:36:37  98 3 °F (36 8 °C)  66  19  128/74  92  97 %  --  --   09/02/21 2030  --  --  --  --  --  --  None (Room air)  --   09/02/21 1700  --  72  --  143/76  --  99 %         Pertinent Labs/Diagnostic Test Results:   8/30/21 MRI brain No acute intracranial pathology  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, as mentioned on recent CT     Results from last 7 days   Lab Units 08/31/21  0531 08/30/21  0445 08/29/21  1427   WBC Thousand/uL 6 05 6 34 5 72   HEMOGLOBIN g/dL 13 5 12 8 14 3   HEMATOCRIT % 41 2 37 5 42 4   PLATELETS Thousands/uL 184 183 190   NEUTROS ABS Thousands/µL 3 25  --  3 52     Results from last 7 days   Lab Units 09/03/21  0619 09/02/21  1307 09/02/21  0512 09/01/21  0432 08/31/21  0531   SODIUM mmol/L 138 140 142 142 141   POTASSIUM mmol/L 3 8 3 8 3 9 3 4* 3 8   CHLORIDE mmol/L 108 107 105 105 106   CO2 mmol/L 27 29 28 29 27   ANION GAP mmol/L 3* 4 9 8 8   BUN mg/dL 17 16 16 17 14   CREATININE mg/dL 0 81 0 76 0 97 0 94 0 95   EGFR ml/min/1 73sq m 78 84 62 65 64   CALCIUM mg/dL 8 9 8 6 8 8 8 7 8 4   MAGNESIUM mg/dL  --   --   --  1 9  --      Results from last 7 days   Lab Units 08/31/21  0531 08/29/21  1427   AST U/L 15 18   ALT U/L 30 40   ALK PHOS U/L 56 71   TOTAL PROTEIN g/dL 5 9* 7 4   ALBUMIN g/dL 2 6* 3 6   TOTAL BILIRUBIN mg/dL 0 31 0 72     Results from last 7 days   Lab Units 09/03/21  0617 09/02/21  2115 09/02/21  1631 09/02/21  1115 09/02/21  0730 09/01/21  2045 09/01/21  1602 09/01/21  1213 09/01/21  0740 08/31/21  2057 08/31/21  1551 08/31/21  1114   POC GLUCOSE mg/dl 132 176* 131 171* 167* 212* 256* 246* 221* 283* 264* 272*     Results from last 7 days   Lab Units 09/03/21  0619 09/02/21  1307 09/02/21  0512 09/01/21  0432 08/31/21  0531 08/30/21  0445 08/29/21  1427   GLUCOSE RANDOM mg/dL 121 147* 191* 206* 318* 235* 390*     Results from last 7 days   Lab Units 08/29/21  1427   HEMOGLOBIN A1C % 10 2*   EAG mg/dl 246     Results from last 7 days   Lab Units 08/30/21  0445 08/29/21  2327 08/29/21  1427   TROPONIN I ng/mL <0 02 <0 02 <0 02     Results from last 7 days   Lab Units 08/31/21  0531   NT-PRO BNP pg/mL 1,033*     Results from last 7 days   Lab Units 08/29/21  1427   CRP mg/L <3 0       Past Medical History:   Diagnosis Date    Colon polyp     Dizziness     Edema     Hyperlipidemia     Hypertension     Type 2 diabetes mellitus (HCC)     Vitamin D deficiency     Wears glasses      Present on Admission:   Right paraclinoid meningioma   CAD (coronary artery disease)   New Ischemic Cardiomyopathy/Systolic CHF   Dizziness   Left bundle branch block      Admitting Diagnosis: CAD (coronary artery disease) [I25 10]  Age/Sex: 59 y o  female  Admission Orders:  Telemetry  Daily weight I/o  is  Scheduled Medications:  aspirin, 81 mg, Oral, Daily  atorvastatin, 80 mg, Oral, Daily With Dinner  carvedilol, 6 25 mg, Oral, BID With Meals  [START ON 9/4/2021] clopidogrel, 75 mg, Oral, Daily  enoxaparin, 40 mg, Subcutaneous, Daily  insulin glargine, 40 Units, Subcutaneous, HS  insulin lispro, 1-5 Units, Subcutaneous, HS  insulin lispro, 1-6 Units, Subcutaneous, TID AC  insulin lispro, 3 Units, Subcutaneous, TID With Meals  pantoprazole, 40 mg, Oral, Early Morning      Continuous IV Infusions:     PRN Meds:       IP CONSULT TO CARDIOLOGY    Network Utilization Review Department  ATTENTION: Please call with any questions or concerns to [phone] and carefully listen to the prompts so that you are directed to the right person   All voicemails are confidential   Shoaib Calender all requests for admission clinical reviews, approved or denied determinations and any other requests to dedicated fax number below belonging to the campus where the patient is receiving treatment   List of dedicated fax numbers for the Facilities:  [address] [place] [address] DENIALS (Administrative/Medical Necessity) [phone]   [address] (Maternity/NICU/Pediatrics) [phone]   [address] [place] [address] [address] [place] [phone] [place] [address] [address] [facility] [place] [address] [phone]

## 2021-09-03 NOTE — ASSESSMENT & PLAN NOTE
EF 37% with RWMA   Reports chronic dyspnea on exertion that has slightly worsened in the last 6 months   Also an episode of dizziness with diaphoresis nausea and vomiting as described below   Also reports slow worsening of chronic lower extremity edema for which she takes PRN Lasix 20 mg daily    NT proBNP 1033  Baseline weight around 180 lb  Started on IV Lasix 40 mg BID  then 40 IV x 1 yesterday  · Monitor I/os, weight  · Appears euvolemic  · F/u cardio recs on OP diuretics, lisinopril    · Cont PO coreg,  · Started Entresto by Cardiology

## 2021-09-03 NOTE — ASSESSMENT & PLAN NOTE
MRI of brain with and without contrast on 08/30/2021 demonstrates a 1 2 x 1 5 x 1 7 cm diffusely enhancing extra-axial mass right paraclinoid mass   Neurosurgery consulted at [facility]: with recommendations of no emergency surgery is anticipated at this juncture  Recommend formal visual field test prior to the 2-3 weeks follow-up appointment at outpatient neurosurgery clinic

## 2021-09-03 NOTE — PROGRESS NOTES
[facility]  Progress Note - Prabha Larose 1960, 59 y o  female MRN: [id number]  Unit/Bed#: 2 216-02 Encounter: [id number]  Primary Care Provider: Tracee Zaidi MD   Date and time admitted to hospital: 9/2/2021 11:06 AM    * CAD (coronary artery disease)  Assessment & Plan  Cardiac catheterization 90% mid LAD, 70% D2  Transfer to [facility] for PCI with rotational atherectomy or shockwave to the mid LAD lesion  S/p C with balloon angio to mid LAD, PCI with HUNTER to mid & prox LAD  Cont aspirin, plavix added, cont statin  Cardiology following:  Advised to stop lisinopril and Januvia  Started on Entresto and Jardiance    Right paraclinoid meningioma  Assessment & Plan  MRI of brain with and without contrast on 08/30/2021 demonstrates a 1 2 x 1 5 x 1 7 cm diffusely enhancing extra-axial mass right paraclinoid mass   Neurosurgery consulted at [address] [place] [address]: with recommendations of no emergency surgery is anticipated at this juncture  Recommend formal visual field test prior to the 2-3 weeks follow-up appointment at outpatient neurosurgery clinic  New Ischemic Cardiomyopathy/Systolic CHF  Assessment & Plan  EF 37% with RWMA   Reports chronic dyspnea on exertion that has slightly worsened in the last 6 months   Also an episode of dizziness with diaphoresis nausea and vomiting as described below   Also reports slow worsening of chronic lower extremity edema for which she takes PRN Lasix 20 mg daily    NT proBNP 1033  Baseline weight around 180 lb  Started on IV Lasix 40 mg BID  then 40 IV x 1 yesterday  · Monitor I/os, weight  · Appears euvolemic  · F/u cardio recs on OP diuretics, lisinopril  · Cont PO coreg,  · Cardiology consult appreciated; Advised to stop lisinopril and Januvia  Started on Entresto and Jardiance      Left bundle branch block  Assessment & Plan  Patient is without complaints of chest pain  There are no prior EKGs for evaluation   Previous stress tests strips in 2017 and 2019 do not show LBBB  She does have chronic exertional shortness of breath which she notices mostly while going up steps  She notes this is not new or worsening  Normal stress test in 2017 and 2019  Family hx: father with MI at age 45, mother with MI age [de-identified]   · Troponin negative x3  · Telemetry monitoring  Echocardiogram-Ejection fraction 37%      Dizziness  Assessment & Plan  Presents to ED with lightheadedness, nausea, vomiting that occurred while she was outside sitting on her patio  She reports a similar episode of lightheadedness 3 weeks ago when turning over in bed  On arrival to emergency department, symptoms have resolved  CTA head neck with likely meningioma  No evidence of infarct or intracranial hemorrhage  No stenosis, dissection, occlusion  · Telemetry monitoring  · P r n  Meclizine  · Neurology consulted  · MRI (8/30) No acute intracranial pathology  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, as mentioned on recent CT    · As per neurosurgery, recommend visual field testing out patient with ophthalmology before neurosurgery appointment in 2-3 weeks  · Echocardiogram (8/30) Ejection fraction 37%  · Monitor I&Os, renal function, electrolytes and standing weight       Type 2 diabetes mellitus without complication, with long-term current use of insulin ([facility])  Assessment & Plan  Lab Results   Component Value Date    HGBA1C 10 2 (H) 08/29/2021     · Continue Lantus 40 units, humalog 3 U TIDAC  · Sliding scale insulin  · OP Endo referral        Discharging Physician / Practitioner: Spike Flores MD  PCP: Libby Joyce MD  Admission Date:   Admission Orders (From admission, onward)     Ordered        09/02/21 1159  Inpatient Admission  Once                   Discharge Date: 09/03/21    Medical Problems     Resolved Problems  Date Reviewed: 9/2/2021    None                Consultations During Hospital Stay:  · Cardiology, Neurosurgery, Neurology    Procedures Performed:   · Cardiac catheterization as above    Significant Findings / Test Results:   · As above    Incidental Findings:   · None    Test Results Pending at Discharge (will require follow up): · Non     Outpatient Tests Requested:  · None    Complications:  None    Reason for Admission:  Transfer for high risk PCI  See HPI for details  Hospital Course: Nader Forte is a 59 y o  female originally presented to [facility] with complaint of dizziness, lightheadedness  Patient initially was admitted under stroke pathway neurology was consulted  Under stroke workup, patient underwent echocardiogram that revealed ejection fraction of 37% with global hypokinesis which prompted patient to undergo cardiac catheterization  She underwent cardiac catheterization today that revealed high risk LAD lesion  Patient has been transferred to [facility] for high risk PCI  Patient underwent PCI  Cardiology recommended stopping lisinopril and Januvia  Patient was started on [place] and Coreg  Statin dose was increased  Patient stable to be discharged home today  Patient will follow-up with Ophthalmology and Neurosurgery as outpatient  Please see above list of diagnoses and related plan for additional information  Condition at Discharge: good     Discharge Day Visit / Exam:     * Please refer to separate progress note for these details *    Discussion with Family:  With patient    Discharge instructions/Information to patient and family:   See after visit summary for information provided to patient and family  Provisions for Follow-Up Care:  See after visit summary for information related to follow-up care and any pertinent home health orders        Disposition:     Home    For Discharges to [facility] SNF:   · Not Applicable to this Patient - Not Applicable to this Patient    Planned Readmission:  No     Discharge Statement:  I spent 35 minutes discharging the patient  This time was spent on the day of discharge  I had direct contact with the patient on the day of discharge  Greater than 50% of the total time was spent examining patient, answering all patient questions, arranging and discussing plan of care with patient as well as directly providing post-discharge instructions  Additional time then spent on discharge activities  Discharge Medications:  See after visit summary for reconciled discharge medications provided to patient and family        ** Please Note: This note has been constructed using a voice recognition system **

## 2021-09-03 NOTE — PROGRESS NOTES
[facility]  Progress Note - David Presume 1960, 59 y o  female MRN: [id number]  Unit/Bed#: CW2 216-02 Encounter: [id number]  Primary Care Provider: Salome Vega MD   Date and time admitted to hospital: 9/2/2021 11:06 AM    Right paraclinoid meningioma  Assessment & Plan  MRI of brain with and without contrast on 08/30/2021 demonstrates a 1 2 x 1 5 x 1 7 cm diffusely enhancing extra-axial mass right paraclinoid mass   Neurosurgery consulted at [facility]: with recommendations of no emergency surgery is anticipated at this juncture  Recommend formal visual field test prior to the 2-3 weeks follow-up appointment at outpatient neurosurgery clinic  New Ischemic Cardiomyopathy/Systolic CHF  Assessment & Plan  EF 37% with RWMA   Reports chronic dyspnea on exertion that has slightly worsened in the last 6 months   Also an episode of dizziness with diaphoresis nausea and vomiting as described below   Also reports slow worsening of chronic lower extremity edema for which she takes PRN Lasix 20 mg daily    NT proBNP 1033  Baseline weight around 180 lb  Started on IV Lasix 40 mg BID  then 40 IV x 1 yesterday  · Monitor I/os, weight  · Appears euvolemic  · F/u cardio recs on OP diuretics, lisinopril  · Cont PO coreg,  · Started Entresto by Cardiology      Left bundle branch block  Assessment & Plan  Patient is without complaints of chest pain  There are no prior EKGs for evaluation  Previous stress tests strips in 2017 and 2019 do not show LBBB  She does have chronic exertional shortness of breath which she notices mostly while going up steps  She notes this is not new or worsening     Normal stress test in 2017 and 2019  Family hx: father with MI at age 45, mother with MI age [de-identified]   · Troponin negative x3  · Telemetry monitoring  Echocardiogram-Ejection fraction 37%      Dizziness  Assessment & Plan  Presents to ED with lightheadedness, nausea, vomiting that occurred while she was outside sitting on her patio  She reports a similar episode of lightheadedness 3 weeks ago when turning over in bed  On arrival to emergency department, symptoms have resolved  CTA head neck with likely meningioma  No evidence of infarct or intracranial hemorrhage  No stenosis, dissection, occlusion  · Telemetry monitoring  · PT OT, speech consult  · P r n  Meclizine  · Neurology consulted  · MRI (8/30) No acute intracranial pathology  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, as mentioned on recent CT  · As per neurosurgery, recommend visual field testing out patient with ophthalmology before neurosurgery appointment in 2-3 weeks  · Echocardiogram (8/30) Ejection fraction 37%  · Monitor I&Os, renal function, electrolytes and standing weight       Type 2 diabetes mellitus without complication, with long-term current use of insulin ([facility])  Assessment & Plan  Lab Results   Component Value Date    HGBA1C 10 2 (H) 08/29/2021     · Continue Lantus 40 units, humalog 3 U TIDAC  · Sliding scale insulin  · Hold p o  Metformin and Januvia, may resume on discharge  · OP Endo referral    * CAD (coronary artery disease)  Assessment & Plan  Cardiac catheterization 90% mid LAD, 70% D2  Transfer to [place] for PCI with rotational atherectomy or shockwave to the mid LAD lesion  S/p [facility] with balloon angio to mid LAD, PCI with HUNTER to mid & prox LAD  Cont aspirin, plavix added, cont statin  Cardiology following      VTE Pharmacologic Prophylaxis:   Pharmacologic: Enoxaparin (Lovenox)  Mechanical VTE Prophylaxis in Place: Yes    Patient Centered Rounds: I have performed bedside rounds with nursing staff today  Discussions with Specialists or Other Care Team Provider:  Cardiology    Education and Discussions with Family / Patient:  Patient, patient did not want me to call any family members    Time Spent for Care: 45 minutes    More than 50% of total time spent on counseling and coordination of care as described above  Current Length of Stay: 1 day(s)    Current Patient Status: Inpatient   Certification Statement: The patient will continue to require additional inpatient hospital stay due to As above    Discharge Plan:  Once cleared by Cardiology    Code Status: Level 1 - Full Code      Subjective:   Patient seen examined bedside  Denies any chest pain, shortness    Objective:     Vitals:   Temp (24hrs), Av 2 °F (36 8 °C), Min:97 6 °F (36 4 °C), Max:98 9 °F (37 2 °C)    Temp:  [97 6 °F (36 4 °C)-98 9 °F (37 2 °C)] 98 9 °F (37 2 °C)  HR:  [63-72] 65  Resp:  [16-19] 16  BP: (109-166)/(58-84) 109/58  SpO2:  [96 %-100 %] 96 %  Body mass index is 29 96 kg/m²  Input and Output Summary (last 24 hours): Intake/Output Summary (Last 24 hours) at 9/3/2021 1248  Last data filed at 9/3/2021 1211  Gross per 24 hour   Intake 340 ml   Output --   Net 340 ml       Physical Exam:     Physical Exam  Vitals and nursing note reviewed  Constitutional:       General: She is not in acute distress  Appearance: She is obese  HENT:      Head: Normocephalic  Nose: Nose normal       Mouth/Throat:      Pharynx: Oropharynx is clear  Eyes:      Conjunctiva/sclera: Conjunctivae normal    Cardiovascular:      Rate and Rhythm: Normal rate and regular rhythm  Heart sounds: No murmur heard  Pulmonary:      Effort: Pulmonary effort is normal  No respiratory distress  Breath sounds: Normal breath sounds  No wheezing  Abdominal:      General: There is no distension  Tenderness: There is no abdominal tenderness  There is no guarding  Musculoskeletal:         General: No swelling  Normal range of motion  Right lower leg: No edema  Skin:     General: Skin is warm  Capillary Refill: Capillary refill takes less than 2 seconds  Neurological:      General: No focal deficit present  Mental Status: She is alert and oriented to person, place, and time        Cranial Nerves: No cranial nerve deficit  Psychiatric:         Mood and Affect: Mood normal            Additional Data:     Labs:    Results from last 7 days   Lab Units 08/31/21  0531   WBC Thousand/uL 6 05   HEMOGLOBIN g/dL 13 5   HEMATOCRIT % 41 2   PLATELETS Thousands/uL 184   NEUTROS PCT % 54   LYMPHS PCT % 37   MONOS PCT % 7   EOS PCT % 1     Results from last 7 days   Lab Units 09/03/21  0619 08/31/21  0531   SODIUM mmol/L 138 141   POTASSIUM mmol/L 3 8 3 8   CHLORIDE mmol/L 108 106   CO2 mmol/L 27 27   BUN mg/dL 17 14   CREATININE mg/dL 0 81 0 95   ANION GAP mmol/L 3* 8   CALCIUM mg/dL 8 9 8 4   ALBUMIN g/dL  --  2 6*   TOTAL BILIRUBIN mg/dL  --  0 31   ALK PHOS U/L  --  56   ALT U/L  --  30   AST U/L  --  15   GLUCOSE RANDOM mg/dL 121 318*         Results from last 7 days   Lab Units 09/03/21  1105 09/03/21  0617 09/02/21  2115 09/02/21  1631 09/02/21  1115 09/02/21  0730 09/01/21  2045 09/01/21  1602 09/01/21  1213 09/01/21  0740 08/31/21  2057 08/31/21  1551   POC GLUCOSE mg/dl 152* 132 176* 131 171* 167* 212* 256* 246* 221* 283* 264*     Results from last 7 days   Lab Units 08/29/21  1427   HEMOGLOBIN A1C % 10 2*               * I Have Reviewed All Lab Data Listed Above  * Additional Pertinent Lab Tests Reviewed:  [facility]    Imaging:    Imaging Reports Reviewed Today Include:  None  Imaging Personally Reviewed by Myself Includes:  None    Recent Cultures (last 7 days):           Last 24 Hours Medication List:   Current Facility-Administered Medications   Medication Dose Route Frequency Provider Last Rate    aspirin  81 mg Oral Daily Kari Diaz MD      atorvastatin  80 mg Oral Daily With Solis Herrera MD      carvedilol  6 25 mg Oral BID With Meals Kari Diaz MD     [facility] [START ON 9/4/2021] clopidogrel  75 mg Oral Daily Pavel Saldivar MD      enoxaparin  40 mg Subcutaneous Daily Hyacinth Mojica MD      insulin glargine  40 Units Subcutaneous HS Renetta Rodriguez MD      insulin lispro  1-5 Units Subcutaneous HS Hyacinth Mojica MD      insulin lispro  1-6 Units Subcutaneous TID AC Hyacinth Mojica MD      insulin lispro  3 Units Subcutaneous TID With Meals Renetta Rodriguez MD      pantoprazole  40 mg Oral Early Morning Renetta Rodriguez MD      sacubitril-valsartan  1 tablet Oral BID Ray Mendenhall MD          Today, Patient Was Seen By: Spike Flores MD    ** Please Note: Dictation voice to text software may have been used in the creation of this document   **

## 2021-09-03 NOTE — ASSESSMENT & PLAN NOTE
MRI of brain with and without contrast on 08/30/2021 demonstrates a 1 2 x 1 5 x 1 7 cm diffusely enhancing extra-axial mass right paraclinoid mass   Neurosurgery consulted at [facility]: with recommendations of no emergency surgery is anticipated at this juncture  Recommend formal visual field test prior to the 2-3 weeks follow-up appointment at outpatient neurosurgery clinic

## 2021-09-03 NOTE — DISCHARGE SUMMARY
[facility]  Discharge- Francesco Solomon 1960, 59 y o  female MRN: [id number]  Unit/Bed#: Cayden Price 216-02 Encounter: [id number]  Primary Care Provider: Keeley Sagastume MD   Date and time admitted to hospital: 9/2/2021 11:06 AM       * CAD (coronary artery disease)  Assessment & Plan  Cardiac catheterization 90% mid LAD, 70% D2    Transfer to [facility] for PCI with rotational atherectomy or shockwave to the mid LAD lesion  S/p C with balloon angio to mid LAD, PCI with HUNTER to mid & prox LAD  Cont aspirin, plavix added, cont statin  Cardiology following:  Advised to stop lisinopril and Januvia  Started on Entresto and Jardiance     Right paraclinoid meningioma  Assessment & Plan  MRI of brain with and without contrast on 08/30/2021 demonstrates a 1 2 x 1 5 x 1 7 cm diffusely enhancing extra-axial mass right paraclinoid mass   Neurosurgery consulted at [facility]: with recommendations of no emergency surgery is anticipated at this juncture  Recommend formal visual field test prior to the 2-3 weeks follow-up appointment at outpatient neurosurgery clinic         New Ischemic Cardiomyopathy/Systolic CHF  Assessment & Plan  EF 37% with RWMA   Reports chronic dyspnea on exertion that has slightly worsened in the last 6 months   Also an episode of dizziness with diaphoresis nausea and vomiting as described below   Also reports slow worsening of chronic lower extremity edema for which she takes PRN Lasix 20 mg daily    NT proBNP 1033  Baseline weight around 180 lb   Started on IV Lasix 40 mg BID  then 40 IV x 1 yesterday  · Monitor I/os, weight  · Appears euvolemic  · F/u cardio recs on OP diuretics, lisinopril  · Cont PO coreg,  · Cardiology consult appreciated; Advised to stop lisinopril and Januvia  Started on Entresto and Jardiance        Left bundle branch block  Assessment & Plan  Patient is without complaints of chest pain  There are no prior EKGs for evaluation  Previous stress tests strips in 2017 and 2019 do not show LBBB  She does have chronic exertional shortness of breath which she notices mostly while going up steps  She notes this is not new or worsening  Normal stress test in 2017 and 2019  Family hx: father with MI at age 45, mother with MI age [de-identified]   · Troponin negative x3  · Telemetry monitoring  Echocardiogram-Ejection fraction 37%        Dizziness  Assessment & Plan  Presents to ED with lightheadedness, nausea, vomiting that occurred while she was outside sitting on her patio  She reports a similar episode of lightheadedness 3 weeks ago when turning over in bed  On arrival to emergency department, symptoms have resolved  CTA head neck with likely meningioma  No evidence of infarct or intracranial hemorrhage  No stenosis, dissection, occlusion      · Telemetry monitoring  · P r n  Meclizine  · Neurology consulted  · MRI (8/30) No acute intracranial pathology  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, as mentioned on recent CT    · As per neurosurgery, recommend visual field testing out patient with ophthalmology before neurosurgery appointment in 2-3 weeks  · Echocardiogram (8/30) Ejection fraction 37%  · Monitor I&Os, renal function, electrolytes and standing weight        Type 2 diabetes mellitus without complication, with long-term current use of insulin ([facility])  Assessment & Plan        Lab Results   Component Value Date     HGBA1C 10 2 (H) 08/29/2021      · Continue Lantus 40 units, humalog 3 U TIDAC  · Sliding scale insulin  · OP Endo referral           Discharging Physician / Practitioner: Joselyn More MD  PCP: Sima Richey MD  Admission Date:       Admission Orders (From admission, onward)              Ordered          09/02/21 1159   Inpatient Admission  Once                       Discharge Date: 09/03/21         Medical Problems            Resolved Problems  Date Reviewed: 9/2/2021           None                   Consultations During Hospital Stay:  · Cardiology, Neurosurgery, Neurology     Procedures Performed:   · Cardiac catheterization as above     Significant Findings / Test Results:   · As above     Incidental Findings:   · None     Test Results Pending at Discharge (will require follow up): · Non     Outpatient Tests Requested:  · None     Complications:  None     Reason for Admission:  Transfer for high risk PCI  See HPI for details  Hospital Course: Hermelinda joya 59 y  o  female originally presented to [facility] with complaint of dizziness, lightheadedness   Patient initially was admitted under stroke pathway neurology was consulted  Ahsan Bernal stroke workup, patient underwent echocardiogram that revealed ejection fraction of 37% with global hypokinesis which prompted patient to undergo cardiac catheterization   She underwent cardiac catheterization today that revealed high risk LAD lesion   Patient has been transferred to [facility] for high risk PCI  Patient underwent PCI  Cardiology recommended stopping lisinopril and Januvia  Patient was started on [place] and Coreg  Statin dose was increased  Patient stable to be discharged home today    Patient will follow-up with Ophthalmology and Neurosurgery as outpatient  Please see above list of diagnoses and related plan for additional information       Condition at Discharge: good      Discharge Day Visit / Exam:      * Please refer to separate progress note for these details *     Discussion with Family:  With patient     Discharge instructions/Information to patient and family:   See after visit summary for information provided to patient and family        Provisions for Follow-Up Care:  See after visit summary for information related to follow-up care and any pertinent home health orders        Disposition:      Home     For Discharges to [facility] SNF:   · Not Applicable to this Patient - Not Applicable to this Patient     Planned Readmission:  No     Discharge Statement:  I spent 35 minutes discharging the patient  This time was spent on the day of discharge  I had direct contact with the patient on the day of discharge  Greater than 50% of the total time was spent examining patient, answering all patient questions, arranging and discussing plan of care with patient as well as directly providing post-discharge instructions    Additional time then spent on discharge activities      Discharge Medications:  See after visit summary for reconciled discharge medications provided to patient and family        ** Please Note: This note has been constructed using a voice recognition system **      Revision History

## 2021-09-07 NOTE — UTILIZATION REVIEW
Notification of Discharge   This is a Notification of Discharge from our facility [address]  Please be advised that this patient has been discharge from our facility  Below you will find the admission and discharge date and time including the patients disposition  UTILIZATION REVIEW CONTACT:  Jessy Finch  Utilization   Network Utilization Review Department  Phone: [phone] x carefully listen to the prompts  All voicemails are confidential   Email: [email or web address]     PHYSICIAN ADVISORY SERVICES:  FOR [id number] REVIEW - MEDICAL NECESSITY DENIAL  Phone: [phone]  Fax: [phone]  Email: [email or web address]     PRESENTATION DATE: 9/2/2021 11:06 AM  OBERVATION ADMISSION DATE:   INPATIENT ADMISSION DATE: 9/2/21 11:06 AM   DISCHARGE DATE: 9/3/2021  3:21 PM  DISPOSITION: Home/Self Care Home/Self Care      IMPORTANT INFORMATION:  Send all requests for admission clinical reviews, approved or denied determinations and any other requests to dedicated fax number below belonging to the campus where the patient is receiving treatment   List of dedicated fax numbers:  [address] DENIALS (Administrative/Medical Necessity) [phone]   [address] (Maternity/NICU/Pediatrics) [phone]   Elia Gomez [phone]   Iwona Bustillo [phone]   Pedro Louise [phone]   Liu [address] [phone]   [facility]  [phone]   [address] [place] [address], [place] [facility]  [address] [facility] 1000 [facility] [phone]

## 2021-09-08 ENCOUNTER — TELEPHONE (OUTPATIENT)
Dept: FAMILY MEDICINE CLINIC | Facility: CLINIC | Age: 61
End: 2021-09-08

## 2021-09-13 ENCOUNTER — OFFICE VISIT (OUTPATIENT)
Dept: FAMILY MEDICINE CLINIC | Facility: CLINIC | Age: 61
End: 2021-09-13
Payer: COMMERCIAL

## 2021-09-13 VITALS
HEART RATE: 55 BPM | RESPIRATION RATE: 16 BRPM | WEIGHT: 179.5 LBS | TEMPERATURE: 97.2 F | DIASTOLIC BLOOD PRESSURE: 70 MMHG | SYSTOLIC BLOOD PRESSURE: 118 MMHG | HEIGHT: 66 IN | BODY MASS INDEX: 28.85 KG/M2 | OXYGEN SATURATION: 97 %

## 2021-09-13 DIAGNOSIS — E11.9 TYPE 2 DIABETES MELLITUS WITHOUT COMPLICATION, WITH LONG-TERM CURRENT USE OF INSULIN (HCC): ICD-10-CM

## 2021-09-13 DIAGNOSIS — I10 ESSENTIAL HYPERTENSION: ICD-10-CM

## 2021-09-13 DIAGNOSIS — R60.0 BILATERAL LEG EDEMA: ICD-10-CM

## 2021-09-13 DIAGNOSIS — Z23 NEED FOR INFLUENZA VACCINATION: ICD-10-CM

## 2021-09-13 DIAGNOSIS — I25.5 ISCHEMIC CARDIOMYOPATHY: ICD-10-CM

## 2021-09-13 DIAGNOSIS — E78.2 MIXED HYPERLIPIDEMIA: ICD-10-CM

## 2021-09-13 DIAGNOSIS — I25.10 CORONARY ARTERY DISEASE INVOLVING NATIVE CORONARY ARTERY OF NATIVE HEART WITHOUT ANGINA PECTORIS: Primary | ICD-10-CM

## 2021-09-13 DIAGNOSIS — Z79.4 TYPE 2 DIABETES MELLITUS WITHOUT COMPLICATION, WITH LONG-TERM CURRENT USE OF INSULIN (HCC): ICD-10-CM

## 2021-09-13 PROCEDURE — 90682 RIV4 VACC RECOMBINANT DNA IM: CPT | Performed by: INTERNAL MEDICINE

## 2021-09-13 PROCEDURE — 90471 IMMUNIZATION ADMIN: CPT | Performed by: INTERNAL MEDICINE

## 2021-09-13 PROCEDURE — 99495 TRANSJ CARE MGMT MOD F2F 14D: CPT | Performed by: INTERNAL MEDICINE

## 2021-09-13 RX ORDER — BLOOD PRESSURE TEST KIT
KIT MISCELLANEOUS 2 TIMES DAILY
Qty: 1 KIT | Refills: 0 | Status: SHIPPED | OUTPATIENT
Start: 2021-09-13 | End: 2022-04-22

## 2021-09-13 RX ORDER — FLASH GLUCOSE SENSOR
KIT MISCELLANEOUS
Qty: 2 EACH | Refills: 5 | Status: SHIPPED | OUTPATIENT
Start: 2021-09-13 | End: 2022-04-22 | Stop reason: SDUPTHER

## 2021-09-13 RX ORDER — FLASH GLUCOSE SCANNING READER
EACH MISCELLANEOUS
Qty: 2 EACH | Refills: 5 | Status: SHIPPED | OUTPATIENT
Start: 2021-09-13

## 2021-09-13 NOTE — ASSESSMENT & PLAN NOTE
Lab Results   Component Value Date    HGBA1C 10 2 (H) 08/29/2021   Now taking Tresiba, jardiance, and metformin and watching diet carefully-she would like a CGM and is going to follow up with Endo

## 2021-09-13 NOTE — ASSESSMENT & PLAN NOTE
Weighs herself every day-had long discussion about sodium and sodium parameters, use of the diuretics, leg elevation

## 2021-09-13 NOTE — PROGRESS NOTES
TCM Call (since 8/13/2021)     Date and time call was made  9/3/2021  4:29 PM    Hospital care reviewed  Records reviewed    Patient was hospitialized at  [facility]        Date of Admission  09/02/21    Date of discharge  09/03/21    Diagnosis  CAD (coronary artery disease)    Disposition  Home    Were the patients medications reviewed and updated  No    Current Symptoms  None      TCM Call (since 8/13/2021)     Post hospital issues  None    Should patient be enrolled in anticoag monitoring? No    Scheduled for follow up?   Yes    Patients specialists  Endocrinologist    Did you obtain your prescribed medications  Yes    Do you need help managing your prescriptions or medications  No    Is transportation to your appointment needed  No    I have advised the patient to call PCP with any new or worsening symptoms  Eduar Mccauley MA        Assessment/Plan:         Problem List Items Addressed This Visit        Endocrine    Type 2 diabetes mellitus without complication, with long-term current use of insulin ([facility] [address] )       Lab Results   Component Value Date    HGBA1C 10 2 (H) 08/29/2021   Now taking Tresiba, jardiance, and metformin and watching diet carefully-she would like a CGM and is going to follow up with Endo            Cardiovascular and Mediastinum    CAD (coronary artery disease) - Primary     Is s/p HUNTER placement to LAD-is taking ASA and Plavix-I told her to please take the aspirin EVERY day not qod-she's not experiencing any chest pain or sob-will follow up with Cardiology            Other    Mixed hyperlipidemia     On high dose statin         Bilateral leg edema     Weighs herself every day-had long discussion about sodium and sodium parameters, use of the diuretics, leg elevation           Other Visit Diagnoses     Need for influenza vaccination        Relevant Orders    influenza vaccine, quadrivalent, recombinant, PF, 0 5 mL, for patients 18 yr+ ([facility])        Will fill out FMLA paperwork    Subjective:      Patient ID: David Rueda is a 59 y o  female  Vee De Los Santos here for a TCM visit s/p hospitalization for newly discovered ischemic cardiomyopathy, CAD s/p HUNTER to LAD showing 90% blockage, DM II insulin dependent, doing ok at home-all of her sisters are helping her with housework, eating, doctor's appointments-she's doing pretty well-had one episode of her legs swelling up quite a bit after eating Ramen noodles but otherwise doing pretty good      The following portions of the patient's history were reviewed and updated as appropriate:   Past Medical History:  She has a past medical history of Colon polyp, Dizziness, Edema, Hyperlipidemia, Hypertension, Type 2 diabetes mellitus ([address] ), Vitamin D deficiency, and Wears glasses  ,  _______________________________________________________________________  Medical Problems:  does not have any pertinent problems on file ,  _______________________________________________________________________  Past Surgical History:   has a past surgical history that includes Colonoscopy (04/11/2018); Mammo (historical) (Bilateral, 05/04/2020); [place] tooth extraction; EGD (04/11/2018); Mammo needle localization right (all inc) (Right, 7/8/2009); and Breast excisional biopsy (Right, 2009)  ,  _______________________________________________________________________  Family History:  family history includes Arrhythmia in her father; BRCA1 Negative in her sister; Breast cancer in her maternal aunt; Breast cancer (age of onset: 44) in her sister; Breast cancer (age of onset: 79) in her mother; Cancer in her maternal aunt; Cancer (age of onset: 61) in her father; Clotting disorder in her father; Colon cancer in her maternal uncle; Diabetes in her father and mother; Esophageal cancer (age of onset: 61) in her maternal aunt;  Heart attack in her mother; Heart disease in her father; Heart failure in her father; Hyperlipidemia in her mother; Hypertension in her mother; Lung cancer in her paternal aunt; Melanoma in her father; No Known Problems in her maternal aunt, maternal grandfather, maternal grandmother, paternal grandfather, paternal grandmother, sister, and sister; Pancreatic cancer (age of onset: 61) in her maternal uncle; Sudden death in her mother ,  _______________________________________________________________________  Social History:   reports that she has never smoked  She has never used smokeless tobacco  She reports that she does not drink alcohol and does not use drugs  ,  _______________________________________________________________________  Allergies:  has No Known Allergies     _______________________________________________________________________  Current Outpatient Medications   Medication Sig Dispense Refill    aspirin 81 MG tablet Take 1 tablet by mouth daily      atorvastatin (LIPITOR) 80 mg tablet Take 1 tablet (80 mg total) by mouth daily with dinner 30 tablet 0    carvedilol (COREG) 6 25 mg tablet Take 1 tablet (6 25 mg total) by mouth 2 (two) times a day with meals 30 tablet 0    clopidogrel (PLAVIX) 75 mg tablet Take 1 tablet (75 mg total) by mouth daily 90 tablet 3    Empagliflozin 10 MG TABS Take 1 tablet (10 mg total) by mouth every morning 90 tablet 3    esomeprazole (NexIUM) 20 mg capsule Take 20 mg by mouth every morning before breakfast      furosemide (LASIX) 20 mg tablet TAKE 1 TABLET BY MOUTH DAILY AS NEEDED 90 tablet 1    metFORMIN (GLUCOPHAGE) 1000 MG tablet Take 1 tablet (1,000 mg total) by mouth 2 (two) times a day with meals 180 tablet 3    sacubitril-valsartan (ENTRESTO) 24-26 MG TABS Take 1 tablet by mouth 2 (two) times a day 60 tablet 1    Tresiba FlexTouch 100 units/mL injection pen Inject 35 Units under the skin daily 5 pen 5    NovoFine 32G X 6 MM MISC USE AS DIRECTED ONCE A DAY WITH INSULIN 30 each 5    TURMERIC PO Take 1 capsule by mouth daily       No current facility-administered medications for this visit  _______________________________________________________________________  Review of Systems   Constitutional: Positive for fatigue  Respiratory: Negative for cough and shortness of breath  Cardiovascular: Positive for leg swelling  Negative for chest pain and palpitations  Gastrointestinal: Negative  Neurological: Positive for light-headedness  Objective:  Vitals:    09/13/21 1122   BP: 118/70   BP Location: Left arm   Patient Position: Sitting   Cuff Size: Adult   Pulse: 55   Resp: 16   Temp: (!) 97 2 °F (36 2 °C)   TempSrc: Temporal   SpO2: 97%   Weight: 81 4 kg (179 lb 8 oz)   Height: 5' 6" (1 676 m)     Body mass index is 28 97 kg/m²  Physical Exam  Vitals reviewed  Constitutional:       Appearance: Normal appearance  HENT:      Head: Normocephalic and atraumatic  Right Ear: External ear normal       Left Ear: External ear normal       Nose: Nose normal       Mouth/Throat:      Mouth: Mucous membranes are moist       Pharynx: Oropharynx is clear  Eyes:      Pupils: Pupils are equal, round, and reactive to light  Cardiovascular:      Rate and Rhythm: Normal rate and regular rhythm  Heart sounds: Normal heart sounds  Pulmonary:      Effort: Pulmonary effort is normal       Breath sounds: No rales  Musculoskeletal:      Cervical back: Normal range of motion and neck supple  Right lower leg: Edema present  Left lower leg: Edema present  Skin:     Capillary Refill: Capillary refill takes less than 2 seconds  Findings: Erythema present  Neurological:      General: No focal deficit present  Mental Status: She is alert and oriented to person, place, and time  Mental status is at baseline  Psychiatric:         Mood and Affect: Mood normal          Behavior: Behavior normal          Thought Content:  Thought content normal          Judgment: Judgment normal

## 2021-09-13 NOTE — ASSESSMENT & PLAN NOTE
Is s/p HUNTER placement to LAD-is taking ASA and Plavix-I told her to please take the aspirin EVERY day not qod-she's not experiencing any chest pain or sob-will follow up with Cardiology

## 2021-09-15 ENCOUNTER — OFFICE VISIT (OUTPATIENT)
Dept: CARDIOLOGY CLINIC | Facility: CLINIC | Age: 61
End: 2021-09-15
Payer: COMMERCIAL

## 2021-09-15 ENCOUNTER — APPOINTMENT (OUTPATIENT)
Dept: LAB | Facility: CLINIC | Age: 61
End: 2021-09-15
Payer: COMMERCIAL

## 2021-09-15 VITALS
HEIGHT: 66 IN | SYSTOLIC BLOOD PRESSURE: 106 MMHG | OXYGEN SATURATION: 97 % | WEIGHT: 180.8 LBS | BODY MASS INDEX: 29.06 KG/M2 | DIASTOLIC BLOOD PRESSURE: 58 MMHG | HEART RATE: 68 BPM

## 2021-09-15 DIAGNOSIS — I25.10 CORONARY ARTERY DISEASE INVOLVING NATIVE CORONARY ARTERY OF NATIVE HEART WITHOUT ANGINA PECTORIS: ICD-10-CM

## 2021-09-15 DIAGNOSIS — I10 ESSENTIAL HYPERTENSION: ICD-10-CM

## 2021-09-15 DIAGNOSIS — R60.0 LOCALIZED EDEMA: ICD-10-CM

## 2021-09-15 DIAGNOSIS — Z09 HOSPITAL DISCHARGE FOLLOW-UP: Primary | ICD-10-CM

## 2021-09-15 DIAGNOSIS — I25.5 ISCHEMIC CARDIOMYOPATHY: ICD-10-CM

## 2021-09-15 DIAGNOSIS — I44.7 LEFT BUNDLE BRANCH BLOCK: ICD-10-CM

## 2021-09-15 DIAGNOSIS — I25.110 CORONARY ARTERY DISEASE INVOLVING NATIVE HEART WITH UNSTABLE ANGINA PECTORIS, UNSPECIFIED VESSEL OR LESION TYPE (HCC): ICD-10-CM

## 2021-09-15 DIAGNOSIS — Z95.5 STATUS POST INSERTION OF DRUG ELUTING CORONARY ARTERY STENT: ICD-10-CM

## 2021-09-15 DIAGNOSIS — E78.2 MIXED HYPERLIPIDEMIA: ICD-10-CM

## 2021-09-15 PROBLEM — I16.0 HYPERTENSIVE URGENCY: Status: RESOLVED | Noted: 2020-10-09 | Resolved: 2021-09-15

## 2021-09-15 LAB
ANION GAP SERPL CALCULATED.3IONS-SCNC: 10 MMOL/L (ref 4–13)
BUN SERPL-MCNC: 24 MG/DL (ref 5–25)
CALCIUM SERPL-MCNC: 9.5 MG/DL (ref 8.3–10.1)
CHLORIDE SERPL-SCNC: 106 MMOL/L (ref 100–108)
CO2 SERPL-SCNC: 23 MMOL/L (ref 21–32)
CREAT SERPL-MCNC: 1.13 MG/DL (ref 0.6–1.3)
ERYTHROCYTE [DISTWIDTH] IN BLOOD BY AUTOMATED COUNT: 12.5 % (ref 11.6–15.1)
GFR SERPL CREATININE-BSD FRML MDRD: 52 ML/MIN/1.73SQ M
GLUCOSE SERPL-MCNC: 159 MG/DL (ref 65–140)
HCT VFR BLD AUTO: 39.4 % (ref 34.8–46.1)
HGB BLD-MCNC: 13 G/DL (ref 11.5–15.4)
MCH RBC QN AUTO: 30.9 PG (ref 26.8–34.3)
MCHC RBC AUTO-ENTMCNC: 33 G/DL (ref 31.4–37.4)
MCV RBC AUTO: 94 FL (ref 82–98)
PLATELET # BLD AUTO: 204 THOUSANDS/UL (ref 149–390)
PMV BLD AUTO: 11.2 FL (ref 8.9–12.7)
POTASSIUM SERPL-SCNC: 4.4 MMOL/L (ref 3.5–5.3)
RBC # BLD AUTO: 4.21 MILLION/UL (ref 3.81–5.12)
SODIUM SERPL-SCNC: 139 MMOL/L (ref 136–145)
WBC # BLD AUTO: 6.26 THOUSAND/UL (ref 4.31–10.16)

## 2021-09-15 PROCEDURE — 36415 COLL VENOUS BLD VENIPUNCTURE: CPT

## 2021-09-15 PROCEDURE — 80048 BASIC METABOLIC PNL TOTAL CA: CPT

## 2021-09-15 PROCEDURE — 85027 COMPLETE CBC AUTOMATED: CPT

## 2021-09-15 PROCEDURE — 1111F DSCHRG MED/CURRENT MED MERGE: CPT | Performed by: NURSE PRACTITIONER

## 2021-09-15 PROCEDURE — 99215 OFFICE O/P EST HI 40 MIN: CPT | Performed by: NURSE PRACTITIONER

## 2021-09-15 RX ORDER — CLOPIDOGREL BISULFATE 75 MG/1
75 TABLET ORAL DAILY
Qty: 90 TABLET | Refills: 3 | Status: SHIPPED | OUTPATIENT
Start: 2021-09-15

## 2021-09-15 RX ORDER — CARVEDILOL 6.25 MG/1
6.25 TABLET ORAL 2 TIMES DAILY WITH MEALS
Qty: 60 TABLET | Refills: 2 | Status: SHIPPED | OUTPATIENT
Start: 2021-09-15 | End: 2021-12-08 | Stop reason: SDUPTHER

## 2021-09-15 RX ORDER — ATORVASTATIN CALCIUM 80 MG/1
80 TABLET, FILM COATED ORAL
Qty: 30 TABLET | Refills: 2 | Status: SHIPPED | OUTPATIENT
Start: 2021-09-15 | End: 2021-12-21 | Stop reason: SDUPTHER

## 2021-09-15 RX ORDER — NITROGLYCERIN 0.4 MG/1
0.4 TABLET SUBLINGUAL
Qty: 24 TABLET | Refills: 1 | Status: SHIPPED | OUTPATIENT
Start: 2021-09-15

## 2021-09-15 NOTE — PATIENT INSTRUCTIONS
DASH Eating Plan   WHAT YOU NEED TO KNOW:   The DASH (Dietary Approaches to Stop Hypertension) Eating Plan is designed to help prevent or lower high blood pressure  It can also help to lower LDL (bad) cholesterol and decrease your risk of heart disease  The plan is low in sodium, sugar, unhealthy fats, and total fat  It is high in potassium, calcium, magnesium, and fiber  These nutrients are added when you eat more fruits, vegetables, and whole grains  DISCHARGE INSTRUCTIONS:   Your sodium limit each day: Your dietitian will tell you how much sodium is safe for you to have each day  People with high blood pressure should have no more than 1,500 to 2,300 mg of sodium in a day  A teaspoon (tsp) of salt has 2,300 mg of sodium  This may seem like a difficult goal, but small changes to the foods you eat can make a big difference  Your healthcare provider or dietitian can help you create a meal plan that follows your sodium limit  How to limit sodium:   · Read food labels  Food labels can help you choose foods that are low in sodium  The amount of sodium is listed in milligrams (mg)  The % Daily Value (DV) column tells you how much of your daily needs are met by 1 serving of the food for each nutrient listed  Choose foods that have less than 5% of the DV of sodium  These foods are considered low in sodium  Foods that have 20% or more of the DV of sodium are considered high in sodium  Avoid foods that have more than 300 mg of sodium in each serving  Choose foods that say low-sodium, reduced-sodium, or no salt added on the food label  · Avoid salt  Do not salt food at the table, and add very little salt to foods during cooking  Use herbs and spices, such as onions, garlic, and salt-free seasonings to add flavor to foods  Try lemon or lime juice or vinegar to give foods a tart flavor  Use hot peppers or a small amount of hot pepper sauce to add a spicy flavor to foods  · Ask about salt substitutes    Ask your healthcare provider if you may use salt substitutes  Some salt substitutes have ingredients that can be harmful if you have certain health conditions  · Choose foods carefully at restaurants  Meals from restaurants, especially fast food restaurants, are often high in sodium  Some restaurants have nutrition information that tells you the amount of sodium in their foods  Ask to have your food prepared with less, or no salt  What you need to know about fats:   · Include healthy fats  Examples are unsaturated fats and omega-3 fatty acids  Unsaturated fats are found in soybean, canola, olive, or sunflower oil, and liquid and soft tub margarines  Omega-3 fatty acids are found in fatty fish, such as salmon, tuna, mackerel, and sardines  It is also found in flaxseed oil and ground flaxseed  · Avoid unhealthy fats  Do not eat unhealthy fats, such as saturated fats and trans fats  Saturated fats are found in foods that contain fat from animals  Examples are fatty meats, whole milk, butter, cream, and other dairy foods  It is also found in shortening, stick margarine, palm oil, and coconut oil  Trans fats are found in fried foods, crackers, chips, and baked goods made with margarine or shortening  Foods to include: With the DASH eating plan, you need to eat a certain number of servings from each food group  This will help you get enough of certain nutrients and limit others  The amount of servings you should eat depends on how many calories you need  Your dietitian can tell you how many calories you need  The number of servings listed next to the food groups below are for people who need about 2,000 calories each day  · Grains:  6 to 8 servings (3 of these servings should be whole-grain foods)    ? 1 slice of whole-grain bread    ? 1 ounce of dry cereal    ? ½ cup of cooked cereal, pasta, or brown rice    · Vegetables and fruits:  4 to 5 servings of fruits and 4 to 5 servings of vegetables    ?  1 medium fruit    ? ½ cup of frozen, canned (no added salt), or chopped fresh vegetables    ? ½ cup of fresh, frozen, dried, or canned fruit (canned in light syrup or fruit juice)    ? ½ cup of vegetable or fruit juice    · Dairy:  2 to 3 servings    ? 1 cup of nonfat (skim) or 1% milk    ? 1½ ounces of fat-free or low-fat cheese    ? 6 ounces of nonfat or low-fat yogurt    · Lean meat, poultry, and fish:  6 ounces or less    ? Poultry (chicken, turkey) with no skin    ? Fish (especially fatty fish, such as salmon, fresh tuna, or mackerel)    ? Lean beef and pork (loin, round, extra lean hamburger)    ? Egg whites and egg substitutes    · Nuts, seeds, and legumes:  4 to 5 servings each week    ? ½ cup of cooked beans and peas    ? 1½ ounces of unsalted nuts    ? 2 tablespoons of peanut butter or seeds    · Sweets and added sugars:  5 or less each week    ? 1 tablespoon of sugar, jelly, or jam    ? ½ cup of sorbet or gelatin    ? 1 cup of lemonade    · Fats:  2 to 3 servings each week    ? 1 teaspoon of soft margarine or vegetable oil    ? 1 tablespoon of mayonnaise    ? 2 tablespoons of salad dressing    Foods to avoid:   · Grains:      ? Baked goods, such as doughnuts, pastries, cookies, and biscuits (high in fat and sugar)    ? Mixes for cornbread and biscuits, packaged foods, such as bread stuffing, rice and pasta mixes, macaroni and cheese, and instant cereals (high in sodium)    · Fruits and vegetables:      ? Regular, canned vegetables (high in sodium)    ? Sauerkraut, pickled vegetables, and other foods prepared in brine (high in sodium)    ? Fried vegetables or vegetables in butter or high-fat sauces    ? Fruit in cream or butter sauce (high in fat)    · Dairy:      ? Whole milk, 2% milk, and cream (high in fat)    ?  Regular cheese and processed cheese (high in fat and sodium)    · Meats and protein foods:      ? Smoked or cured meat, such as corned beef, bolanos, ham, hot dogs, and sausage (high in fat and sodium)    ? Canned beans and canned meats or spreads, such as potted meats, sardines, anchovies, and imitation seafood (high in sodium)    ? Deli or lunch meats, such as bologna, ham, turkey, and roast beef (high in sodium)    ? High-fat meat (T-bone steak, regular hamburger, and ribs)    ? Whole eggs and egg yolks (high in fat)    · Other:      ? Seasonings made with salt, such as garlic salt, celery salt, onion salt, seasoned salt, meat tenderizers, and monosodium glutamate (MSG)    ? Miso soup and canned or dried soup mixes (high in sodium)    ? Regular soy sauce, barbecue sauce, teriyaki sauce, steak sauce, Worcestershire sauce, and most flavored vinegars (high in sodium)    ? Regular condiments, such as mustard, ketchup, and salad dressings (high in sodium)    ? Gravy and sauces, such as Quan or cheese sauces (high in sodium and fat)    ? Drinks high in sugar, such as soda or fruit drinks    ? Snack foods, such as salted chips, popcorn, pretzels, pork rinds, salted crackers, and salted nuts    ? Frozen foods, such as dinners, entrees, vegetables with sauces, and breaded meats (high in sodium)    Other guidelines to follow:   · Maintain a healthy weight  Your risk for heart disease is higher if you are overweight  Your healthcare provider may suggest that you lose weight if you are overweight  You can lose weight by eating fewer calories and foods that have added sugars and fat  The DASH meal plan can help you do this  Decrease calories by eating smaller portions at each meal and fewer snacks  Ask your healthcare provider for more information about how to lose weight  · Exercise regularly  Regular exercise can help you reach or maintain a healthy weight  Regular exercise can also help decrease your blood pressure and improve your cholesterol levels  Get 30 minutes or more of moderate exercise each day of the week  To lose weight, get at least 60 minutes of exercise   Talk to your healthcare provider about the best exercise program for you  · Limit alcohol  Women should limit alcohol to 1 drink a day  Men should limit alcohol to 2 drinks a day  A drink of alcohol is 12 ounces of beer, 5 ounces of wine, or 1½ ounces of liquor  © Copyright [employer] 2021 Information is for End User's use only and may not be sold, redistributed or otherwise used for commercial purposes  All illustrations and images included in CareNotes® are the copyrighted property of [employer]  or [facility] [place]   The above information is an  only  It is not intended as medical advice for individual conditions or treatments  Talk to your doctor, nurse or pharmacist before following any medical regimen to see if it is safe and effective for you

## 2021-09-15 NOTE — LETTER
September 15, 2021     Umair Weiss MD  [address] [facility]    Patient: Kenrick Campos   YOB: 1960   Date of Visit: 9/15/2021       Dear Dr Malaika Werner:    Thank you for referring Kenrick Campos to me for evaluation  Below are my notes for this consultation  If you have questions, please do not hesitate to call me  I look forward to following your patient along with you  Sincerely,        ASH Gagnon        CC: MD Guera Posada CRNP  9/15/2021  1:34 PM  Incomplete  Cardiology   Heart Failure Follow Up   Office Visit Note  Kenrick Campos   59 y o    female   MRN: [id number]  [address]  [address]  [address] [id number] [phone]    PCP: Charlie Washington MD  Cardiologist: Dr Tiki Zamudio            Summary of recommendations  Low-sodium diet; Heart failure education provided  Fasting lipid profile 8 weeks  CBC, BMP today or onel  Cardiac rehab has been prescribed recommended  Medical adherence to dual antiplatelet therapy reinforced  aggressive risk factor modification  consider screening for sleep apnea if her daytime somnolence persists  Rx sl NTG with instructions for use  Follow up will be scheduled with Dr Tiki Zamudio 6 weeks        Assessment/plan  CAD,S/p CAD with calcified 90% mid LAD lesion,  s/p overlapping HUNTER , adm 8/29-9/3/21  Residual 70%-80% ostial 2nd diagonal, medically managed   On aspirin,  Plavix ,statin, beta-blocker   Cardiac rehab has been prescribed and recommended   Adherence to dual antiplatelet therapy reinforc  ischemic cardiomyopathy, new onset  HfrEF newly diagnosed  EF 37%, secondary to ischemia  - dry weight around 180 lb  Wt Readings from Last 3 Encounters:   09/13/21 81 4 kg (179 lb 8 oz)   08/31/21 84 2 kg (185 lb 10 oz)   08/30/21 88 6 kg (195 lb 5 2 oz)     --Beta-blocker:   Carvedilol 6 25 mg b i d   --Diuretic:   Lasix 20 mg daily p r n     For weight gain  --ACE/ARB/ARNI:   Adrianne Renae 24/26 q 12h  --MRA  Hypertension, essential   BP  106/58 on carvedilol, Entresto  Hyperlipidemia, mix  LDL 64, non HDL  88  Now on atorvastatin 80 mg daily for Interventional Cardiology; previously simvastatin 40 mg  Heart healthy diet  Reassess lipids 8 weeks  She may not need atorvastatin 80 but more likely 40  Goal LDL  Less than 50 given her severe CAD, and uncontrolled diabetes  Type 2 diabetes mellitus  Hemoglobin A1c 10 2 8/29/ 21  On metformin, insulin, per Endocrinology  Family history premature CAD father MI 45  Right paraclinoid meningioma 1 2 x 1 5 x 1 7 cm diffusely enhancing extra-axial mass right paraclinoid mass   Cardiac testing  · Stress echocardiogram 09/28/2017:  Cannot rule out stress-induced ischemia of the mid-distal inferoseptal wall  · EKG stress test 11/11/2019:  Doni protocol x 6 mins;  achieving 7 Mets and 87% of MPHR  no ischemia after maximal exercise  No reproduction of symptoms  · TTE  8/30/21 EF 37% with hypokinesis of the mid anterior, basal-mid anteroseptal, basal-mid inferoseptal, entire inferior and apical septal wall, G1DD  Normal RV size and function  Mild-moderate MR   Cardiac catheterization 9/1/21  Left main: The vessel was large sized  Angiography showed minor luminal irregularities  LAD: The vessel was medium sized  Mid LAD: There was a tubular 90 % stenosis just after D1  The lesion was heavily calcified and without evidence of thrombus  There was BANDAR grade 3 flow through the vessel (brisk flow)  This is a likely culprit for the patient's clinical presentation  It appears amenable to percutaneous intervention  In a second lesion, there was a discrete 80 % stenosis at the origin of S2  The lesion was mildly calcified and without evidence of thrombus  There was BANDAR grade 3 flow through the vessel (brisk flow)  2nd diagonal: There was a tubular 70 % stenosis at the ostium of the vessel segment  Circumflex:  The vessel was medium sized  Angiography showed mild atherosclerosis  RCA: The vessel was medium to large sized  Angiography showed mild atherosclerosis  ·  cardiac catheterization 9/2/21 (Right radial access)  Successful OCT-guided PCI, mid LAD, with successful treatment of two lesions mid LAD with overlapping drug-eluting stents                VIVIEN  Chela Trent is a 58 yo female with uncontrolled type 2 diabetes, current Hg A1c 10 2, mixed hyperlipidemia, hypertension and a strong family history premature CAD; She is a lifelong nonsmoker  Home medication regimen includes aspirin 81 mg daily, Lasix 20 mg PRN, lisinopril 5 mg daily, simvastatin 40 mg daily  She follows with Dr Kayce Pizarro  She underwent an EKG stress test November 2019 that showed no ischemia after maximal exercise  Adm 8/29-9/3/21 she initially presented to [facility] with dizziness, with a background of dyspnea on exertion  She was severely hypertensive, mildly volume overloaded  She underwent a stroke pathway on admission  Brain imaging revealed a benign meningioma, for which neurosurgery recommended outpatient follow-up, no urgent surgery     An echocardiogram demonstrated a reduced ejection fraction, 37% with global hypokinesis with regional variations, presumed new onset  Her NT proBNP was 1033  She was followed by Cardiology  She was diuresed  Her ACE-inhibitor was held  Her EKG demonstrated sinus rhythm with a new left bundle branch block  Troponins were unremarkable  Hemoglobin A1c 10 2  Initial left heart catheterization at [facility] revealed a high risk LAD lesion, and an 80% ostial D2 lesion  Vannessa Oneal She was transferred to [facility] for high risk PCI  September 2nd she underwent PCI deployment of 2 overlapping drug-eluting stents to the mid LAD with good result  She was placed on dual antiplatelet therapy with aspirin and Plavix  Lisinopril and Januvia were discontinued    She was started on guideline directed medical therapy with Entresto,   Carvedilol and Jardiance  Aggressive risk factor modification was recommended; she was followed by endocrinology for her uncontrolled diabetes  Neurosurgery recommended visual field testing as an outpatient with ophthalmology before Neurosurgery follow-up in a few weeks  Discharge weight 185lb September 1st  Discharge creatinine  0 81  Discharge diuretic Lasix 20 mg PRN    9/15/21  Hospital follow-up  She is accompanied by her sister  Overall she is feeling better  No chest pain, shortness of breath, lightheadedness or dizziness  Historically she has daytime somnolence  She is going to assess whether this is improved and discussed with her cardiologist if it persists  She is compliant with her medications-we were reviewed them  In the past she was on aspirin 81 mg every other day  She is now taking it daily  She has Lasix 20 mg to use p r n  for lower extremity edema/ weight gain  She does have some volume on board today  I think she may benefit from using it twice a week for a few weeks  She has normal renal function baseline    She is amenable to a CBC, BMP today  Wt 179 lb  We discussed cardiac rehab  She is engaged  We discussed importance of a heart healthy diet, low sodium  We reviewed how to read food labels to facilitate adherence  In the past, she was eating Ramen noodles  I have spent 40 minutes with Patient and family today in which greater than 50% of this time was spent in counseling/coordination of care regarding Diagnostic results, Intructions for management, Patient and family education, Importance of tx compliance and Risk factor reductions    Assessment  Diagnoses and all orders for this visit:    Hospital discharge follow-up    Coronary artery disease involving native coronary artery of native heart without angina pectoris    Status post insertion of drug eluting coronary artery stent    New Ischemic Cardiomyopathy/Systolic CHF    Left bundle branch block    Essential hypertension    Mixed hyperlipidemia          Past Medical History:   Diagnosis Date    Colon polyp     Dizziness     Edema     Hyperlipidemia     Hypertension     Type 2 diabetes mellitus (HCC)     Vitamin D deficiency     Wears glasses        Review of Systems   Constitutional: Negative for chills  Cardiovascular: Negative for chest pain, claudication, cyanosis, dyspnea on exertion, irregular heartbeat, leg swelling, near-syncope, orthopnea, palpitations, paroxysmal nocturnal dyspnea and syncope  Respiratory: Negative for cough and shortness of breath  Gastrointestinal: Negative for heartburn and nausea  Neurological: Negative for dizziness, focal weakness, headaches, light-headedness and weakness  All other systems reviewed and are negative  No Known Allergies        Current Outpatient Medications:     aspirin 81 MG tablet, Take 1 tablet by mouth daily, Disp: , Rfl:     atorvastatin (LIPITOR) 80 mg tablet, Take 1 tablet (80 mg total) by mouth daily with dinner, Disp: 30 tablet, Rfl: 0    Blood Pressure Monitor KIT, Use 2 (two) times a day, Disp: 1 kit, Rfl: 0    carvedilol (COREG) 6 25 mg tablet, Take 1 tablet (6 25 mg total) by mouth 2 (two) times a day with meals, Disp: 30 tablet, Rfl: 0    clopidogrel (PLAVIX) 75 mg tablet, Take 1 tablet (75 mg total) by mouth daily, Disp: 90 tablet, Rfl: 3    Continuous Blood Gluc  (FreeStyle Feng 14 Day Lucas) CORBY, USE AS DIRECTED, Disp: 2 each, Rfl: 5    Continuous Blood Gluc Sensor (FreeStyle Feng 14 Day Sensor) MISC, USE AS DIRECTED, Disp: 2 each, Rfl: 5    Empagliflozin 10 MG TABS, Take 1 tablet (10 mg total) by mouth every morning, Disp: 90 tablet, Rfl: 3    esomeprazole (NexIUM) 20 mg capsule, Take 20 mg by mouth every morning before breakfast, Disp: , Rfl:     furosemide (LASIX) 20 mg tablet, TAKE 1 TABLET BY MOUTH DAILY AS NEEDED, Disp: 90 tablet, Rfl: 1    metFORMIN (GLUCOPHAGE) 1000 MG tablet, Take 1 tablet (1,000 mg total) by mouth 2 (two) times a day with meals, Disp: 180 tablet, Rfl: 3    NovoFine 32G X 6 MM MISC, USE AS DIRECTED ONCE A DAY WITH INSULIN, Disp: 30 each, Rfl: 5    sacubitril-valsartan (ENTRESTO) 24-26 MG TABS, Take 1 tablet by mouth 2 (two) times a day, Disp: 60 tablet, Rfl: 1    Tresiba FlexTouch 100 units/mL injection pen, Inject 35 Units under the skin daily, Disp: 5 pen, Rfl: 5    TURMERIC PO, Take 1 capsule by mouth daily, Disp: , Rfl:         Social History     Socioeconomic History    Marital status: Single     Spouse name: Not on file    Number of children: Not on file    Years of education: Not on file    Highest education level: Not on file   Occupational History    Occupation: deed recorder   Tobacco Use    Smoking status: Never Smoker    Smokeless tobacco: Never Used   Vaping Use    Vaping Use: Never used   Substance and Sexual Activity    Alcohol use: No    Drug use: No    Sexual activity: Not Currently   Other Topics Concern    Not on file   Social History Narrative    Do you currently or have you served in the [employer]: No    Were you activated, into active duty, as a member of the [email or web address] or as a Reservist: No    Occupation:     Marital status: Single    Exercise level: None    Diet: Regular    General stress level: Medium    Alcohol intake: None    Caffeine intake: Moderate    1 cup of coffee in the morning    Chewing tobacco: none    Illicit drugs: none    Guns present in home: No    Seat belts used routinely: Yes    Sunscreen used routinely: Yes    Smoke alarm in home: Yes    Advance directive: Yes     Social Determinants of Health     Financial Resource Strain:     Difficulty of Paying Living Expenses:    Food Insecurity:     Worried About Running Out of Food in the Last Year:     Ran Out of Food in the Last Year:    Transportation Needs:     Lack of Transportation (Medical):      Lack of Transportation (Non-Medical):    Physical Activity:     Days of Exercise per Week:     Minutes of Exercise per Session:    Stress:     Feeling of Stress :    Social Connections:     Frequency of Communication with Friends and Family:     Frequency of Social Gatherings with Friends and Family:     Attends [employer] Services:     Active Member of Clubs or Organizations:     Attends Club or Organization Meetings:     Marital Status:    Intimate Partner Violence:     Fear of Current or Ex-Partner:     Emotionally Abused:     Physically Abused:     Sexually Abused:        Family History   Problem Relation Age of Onset    Diabetes Mother     Hypertension Mother     Sudden death Mother         SCD    Hyperlipidemia Mother     Heart attack Mother     Breast cancer Mother 79    Diabetes Father     Arrhythmia Father         pacemaker/ defib    Clotting disorder Father         eliquis    Heart failure Father     Heart disease Father     Melanoma Father     Cancer Father 61        bladder cancer    Breast cancer Sister 44    BRCA1 Negative Sister     No Known Problems Maternal Grandmother     No Known Problems Maternal Grandfather     No Known Problems Paternal Grandmother     No Known Problems Paternal Grandfather     No Known Problems Sister     No Known Problems Sister     Breast cancer Maternal Aunt         unknown age   Traore Esophageal cancer Maternal Aunt 61    Cancer Maternal Aunt         unknown age or type    No Known Problems Maternal Aunt     Lung cancer Paternal Aunt         unknonw age- in her [de-identified]    Colon cancer Maternal Uncle         unknwon age   Traore Pancreatic cancer Maternal Uncle 61    Anuerysm Neg Hx        Physical Exam  Vitals and nursing note reviewed  Constitutional:       General: She is not in acute distress  Appearance: She is not diaphoretic  HENT:      Head: Normocephalic and atraumatic     Eyes:      Conjunctiva/sclera: Conjunctivae normal    Cardiovascular:      Rate and Rhythm: Normal rate and regular rhythm  Pulses: Intact distal pulses  Heart sounds: Normal heart sounds  Pulmonary:      Effort: Pulmonary effort is normal       Breath sounds: Normal breath sounds  Abdominal:      General: Bowel sounds are normal       Palpations: Abdomen is soft  Musculoskeletal:         General: Normal range of motion  Cervical back: Normal range of motion and neck supple  Skin:     General: Skin is warm and dry  Neurological:      Mental Status: She is alert and oriented to person, place, and time  Vitals: not currently breastfeeding  Wt Readings from Last 3 Encounters:   09/13/21 81 4 kg (179 lb 8 oz)   08/31/21 84 2 kg (185 lb 10 oz)   08/30/21 88 6 kg (195 lb 5 2 oz)         Labs & Results:  Lab Results   Component Value Date    WBC 6 05 08/31/2021    HGB 13 5 08/31/2021    HCT 41 2 08/31/2021    MCV 92 08/31/2021     08/31/2021     No results found for: BNP  No components found for: CHEM  Troponin I   Date Value Ref Range Status   08/30/2021 <0 02 <=0 04 ng/mL Final     Comment:     Siemens Chemistry analyzer 99% cutoff is > 0 04 ng/mL in network labs     o cTnI 99% cutoff is useful only when applied to patients in the clinical setting of myocardial ischemia   o cTnI 99% cutoff should be interpreted in the context of clinical history, ECG findings and possibly cardiac imaging to establish correct diagnosis     o cTnI 99% cutoff may be suggestive but clearly not indicative of a coronary event without the clinical setting of myocardial ischemia      08/29/2021 <0 02 <=0 04 ng/mL Final     Comment:     Autovalidation override  Siemens Chemistry analyzer 99% cutoff is > 0 04 ng/mL in network labs     o cTnI 99% cutoff is useful only when applied to patients in the clinical setting of myocardial ischemia   o cTnI 99% cutoff should be interpreted in the context of clinical history, ECG findings and possibly cardiac imaging to establish correct diagnosis  o cTnI 99% cutoff may be suggestive but clearly not indicative of a coronary event without the clinical setting of myocardial ischemia      2021 <0 02 <=0 04 ng/mL Final     Comment:     Siemens Chemistry analyzer 99% cutoff is > 0 04 ng/mL in network labs     o cTnI 99% cutoff is useful only when applied to patients in the clinical setting of myocardial ischemia   o cTnI 99% cutoff should be interpreted in the context of clinical history, ECG findings and possibly cardiac imaging to establish correct diagnosis  o cTnI 99% cutoff may be suggestive but clearly not indicative of a coronary event without the clinical setting of myocardial ischemia  Results for orders placed during the hospital encounter of 21    Echo complete with contrast if indicated    Narrative  [facility], [address] [facility]  [phone]    Transthoracic Echocardiogram  2D, M-mode, Doppler, and Color Doppler    Study date:  30-Aug-2021    Patient: Jorge Luis Mcneal  MR number: [id number]  Account number: [de-identified]  : 1960  Age: 59 years  Gender: Female  Status: Outpatient  Location: Emergency room  Height: 66 in  Weight: 195 lb  BP: 166/ 77 mmHg    Indications: CVA  Diagnoses: I63 9 - Cerebral infarction, unspecified    Sonographer:  JESSIE Riggs  Primary Physician:  Ca Vance MD  Referring Physician:  Leticia Castellano  Group:  [facility]  Interpreting Physician:  Chula Jacobson MD    SUMMARY    LEFT VENTRICLE:  Systolic function was moderately reduced  Ejection fraction was estimated to be 37 %  There was hypokinesis of the mid anterior, basal-mid anteroseptal, basal-mid inferoseptal, entire inferior, and apical septal wall(s)  Doppler parameters were consistent with abnormal left ventricular relaxation (grade 1 diastolic dysfunction)  VENTRICULAR SEPTUM:  There was mild dyssynergic motion   These changes are consistent with LBBB     LEFT ATRIUM:  The atrium was mildly dilated  MITRAL VALVE:  There was mild to moderate regurgitation  AORTIC VALVE:  There was trace regurgitation  TRICUSPID VALVE:  There was trace regurgitation  PERICARDIUM:  A very small pericardial effusion was identified anterior to the heart  The fluid had no internal echoes  HISTORY: PRIOR HISTORY: Hyperlipidemia, LBBB, Abnormal Stress Echo, Hypertension, Lower Leg Edema, Diabetes Mellitus II  PROCEDURE: The procedure was performed in the emergency room  This was a routine study  The transthoracic approach was used  The study included complete 2D imaging, M-mode, complete spectral Doppler, and color Doppler  The heart rate was  70 bpm, at the start of the study  Images were obtained from the parasternal, apical, subcostal, and suprasternal notch acoustic windows  Image quality was adequate  LEFT VENTRICLE: Size was normal  Systolic function was moderately reduced  Ejection fraction was estimated to be 37 %  There was hypokinesis of the mid anterior, basal-mid anteroseptal, basal-mid inferoseptal, entire inferior, and apical  septal wall(s)  Wall thickness was normal  DOPPLER: Doppler parameters were consistent with abnormal left ventricular relaxation (grade 1 diastolic dysfunction)  VENTRICULAR SEPTUM: There was mild dyssynergic motion  These changes are consistent with LBBB  RIGHT VENTRICLE: The size was normal  Systolic function was normal  Wall thickness was normal     LEFT ATRIUM: The atrium was mildly dilated  RIGHT ATRIUM: Size was normal     MITRAL VALVE: Valve structure was normal  There was normal leaflet separation  DOPPLER: The transmitral velocity was within the normal range  There was no evidence for stenosis  There was mild to moderate regurgitation  AORTIC VALVE: The valve was trileaflet  Leaflets exhibited normal thickness and normal cuspal separation  DOPPLER: Transaortic velocity was within the normal range   There was no evidence for stenosis  There was trace regurgitation  TRICUSPID VALVE: The valve structure was normal  There was normal leaflet separation  DOPPLER: The transtricuspid velocity was within the normal range  There was no evidence for stenosis  There was trace regurgitation  PULMONIC VALVE: Leaflets exhibited normal thickness, no calcification, and normal cuspal separation  DOPPLER: The transpulmonic velocity was within the normal range  There was no significant regurgitation  PERICARDIUM: A very small pericardial effusion was identified anterior to the heart  The fluid had no internal echoes  The pericardium was normal in appearance  AORTA: The root exhibited normal size  SYSTEMIC VEINS: IVC: The inferior vena cava was normal in size  SYSTEM MEASUREMENT TABLES    2D  %FS: 17 9 %  Ao Diam: 2 86 cm  EDV(Teich): 106 05 ml  EF(Teich): 37 18 %  ESV(Teich): 66 62 ml  HR_2Ch_Q: 69 77 BPM  HR_4Ch_Q: 69 1 BPM  IVSd: 0 91 cm  LA Area: 18 23 cm2  LA Diam: 3 96 cm  LVCO_2Ch_Q: 3 91 L/min  LVCO_4Ch_Q: 3 24 L/min  LVCO_BiP_Q: 3 58 L/min  LVEDV MOD A4C: 122 92 ml  LVEF MOD A4C: 38 87 %  LVEF_2Ch_Q: 36 48 %  LVEF_4Ch_Q: 37 23 %  LVEF_BiP_Q: 37 25 %  LVESV MOD A4C: 75 14 ml  LVIDd: 4 77 cm  LVIDs: 3 92 cm  LVLd A4C: 8 14 cm  LVLd_2Ch_Q: 8 14 cm  LVLd_4Ch_Q: 7 56 cm  LVLs A4C: 6 97 cm  LVLs_2Ch_Q: 7 04 cm  LVLs_4Ch_Q: 6 71 cm  LVPWd: 0 91 cm  LVSV_2Ch_Q: 56 ml  LVSV_4Ch_Q: 46 95 ml  LVSV_BiP_Q: 52 19 ml  LVVED_2Ch_Q: 153 53 ml  LVVED_4Ch_Q: 126 09 ml  LVVED_BiP_Q: 140 11 ml  LVVES_2Ch_Q: 97 52 ml  LVVES_4Ch_Q: 79 14 ml  LVVES_BiP_Q: 87 91 ml  RA Area: 14 cm2  RVIDd: 3 29 cm  SV MOD A4C: 47 78 ml  SV(Teich): 39 44 ml    CF  MR Als  Marko: 0 42 m/s  MR Flow: 40 81 ml/s  MR Rad: 0 39 cm    CW  AV MaxP 23 mmHg  AV Vmax: 1 34 m/s  MR VTI: 234 05 cm  MR Vmax: 5 73 m/s  MR Vmean: 4 59 m/s  MR maxP 57 mmHg  MR meanP 94 mmHg  TR MaxP 21 mmHg  TR Vmax: 1 82 m/s    MM  TAPSE: 2 59 cm    PW  E' Sept: 0 05 m/s  E/E' Sept:  24  LVOT Vmax: 0 85 m/s  LVOT maxP 89 mmHg  MV A Marko: 1 13 m/s  MV Dec Jerauld: 3 87 m/s2  MV DecT: 168 95 ms  MV E Marko: 0 65 m/s  MV E/A Ratio: 0 58  MV PHT: 49 ms  MVA By PHT: 4 49 cm2    [address] Accredited Echocardiography Laboratory    Prepared and electronically signed by    Casa Huerta MD  Signed 31-Aug-2021 08:45:43    No results found for this or any previous visit  This note was completed in part utilizing [employer] direct voice recognition software  Grammatical errors, random word insertion, spelling mistakes, and incomplete sentences may be an occasional consequence of the system secondary to software limitations, ambient noise and hardware issues  At the time of dictation, efforts were made to edit, clarify and /or correct errors  Please read the chart carefully and recognize, using context, where substitutions have occurred  If you have any questions or concerns about the context, text or information contained within the body of this dictation, please contact myself, the provider, for further clarification        ASH Alan  9/15/2021  1:34 PM  Sign when Signing Visit  Cardiology   Heart Failure Follow Up   Office Visit Note  David Presume   59 y o    female   MRN: [id number]  [address]  [address] [facility] [address] [facility] [address] [phone]    PCP: Salome Vega MD  Cardiologist: Dr Eduardo Solano            Summary of recommendations  Low-sodium diet;  Heart failure education provided  Fasting lipid profile 8 weeks  CBC, BMP today or onel  Cardiac rehab has been prescribed recommended  Medical adherence to dual antiplatelet therapy reinforced  aggressive risk factor modification   consider screening for sleep apnea if her daytime somnolence persists  Follow up will be scheduled with Dr Eduardo Solano 6 weeks        Assessment/plan  CAD,S/p CAD with calcified 90% mid LAD lesion,  s/p overlapping HUNTER , adm 8/29-9/3/21  Residual 70%-80% ostial 2nd diagonal, medically managed   On aspirin,  Plavix ,statin, beta-blocker   Cardiac rehab has been prescribed and recommended   Adherence to dual antiplatelet therapy reinforc  ischemic cardiomyopathy, new onset  HfrEF newly diagnosed  EF 37%, secondary to ischemia  - dry weight around 180 lb  Wt Readings from Last 3 Encounters:   09/13/21 81 4 kg (179 lb 8 oz)   08/31/21 84 2 kg (185 lb 10 oz)   08/30/21 88 6 kg (195 lb 5 2 oz)     --Beta-blocker:   Carvedilol 6 25 mg b i d   --Diuretic:   Lasix 20 mg daily p r n     For weight gain  --ACE/ARB/ARNI:   Ivjess Mitchell 24/26 q 12h  --MRA  Hypertension, essential   BP  106/58 on carvedilol, Entresto  Hyperlipidemia, mix  LDL 64, non HDL  88  Now on atorvastatin 80 mg daily for Interventional Cardiology; previously simvastatin 40 mg  Heart healthy diet  Reassess lipids 8 weeks  She may not need atorvastatin 80 but more likely 40  Goal LDL  Less than 50 given her severe CAD, and uncontrolled diabetes  Type 2 diabetes mellitus  Hemoglobin A1c 10 2 8/29/ 21  On metformin, insulin, per Endocrinology  Family history premature CAD father MI 45  Right paraclinoid meningioma 1 2 x 1 5 x 1 7 cm diffusely enhancing extra-axial mass right paraclinoid mass   Cardiac testing  · Stress echocardiogram 09/28/2017:  Cannot rule out stress-induced ischemia of the mid-distal inferoseptal wall  · EKG stress test 11/11/2019:  Doni protocol x 6 mins;  achieving 7 Mets and 87% of MPHR  no ischemia after maximal exercise  No reproduction of symptoms  · TTE  8/30/21 EF 37% with hypokinesis of the mid anterior, basal-mid anteroseptal, basal-mid inferoseptal, entire inferior and apical septal wall, G1DD  Normal RV size and function  Mild-moderate MR   Cardiac catheterization 9/1/21  Left main: The vessel was large sized  Angiography showed minor luminal irregularities  LAD: The vessel was medium sized    Mid LAD: There was a tubular 90 % stenosis just after D1  The lesion was heavily calcified and without evidence of thrombus  There was BANDAR grade 3 flow through the vessel (brisk flow)  This is a likely culprit for the patient's clinical presentation  It appears amenable to percutaneous intervention  In a second lesion, there was a discrete 80 % stenosis at the origin of S2  The lesion was mildly calcified and without evidence of thrombus  There was BANDAR grade 3 flow through the vessel (brisk flow)  2nd diagonal: There was a tubular 70 % stenosis at the ostium of the vessel segment  Circumflex: The vessel was medium sized  Angiography showed mild atherosclerosis  RCA: The vessel was medium to large sized  Angiography showed mild atherosclerosis  ·  cardiac catheterization 9/2/21 (Right radial access)  Successful OCT-guided PCI, mid LAD, with successful treatment of two lesions mid LAD with overlapping drug-eluting stents                HPI  Juan Francisco Lanier is a 60 yo female with uncontrolled type 2 diabetes, current Hg A1c 10 2, mixed hyperlipidemia, hypertension and a strong family history premature CAD; She is a lifelong nonsmoker  Home medication regimen includes aspirin 81 mg daily, Lasix 20 mg PRN, lisinopril 5 mg daily, simvastatin 40 mg daily  She follows with Dr Sravanthi Wilkerson  She underwent an EKG stress test November 2019 that showed no ischemia after maximal exercise  Adm 8/29-9/3/21 she initially presented to [address] [place] [address] with dizziness, with a background of dyspnea on exertion  She was severely hypertensive, mildly volume overloaded  She underwent a stroke pathway on admission  Brain imaging revealed a benign meningioma, for which neurosurgery recommended outpatient follow-up, no urgent surgery     An echocardiogram demonstrated a reduced ejection fraction, 37% with global hypokinesis with regional variations, presumed new onset  Her NT proBNP was 1033  She was followed by Cardiology  She was diuresed   Her ACE-inhibitor was held  Her EKG demonstrated sinus rhythm with a new left bundle branch block  Troponins were unremarkable  Hemoglobin A1c 10 2  Initial left heart catheterization at [facility] revealed a high risk LAD lesion, and an 80% ostial D2 lesion  Leonor Varela She was transferred to [facility] for high risk PCI  September 2nd she underwent PCI deployment of 2 overlapping drug-eluting stents to the mid LAD with good result  She was placed on dual antiplatelet therapy with aspirin and Plavix  Lisinopril and Januvia were discontinued  She was started on guideline directed medical therapy with Entresto,   Carvedilol and Jardiance  Aggressive risk factor modification was recommended; she was followed by endocrinology for her uncontrolled diabetes  Neurosurgery recommended visual field testing as an outpatient with ophthalmology before Neurosurgery follow-up in a few weeks  Discharge weight 185lb September 1st  Discharge creatinine  0 81  Discharge diuretic Lasix 20 mg PRN    9/15/21  Hospital follow-up  She is accompanied by her sister  Overall she is feeling better  No chest pain, shortness of breath, lightheadedness or dizziness  Historically she has daytime somnolence  She is going to assess whether this is improved and discussed with her cardiologist if it persists  She is compliant with her medications-we were reviewed them  In the past she was on aspirin 81 mg every other day  She is now taking it daily  She has Lasix 20 mg to use p r n  for lower extremity edema/ weight gain  She does have some volume on board today  I think she may benefit from using it twice a week for a few weeks  She has normal renal function baseline    She is amenable to a CBC, BMP today  Wt 179 lb  We discussed cardiac rehab  She is engaged  We discussed importance of a heart healthy diet, low sodium  We reviewed how to read food labels to facilitate adherence  In the past, she was eating Ramen noodles      I have spent 40 minutes with Patient and family today in which greater than 50% of this time was spent in counseling/coordination of care regarding Diagnostic results, Intructions for management, Patient and family education, Importance of tx compliance and Risk factor reductions  Assessment  Diagnoses and all orders for this visit:    Hospital discharge follow-up    Coronary artery disease involving native coronary artery of native heart without angina pectoris    Status post insertion of drug eluting coronary artery stent    New Ischemic Cardiomyopathy/Systolic CHF    Left bundle branch block    Essential hypertension    Mixed hyperlipidemia          Past Medical History:   Diagnosis Date    Colon polyp     Dizziness     Edema     Hyperlipidemia     Hypertension     Type 2 diabetes mellitus (HCC)     Vitamin D deficiency     Wears glasses        Review of Systems   Constitutional: Negative for chills  Cardiovascular: Negative for chest pain, claudication, cyanosis, dyspnea on exertion, irregular heartbeat, leg swelling, near-syncope, orthopnea, palpitations, paroxysmal nocturnal dyspnea and syncope  Respiratory: Negative for cough and shortness of breath  Gastrointestinal: Negative for heartburn and nausea  Neurological: Negative for dizziness, focal weakness, headaches, light-headedness and weakness  All other systems reviewed and are negative  No Known Allergies        Current Outpatient Medications:     aspirin 81 MG tablet, Take 1 tablet by mouth daily, Disp: , Rfl:     atorvastatin (LIPITOR) 80 mg tablet, Take 1 tablet (80 mg total) by mouth daily with dinner, Disp: 30 tablet, Rfl: 0    Blood Pressure Monitor KIT, Use 2 (two) times a day, Disp: 1 kit, Rfl: 0    carvedilol (COREG) 6 25 mg tablet, Take 1 tablet (6 25 mg total) by mouth 2 (two) times a day with meals, Disp: 30 tablet, Rfl: 0    clopidogrel (PLAVIX) 75 mg tablet, Take 1 tablet (75 mg total) by mouth daily, Disp: 90 tablet, Rfl: 3    Continuous Blood Gluc  (FreeStyle Fegn 14 Day [place]) CORBY, USE AS DIRECTED, Disp: 2 each, Rfl: 5    Continuous Blood Gluc Sensor (FreeStyle Feng 14 Day Sensor) MISC, USE AS DIRECTED, Disp: 2 each, Rfl: 5    Empagliflozin 10 MG TABS, Take 1 tablet (10 mg total) by mouth every morning, Disp: 90 tablet, Rfl: 3    esomeprazole (NexIUM) 20 mg capsule, Take 20 mg by mouth every morning before breakfast, Disp: , Rfl:     furosemide (LASIX) 20 mg tablet, TAKE 1 TABLET BY MOUTH DAILY AS NEEDED, Disp: 90 tablet, Rfl: 1    metFORMIN (GLUCOPHAGE) 1000 MG tablet, Take 1 tablet (1,000 mg total) by mouth 2 (two) times a day with meals, Disp: 180 tablet, Rfl: 3    NovoFine 32G X 6 MM MISC, USE AS DIRECTED ONCE A DAY WITH INSULIN, Disp: 30 each, Rfl: 5    sacubitril-valsartan (ENTRESTO) 24-26 MG TABS, Take 1 tablet by mouth 2 (two) times a day, Disp: 60 tablet, Rfl: 1    Tresiba FlexTouch 100 units/mL injection pen, Inject 35 Units under the skin daily, Disp: 5 pen, Rfl: 5    TURMERIC PO, Take 1 capsule by mouth daily, Disp: , Rfl:         Social History     Socioeconomic History    Marital status: Single     Spouse name: Not on file    Number of children: Not on file    Years of education: Not on file    Highest education level: Not on file   Occupational History    Occupation: deed recorder   Tobacco Use    Smoking status: Never Smoker    Smokeless tobacco: Never Used   Vaping Use    Vaping Use: Never used   Substance and Sexual Activity    Alcohol use: No    Drug use: No    Sexual activity: Not Currently   Other Topics Concern    Not on file   Social History Narrative    Do you currently or have you served in the [employer]: No    Were you activated, into active duty, as a member of the [employer] or as a Reservist: No    Occupation:     Marital status: Single    Exercise level: None    Diet: Regular    General stress level: Medium    Alcohol intake: None Caffeine intake: Moderate    1 cup of coffee in the morning    Chewing tobacco: none    Illicit drugs: none    Guns present in home: No    Seat belts used routinely: Yes    Sunscreen used routinely: Yes    Smoke alarm in home: Yes    Advance directive: Yes     Social Determinants of Health     Financial Resource Strain:     Difficulty of Paying Living Expenses:    Food Insecurity:     Worried About Running Out of Food in the Last Year:     Ran Out of Food in the Last Year:    Transportation Needs:     Lack of Transportation (Medical):      Lack of Transportation (Non-Medical):    Physical Activity:     Days of Exercise per Week:     Minutes of Exercise per Session:    Stress:     Feeling of Stress :    Social Connections:     Frequency of Communication with Friends and Family:     Frequency of Social Gatherings with Friends and Family:     Attends Anabaptist Services:     Active Member of Clubs or Organizations:     Attends Club or Organization Meetings:     Marital Status:    Intimate Partner Violence:     Fear of Current or Ex-Partner:     Emotionally Abused:     Physically Abused:     Sexually Abused:        Family History   Problem Relation Age of Onset    Diabetes Mother     Hypertension Mother     Sudden death Mother         SCD    Hyperlipidemia Mother     Heart attack Mother     Breast cancer Mother 79    Diabetes Father     Arrhythmia Father         pacemaker/ defib    Clotting disorder Father         eliquis    Heart failure Father     Heart disease Father     Melanoma Father     Cancer Father 61        bladder cancer    Breast cancer Sister 44    BRCA1 Negative Sister     No Known Problems Maternal Grandmother     No Known Problems Maternal Grandfather     No Known Problems Paternal Grandmother     No Known Problems Paternal Grandfather     No Known Problems Sister     No Known Problems Sister     Breast cancer Maternal Aunt         unknown age   [facility] Esophageal cancer Maternal Aunt 61    Cancer Maternal Aunt         unknown age or type    No Known Problems Maternal Aunt     Lung cancer Paternal Aunt         unknonw age- in her [de-identified]    Colon cancer Maternal Uncle         unknwon age   [facility] Pancreatic cancer Maternal Uncle 61    Anuerysm Neg Hx        Physical Exam  Vitals and nursing note reviewed  Constitutional:       General: She is not in acute distress  Appearance: She is not diaphoretic  HENT:      Head: Normocephalic and atraumatic  Eyes:      Conjunctiva/sclera: Conjunctivae normal    Cardiovascular:      Rate and Rhythm: Normal rate and regular rhythm  Pulses: Intact distal pulses  Heart sounds: Normal heart sounds  Pulmonary:      Effort: Pulmonary effort is normal       Breath sounds: Normal breath sounds  Abdominal:      General: Bowel sounds are normal       Palpations: Abdomen is soft  Musculoskeletal:         General: Normal range of motion  Cervical back: Normal range of motion and neck supple  Skin:     General: Skin is warm and dry  Neurological:      Mental Status: She is alert and oriented to person, place, and time  Vitals: not currently breastfeeding     Wt Readings from Last 3 Encounters:   21 81 4 kg (179 lb 8 oz)   21 84 2 kg (185 lb 10 oz)   21 88 6 kg (195 lb 5 2 oz)         Labs & Results:  Lab Results   Component Value Date    WBC 6 05 2021    HGB 13 5 2021    HCT 41 2 2021    MCV 92 2021     2021     No results found for: BNP  No components found for: CHEM  Troponin I   Date Value Ref Range Status   2021 <0 02 <=0 04 ng/mL Final     Comment:     Siemens Chemistry analyzer 99% cutoff is > 0 04 ng/mL in network labs     o cTnI 99% cutoff is useful only when applied to patients in the clinical setting of myocardial ischemia   o cTnI 99% cutoff should be interpreted in the context of clinical history, ECG findings and possibly cardiac imaging to establish correct diagnosis  o cTnI 99% cutoff may be suggestive but clearly not indicative of a coronary event without the clinical setting of myocardial ischemia      2021 <0 02 <=0 04 ng/mL Final     Comment:     Autovalidation override  Siemens Chemistry analyzer 99% cutoff is > 0 04 ng/mL in network labs     o cTnI 99% cutoff is useful only when applied to patients in the clinical setting of myocardial ischemia   o cTnI 99% cutoff should be interpreted in the context of clinical history, ECG findings and possibly cardiac imaging to establish correct diagnosis  o cTnI 99% cutoff may be suggestive but clearly not indicative of a coronary event without the clinical setting of myocardial ischemia      2021 <0 02 <=0 04 ng/mL Final     Comment:     Siemens Chemistry analyzer 99% cutoff is > 0 04 ng/mL in network labs     o cTnI 99% cutoff is useful only when applied to patients in the clinical setting of myocardial ischemia   o cTnI 99% cutoff should be interpreted in the context of clinical history, ECG findings and possibly cardiac imaging to establish correct diagnosis  o cTnI 99% cutoff may be suggestive but clearly not indicative of a coronary event without the clinical setting of myocardial ischemia  Results for orders placed during the hospital encounter of 21    Echo complete with contrast if indicated    Narrative  [facility] [facility]  [phone]    Transthoracic Echocardiogram  2D, M-mode, Doppler, and Color Doppler    Study date:  30-Aug-2021    Patient: Leola Toro  MR number: [id number]  Account number: [de-identified]  : 1960  Age: 59 years  Gender: Female  Status: Outpatient  Location: Emergency room  Height: 66 in  Weight: 195 lb  BP: 166/ 77 mmHg    Indications: CVA      Diagnoses: I63 9 - Cerebral infarction, unspecified    Sonographer:  JESSIE Rubin  Primary Physician:  Julisa Obrien MD  Referring Physician: [facility]  Group:  [id number]  Interpreting Physician:  Ninfa Galvez MD    SUMMARY    LEFT VENTRICLE:  Systolic function was moderately reduced  Ejection fraction was estimated to be 37 %  There was hypokinesis of the mid anterior, basal-mid anteroseptal, basal-mid inferoseptal, entire inferior, and apical septal wall(s)  Doppler parameters were consistent with abnormal left ventricular relaxation (grade 1 diastolic dysfunction)  VENTRICULAR SEPTUM:  There was mild dyssynergic motion  These changes are consistent with LBBB  LEFT ATRIUM:  The atrium was mildly dilated  MITRAL VALVE:  There was mild to moderate regurgitation  AORTIC VALVE:  There was trace regurgitation  TRICUSPID VALVE:  There was trace regurgitation  PERICARDIUM:  A very small pericardial effusion was identified anterior to the heart  The fluid had no internal echoes  HISTORY: PRIOR HISTORY: Hyperlipidemia, LBBB, Abnormal Stress Echo, Hypertension, Lower Leg Edema, Diabetes Mellitus II  PROCEDURE: The procedure was performed in the emergency room  This was a routine study  The transthoracic approach was used  The study included complete 2D imaging, M-mode, complete spectral Doppler, and color Doppler  The heart rate was  70 bpm, at the start of the study  Images were obtained from the parasternal, apical, subcostal, and suprasternal notch acoustic windows  Image quality was adequate  LEFT VENTRICLE: Size was normal  Systolic function was moderately reduced  Ejection fraction was estimated to be 37 %  There was hypokinesis of the mid anterior, basal-mid anteroseptal, basal-mid inferoseptal, entire inferior, and apical  septal wall(s)  Wall thickness was normal  DOPPLER: Doppler parameters were consistent with abnormal left ventricular relaxation (grade 1 diastolic dysfunction)  VENTRICULAR SEPTUM: There was mild dyssynergic motion  These changes are consistent with LBBB      RIGHT VENTRICLE: The size was normal  Systolic function was normal  Wall thickness was normal     LEFT ATRIUM: The atrium was mildly dilated  RIGHT ATRIUM: Size was normal     MITRAL VALVE: Valve structure was normal  There was normal leaflet separation  DOPPLER: The transmitral velocity was within the normal range  There was no evidence for stenosis  There was mild to moderate regurgitation  AORTIC VALVE: The valve was trileaflet  Leaflets exhibited normal thickness and normal cuspal separation  DOPPLER: Transaortic velocity was within the normal range  There was no evidence for stenosis  There was trace regurgitation  TRICUSPID VALVE: The valve structure was normal  There was normal leaflet separation  DOPPLER: The transtricuspid velocity was within the normal range  There was no evidence for stenosis  There was trace regurgitation  PULMONIC VALVE: Leaflets exhibited normal thickness, no calcification, and normal cuspal separation  DOPPLER: The transpulmonic velocity was within the normal range  There was no significant regurgitation  PERICARDIUM: A very small pericardial effusion was identified anterior to the heart  The fluid had no internal echoes  The pericardium was normal in appearance  AORTA: The root exhibited normal size  SYSTEMIC VEINS: IVC: The inferior vena cava was normal in size      SYSTEM MEASUREMENT TABLES    2D  %FS: 17 9 %  Ao Diam: 2 86 cm  EDV(Teich): 106 05 ml  EF(Teich): 37 18 %  ESV(Teich): 66 62 ml  HR_2Ch_Q: 69 77 BPM  HR_4Ch_Q: 69 1 BPM  IVSd: 0 91 cm  LA Area: 18 23 cm2  LA Diam: 3 96 cm  LVCO_2Ch_Q: 3 91 L/min  LVCO_4Ch_Q: 3 24 L/min  LVCO_BiP_Q: 3 58 L/min  LVEDV MOD A4C: 122 92 ml  LVEF MOD A4C: 38 87 %  LVEF_2Ch_Q: 36 48 %  LVEF_4Ch_Q: 37 23 %  LVEF_BiP_Q: 37 25 %  LVESV MOD A4C: 75 14 ml  LVIDd: 4 77 cm  LVIDs: 3 92 cm  LVLd A4C: 8 14 cm  LVLd_2Ch_Q: 8 14 cm  LVLd_4Ch_Q: 7 56 cm  LVLs A4C: 6 97 cm  LVLs_2Ch_Q: 7 04 cm  LVLs_4Ch_Q: 6 71 cm  LVPWd: 0 91 cm  LVSV_2Ch_Q: 56 ml  LVSV_4Ch_Q: 46 95 ml  LVSV_BiP_Q: 52 19 ml  LVVED_2Ch_Q: 153 53 ml  LVVED_4Ch_Q: 126 09 ml  LVVED_BiP_Q: 140 11 ml  LVVES_2Ch_Q: 97 52 ml  LVVES_4Ch_Q: 79 14 ml  LVVES_BiP_Q: 87 91 ml  RA Area: 14 cm2  RVIDd: 3 29 cm  SV MOD A4C: 47 78 ml  SV(Teich): 39 44 ml    CF  MR Als  Marko: 0 42 m/s  MR Flow: 40 81 ml/s  MR Rad: 0 39 cm    CW  AV MaxP 23 mmHg  AV Vmax: 1 34 m/s  MR VTI: 234 05 cm  MR Vmax: 5 73 m/s  MR Vmean: 4 59 m/s  MR maxP 57 mmHg  MR meanP 94 mmHg  TR MaxP 21 mmHg  TR Vmax: 1 82 m/s    MM  TAPSE: 2 59 cm    PW  E' Sept: 0 05 m/s  E/E' Sept: 13 24  LVOT Vmax: 0 85 m/s  LVOT maxP 89 mmHg  MV A Marko: 1 13 m/s  MV Dec Lynn: 3 87 m/s2  MV DecT: 168 95 ms  MV E Marko: 0 65 m/s  MV E/A Ratio: 0 58  MV PHT: 49 ms  MVA By PHT: 4 49 cm2    [employer] Accredited [facility]    Prepared and electronically signed by    Debbi Maldonado MD  Signed 31-Aug-2021 08:45:43    No results found for this or any previous visit  This note was completed in part utilizing m-modal fluency direct voice recognition software  Grammatical errors, random word insertion, spelling mistakes, and incomplete sentences may be an occasional consequence of the system secondary to software limitations, ambient noise and hardware issues  At the time of dictation, efforts were made to edit, clarify and /or correct errors  Please read the chart carefully and recognize, using context, where substitutions have occurred    If you have any questions or concerns about the context, text or information contained within the body of this dictation, please contact myself, the provider, for further clarification

## 2021-09-15 NOTE — PROGRESS NOTES
Cardiology   Heart Failure Follow Up   Office Visit Note  Daylin Campoverde   59 y o    female   MRN: [id number]  [address]  [address] [place]  [place] [address] [facility] [place] [address]  [phone]    PCP: Marleen Albarado MD  Cardiologist: Dr Cornelius Oropeza            Summary of recommendations  Low-sodium diet; Heart failure education provided  Fasting lipid profile 8 weeks  CBC, BMP today or onel  Cardiac rehab has been prescribed recommended  Medical adherence to dual antiplatelet therapy reinforced  aggressive risk factor modification  consider screening for sleep apnea if her daytime somnolence persists  Rx sl NTG with instructions for use  Follow up will be scheduled with Dr Cornelius Oropeza 6 weeks        Assessment/plan  CAD,S/p CAD with calcified 90% mid LAD lesion,  s/p overlapping HUNTER , adm 8/29-9/3/21  Residual 70%-80% ostial 2nd diagonal, medically managed   On aspirin,  Plavix ,statin, beta-blocker   Cardiac rehab has been prescribed and recommended   Adherence to dual antiplatelet therapy reinforc  ischemic cardiomyopathy, new onset  HfrEF newly diagnosed  EF 37%, secondary to ischemia  - dry weight around 180 lb  Wt Readings from Last 3 Encounters:   09/13/21 81 4 kg (179 lb 8 oz)   08/31/21 84 2 kg (185 lb 10 oz)   08/30/21 88 6 kg (195 lb 5 2 oz)     --Beta-blocker:   Carvedilol 6 25 mg b i d   --Diuretic:   Lasix 20 mg daily p r n     For weight gain  --ACE/ARB/ARNI:   Fawn Pineda 24/26 q 12h  --MRA  Hypertension, essential   BP  106/58 on carvedilol, Entresto  Hyperlipidemia, mix  LDL 64, non HDL  88  Now on atorvastatin 80 mg daily for Interventional Cardiology; previously simvastatin 40 mg  Heart healthy diet  Reassess lipids 8 weeks  She may not need atorvastatin 80 but more likely 40  Goal LDL  Less than 50 given her severe CAD, and uncontrolled diabetes  Type 2 diabetes mellitus  Hemoglobin A1c 10 2 8/29/ 21    On metformin, insulin, per Endocrinology  Family history premature CAD father MI 45  Right paraclinoid meningioma 1 2 x 1 5 x 1 7 cm diffusely enhancing extra-axial mass right paraclinoid mass   Cardiac testing  · Stress echocardiogram 09/28/2017:  Cannot rule out stress-induced ischemia of the mid-distal inferoseptal wall  · EKG stress test 11/11/2019:  Doni protocol x 6 mins;  achieving 7 Mets and 87% of MPHR  no ischemia after maximal exercise  No reproduction of symptoms  · TTE  8/30/21 EF 37% with hypokinesis of the mid anterior, basal-mid anteroseptal, basal-mid inferoseptal, entire inferior and apical septal wall, G1DD  Normal RV size and function  Mild-moderate MR   Cardiac catheterization 9/1/21  Left main: The vessel was large sized  Angiography showed minor luminal irregularities  LAD: The vessel was medium sized  Mid LAD: There was a tubular 90 % stenosis just after D1  The lesion was heavily calcified and without evidence of thrombus  There was BANDAR grade 3 flow through the vessel (brisk flow)  This is a likely culprit for the patient's clinical presentation  It appears amenable to percutaneous intervention  In a second lesion, there was a discrete 80 % stenosis at the origin of S2  The lesion was mildly calcified and without evidence of thrombus  There was BANDAR grade 3 flow through the vessel (brisk flow)  2nd diagonal: There was a tubular 70 % stenosis at the ostium of the vessel segment  Circumflex: The vessel was medium sized  Angiography showed mild atherosclerosis  RCA: The vessel was medium to large sized  Angiography showed mild atherosclerosis  ·  cardiac catheterization 9/2/21 (Right radial access)  Successful OCT-guided PCI, mid LAD, with successful treatment of two lesions mid LAD with overlapping drug-eluting stents                HPI  Gordon Echeverria is a 58 yo female with uncontrolled type 2 diabetes, current Hg A1c 10 2, mixed hyperlipidemia, hypertension and a strong family history premature CAD; She is a lifelong nonsmoker  Home medication regimen includes aspirin 81 mg daily, Lasix 20 mg PRN, lisinopril 5 mg daily, simvastatin 40 mg daily  She follows with Dr Trang Acevedo  She underwent an EKG stress test November 2019 that showed no ischemia after maximal exercise  Adm 8/29-9/3/21 she initially presented to [address] [place] [address] with dizziness, with a background of dyspnea on exertion  She was severely hypertensive, mildly volume overloaded  She underwent a stroke pathway on admission  Brain imaging revealed a benign meningioma, for which neurosurgery recommended outpatient follow-up, no urgent surgery     An echocardiogram demonstrated a reduced ejection fraction, 37% with global hypokinesis with regional variations, presumed new onset  Her NT proBNP was 1033  She was followed by Cardiology  She was diuresed  Her ACE-inhibitor was held  Her EKG demonstrated sinus rhythm with a new left bundle branch block  Troponins were unremarkable  Hemoglobin A1c 10 2  Initial left heart catheterization at [facility] revealed a high risk LAD lesion, and an 80% ostial D2 lesion  [facility] She was transferred to [facility] for high risk PCI  September 2nd she underwent PCI deployment of 2 overlapping drug-eluting stents to the mid LAD with good result  She was placed on dual antiplatelet therapy with aspirin and Plavix  Lisinopril and Januvia were discontinued  She was started on guideline directed medical therapy with Entresto,   Carvedilol and Jardiance  Aggressive risk factor modification was recommended; she was followed by endocrinology for her uncontrolled diabetes  Neurosurgery recommended visual field testing as an outpatient with ophthalmology before Neurosurgery follow-up in a few weeks  Discharge weight 185lb September 1st  Discharge creatinine  0 81  Discharge diuretic Lasix 20 mg PRN    9/15/21  Hospital follow-up  She is accompanied by her sister  Overall she is feeling better    No chest pain, shortness of breath, lightheadedness or dizziness  Historically she has daytime somnolence  She is going to assess whether this is improved and discussed with her cardiologist if it persists  She is compliant with her medications-we were reviewed them  In the past she was on aspirin 81 mg every other day  She is now taking it daily  She has Lasix 20 mg to use p r n  for lower extremity edema/ weight gain  She does have some volume on board today  I think she may benefit from using it twice a week for a few weeks  She has normal renal function baseline    She is amenable to a CBC, BMP today  Wt 179 lb  We discussed cardiac rehab  She is engaged  We discussed importance of a heart healthy diet, low sodium  We reviewed how to read food labels to facilitate adherence  In the past, she was eating Ramen noodles  I have spent 40 minutes with Patient and family today in which greater than 50% of this time was spent in counseling/coordination of care regarding Diagnostic results, Intructions for management, Patient and family education, Importance of tx compliance and Risk factor reductions  Assessment  Diagnoses and all orders for this visit:    Hospital discharge follow-up    Coronary artery disease involving native coronary artery of native heart without angina pectoris    Status post insertion of drug eluting coronary artery stent    New Ischemic Cardiomyopathy/Systolic CHF    Left bundle branch block    Essential hypertension    Mixed hyperlipidemia          Past Medical History:   Diagnosis Date    Colon polyp     Dizziness     Edema     Hyperlipidemia     Hypertension     Type 2 diabetes mellitus (HCC)     Vitamin D deficiency     Wears glasses        Review of Systems   Constitutional: Negative for chills  Cardiovascular: Negative for chest pain, claudication, cyanosis, dyspnea on exertion, irregular heartbeat, leg swelling, near-syncope, orthopnea, palpitations, paroxysmal nocturnal dyspnea and syncope  Respiratory: Negative for cough and shortness of breath  Gastrointestinal: Negative for heartburn and nausea  Neurological: Negative for dizziness, focal weakness, headaches, light-headedness and weakness  All other systems reviewed and are negative  No Known Allergies        Current Outpatient Medications:     aspirin 81 MG tablet, Take 1 tablet by mouth daily, Disp: , Rfl:     atorvastatin (LIPITOR) 80 mg tablet, Take 1 tablet (80 mg total) by mouth daily with dinner, Disp: 30 tablet, Rfl: 0    Blood Pressure Monitor KIT, Use 2 (two) times a day, Disp: 1 kit, Rfl: 0    carvedilol (COREG) 6 25 mg tablet, Take 1 tablet (6 25 mg total) by mouth 2 (two) times a day with meals, Disp: 30 tablet, Rfl: 0    clopidogrel (PLAVIX) 75 mg tablet, Take 1 tablet (75 mg total) by mouth daily, Disp: 90 tablet, Rfl: 3    Continuous Blood Gluc  (FreeStyle Feng 14 Day [place]) CORBY, USE AS DIRECTED, Disp: 2 each, Rfl: 5    Continuous Blood Gluc Sensor (FreeStyle Feng 14 Day Sensor) MISC, USE AS DIRECTED, Disp: 2 each, Rfl: 5    Empagliflozin 10 MG TABS, Take 1 tablet (10 mg total) by mouth every morning, Disp: 90 tablet, Rfl: 3    esomeprazole (NexIUM) 20 mg capsule, Take 20 mg by mouth every morning before breakfast, Disp: , Rfl:     furosemide (LASIX) 20 mg tablet, TAKE 1 TABLET BY MOUTH DAILY AS NEEDED, Disp: 90 tablet, Rfl: 1    metFORMIN (GLUCOPHAGE) 1000 MG tablet, Take 1 tablet (1,000 mg total) by mouth 2 (two) times a day with meals, Disp: 180 tablet, Rfl: 3    NovoFine 32G X 6 MM MISC, USE AS DIRECTED ONCE A DAY WITH INSULIN, Disp: 30 each, Rfl: 5    sacubitril-valsartan (ENTRESTO) 24-26 MG TABS, Take 1 tablet by mouth 2 (two) times a day, Disp: 60 tablet, Rfl: 1    Tresiba FlexTouch 100 units/mL injection pen, Inject 35 Units under the skin daily, Disp: 5 pen, Rfl: 5    TURMERIC PO, Take 1 capsule by mouth daily, Disp: , Rfl:         Social History     Socioeconomic History    Marital status: Single     Spouse name: Not on file    Number of children: Not on file    Years of education: Not on file    Highest education level: Not on file   Occupational History    Occupation: deed recorder   Tobacco Use    Smoking status: Never Smoker    Smokeless tobacco: Never Used   Vaping Use    Vaping Use: Never used   Substance and Sexual Activity    Alcohol use: No    Drug use: No    Sexual activity: Not Currently   Other Topics Concern    Not on file   Social History Narrative    Do you currently or have you served in the [employer]: No    Were you activated, into active duty, as a member of the [employer] or as a Reservist: No    Occupation:     Marital status: Single    Exercise level: None    Diet: Regular    General stress level: Medium    Alcohol intake: None    Caffeine intake: Moderate    1 cup of coffee in the morning    Chewing tobacco: none    Illicit drugs: none    Guns present in home: No    Seat belts used routinely: Yes    Sunscreen used routinely: Yes    Smoke alarm in home: Yes    Advance directive: Yes     Social Determinants of Health     Financial Resource Strain:     Difficulty of Paying Living Expenses:    Food Insecurity:     Worried About Running Out of Food in the Last Year:     Ran Out of Food in the Last Year:    Transportation Needs:     Lack of Transportation (Medical):      Lack of Transportation (Non-Medical):    Physical Activity:     Days of Exercise per Week:     Minutes of Exercise per Session:    Stress:     Feeling of Stress :    Social Connections:     Frequency of Communication with Friends and Family:     Frequency of Social Gatherings with Friends and Family:     Attends Muslim Services:     Active Member of Clubs or Organizations:     Attends Club or Organization Meetings:     Marital Status:    Intimate Partner Violence:     Fear of Current or Ex-Partner:     Emotionally Abused:     Physically Abused:     Sexually Abused:        Family History   Problem Relation Age of Onset    Diabetes Mother     Hypertension Mother     Sudden death Mother         SCD    Hyperlipidemia Mother     Heart attack Mother     Breast cancer Mother 79    Diabetes Father     Arrhythmia Father         pacemaker/ defib    Clotting disorder Father         eliquis    Heart failure Father     Heart disease Father     Melanoma Father     Cancer Father 61        bladder cancer    Breast cancer Sister 44    BRCA1 Negative Sister     No Known Problems Maternal Grandmother     No Known Problems Maternal Grandfather     No Known Problems Paternal Grandmother     No Known Problems Paternal Grandfather     No Known Problems Sister     No Known Problems Sister     Breast cancer Maternal Aunt         unknown age   Maldonado Pallavi Esophageal cancer Maternal Aunt 61    Cancer Maternal Aunt         unknown age or type    No Known Problems Maternal Aunt     Lung cancer Paternal Aunt         unknonw age- in her [de-identified]    Colon cancer Maternal Uncle         unknwon age   Maldonado Pallavi Pancreatic cancer Maternal Uncle 61    Anuerysm Neg Hx        Physical Exam  Vitals and nursing note reviewed  Constitutional:       General: She is not in acute distress  Appearance: She is not diaphoretic  HENT:      Head: Normocephalic and atraumatic  Eyes:      Conjunctiva/sclera: Conjunctivae normal    Cardiovascular:      Rate and Rhythm: Normal rate and regular rhythm  Pulses: Intact distal pulses  Heart sounds: Normal heart sounds  Pulmonary:      Effort: Pulmonary effort is normal       Breath sounds: Normal breath sounds  Abdominal:      General: Bowel sounds are normal       Palpations: Abdomen is soft  Musculoskeletal:         General: Normal range of motion  Cervical back: Normal range of motion and neck supple  Skin:     General: Skin is warm and dry     Neurological:      Mental Status: She is alert and oriented to person, place, and time          Vitals: not currently breastfeeding  Wt Readings from Last 3 Encounters:   09/13/21 81 4 kg (179 lb 8 oz)   08/31/21 84 2 kg (185 lb 10 oz)   08/30/21 88 6 kg (195 lb 5 2 oz)         Labs & Results:  Lab Results   Component Value Date    WBC 6 05 08/31/2021    HGB 13 5 08/31/2021    HCT 41 2 08/31/2021    MCV 92 08/31/2021     08/31/2021     No results found for: BNP  No components found for: CHEM  Troponin I   Date Value Ref Range Status   08/30/2021 <0 02 <=0 04 ng/mL Final     Comment:     Siemens Chemistry analyzer 99% cutoff is > 0 04 ng/mL in network labs     o cTnI 99% cutoff is useful only when applied to patients in the clinical setting of myocardial ischemia   o cTnI 99% cutoff should be interpreted in the context of clinical history, ECG findings and possibly cardiac imaging to establish correct diagnosis  o cTnI 99% cutoff may be suggestive but clearly not indicative of a coronary event without the clinical setting of myocardial ischemia      08/29/2021 <0 02 <=0 04 ng/mL Final     Comment:     Autovalidation override  Siemens Chemistry analyzer 99% cutoff is > 0 04 ng/mL in network labs     o cTnI 99% cutoff is useful only when applied to patients in the clinical setting of myocardial ischemia   o cTnI 99% cutoff should be interpreted in the context of clinical history, ECG findings and possibly cardiac imaging to establish correct diagnosis  o cTnI 99% cutoff may be suggestive but clearly not indicative of a coronary event without the clinical setting of myocardial ischemia      08/29/2021 <0 02 <=0 04 ng/mL Final     Comment:     Siemens Chemistry analyzer 99% cutoff is > 0 04 ng/mL in network labs     o cTnI 99% cutoff is useful only when applied to patients in the clinical setting of myocardial ischemia   o cTnI 99% cutoff should be interpreted in the context of clinical history, ECG findings and possibly cardiac imaging to establish correct diagnosis     o cTnI 99% cutoff may be suggestive but clearly not indicative of a coronary event without the clinical setting of myocardial ischemia  Results for orders placed during the hospital encounter of 21    Echo complete with contrast if indicated    Narrative  [address], [address] [facility]  [phone]    Transthoracic Echocardiogram  2D, M-mode, Doppler, and Color Doppler    Study date:  30-Aug-2021    Patient: Dillan Maza  MR number: [id number]  Account number: [de-identified]  : 1960  Age: 59 years  Gender: Female  Status: Outpatient  Location: Emergency room  Height: 66 in  Weight: 195 lb  BP: 166/ 77 mmHg    Indications: CVA  Diagnoses: I63 9 - Cerebral infarction, unspecified    Sonographer:  JESSIE Briceño  Primary Physician:  Camille Lazcano MD  Referring Physician:  Jasvir Liu  Group:  [facility]  Interpreting Physician:  Virgil Troy MD    SUMMARY    LEFT VENTRICLE:  Systolic function was moderately reduced  Ejection fraction was estimated to be 37 %  There was hypokinesis of the mid anterior, basal-mid anteroseptal, basal-mid inferoseptal, entire inferior, and apical septal wall(s)  Doppler parameters were consistent with abnormal left ventricular relaxation (grade 1 diastolic dysfunction)  VENTRICULAR SEPTUM:  There was mild dyssynergic motion  These changes are consistent with LBBB  LEFT ATRIUM:  The atrium was mildly dilated  MITRAL VALVE:  There was mild to moderate regurgitation  AORTIC VALVE:  There was trace regurgitation  TRICUSPID VALVE:  There was trace regurgitation  PERICARDIUM:  A very small pericardial effusion was identified anterior to the heart  The fluid had no internal echoes  HISTORY: PRIOR HISTORY: Hyperlipidemia, LBBB, Abnormal Stress Echo, Hypertension, Lower Leg Edema, Diabetes Mellitus II  PROCEDURE: The procedure was performed in the emergency room  This was a routine study   The transthoracic approach was used  The study included complete 2D imaging, M-mode, complete spectral Doppler, and color Doppler  The heart rate was  70 bpm, at the start of the study  Images were obtained from the parasternal, apical, subcostal, and suprasternal notch acoustic windows  Image quality was adequate  LEFT VENTRICLE: Size was normal  Systolic function was moderately reduced  Ejection fraction was estimated to be 37 %  There was hypokinesis of the mid anterior, basal-mid anteroseptal, basal-mid inferoseptal, entire inferior, and apical  septal wall(s)  Wall thickness was normal  DOPPLER: Doppler parameters were consistent with abnormal left ventricular relaxation (grade 1 diastolic dysfunction)  VENTRICULAR SEPTUM: There was mild dyssynergic motion  These changes are consistent with LBBB  RIGHT VENTRICLE: The size was normal  Systolic function was normal  Wall thickness was normal     LEFT ATRIUM: The atrium was mildly dilated  RIGHT ATRIUM: Size was normal     MITRAL VALVE: Valve structure was normal  There was normal leaflet separation  DOPPLER: The transmitral velocity was within the normal range  There was no evidence for stenosis  There was mild to moderate regurgitation  AORTIC VALVE: The valve was trileaflet  Leaflets exhibited normal thickness and normal cuspal separation  DOPPLER: Transaortic velocity was within the normal range  There was no evidence for stenosis  There was trace regurgitation  TRICUSPID VALVE: The valve structure was normal  There was normal leaflet separation  DOPPLER: The transtricuspid velocity was within the normal range  There was no evidence for stenosis  There was trace regurgitation  PULMONIC VALVE: Leaflets exhibited normal thickness, no calcification, and normal cuspal separation  DOPPLER: The transpulmonic velocity was within the normal range  There was no significant regurgitation      PERICARDIUM: A very small pericardial effusion was identified anterior to the heart  The fluid had no internal echoes  The pericardium was normal in appearance  AORTA: The root exhibited normal size  SYSTEMIC VEINS: IVC: The inferior vena cava was normal in size  SYSTEM MEASUREMENT TABLES    2D  %FS: 17 9 %  Ao Diam: 2 86 cm  EDV(Teich): 106 05 ml  EF(Teich): 37 18 %  ESV(Teich): 66 62 ml  HR_2Ch_Q: 69 77 BPM  HR_4Ch_Q: 69 1 BPM  IVSd: 0 91 cm  LA Area: 18 23 cm2  LA Diam: 3 96 cm  LVCO_2Ch_Q: 3 91 L/min  LVCO_4Ch_Q: 3 24 L/min  LVCO_BiP_Q: 3 58 L/min  LVEDV MOD A4C: 122 92 ml  LVEF MOD A4C: 38 87 %  LVEF_2Ch_Q: 36 48 %  LVEF_4Ch_Q: 37 23 %  LVEF_BiP_Q: 37 25 %  LVESV MOD A4C: 75 14 ml  LVIDd: 4 77 cm  LVIDs: 3 92 cm  LVLd A4C: 8 14 cm  LVLd_2Ch_Q: 8 14 cm  LVLd_4Ch_Q: 7 56 cm  LVLs A4C: 6 97 cm  LVLs_2Ch_Q: 7 04 cm  LVLs_4Ch_Q: 6 71 cm  LVPWd: 0 91 cm  LVSV_2Ch_Q: 56 ml  LVSV_4Ch_Q: 46 95 ml  LVSV_BiP_Q: 52 19 ml  LVVED_2Ch_Q: 153 53 ml  LVVED_4Ch_Q: 126 09 ml  LVVED_BiP_Q: 140 11 ml  LVVES_2Ch_Q: 97 52 ml  LVVES_4Ch_Q: 79 14 ml  LVVES_BiP_Q: 87 91 ml  RA Area: 14 cm2  RVIDd: 3 29 cm  SV MOD A4C: 47 78 ml  SV(Teich): 39 44 ml    CF  MR Als  Marko: 0 42 m/s  MR Flow: 40 81 ml/s  MR Rad: 0 39 cm    CW  AV MaxP 23 mmHg  AV Vmax: 1 34 m/s  MR VTI: 234 05 cm  MR Vmax: 5 73 m/s  MR Vmean: 4 59 m/s  MR maxP 57 mmHg  MR meanP 94 mmHg  TR MaxP 21 mmHg  TR Vmax: 1 82 m/s    MM  TAPSE: 2 59 cm    PW  E' Sept: 0 05 m/s  E/E' Sept: 13 24  LVOT Vmax: 0 85 m/s  LVOT maxP 89 mmHg  MV A Marko: 1 13 m/s  MV Dec [place]: 3 87 m/s2  MV DecT: 168 95 ms  MV E Marko: 0 65 m/s  MV E/A Ratio: 0 58  MV PHT: 49 ms  MVA By PHT: 4 49 cm2    [employer] Accredited Ech[facility]    Prepared and electronically signed by    Louise Cole MD  Signed 31-Aug-2021 08:45:43    No results found for this or any previous visit  This note was completed in part utilizing m-modal fluency direct voice recognition software     Grammatical errors, random word insertion, spelling mistakes, and incomplete sentences may be an occasional consequence of the system secondary to software limitations, ambient noise and hardware issues  At the time of dictation, efforts were made to edit, clarify and /or correct errors  Please read the chart carefully and recognize, using context, where substitutions have occurred    If you have any questions or concerns about the context, text or information contained within the body of this dictation, please contact myself, the provider, for further clarification

## 2021-09-17 DIAGNOSIS — Z79.4 CONTROLLED TYPE 2 DIABETES MELLITUS WITHOUT COMPLICATION, WITH LONG-TERM CURRENT USE OF INSULIN (HCC): ICD-10-CM

## 2021-09-17 DIAGNOSIS — E11.9 CONTROLLED TYPE 2 DIABETES MELLITUS WITHOUT COMPLICATION, WITH LONG-TERM CURRENT USE OF INSULIN (HCC): ICD-10-CM

## 2021-09-17 RX ORDER — INSULIN DEGLUDEC INJECTION 100 U/ML
35 INJECTION, SOLUTION SUBCUTANEOUS DAILY
Qty: 15 ML | Refills: 3 | Status: SHIPPED | OUTPATIENT
Start: 2021-09-17 | End: 2021-10-26 | Stop reason: SDUPTHER

## 2021-09-20 PROCEDURE — B240ZZ3 ULTRASONOGRAPHY OF SINGLE CORONARY ARTERY, INTRAVASCULAR: ICD-10-PCS | Performed by: INTERNAL MEDICINE

## 2021-09-22 ENCOUNTER — OFFICE VISIT (OUTPATIENT)
Dept: NEUROSURGERY | Facility: CLINIC | Age: 61
End: 2021-09-22
Payer: COMMERCIAL

## 2021-09-22 ENCOUNTER — OFFICE VISIT (OUTPATIENT)
Dept: FAMILY MEDICINE CLINIC | Facility: CLINIC | Age: 61
End: 2021-09-22
Payer: COMMERCIAL

## 2021-09-22 VITALS
HEIGHT: 66 IN | WEIGHT: 180 LBS | BODY MASS INDEX: 28.93 KG/M2 | DIASTOLIC BLOOD PRESSURE: 68 MMHG | TEMPERATURE: 97.1 F | SYSTOLIC BLOOD PRESSURE: 110 MMHG

## 2021-09-22 VITALS
DIASTOLIC BLOOD PRESSURE: 64 MMHG | SYSTOLIC BLOOD PRESSURE: 100 MMHG | HEART RATE: 66 BPM | RESPIRATION RATE: 18 BRPM | HEIGHT: 66 IN | WEIGHT: 180 LBS | TEMPERATURE: 98.7 F | BODY MASS INDEX: 28.93 KG/M2

## 2021-09-22 DIAGNOSIS — D32.9 MENINGIOMA (HCC): Primary | ICD-10-CM

## 2021-09-22 DIAGNOSIS — S81.802A WOUND OF LEFT LOWER EXTREMITY, INITIAL ENCOUNTER: Primary | ICD-10-CM

## 2021-09-22 PROCEDURE — 99214 OFFICE O/P EST MOD 30 MIN: CPT | Performed by: INTERNAL MEDICINE

## 2021-09-22 PROCEDURE — 99213 OFFICE O/P EST LOW 20 MIN: CPT | Performed by: PHYSICIAN ASSISTANT

## 2021-09-22 RX ORDER — CEPHALEXIN 500 MG/1
500 CAPSULE ORAL 3 TIMES DAILY
Qty: 30 CAPSULE | Refills: 0 | Status: SHIPPED | OUTPATIENT
Start: 2021-09-22 | End: 2021-10-02

## 2021-09-22 NOTE — ASSESSMENT & PLAN NOTE
Doesn't look too bad currently-obviously we have to watch this closely in a diabetic/someone who has CHF and chronic venous stasis changes-silvadene dressing applied and will rx some keflex for her to have on hand to use if worse-hold off on imaging/labs for right now-told her to call me if worse-I will rx silvadene as well

## 2021-09-22 NOTE — PROGRESS NOTES
Office Note - Neurosurgery   Rasheed Cabrera 59 y o  female MRN: [id number]      Assessment:  Patient is a 59years old pleasant lady was seen in the hospital about 2 weeks ago for incidental image findings of right paraclinoid 1 2 x 1 5 x 1 7 cm diffuse enhancing extra-axial mass  The mass without edema or significant compression of surrounding structures  Patient presented with nausea and dizziness  Cardiology evaluation done and patient was transferred to [facility] for coronary artery catheterization that was followed by angioplasty of LAD on 09/02/2021  by Dr Leila Panchal,  with  HUNTER  Patient on Plavix and aspirin  Reports her dizziness and nausea improved  Reports  Occasional baseline gait instability ( off balance)  No headache, or new visual issues  Denies weakness, paresthesia or numbness in the extremities  Exam- A&OX3, PERRL, EOMI 2 mm conj bilaterally  VF intact in all quadrants  SF  Jonny  Finger-to-nose tests normal and without drift bilaterally  Strength is 5/5 bilaterally  Sensation to light touch intact throughout  There appears chronic bilateral lower extremity edema/lymphedema with venous dermatitis appearance on the right, and slight erythematous lesion in the left  shin    Hx, PEx and images reviewed with the patient and her sister  Her formal visual field tests done and called for results summary  Advised patient to follow-up in 3 months with brain MRI with and without contrast   Call office if there is new neurological symptoms or worsening headache or go to emergency room for re-imaging  Questions and concerns were answered  Both Patient and her daughter expressed their understandings and agreed with the plan  Plan:  1  MRI of brain w/wo contrast in 3 months  2  F/U in 3 months  3  Advised to Call office or go to ER if new neurological symptoms developed    4  Advised patient to see her primary care physician to address her LS lower extremity skin lesion the setting of diabetes mellitus  With bone on the open  5  Call with questions or concern      Subjective/Objective     Chief Complaint: 2 weeks F/U for R paraclinoid meningioma"        HPI    Patient is a 59years old pleasant lady with history of GERD, type 2 diabetes mellitus, bilateral lower extremity lymph edema,  cardiomyopathy, left bundle-branch block, hypertension was seen in the hospital about 2 weeks ago for incidental image findings of right paraclinoid 1 2 x 1 5 x 1 7 cm diffuse enhancing extra-axial mass  The mass without edema or significant compression of surrounding structures  Patient presented with nausea and dizziness  Cardiology evaluation done and patient was transferred to [facility] for coronary artery catheterization that was followed by angioplasty of LAD on 09/02/2021  by Dr Taiwo Aguilar,  with  HUNTER  Patient on Plavix and aspirin  Reports her dizziness and nausea improved  Reports  Occasional baseline gait instability ( off balance)  No headache, or new visual issues  Denies weakness, paresthesia or numbness in the extremities  Patient denies Hx of seizure,bowel or bladder dysfunction  ROS  Review of system personally reviewed and updated  Review of Systems   Constitutional: Negative  HENT: Negative  Eyes: Negative  VF done by Dr Jorge A Aparicio in [facility]  P# [phone]   Respiratory: Negative  Cardiovascular: Negative  H/o HTN/ Hyperlipidemia   Gastrointestinal: Negative  H/o GERD   Endocrine: Negative  Diabetes Type 2   Genitourinary: Negative  Musculoskeletal: Negative  Skin: Negative  Allergic/Immunologic: Negative  Neurological: Positive for dizziness  Hematological: Bruises/bleeds easily  On Plavix/ Aspirin   Psychiatric/Behavioral: Negative for decreased concentration  All other systems reviewed and are negative        Family History    Family History   Problem Relation Age of Onset    Diabetes Mother    Tiffany Garnett Hypertension Mother     Sudden death Mother         SCD    Hyperlipidemia Mother     Heart attack Mother     Breast cancer Mother 79    Diabetes Father     Arrhythmia Father         pacemaker/ defib    Clotting disorder Father         eliquis    Heart failure Father     Heart disease Father     Melanoma Father     Cancer Father 61        bladder cancer    Breast cancer Sister 44    BRCA1 Negative Sister     No Known Problems Maternal Grandmother     No Known Problems Maternal Grandfather     No Known Problems Paternal Grandmother     No Known Problems Paternal Grandfather     No Known Problems Sister     No Known Problems Sister     Breast cancer Maternal Aunt         unknown age   Levora Ruiz Esophageal cancer Maternal Aunt 61    Cancer Maternal Aunt         unknown age or type    No Known Problems Maternal Aunt     Lung cancer Paternal Aunt         unknonw age- in her [de-identified]    Colon cancer Maternal Uncle         unknwon age   Levora Ruiz Pancreatic cancer Maternal Uncle 61    Anuerysm Neg Hx        Social History    Social History     Socioeconomic History    Marital status: Single     Spouse name: Not on file    Number of children: Not on file    Years of education: Not on file    Highest education level: Not on file   Occupational History    Occupation: deed recorder   Tobacco Use    Smoking status: Never Smoker    Smokeless tobacco: Never Used   Vaping Use    Vaping Use: Never used   Substance and Sexual Activity    Alcohol use: No    Drug use: No    Sexual activity: Not Currently   Other Topics Concern    Not on file   Social History Narrative    Do you currently or have you served in the [employer]: No    Were you activated, into active duty, as a member of the [employer] or as a Reservist: No    Occupation:     Marital status: Single    Exercise level: None    Diet: Regular    General stress level: Medium    Alcohol intake: None    Caffeine intake:  Moderate    1 cup of coffee in the morning    Chewing tobacco: none    Illicit drugs: none    Guns present in home: No    Seat belts used routinely: Yes    Sunscreen used routinely: Yes    Smoke alarm in home: Yes    Advance directive: Yes     Social Determinants of Health     Financial Resource Strain:     Difficulty of Paying Living Expenses:    Food Insecurity:     Worried About Running Out of Food in the Last Year:     Ran Out of Food in the Last Year:    Transportation Needs:     Lack of Transportation (Medical):      Lack of Transportation (Non-Medical):    Physical Activity:     Days of Exercise per Week:     Minutes of Exercise per Session:    Stress:     Feeling of Stress :    Social Connections:     Frequency of Communication with Friends and Family:     Frequency of Social Gatherings with Friends and Family:     Attends Confucianism Services:     Active Member of Clubs or Organizations:     Attends Club or Organization Meetings:     Marital Status:    Intimate Partner Violence:     Fear of Current or Ex-Partner:     Emotionally Abused:     Physically Abused:     Sexually Abused:        Past Medical History    Past Medical History:   Diagnosis Date    Colon polyp     Dizziness     Edema     Hyperlipidemia     Hypertension     Type 2 diabetes mellitus ([facility] [address] )     Vitamin D deficiency     Wears glasses        Surgical History    Past Surgical History:   Procedure Laterality Date    BREAST EXCISIONAL BIOPSY Right 2009    COLONOSCOPY  04/11/2018    Colonoscpoy due in 3 years    EGD  04/11/2018    MAMMO (HISTORICAL) Bilateral 05/04/2020 2018    MAMMO NEEDLE LOCALIZATION RIGHT (ALL INC) Right 7/8/2009    WISDOM TOOTH EXTRACTION         Medications      Current Outpatient Medications:     aspirin 81 MG tablet, Take 1 tablet by mouth daily, Disp: , Rfl:     atorvastatin (LIPITOR) 80 mg tablet, Take 1 tablet (80 mg total) by mouth daily with dinner, Disp: 30 tablet, Rfl: 2    Blood Pressure Monitor KIT, Use 2 (two) times a day, Disp: 1 kit, Rfl: 0    carvedilol (COREG) 6 25 mg tablet, Take 1 tablet (6 25 mg total) by mouth 2 (two) times a day with meals, Disp: 60 tablet, Rfl: 2    clopidogrel (PLAVIX) 75 mg tablet, Take 1 tablet (75 mg total) by mouth daily, Disp: 90 tablet, Rfl: 3    Continuous Blood Gluc  (FreeStyle Feng 14 Day [place]) CORBY, USE AS DIRECTED, Disp: 2 each, Rfl: 5    Continuous Blood Gluc Sensor (FreeStyle Feng 14 Day Sensor) MISC, USE AS DIRECTED, Disp: 2 each, Rfl: 5    Empagliflozin 10 MG TABS, Take 1 tablet (10 mg total) by mouth every morning, Disp: 90 tablet, Rfl: 3    esomeprazole (NexIUM) 20 mg capsule, Take 20 mg by mouth every morning before breakfast, Disp: , Rfl:     furosemide (LASIX) 20 mg tablet, TAKE 1 TABLET BY MOUTH DAILY AS NEEDED, Disp: 90 tablet, Rfl: 1    metFORMIN (GLUCOPHAGE) 1000 MG tablet, Take 1 tablet (1,000 mg total) by mouth 2 (two) times a day with meals, Disp: 180 tablet, Rfl: 3    nitroglycerin (NITROSTAT) 0 4 mg SL tablet, Place 1 tablet (0 4 mg total) under the tongue every 5 (five) minutes as needed for chest pain, Disp: 24 tablet, Rfl: 1    NovoFine 32G X 6 MM MISC, USE AS DIRECTED ONCE A DAY WITH INSULIN, Disp: 30 each, Rfl: 5    sacubitril-valsartan (ENTRESTO) 24-26 MG TABS, Take 1 tablet by mouth 2 (two) times a day, Disp: 60 tablet, Rfl: 1    Tresiba FlexTouch 100 units/mL injection pen, Inject 35 Units under the skin daily, Disp: 15 mL, Rfl: 3    TURMERIC PO, Take 1 capsule by mouth daily, Disp: , Rfl:     Allergies    No Known Allergies    The following portions of the patient's history were reviewed and updated as appropriate: allergies, current medications, past family history, past medical history, past social history, past surgical history and problem list     Investigations    I personally reviewed the MRI results with the patient:    Findings as described above    Physical Exam    There were no vitals filed for this visit  Physical Exam  Constitutional:       Appearance: Normal appearance  HENT:      Head: Normocephalic and atraumatic  Eyes:      Extraocular Movements: Extraocular movements intact  Pupils: Pupils are equal, round, and reactive to light  Cardiovascular:      Rate and Rhythm: Normal rate  Pulses: Normal pulses  Pulmonary:      Effort: Pulmonary effort is normal    Musculoskeletal:         General: Normal range of motion  Cervical back: Normal range of motion  Skin:     General: Skin is warm  Neurological:      General: No focal deficit present  Mental Status: She is alert and oriented to person, place, and time  GCS: GCS eye subscore is 4  GCS verbal subscore is 5  GCS motor subscore is 6  Cranial Nerves: Cranial nerves are intact  Sensory: Sensation is intact  Motor: Motor function is intact  Deep Tendon Reflexes: Reflexes are normal and symmetric  Psychiatric:         Speech: Speech normal        Neurologic Exam     Mental Status   Oriented to person, place, and time  Speech: speech is normal   Level of consciousness: alert    Cranial Nerves     CN II   Right visual field deficit: none  Left visual field deficit: none     CN III, IV, VI   Pupils are equal, round, and reactive to light  Right pupil: Size: 2 mm  Shape: regular  Reactivity: brisk  Left pupil: Size: 2 mm  Shape: regular  Reactivity: brisk     Nystagmus: none     CN XI   CN XI normal

## 2021-09-22 NOTE — PROGRESS NOTES
Assessment/Plan:         Problem List Items Addressed This Visit        Other    Wound of left leg - Primary     Doesn't look too bad currently-obviously we have to watch this closely in a diabetic/someone who has CHF and chronic venous stasis changes-silvadene dressing applied and will rx some keflex for her to have on hand to use if worse-hold off on imaging/labs for right now-told her to call me if worse-I will rx silvadene as well         Relevant Medications    cephalexin (KEFLEX) 500 mg capsule    silver sulfadiazine (Silvadene) 1 % cream            Subjective:      Patient ID: Ida Hummel is a 59 y o  female  Jelly Kelechi here because when she went to follow up on the incidental meningioma that was found during her hospital stay the dr noticed a concerning wound on her left gilbert-Bruce has a hx of difficult to control DM type II, HL, CAD s/p recent stenting of LAD, CHF, and lower extremity edema-has had some chronic venous stasis changes of her lower limbs for awhile-she says she just noticed the spot on her left shin today-doesn't hurt that much, no bleeding, she didn't bump it on anything, no fever      The following portions of the patient's history were reviewed and updated as appropriate:   Past Medical History:  She has a past medical history of Colon polyp, Dizziness, Edema, Hyperlipidemia, Hypertension, Type 2 diabetes mellitus ([address] ), Vitamin D deficiency, and Wears glasses  ,  _______________________________________________________________________  Medical Problems:  does not have any pertinent problems on file ,  _______________________________________________________________________  Past Surgical History:   has a past surgical history that includes Colonoscopy (04/11/2018); Mammo (historical) (Bilateral, 05/04/2020); [place] tooth extraction; EGD (04/11/2018);  Mammo needle localization right (all inc) (Right, 7/8/2009); and Breast excisional biopsy (Right, 2009) ,  _______________________________________________________________________  Family History:  family history includes Arrhythmia in her father; BRCA1 Negative in her sister; Breast cancer in her maternal aunt; Breast cancer (age of onset: 44) in her sister; Breast cancer (age of onset: 79) in her mother; Cancer in her maternal aunt; Cancer (age of onset: 61) in her father; Clotting disorder in her father; Colon cancer in her maternal uncle; Diabetes in her father and mother; Esophageal cancer (age of onset: 61) in her maternal aunt; Heart attack in her mother; Heart disease in her father; Heart failure in her father; Hyperlipidemia in her mother; Hypertension in her mother; Lung cancer in her paternal aunt; Melanoma in her father; No Known Problems in her maternal aunt, maternal grandfather, maternal grandmother, paternal grandfather, paternal grandmother, sister, and sister; Pancreatic cancer (age of onset: 61) in her maternal uncle; Sudden death in her mother ,  _______________________________________________________________________  Social History:   reports that she has never smoked  She has never used smokeless tobacco  She reports that she does not drink alcohol and does not use drugs  ,  _______________________________________________________________________  Allergies:  has No Known Allergies     _______________________________________________________________________  Current Outpatient Medications   Medication Sig Dispense Refill    aspirin 81 MG tablet Take 1 tablet by mouth daily      atorvastatin (LIPITOR) 80 mg tablet Take 1 tablet (80 mg total) by mouth daily with dinner 30 tablet 2    Blood Pressure Monitor KIT Use 2 (two) times a day 1 kit 0    carvedilol (COREG) 6 25 mg tablet Take 1 tablet (6 25 mg total) by mouth 2 (two) times a day with meals 60 tablet 2    cephalexin (KEFLEX) 500 mg capsule Take 1 capsule (500 mg total) by mouth 3 (three) times a day for 10 days 30 capsule 0    clopidogrel (PLAVIX) 75 mg tablet Take 1 tablet (75 mg total) by mouth daily 90 tablet 3    Continuous Blood Gluc  (FreeStyle Feng 14 Day [place]) CORBY USE AS DIRECTED 2 each 5    Continuous Blood Gluc Sensor (FreeStyle Feng 14 Day Sensor) MISC USE AS DIRECTED 2 each 5    Empagliflozin 10 MG TABS Take 1 tablet (10 mg total) by mouth every morning 90 tablet 3    esomeprazole (NexIUM) 20 mg capsule Take 20 mg by mouth every morning before breakfast      furosemide (LASIX) 20 mg tablet TAKE 1 TABLET BY MOUTH DAILY AS NEEDED 90 tablet 1    metFORMIN (GLUCOPHAGE) 1000 MG tablet Take 1 tablet (1,000 mg total) by mouth 2 (two) times a day with meals 180 tablet 3    nitroglycerin (NITROSTAT) 0 4 mg SL tablet Place 1 tablet (0 4 mg total) under the tongue every 5 (five) minutes as needed for chest pain 24 tablet 1    NovoFine 32G X 6 MM MISC USE AS DIRECTED ONCE A DAY WITH INSULIN 30 each 5    sacubitril-valsartan (ENTRESTO) 24-26 MG TABS Take 1 tablet by mouth 2 (two) times a day 60 tablet 1    silver sulfadiazine (Silvadene) 1 % cream Apply topically daily 50 g 1    Tresiba FlexTouch 100 units/mL injection pen Inject 35 Units under the skin daily 15 mL 3    TURMERIC PO Take 1 capsule by mouth daily       No current facility-administered medications for this visit      _______________________________________________________________________  Review of Systems   Constitutional: Negative for fatigue and fever  Cardiovascular: Positive for leg swelling  Negative for chest pain and palpitations  Skin: Positive for wound  Wound left lower extremity circular with erythema, chronic venous stasis changes present   Psychiatric/Behavioral: Negative for agitation and behavioral problems           Objective:  Vitals:    09/22/21 1549   BP: 110/68   BP Location: Left arm   Patient Position: Sitting   Cuff Size: Adult   Temp: (!) 97 1 °F (36 2 °C)   TempSrc: Temporal   Weight: 81 6 kg (180 lb)   Height: 5' 6" (1 676 m)     Body mass index is 29 05 kg/m²  Physical Exam  Constitutional:       Appearance: Normal appearance  Pulmonary:      Effort: Pulmonary effort is normal    Skin:     Findings: Lesion present  Comments: Left shin lesion erythematous, circular in the setting of chronic venous stasis changes-silvadene dressing applied   Neurological:      General: No focal deficit present  Mental Status: She is alert and oriented to person, place, and time  Psychiatric:         Mood and Affect: Mood normal          Behavior: Behavior normal          Thought Content:  Thought content normal          Judgment: Judgment normal

## 2021-10-04 ENCOUNTER — TELEPHONE (OUTPATIENT)
Dept: CARDIAC REHAB | Age: 61
End: 2021-10-04

## 2021-10-05 ENCOUNTER — CLINICAL SUPPORT (OUTPATIENT)
Dept: CARDIAC REHAB | Age: 61
End: 2021-10-05
Payer: COMMERCIAL

## 2021-10-05 DIAGNOSIS — I25.5 ISCHEMIC CARDIOMYOPATHY: ICD-10-CM

## 2021-10-05 PROCEDURE — 93797 PHYS/QHP OP CAR RHAB WO ECG: CPT

## 2021-10-06 ENCOUNTER — VBI (OUTPATIENT)
Dept: ADMINISTRATIVE | Facility: OTHER | Age: 61
End: 2021-10-06

## 2021-10-08 ENCOUNTER — CLINICAL SUPPORT (OUTPATIENT)
Dept: CARDIAC REHAB | Age: 61
End: 2021-10-08
Payer: COMMERCIAL

## 2021-10-08 DIAGNOSIS — I25.5 ISCHEMIC CARDIOMYOPATHY: ICD-10-CM

## 2021-10-08 PROCEDURE — 93798 PHYS/QHP OP CAR RHAB W/ECG: CPT

## 2021-10-11 ENCOUNTER — CLINICAL SUPPORT (OUTPATIENT)
Dept: CARDIAC REHAB | Age: 61
End: 2021-10-11
Payer: COMMERCIAL

## 2021-10-11 DIAGNOSIS — I25.5 ISCHEMIC CARDIOMYOPATHY: ICD-10-CM

## 2021-10-11 PROCEDURE — 93798 PHYS/QHP OP CAR RHAB W/ECG: CPT

## 2021-10-13 ENCOUNTER — CLINICAL SUPPORT (OUTPATIENT)
Dept: CARDIAC REHAB | Age: 61
End: 2021-10-13
Payer: COMMERCIAL

## 2021-10-13 DIAGNOSIS — I25.5 ISCHEMIC CARDIOMYOPATHY: ICD-10-CM

## 2021-10-13 PROCEDURE — 93798 PHYS/QHP OP CAR RHAB W/ECG: CPT

## 2021-10-15 ENCOUNTER — CLINICAL SUPPORT (OUTPATIENT)
Dept: CARDIAC REHAB | Age: 61
End: 2021-10-15
Payer: COMMERCIAL

## 2021-10-15 DIAGNOSIS — I25.5 ISCHEMIC CARDIOMYOPATHY: ICD-10-CM

## 2021-10-15 PROCEDURE — 93798 PHYS/QHP OP CAR RHAB W/ECG: CPT

## 2021-10-18 ENCOUNTER — CLINICAL SUPPORT (OUTPATIENT)
Dept: CARDIAC REHAB | Age: 61
End: 2021-10-18
Payer: COMMERCIAL

## 2021-10-18 DIAGNOSIS — I25.5 ISCHEMIC CARDIOMYOPATHY: Primary | ICD-10-CM

## 2021-10-18 PROCEDURE — 93798 PHYS/QHP OP CAR RHAB W/ECG: CPT

## 2021-10-20 ENCOUNTER — CLINICAL SUPPORT (OUTPATIENT)
Dept: CARDIAC REHAB | Age: 61
End: 2021-10-20
Payer: COMMERCIAL

## 2021-10-20 DIAGNOSIS — I25.5 ISCHEMIC CARDIOMYOPATHY: ICD-10-CM

## 2021-10-20 PROCEDURE — 93798 PHYS/QHP OP CAR RHAB W/ECG: CPT

## 2021-10-22 ENCOUNTER — OFFICE VISIT (OUTPATIENT)
Dept: ENDOCRINOLOGY | Facility: CLINIC | Age: 61
End: 2021-10-22
Payer: COMMERCIAL

## 2021-10-22 ENCOUNTER — CLINICAL SUPPORT (OUTPATIENT)
Dept: CARDIAC REHAB | Age: 61
End: 2021-10-22
Payer: COMMERCIAL

## 2021-10-22 VITALS
SYSTOLIC BLOOD PRESSURE: 124 MMHG | HEIGHT: 66 IN | BODY MASS INDEX: 28.93 KG/M2 | DIASTOLIC BLOOD PRESSURE: 60 MMHG | HEART RATE: 72 BPM | WEIGHT: 180 LBS

## 2021-10-22 DIAGNOSIS — R80.9 MICROALBUMINURIA: ICD-10-CM

## 2021-10-22 DIAGNOSIS — I25.5 ISCHEMIC CARDIOMYOPATHY: ICD-10-CM

## 2021-10-22 DIAGNOSIS — Z79.4 TYPE 2 DIABETES MELLITUS WITH HYPERGLYCEMIA, WITH LONG-TERM CURRENT USE OF INSULIN (HCC): Primary | ICD-10-CM

## 2021-10-22 DIAGNOSIS — E11.9 TYPE 2 DIABETES MELLITUS WITHOUT COMPLICATION, WITH LONG-TERM CURRENT USE OF INSULIN (HCC): ICD-10-CM

## 2021-10-22 DIAGNOSIS — E78.2 MIXED HYPERLIPIDEMIA: ICD-10-CM

## 2021-10-22 DIAGNOSIS — Z79.4 TYPE 2 DIABETES MELLITUS WITHOUT COMPLICATION, WITH LONG-TERM CURRENT USE OF INSULIN (HCC): ICD-10-CM

## 2021-10-22 DIAGNOSIS — E11.65 TYPE 2 DIABETES MELLITUS WITH HYPERGLYCEMIA, WITH LONG-TERM CURRENT USE OF INSULIN (HCC): Primary | ICD-10-CM

## 2021-10-22 DIAGNOSIS — R53.83 FATIGUE, UNSPECIFIED TYPE: ICD-10-CM

## 2021-10-22 PROCEDURE — 3066F NEPHROPATHY DOC TX: CPT | Performed by: INTERNAL MEDICINE

## 2021-10-22 PROCEDURE — 93798 PHYS/QHP OP CAR RHAB W/ECG: CPT

## 2021-10-22 PROCEDURE — 3074F SYST BP LT 130 MM HG: CPT | Performed by: INTERNAL MEDICINE

## 2021-10-22 PROCEDURE — 99244 OFF/OP CNSLTJ NEW/EST MOD 40: CPT | Performed by: INTERNAL MEDICINE

## 2021-10-22 PROCEDURE — 3008F BODY MASS INDEX DOCD: CPT | Performed by: INTERNAL MEDICINE

## 2021-10-22 PROCEDURE — 3078F DIAST BP <80 MM HG: CPT | Performed by: INTERNAL MEDICINE

## 2021-10-25 ENCOUNTER — CLINICAL SUPPORT (OUTPATIENT)
Dept: CARDIAC REHAB | Age: 61
End: 2021-10-25
Payer: COMMERCIAL

## 2021-10-25 DIAGNOSIS — I25.5 ISCHEMIC CARDIOMYOPATHY: ICD-10-CM

## 2021-10-25 PROCEDURE — 93798 PHYS/QHP OP CAR RHAB W/ECG: CPT

## 2021-10-26 ENCOUNTER — TELEPHONE (OUTPATIENT)
Dept: DIABETES SERVICES | Facility: CLINIC | Age: 61
End: 2021-10-26

## 2021-10-26 DIAGNOSIS — E11.9 CONTROLLED TYPE 2 DIABETES MELLITUS WITHOUT COMPLICATION, WITH LONG-TERM CURRENT USE OF INSULIN (HCC): ICD-10-CM

## 2021-10-26 DIAGNOSIS — Z79.4 CONTROLLED TYPE 2 DIABETES MELLITUS WITHOUT COMPLICATION, WITH LONG-TERM CURRENT USE OF INSULIN (HCC): ICD-10-CM

## 2021-10-27 ENCOUNTER — CLINICAL SUPPORT (OUTPATIENT)
Dept: CARDIAC REHAB | Age: 61
End: 2021-10-27
Payer: COMMERCIAL

## 2021-10-27 DIAGNOSIS — I25.5 ISCHEMIC CARDIOMYOPATHY: ICD-10-CM

## 2021-10-27 PROCEDURE — 93798 PHYS/QHP OP CAR RHAB W/ECG: CPT

## 2021-10-28 ENCOUNTER — APPOINTMENT (OUTPATIENT)
Dept: LAB | Facility: CLINIC | Age: 61
End: 2021-10-28
Payer: COMMERCIAL

## 2021-10-28 DIAGNOSIS — E78.2 MIXED HYPERLIPIDEMIA: ICD-10-CM

## 2021-10-28 LAB
CHOLEST SERPL-MCNC: 70 MG/DL (ref 50–200)
HDLC SERPL-MCNC: 28 MG/DL
LDLC SERPL CALC-MCNC: 28 MG/DL (ref 0–100)
NONHDLC SERPL-MCNC: 42 MG/DL
TRIGL SERPL-MCNC: 72 MG/DL

## 2021-10-28 PROCEDURE — 36415 COLL VENOUS BLD VENIPUNCTURE: CPT

## 2021-10-28 PROCEDURE — 80061 LIPID PANEL: CPT

## 2021-10-29 ENCOUNTER — CLINICAL SUPPORT (OUTPATIENT)
Dept: CARDIAC REHAB | Age: 61
End: 2021-10-29
Payer: COMMERCIAL

## 2021-10-29 DIAGNOSIS — I25.5 ISCHEMIC CARDIOMYOPATHY: ICD-10-CM

## 2021-10-29 PROCEDURE — 93798 PHYS/QHP OP CAR RHAB W/ECG: CPT

## 2021-10-29 RX ORDER — INSULIN DEGLUDEC INJECTION 100 U/ML
25 INJECTION, SOLUTION SUBCUTANEOUS DAILY
Qty: 15 ML | Refills: 3 | Status: SHIPPED | OUTPATIENT
Start: 2021-10-29 | End: 2021-12-07 | Stop reason: ALTCHOICE

## 2021-11-01 ENCOUNTER — APPOINTMENT (OUTPATIENT)
Dept: CARDIAC REHAB | Age: 61
End: 2021-11-01
Payer: COMMERCIAL

## 2021-11-01 ENCOUNTER — OFFICE VISIT (OUTPATIENT)
Dept: DIABETES SERVICES | Facility: CLINIC | Age: 61
End: 2021-11-01
Payer: COMMERCIAL

## 2021-11-01 VITALS — HEIGHT: 66 IN | WEIGHT: 177.4 LBS | BODY MASS INDEX: 28.51 KG/M2

## 2021-11-01 DIAGNOSIS — E11.9 TYPE 2 DIABETES MELLITUS WITHOUT COMPLICATION, WITH LONG-TERM CURRENT USE OF INSULIN (HCC): ICD-10-CM

## 2021-11-01 DIAGNOSIS — Z79.4 TYPE 2 DIABETES MELLITUS WITHOUT COMPLICATION, WITH LONG-TERM CURRENT USE OF INSULIN (HCC): ICD-10-CM

## 2021-11-01 PROCEDURE — G0108 DIAB MANAGE TRN  PER INDIV: HCPCS | Performed by: DIETITIAN, REGISTERED

## 2021-11-03 ENCOUNTER — CLINICAL SUPPORT (OUTPATIENT)
Dept: CARDIAC REHAB | Age: 61
End: 2021-11-03
Payer: COMMERCIAL

## 2021-11-03 DIAGNOSIS — I25.5 ISCHEMIC CARDIOMYOPATHY: ICD-10-CM

## 2021-11-03 PROCEDURE — 93798 PHYS/QHP OP CAR RHAB W/ECG: CPT

## 2021-11-04 ENCOUNTER — OFFICE VISIT (OUTPATIENT)
Dept: CARDIOLOGY CLINIC | Facility: CLINIC | Age: 61
End: 2021-11-04
Payer: COMMERCIAL

## 2021-11-04 VITALS
BODY MASS INDEX: 27.93 KG/M2 | OXYGEN SATURATION: 98 % | DIASTOLIC BLOOD PRESSURE: 68 MMHG | HEIGHT: 66 IN | WEIGHT: 173.8 LBS | HEART RATE: 60 BPM | SYSTOLIC BLOOD PRESSURE: 124 MMHG

## 2021-11-04 DIAGNOSIS — R06.00 DOE (DYSPNEA ON EXERTION): ICD-10-CM

## 2021-11-04 DIAGNOSIS — I10 ESSENTIAL HYPERTENSION: ICD-10-CM

## 2021-11-04 DIAGNOSIS — E78.2 MIXED HYPERLIPIDEMIA: ICD-10-CM

## 2021-11-04 DIAGNOSIS — I25.5 ISCHEMIC CARDIOMYOPATHY: Primary | ICD-10-CM

## 2021-11-04 DIAGNOSIS — I25.10 CORONARY ARTERY DISEASE INVOLVING NATIVE CORONARY ARTERY OF NATIVE HEART WITHOUT ANGINA PECTORIS: ICD-10-CM

## 2021-11-04 DIAGNOSIS — R60.0 BILATERAL LEG EDEMA: ICD-10-CM

## 2021-11-04 DIAGNOSIS — I44.7 LEFT BUNDLE BRANCH BLOCK: ICD-10-CM

## 2021-11-04 PROCEDURE — 99214 OFFICE O/P EST MOD 30 MIN: CPT | Performed by: INTERNAL MEDICINE

## 2021-11-04 RX ORDER — SACUBITRIL AND VALSARTAN 49; 51 MG/1; MG/1
1 TABLET, FILM COATED ORAL 2 TIMES DAILY
Qty: 180 TABLET | Refills: 4 | Status: SHIPPED | OUTPATIENT
Start: 2021-11-04

## 2021-11-05 ENCOUNTER — CLINICAL SUPPORT (OUTPATIENT)
Dept: CARDIAC REHAB | Age: 61
End: 2021-11-05
Payer: COMMERCIAL

## 2021-11-05 DIAGNOSIS — I25.5 ISCHEMIC CARDIOMYOPATHY: ICD-10-CM

## 2021-11-05 PROCEDURE — 93798 PHYS/QHP OP CAR RHAB W/ECG: CPT

## 2021-11-08 ENCOUNTER — CLINICAL SUPPORT (OUTPATIENT)
Dept: CARDIAC REHAB | Age: 61
End: 2021-11-08
Payer: COMMERCIAL

## 2021-11-08 DIAGNOSIS — I25.5 ISCHEMIC CARDIOMYOPATHY: ICD-10-CM

## 2021-11-08 PROCEDURE — 93798 PHYS/QHP OP CAR RHAB W/ECG: CPT

## 2021-11-09 ENCOUNTER — TELEMEDICINE (OUTPATIENT)
Dept: DIABETES SERVICES | Facility: HOSPITAL | Age: 61
End: 2021-11-09
Payer: COMMERCIAL

## 2021-11-09 DIAGNOSIS — Z79.4 TYPE 2 DIABETES MELLITUS WITH HYPERGLYCEMIA, WITH LONG-TERM CURRENT USE OF INSULIN (HCC): Primary | ICD-10-CM

## 2021-11-09 DIAGNOSIS — E11.65 TYPE 2 DIABETES MELLITUS WITH HYPERGLYCEMIA, WITH LONG-TERM CURRENT USE OF INSULIN (HCC): Primary | ICD-10-CM

## 2021-11-09 PROCEDURE — G0109 DIAB MANAGE TRN IND/GROUP: HCPCS | Performed by: DIETITIAN, REGISTERED

## 2021-11-10 ENCOUNTER — TELEPHONE (OUTPATIENT)
Dept: DIABETES SERVICES | Facility: CLINIC | Age: 61
End: 2021-11-10

## 2021-11-10 ENCOUNTER — CLINICAL SUPPORT (OUTPATIENT)
Dept: CARDIAC REHAB | Age: 61
End: 2021-11-10
Payer: COMMERCIAL

## 2021-11-10 ENCOUNTER — OFFICE VISIT (OUTPATIENT)
Dept: DIABETES SERVICES | Facility: CLINIC | Age: 61
End: 2021-11-10
Payer: COMMERCIAL

## 2021-11-10 DIAGNOSIS — I25.5 ISCHEMIC CARDIOMYOPATHY: ICD-10-CM

## 2021-11-10 DIAGNOSIS — E11.65 TYPE 2 DIABETES MELLITUS WITH HYPERGLYCEMIA, WITH LONG-TERM CURRENT USE OF INSULIN (HCC): Primary | ICD-10-CM

## 2021-11-10 DIAGNOSIS — Z79.4 TYPE 2 DIABETES MELLITUS WITH HYPERGLYCEMIA, WITH LONG-TERM CURRENT USE OF INSULIN (HCC): Primary | ICD-10-CM

## 2021-11-10 PROCEDURE — 95249 CONT GLUC MNTR PT PROV EQP: CPT | Performed by: DIETITIAN, REGISTERED

## 2021-11-10 PROCEDURE — 93798 PHYS/QHP OP CAR RHAB W/ECG: CPT

## 2021-11-11 ENCOUNTER — TELEMEDICINE (OUTPATIENT)
Dept: DIABETES SERVICES | Facility: HOSPITAL | Age: 61
End: 2021-11-11
Payer: COMMERCIAL

## 2021-11-11 DIAGNOSIS — E11.65 TYPE 2 DIABETES MELLITUS WITH HYPERGLYCEMIA, WITH LONG-TERM CURRENT USE OF INSULIN (HCC): Primary | ICD-10-CM

## 2021-11-11 DIAGNOSIS — Z79.4 TYPE 2 DIABETES MELLITUS WITH HYPERGLYCEMIA, WITH LONG-TERM CURRENT USE OF INSULIN (HCC): Primary | ICD-10-CM

## 2021-11-11 PROCEDURE — G0109 DIAB MANAGE TRN IND/GROUP: HCPCS | Performed by: DIETITIAN, REGISTERED

## 2021-11-12 ENCOUNTER — APPOINTMENT (OUTPATIENT)
Dept: CARDIAC REHAB | Age: 61
End: 2021-11-12
Payer: COMMERCIAL

## 2021-11-15 ENCOUNTER — CLINICAL SUPPORT (OUTPATIENT)
Dept: CARDIAC REHAB | Age: 61
End: 2021-11-15
Payer: COMMERCIAL

## 2021-11-15 DIAGNOSIS — I25.5 ISCHEMIC CARDIOMYOPATHY: Primary | ICD-10-CM

## 2021-11-15 PROCEDURE — 93798 PHYS/QHP OP CAR RHAB W/ECG: CPT

## 2021-11-16 ENCOUNTER — TELEMEDICINE (OUTPATIENT)
Dept: DIABETES SERVICES | Facility: HOSPITAL | Age: 61
End: 2021-11-16
Payer: COMMERCIAL

## 2021-11-16 ENCOUNTER — OFFICE VISIT (OUTPATIENT)
Dept: DIABETES SERVICES | Facility: CLINIC | Age: 61
End: 2021-11-16
Payer: COMMERCIAL

## 2021-11-16 VITALS — WEIGHT: 171 LBS | BODY MASS INDEX: 27.48 KG/M2 | HEIGHT: 66 IN

## 2021-11-16 DIAGNOSIS — E11.65 TYPE 2 DIABETES MELLITUS WITH HYPERGLYCEMIA, WITH LONG-TERM CURRENT USE OF INSULIN (HCC): Primary | ICD-10-CM

## 2021-11-16 DIAGNOSIS — Z79.4 TYPE 2 DIABETES MELLITUS WITH HYPERGLYCEMIA, WITH LONG-TERM CURRENT USE OF INSULIN (HCC): Primary | ICD-10-CM

## 2021-11-16 PROCEDURE — 97802 MEDICAL NUTRITION INDIV IN: CPT | Performed by: DIETITIAN, REGISTERED

## 2021-11-16 PROCEDURE — G0109 DIAB MANAGE TRN IND/GROUP: HCPCS | Performed by: DIETITIAN, REGISTERED

## 2021-11-16 PROCEDURE — 3008F BODY MASS INDEX DOCD: CPT | Performed by: INTERNAL MEDICINE

## 2021-11-17 ENCOUNTER — CLINICAL SUPPORT (OUTPATIENT)
Dept: CARDIAC REHAB | Age: 61
End: 2021-11-17
Payer: COMMERCIAL

## 2021-11-17 DIAGNOSIS — I25.5 ISCHEMIC CARDIOMYOPATHY: ICD-10-CM

## 2021-11-17 PROCEDURE — 93798 PHYS/QHP OP CAR RHAB W/ECG: CPT

## 2021-11-18 ENCOUNTER — TELEMEDICINE (OUTPATIENT)
Dept: DIABETES SERVICES | Facility: HOSPITAL | Age: 61
End: 2021-11-18
Payer: COMMERCIAL

## 2021-11-18 ENCOUNTER — TELEPHONE (OUTPATIENT)
Dept: FAMILY MEDICINE CLINIC | Facility: CLINIC | Age: 61
End: 2021-11-18

## 2021-11-18 DIAGNOSIS — R60.0 LOCALIZED EDEMA: ICD-10-CM

## 2021-11-18 DIAGNOSIS — Z79.4 TYPE 2 DIABETES MELLITUS WITH HYPERGLYCEMIA, WITH LONG-TERM CURRENT USE OF INSULIN (HCC): Primary | ICD-10-CM

## 2021-11-18 DIAGNOSIS — E11.65 TYPE 2 DIABETES MELLITUS WITH HYPERGLYCEMIA, WITH LONG-TERM CURRENT USE OF INSULIN (HCC): Primary | ICD-10-CM

## 2021-11-18 PROCEDURE — G0109 DIAB MANAGE TRN IND/GROUP: HCPCS | Performed by: DIETITIAN, REGISTERED

## 2021-11-18 RX ORDER — FUROSEMIDE 20 MG/1
20 TABLET ORAL 2 TIMES DAILY
Qty: 60 TABLET | Refills: 3 | Status: SHIPPED | OUTPATIENT
Start: 2021-11-18 | End: 2022-06-13

## 2021-11-19 ENCOUNTER — CLINICAL SUPPORT (OUTPATIENT)
Dept: CARDIAC REHAB | Age: 61
End: 2021-11-19
Payer: COMMERCIAL

## 2021-11-19 DIAGNOSIS — I25.5 ISCHEMIC CARDIOMYOPATHY: ICD-10-CM

## 2021-11-19 PROCEDURE — 93798 PHYS/QHP OP CAR RHAB W/ECG: CPT

## 2021-11-22 ENCOUNTER — CLINICAL SUPPORT (OUTPATIENT)
Dept: CARDIAC REHAB | Age: 61
End: 2021-11-22
Payer: COMMERCIAL

## 2021-11-22 DIAGNOSIS — I25.5 ISCHEMIC CARDIOMYOPATHY: ICD-10-CM

## 2021-11-22 PROCEDURE — 93798 PHYS/QHP OP CAR RHAB W/ECG: CPT

## 2021-11-24 ENCOUNTER — APPOINTMENT (OUTPATIENT)
Dept: CARDIAC REHAB | Age: 61
End: 2021-11-24
Payer: COMMERCIAL

## 2021-11-26 ENCOUNTER — CLINICAL SUPPORT (OUTPATIENT)
Dept: CARDIAC REHAB | Age: 61
End: 2021-11-26
Payer: COMMERCIAL

## 2021-11-26 DIAGNOSIS — I25.5 ISCHEMIC CARDIOMYOPATHY: ICD-10-CM

## 2021-11-26 PROCEDURE — 93798 PHYS/QHP OP CAR RHAB W/ECG: CPT

## 2021-11-29 ENCOUNTER — CLINICAL SUPPORT (OUTPATIENT)
Dept: CARDIAC REHAB | Age: 61
End: 2021-11-29
Payer: COMMERCIAL

## 2021-11-29 DIAGNOSIS — I25.5 ISCHEMIC CARDIOMYOPATHY: ICD-10-CM

## 2021-11-29 PROCEDURE — 93798 PHYS/QHP OP CAR RHAB W/ECG: CPT

## 2021-12-01 ENCOUNTER — CLINICAL SUPPORT (OUTPATIENT)
Dept: CARDIAC REHAB | Age: 61
End: 2021-12-01
Payer: COMMERCIAL

## 2021-12-01 DIAGNOSIS — I25.5 ISCHEMIC CARDIOMYOPATHY: ICD-10-CM

## 2021-12-01 PROCEDURE — 93798 PHYS/QHP OP CAR RHAB W/ECG: CPT

## 2021-12-03 ENCOUNTER — APPOINTMENT (OUTPATIENT)
Dept: LAB | Facility: CLINIC | Age: 61
End: 2021-12-03
Payer: COMMERCIAL

## 2021-12-03 ENCOUNTER — CLINICAL SUPPORT (OUTPATIENT)
Dept: CARDIAC REHAB | Age: 61
End: 2021-12-03
Payer: COMMERCIAL

## 2021-12-03 DIAGNOSIS — Z79.4 TYPE 2 DIABETES MELLITUS WITH HYPERGLYCEMIA, WITH LONG-TERM CURRENT USE OF INSULIN (HCC): ICD-10-CM

## 2021-12-03 DIAGNOSIS — I25.5 ISCHEMIC CARDIOMYOPATHY: ICD-10-CM

## 2021-12-03 DIAGNOSIS — E11.65 TYPE 2 DIABETES MELLITUS WITH HYPERGLYCEMIA, WITH LONG-TERM CURRENT USE OF INSULIN (HCC): ICD-10-CM

## 2021-12-03 LAB — 25(OH)D3 SERPL-MCNC: 27.7 NG/ML (ref 30–100)

## 2021-12-03 PROCEDURE — 36415 COLL VENOUS BLD VENIPUNCTURE: CPT

## 2021-12-03 PROCEDURE — 93798 PHYS/QHP OP CAR RHAB W/ECG: CPT

## 2021-12-03 PROCEDURE — 82306 VITAMIN D 25 HYDROXY: CPT

## 2021-12-06 ENCOUNTER — CLINICAL SUPPORT (OUTPATIENT)
Dept: CARDIAC REHAB | Age: 61
End: 2021-12-06
Payer: COMMERCIAL

## 2021-12-06 DIAGNOSIS — I25.5 ISCHEMIC CARDIOMYOPATHY: ICD-10-CM

## 2021-12-06 PROCEDURE — 93798 PHYS/QHP OP CAR RHAB W/ECG: CPT

## 2021-12-07 ENCOUNTER — VBI (OUTPATIENT)
Dept: ADMINISTRATIVE | Facility: OTHER | Age: 61
End: 2021-12-07

## 2021-12-07 ENCOUNTER — OFFICE VISIT (OUTPATIENT)
Dept: ENDOCRINOLOGY | Facility: CLINIC | Age: 61
End: 2021-12-07
Payer: COMMERCIAL

## 2021-12-07 VITALS
SYSTOLIC BLOOD PRESSURE: 120 MMHG | HEART RATE: 72 BPM | DIASTOLIC BLOOD PRESSURE: 72 MMHG | WEIGHT: 172.8 LBS | BODY MASS INDEX: 27.77 KG/M2 | HEIGHT: 66 IN

## 2021-12-07 DIAGNOSIS — E78.2 MIXED HYPERLIPIDEMIA: ICD-10-CM

## 2021-12-07 DIAGNOSIS — R80.9 MICROALBUMINURIA: ICD-10-CM

## 2021-12-07 DIAGNOSIS — Z79.4 CONTROLLED TYPE 2 DIABETES MELLITUS WITHOUT COMPLICATION, WITH LONG-TERM CURRENT USE OF INSULIN (HCC): Primary | ICD-10-CM

## 2021-12-07 DIAGNOSIS — E55.9 VITAMIN D DEFICIENCY: ICD-10-CM

## 2021-12-07 DIAGNOSIS — E11.65 TYPE 2 DIABETES MELLITUS WITH HYPERGLYCEMIA, WITH LONG-TERM CURRENT USE OF INSULIN (HCC): Primary | ICD-10-CM

## 2021-12-07 DIAGNOSIS — E11.9 CONTROLLED TYPE 2 DIABETES MELLITUS WITHOUT COMPLICATION, WITH LONG-TERM CURRENT USE OF INSULIN (HCC): Primary | ICD-10-CM

## 2021-12-07 DIAGNOSIS — Z79.4 TYPE 2 DIABETES MELLITUS WITH HYPERGLYCEMIA, WITH LONG-TERM CURRENT USE OF INSULIN (HCC): Primary | ICD-10-CM

## 2021-12-07 LAB — SL AMB POCT HEMOGLOBIN AIC: 5.9 (ref ?–6.5)

## 2021-12-07 PROCEDURE — 3044F HG A1C LEVEL LT 7.0%: CPT | Performed by: INTERNAL MEDICINE

## 2021-12-07 PROCEDURE — 99214 OFFICE O/P EST MOD 30 MIN: CPT | Performed by: INTERNAL MEDICINE

## 2021-12-07 PROCEDURE — 3078F DIAST BP <80 MM HG: CPT | Performed by: INTERNAL MEDICINE

## 2021-12-07 PROCEDURE — 83036 HEMOGLOBIN GLYCOSYLATED A1C: CPT

## 2021-12-07 PROCEDURE — 3074F SYST BP LT 130 MM HG: CPT | Performed by: INTERNAL MEDICINE

## 2021-12-07 PROCEDURE — 3066F NEPHROPATHY DOC TX: CPT | Performed by: INTERNAL MEDICINE

## 2021-12-08 ENCOUNTER — APPOINTMENT (OUTPATIENT)
Dept: CARDIAC REHAB | Age: 61
End: 2021-12-08
Payer: COMMERCIAL

## 2021-12-08 DIAGNOSIS — I25.5 ISCHEMIC CARDIOMYOPATHY: ICD-10-CM

## 2021-12-08 RX ORDER — CARVEDILOL 6.25 MG/1
6.25 TABLET ORAL 2 TIMES DAILY WITH MEALS
Qty: 60 TABLET | Refills: 2 | Status: SHIPPED | OUTPATIENT
Start: 2021-12-08 | End: 2022-04-05 | Stop reason: SDUPTHER

## 2021-12-09 ENCOUNTER — CLINICAL SUPPORT (OUTPATIENT)
Dept: CARDIAC REHAB | Age: 61
End: 2021-12-09
Payer: COMMERCIAL

## 2021-12-09 DIAGNOSIS — I25.5 ISCHEMIC CARDIOMYOPATHY: ICD-10-CM

## 2021-12-09 PROCEDURE — 93798 PHYS/QHP OP CAR RHAB W/ECG: CPT

## 2021-12-10 ENCOUNTER — APPOINTMENT (OUTPATIENT)
Dept: CARDIAC REHAB | Age: 61
End: 2021-12-10
Payer: COMMERCIAL

## 2021-12-10 RX ORDER — FLASH GLUCOSE SCANNING READER
EACH MISCELLANEOUS
COMMUNITY
End: 2022-04-22 | Stop reason: SDUPTHER

## 2021-12-10 RX ORDER — CEPHALEXIN 500 MG/1
CAPSULE ORAL
COMMUNITY
End: 2021-12-13

## 2021-12-10 RX ORDER — FLASH GLUCOSE SENSOR
KIT MISCELLANEOUS
COMMUNITY
End: 2022-07-25 | Stop reason: SDUPTHER

## 2021-12-13 ENCOUNTER — OFFICE VISIT (OUTPATIENT)
Dept: FAMILY MEDICINE CLINIC | Facility: CLINIC | Age: 61
End: 2021-12-13
Payer: COMMERCIAL

## 2021-12-13 ENCOUNTER — CLINICAL SUPPORT (OUTPATIENT)
Dept: CARDIAC REHAB | Age: 61
End: 2021-12-13
Payer: COMMERCIAL

## 2021-12-13 VITALS
RESPIRATION RATE: 16 BRPM | HEART RATE: 72 BPM | TEMPERATURE: 97.1 F | OXYGEN SATURATION: 98 % | DIASTOLIC BLOOD PRESSURE: 60 MMHG | WEIGHT: 172.5 LBS | BODY MASS INDEX: 27.72 KG/M2 | HEIGHT: 66 IN | SYSTOLIC BLOOD PRESSURE: 116 MMHG

## 2021-12-13 DIAGNOSIS — E11.65 TYPE 2 DIABETES MELLITUS WITH HYPERGLYCEMIA, WITH LONG-TERM CURRENT USE OF INSULIN (HCC): ICD-10-CM

## 2021-12-13 DIAGNOSIS — I10 ESSENTIAL HYPERTENSION: ICD-10-CM

## 2021-12-13 DIAGNOSIS — R60.0 BILATERAL LEG EDEMA: ICD-10-CM

## 2021-12-13 DIAGNOSIS — I25.5 ISCHEMIC CARDIOMYOPATHY: ICD-10-CM

## 2021-12-13 DIAGNOSIS — I25.10 CORONARY ARTERY DISEASE INVOLVING NATIVE CORONARY ARTERY OF NATIVE HEART WITHOUT ANGINA PECTORIS: ICD-10-CM

## 2021-12-13 DIAGNOSIS — Z00.00 ANNUAL PHYSICAL EXAM: Primary | ICD-10-CM

## 2021-12-13 DIAGNOSIS — Z79.4 TYPE 2 DIABETES MELLITUS WITH HYPERGLYCEMIA, WITH LONG-TERM CURRENT USE OF INSULIN (HCC): ICD-10-CM

## 2021-12-13 PROCEDURE — 93798 PHYS/QHP OP CAR RHAB W/ECG: CPT

## 2021-12-13 PROCEDURE — 3008F BODY MASS INDEX DOCD: CPT | Performed by: INTERNAL MEDICINE

## 2021-12-13 PROCEDURE — 99396 PREV VISIT EST AGE 40-64: CPT | Performed by: INTERNAL MEDICINE

## 2021-12-15 ENCOUNTER — CLINICAL SUPPORT (OUTPATIENT)
Dept: CARDIAC REHAB | Age: 61
End: 2021-12-15
Payer: COMMERCIAL

## 2021-12-15 DIAGNOSIS — I25.5 ISCHEMIC CARDIOMYOPATHY: ICD-10-CM

## 2021-12-15 PROCEDURE — 93798 PHYS/QHP OP CAR RHAB W/ECG: CPT

## 2021-12-17 ENCOUNTER — HOSPITAL ENCOUNTER (OUTPATIENT)
Dept: MRI IMAGING | Facility: HOSPITAL | Age: 61
Discharge: HOME | End: 2021-12-17
Payer: COMMERCIAL

## 2021-12-17 ENCOUNTER — CLINICAL SUPPORT (OUTPATIENT)
Dept: CARDIAC REHAB | Age: 61
End: 2021-12-17
Payer: COMMERCIAL

## 2021-12-17 DIAGNOSIS — I25.5 ISCHEMIC CARDIOMYOPATHY: ICD-10-CM

## 2021-12-17 DIAGNOSIS — D32.9 MENINGIOMA (HCC): ICD-10-CM

## 2021-12-17 PROCEDURE — G1004 CDSM NDSC: HCPCS

## 2021-12-17 PROCEDURE — A9585 GADOBUTROL INJECTION: HCPCS | Performed by: PHYSICIAN ASSISTANT

## 2021-12-17 PROCEDURE — 70553 MRI BRAIN STEM W/O & W/DYE: CPT

## 2021-12-17 PROCEDURE — 93798 PHYS/QHP OP CAR RHAB W/ECG: CPT

## 2021-12-17 RX ADMIN — GADOBUTROL 7 ML: 604.72 INJECTION INTRAVENOUS at 15:44

## 2021-12-20 ENCOUNTER — CLINICAL SUPPORT (OUTPATIENT)
Dept: CARDIAC REHAB | Age: 61
End: 2021-12-20
Payer: COMMERCIAL

## 2021-12-20 DIAGNOSIS — I25.5 ISCHEMIC CARDIOMYOPATHY: ICD-10-CM

## 2021-12-20 PROCEDURE — 93798 PHYS/QHP OP CAR RHAB W/ECG: CPT

## 2021-12-21 DIAGNOSIS — I25.5 ISCHEMIC CARDIOMYOPATHY: ICD-10-CM

## 2021-12-22 ENCOUNTER — CLINICAL SUPPORT (OUTPATIENT)
Dept: CARDIAC REHAB | Age: 61
End: 2021-12-22
Payer: COMMERCIAL

## 2021-12-22 DIAGNOSIS — I25.5 ISCHEMIC CARDIOMYOPATHY: ICD-10-CM

## 2021-12-22 PROCEDURE — 93798 PHYS/QHP OP CAR RHAB W/ECG: CPT

## 2021-12-24 ENCOUNTER — APPOINTMENT (OUTPATIENT)
Dept: CARDIAC REHAB | Age: 61
End: 2021-12-24
Payer: COMMERCIAL

## 2021-12-24 RX ORDER — ATORVASTATIN CALCIUM 80 MG/1
80 TABLET, FILM COATED ORAL
Qty: 90 TABLET | Refills: 4 | Status: SHIPPED | OUTPATIENT
Start: 2021-12-24 | End: 2021-12-27 | Stop reason: SDUPTHER

## 2021-12-27 ENCOUNTER — APPOINTMENT (OUTPATIENT)
Dept: CARDIAC REHAB | Age: 61
End: 2021-12-27
Payer: COMMERCIAL

## 2021-12-27 DIAGNOSIS — I25.5 ISCHEMIC CARDIOMYOPATHY: ICD-10-CM

## 2021-12-28 ENCOUNTER — VBI (OUTPATIENT)
Dept: ADMINISTRATIVE | Facility: OTHER | Age: 61
End: 2021-12-28

## 2021-12-28 RX ORDER — ATORVASTATIN CALCIUM 80 MG/1
80 TABLET, FILM COATED ORAL
Qty: 90 TABLET | Refills: 1 | Status: SHIPPED | OUTPATIENT
Start: 2021-12-28 | End: 2022-07-25 | Stop reason: SDUPTHER

## 2021-12-29 ENCOUNTER — CLINICAL SUPPORT (OUTPATIENT)
Dept: CARDIAC REHAB | Age: 61
End: 2021-12-29
Payer: COMMERCIAL

## 2021-12-29 DIAGNOSIS — I25.5 ISCHEMIC CARDIOMYOPATHY: ICD-10-CM

## 2021-12-29 PROCEDURE — 93798 PHYS/QHP OP CAR RHAB W/ECG: CPT

## 2021-12-31 ENCOUNTER — CLINICAL SUPPORT (OUTPATIENT)
Dept: CARDIAC REHAB | Age: 61
End: 2021-12-31
Payer: COMMERCIAL

## 2021-12-31 DIAGNOSIS — I25.5 ISCHEMIC CARDIOMYOPATHY: ICD-10-CM

## 2021-12-31 PROCEDURE — 93798 PHYS/QHP OP CAR RHAB W/ECG: CPT

## 2022-01-03 ENCOUNTER — CLINICAL SUPPORT (OUTPATIENT)
Dept: CARDIAC REHAB | Age: 62
End: 2022-01-03
Payer: COMMERCIAL

## 2022-01-03 ENCOUNTER — OFFICE VISIT (OUTPATIENT)
Dept: NEUROSURGERY | Facility: CLINIC | Age: 62
End: 2022-01-03
Payer: COMMERCIAL

## 2022-01-03 VITALS
TEMPERATURE: 97.4 F | HEART RATE: 75 BPM | WEIGHT: 172 LBS | RESPIRATION RATE: 16 BRPM | SYSTOLIC BLOOD PRESSURE: 118 MMHG | BODY MASS INDEX: 27.64 KG/M2 | DIASTOLIC BLOOD PRESSURE: 77 MMHG | HEIGHT: 66 IN

## 2022-01-03 DIAGNOSIS — D32.9 MENINGIOMA (HCC): Primary | ICD-10-CM

## 2022-01-03 DIAGNOSIS — I25.5 ISCHEMIC CARDIOMYOPATHY: ICD-10-CM

## 2022-01-03 PROCEDURE — 3078F DIAST BP <80 MM HG: CPT | Performed by: PHYSICIAN ASSISTANT

## 2022-01-03 PROCEDURE — 93798 PHYS/QHP OP CAR RHAB W/ECG: CPT

## 2022-01-03 PROCEDURE — 3074F SYST BP LT 130 MM HG: CPT | Performed by: PHYSICIAN ASSISTANT

## 2022-01-03 PROCEDURE — 99214 OFFICE O/P EST MOD 30 MIN: CPT | Performed by: PHYSICIAN ASSISTANT

## 2022-01-04 ENCOUNTER — OFFICE VISIT (OUTPATIENT)
Dept: DIABETES SERVICES | Facility: CLINIC | Age: 62
End: 2022-01-04
Payer: COMMERCIAL

## 2022-01-04 VITALS — HEIGHT: 66 IN | BODY MASS INDEX: 27.32 KG/M2 | WEIGHT: 170 LBS

## 2022-01-04 DIAGNOSIS — E11.65 TYPE 2 DIABETES MELLITUS WITH HYPERGLYCEMIA, WITH LONG-TERM CURRENT USE OF INSULIN (HCC): Primary | ICD-10-CM

## 2022-01-04 DIAGNOSIS — Z79.4 TYPE 2 DIABETES MELLITUS WITH HYPERGLYCEMIA, WITH LONG-TERM CURRENT USE OF INSULIN (HCC): Primary | ICD-10-CM

## 2022-01-04 PROCEDURE — 97803 MED NUTRITION INDIV SUBSEQ: CPT | Performed by: DIETITIAN, REGISTERED

## 2022-01-04 PROCEDURE — 3008F BODY MASS INDEX DOCD: CPT | Performed by: PHYSICIAN ASSISTANT

## 2022-01-04 NOTE — PROGRESS NOTES
Medical Nutrition Therapy      Assessment    Chief complaint T2DM    Visit Type: Follow-up visit    HPI: Kezia Lynne returned for follow-up today  She did not bring 3 day food record as requested  Diet history obtained via 24 hour recall  Eze was downloaded today revealing CGM active time 28%, average glucose 182 mg/dL with 28 1% variability  Time in range 53%, 0% low or very low, 37% high, 10% very high  Bruces 24 hour diet recall reveals inconsistent carbohydrate intake and inadequate intake of high-fiber fruit and vegetables  Her dinner example was high on saturated fat, this can be attributed to residual holiday food choices and is not typical  Overall, Rolly meals reported during diet recall range 30-58 grams carbohydrate  Based on meal plan review and diet recall provided education about keeping all meals to between 30-45 grams CHO, choosing higher fiber fruits, increasing intake of non-starchy vegetables, and aiming for more nutritional balance at meals  Reviewed higher-fiber fruit choices as well as visual of plate method for dinner meal  Kezia Lynne had a questions today about net carbs  Provided education on the fiber rule (subtract half of fiber grams from total carbohydrate grams if total fiber is 5 g or higher)  Reminded that generally total carbohydrate is what she should be looking at when carb counting using labels  Due to fairly low CGM active time also reviewed that Eze should be scanned at least once every 8 hours  Encouraged that Kezia Lynne scan before each meal and before bedtime to help fill in her graph and increase CGM active time  Kezia Lynne verbalized good understanding and will call with any questions prior to next follow-up appointment in 3 months      Ht Readings from Last 1 Encounters:   01/03/22 5' 6" (1 676 m)     Wt Readings from Last 2 Encounters:   01/03/22 78 kg (172 lb)   12/13/21 78 2 kg (172 lb 8 oz)     Weight Change: No    Medical Diagnosis/reason for visit E11 9, Z79 4 (ICD-10-CM) - Type 2 diabetes mellitus without complication, with long-term current use of insulin ([facility])    Food Log: Completed via the method of food recall                Breakfast:wakes 6 am  Eats right away  Banana and 1-2 cups coffee (half and half) then a little later has a bowl of cereal 30 min later (chad sorgum she thinks 22 g per cup and eye ball about 1 cup with skim milk  Morning Snack:not usually   Lunch:12 pm  Leftover chinese food - steamed chicken, snow peas over 1 cup brown rice, a little teriaki sauce on it  No beverage  Afternoon Snack: not usually   XPMAVA:1-1:16 pm  Slice or pork, potato casserole -hash browns, sour cream, cream of chicken soup, cheddar cheese, corn flakes ( 1 cup)  Evening Snack:9 pm a few gluten free crackers - 5-6 crackers    Beverages: coffee, pepsi - regular only because of new years, diet soda normally   Eating out/Take out:none    Exercise still in cardiac rehab 3 days per week - reduced to 2 days during the holidays  Has an exercise bike but has not done it in a little bit      Calorie needs 1,378 kcals/day Carbs: 30-45 g/meal, 15 g/snack     Fat: 4 servings/day    Protein:6 oz/day    Nutrition Diagnosis:  Inconsistent carbohydrate intake  intake related to Food and nutrition related knowledge deficit concerning appropriate amount and timing of carbohydrate intake as evidenced by  Estimated carbohydrate intake that is different from recommended types or ingested on an irregular basis    Intervention: plate method, increased fiber intake, label reading, carbohydrate counting, increased plant based foods, meal planning, individualized meal plan and food diary     Treatment Goals: Patient understands education and recommendations, Patient will consume 25-35 grams of fiber a day, Patient will increase their intake of plant based foods and Patient will count carbohydrates    Monitoring and evaluation:    Term code indicator   1 6 3 Carbohydrate Intake Criteria: 30-45 g CHO per meal, 0-15 g CHO per snack  Term code indicator   4 4 Mealtime Behavior Criteria: 3 meals per day, 4-5 hours apart  Up to 3 sancks per day, at least 2 hours apart from meals  Patients Response to Instruction:  Graciela Lora  Expected Compliancegood    Thank you for coming to the [facility] for education today  Please feel free to call with any questions or concerns      Start: 8:19 am  Stop: 8:50 am  Referred by: MD Flory Martinez, [address] [place] [phone]

## 2022-01-04 NOTE — PATIENT INSTRUCTIONS
Continue with meal plan of 30-45 grams of carbohydrate per meal and up to 15 grams of carbohydrates per snack    Choose higher fiber fruits such as berries, apples, oranges, etc  ( fruits with the * on pages 5-6)  Increase overall intake and be more consistent with intake of non-starchy vegetables  Try to scan Feng at least once every 8 hours  Please complete 3 day food record prior to next follow-up appointment

## 2022-01-05 ENCOUNTER — CLINICAL SUPPORT (OUTPATIENT)
Dept: CARDIAC REHAB | Age: 62
End: 2022-01-05
Payer: COMMERCIAL

## 2022-01-05 DIAGNOSIS — I25.5 ISCHEMIC CARDIOMYOPATHY: ICD-10-CM

## 2022-01-05 PROCEDURE — 93798 PHYS/QHP OP CAR RHAB W/ECG: CPT

## 2022-01-07 ENCOUNTER — CLINICAL SUPPORT (OUTPATIENT)
Dept: CARDIAC REHAB | Age: 62
End: 2022-01-07
Payer: COMMERCIAL

## 2022-01-07 DIAGNOSIS — I25.5 ISCHEMIC CARDIOMYOPATHY: ICD-10-CM

## 2022-01-07 PROCEDURE — 93798 PHYS/QHP OP CAR RHAB W/ECG: CPT

## 2022-01-08 ENCOUNTER — IMMUNIZATIONS (OUTPATIENT)
Dept: FAMILY MEDICINE CLINIC | Facility: HOSPITAL | Age: 62
End: 2022-01-08

## 2022-01-08 DIAGNOSIS — Z23 ENCOUNTER FOR IMMUNIZATION: Primary | ICD-10-CM

## 2022-01-08 PROCEDURE — 91300 COVID-19 PFIZER VACC 0.3 ML: CPT

## 2022-01-08 PROCEDURE — 0001A COVID-19 PFIZER VACC 0.3 ML: CPT

## 2022-01-10 ENCOUNTER — CLINICAL SUPPORT (OUTPATIENT)
Dept: CARDIAC REHAB | Age: 62
End: 2022-01-10
Payer: COMMERCIAL

## 2022-01-10 DIAGNOSIS — I25.5 ISCHEMIC CARDIOMYOPATHY: ICD-10-CM

## 2022-01-10 PROCEDURE — 93798 PHYS/QHP OP CAR RHAB W/ECG: CPT

## 2022-01-10 NOTE — PROGRESS NOTES
Cardiac Rehabilitation Plan of Care   Discharge          Today's date: 1/10/2022   # of Exercise Sessions Completed: 36  Patient name: Judy Glover      : 1960  Age: 59 y o  MRN: [id number]  Referring Physician: Virginia Ibrahim MD  Cardiologist: Eder Davis MD  Provider: Toribio Barnes  Clinician: Eligio Buchanan, MS, CEP, CCRP        Dx:   Encounter Diagnosis   Name Primary?  Ischemic cardiomyopathy      Date of onset: 21      SUMMARY OF PROGRESS:Discharge note for Felipe Umana  She had 17% improvement in functional capacity increasing Her 6 MWT distance by 26% walking 1240ft  Her exercise tolerance (max METs in tolerated in cardiac rehab) increased by 64%  She had a 5% declinein the DUKE activity estimated MET level with ADLs and physical activity  However, she reports improved stamina with household chores  Her [facility] QOL decreased by 17%  PHQ-9 score increased from 16 to 23  GUILLERMINA-7 increased from 12 to 17  Her PCP was notified who reached out to Felipe Umana via [address]  When addressed, Felipe Umana admits to seasonal depression and reports excellent support from her 3 sisters  She was encouraged to see professional help through therapy or discussing medical options with her PCP  Her weight decreased by 12 pounds  Waist circumference decreased by 2 inches  Rate Your Plate score did not improve but went down from 61 to 53  Felipe Umana has been meeting with a dietitian  Felipe Umana attended our heart healthy eating classes as well  Her A1c improved from 10 2 to 5 9  Felipe Umana has not been supplementing CR sessions with consistent home exercise but has occasionally used her stationary bike  She reports increased stamina, strength and reduced SOB with home activity but reports to feel "winded" in cardiac rehab    I reminded her of how far she has come in her exercise intensities while in cardiac rehab and increased respirations are necessary when exercising discussing the difference between feeling "winded" from exercise and SOB  She felt encouraged by her fitness improvements  Bruce tolerates 40 mins at 1 7 - 3 6 METs plus wt training  NSR,  LBBB on telemetry  RHR 70 - 82, ExHR 80 - 110  Resting /72 - 130/66 with appropriate response to exercise reaching 134/70 - 160/60  All group education classes on cardiac risk factor modification were attended by the patient  Discharge plans include joining [address] [place] [address]  Encouraged Pt to continue exercise  Frequency: 4-6 days/wk, Intensity: RPE 4-5, Time: 40-50 mins daily, 150-200 mins/wk  Pt was encouraged to continue eating heart healthy  Pt was encouraged to remain compliant with medications and f/u with cardiologist with any cardiac symptoms, medication management and updated lipid profile         Medication compliance: Yes   Comments: Pt reports to be compliant with medications  Fall Risk: Low   Comments: Ambulates with a steady gait with no assist device and Denies a fall in the past 6 months    EKG Interpretation:  NSR, LBBB      EXERCISE ASSESSMENT and PLAN    Current Exercise Program in Rehab:       Frequency: 3 days/week Supplement with home exercise 2+ days/wk as tolerated       Minutes: 40         METS: 1 7 - 3 4            HR: 87 - 102   RPE: 4-5         Modalities: Treadmill, UBE, NuStep and Recumbent bike      Exercise Goals :    Frequency: 3 - 5 days/week    Minutes: 40                            >150 mins/wk of moderate intensity exercise   METS: 2 0 - 3 8   HR:     RPE: 4-5   Modalities: Treadmill, UBE, Lifecycle, Rower, NuStep and Recumbent bike    Strength trainin-3 days / week  12-15 repetitions  1-2 sets per modality    Modalities: Leg Press, Lateral Raise, Arm Curl and Seated Row    Home Exercise: Type: stationary bike, Frequency: 2 days/week, Duration: 15 mins  - not consistently    Goals:  Exercise 5 days/wk, >150mins/wk of moderate intensity exercise,  Progression Toward Goals:  Goals not met: improved DASI score by 10%,   , Goals met: Increase in exercise capacity by 40% - based on peak METs tolerated in cardiac rehab exercise session, Exercise 5 days/wk, >150mins/wk of moderate intensity exercise, Improved 6MWT distance by 10%, Resume ADLs with increased strength, Return to work unrestricted, Attend Rehab regularly and start a home exercise program , Patient will be encouraged to focus on lifestyle modifications following discharge  Education: benefit of exercise for CAD risk factors, home exercise guidelines, AHA guidelines to achieve >150 mins/wk of moderate exercise, RPE scale, class: Risk Factors for Heart Disease and exercise instructions/guidelines for discharge    Plan: [facility]  Readiness to change: Maintenance: (Maintaining the behavior change)      NUTRITION ASSESSMENT AND PLAN    Weight control:    Starting weight: 177 4   Current weight:   165  Waist circumference:    Startin 5   Current:   37 5  Diabetes: IFG, Patient reported fasting BS , on Insulin , using a CGM  A1c: 5 9   last measured: 21    Lipid management: Discussed diet and lipid management and Last lipid profile 10/28/21  Chol 70  TRG 72  HDL 28  LDL 29    Goals:reduced BMI to < 25, HDL >40, reduced waist circumference <35 inches (F), Wt  loss 1-2 ppw,  goal of 10 lbs  , reduce portion sizes of meat to 3oz or less, increase intake of fish, shellfish, reduce cheese intake or use reduced-fat, eat 3 or more servings of whole grains a day, eliminate butter and daily saturated fat intake <7%/13g    Progression Toward Goals: Goals not met: reduced BMI to < 25, HDL >40, reduced waist circumference <35 inches (F),   , Goals met: Wt  loss 1-2 ppw,  goal of 10 lbs  , reduce portion sizes of meat to 3oz or less  , Patient will be encouraged to focus on lifestyle modifications following discharge   - A1C is down from 10 2 to 5 9, wt loss of 6lbs in 60 days, Stanton Contreras has been consulting with dietitian    Education: heart healthy eating  low sodium diet  hydration  nutrition for  lipid management  nutrition for Improved BG control  target goal for A1c <7 0  exercise and diabetes management   wt  loss   education class: Heart Healthy Eating  education class:  Label Reading  Plan: refer to diabetes educator, switch to low fat cheeses, replace refined grain bread with whole grain bread, reduce portion sizes, reduce red meat 1x/wk, switch to skim or 1% milk, avoid processed foods, monitor home blood glucose, drink more water, learn how to read food labels, replace sugar with stevia or truvia and keep added daily sugar <25g/day, f/u with dietitian  Readiness to change: Action:  (Changing behavior)      PSYCHOSOCIAL ASSESSMENT AND PLAN    Emotional:  Depression assessment:  PHQ-9 = 23            Anxiety measure:  GUILLERMINA-7 = 17   Self-reported stress level:  8  Back to work FT, holidays, home renovation  Social support: Good  and Patient has friends available for support when needed , depends on her 3 sisters for emotional support    Goals:  Reduce perceived stress to 1-3/10, improved [facility] QOL, improved Physical Fitness improved Overall Health, improved Quality of Life, Increased interest in doing things, improved sleep, Improved appetite, improved concentration, improved positive thoughts of well being, increased energy, stop worrying, take time to relax, Feel less anxious and Handle anger/frustration better    Progression Toward Goals: Goals not met: Reduce perceived stress to 1-3/10, improved [facility] QOL < 27, PHQ-9 - reduced severity by one level, Feelings in [place] Score < 3, Physical Fitness in [place] Score < 3, Social Support in [place] Score < 3, Daily Activity in [place] Score < 3, Social Activities in [place] Score < 3, Pain in [place] Score < 3, Overall Health in [place] Score < 3, Quality of Life in [facility] Score < 3 , Increased interest in doing things, improved sleep, Improved appetite, improved concentration, improved positive thoughts of well being, increased energy, stop worrying, take time to relax, Feel less anxious and Handle anger/frustration better  , Patient will be encouraged to focus on lifestyle modifications following discharge  Education: signs/sxs of depression, benefits of a positive support system, stress management techniques, depression and CAD, benefits of mental health counseling, class:  Stress and Your Health  and class:  Relaxation  Plan: PHQ-9 >5 will refer to MD, Refer to [employer], Practice relaxation techniques, Exercise, Enjoy a hobby and stay involved in [employer], return to [employer], call PCP for behavioral health  Readiness to change: Contemplation:  (Acknowledging that there is a problem but not yet ready or sure of wanting to make a change)      OTHER CORE COMPONENTS     Tobacco:   Social History     Tobacco Use   Smoking Status Never Smoker   Smokeless Tobacco Never Used       Tobacco Use Intervention:   N/A:  Patient is a non-smoker     Anginal Symptoms:  nausea/vomiting and lightheadedness   NTG use: Compliant with carrying NTG, Reviewed Proper use and Pt has not used NTG since event    Blood pressure:    Restin/72 - 130/66   Exercise: 134/70 - 160/60    Goals: reduced dietary sodium <2300mg, moderate intensity exercise >150 mins/wk and medication compliance    Progression Toward Goals: Goals met: reduced dietary sodium <2300mg, moderate intensity exercise >150 mins/wk and medication compliance , Patient will be encouraged to focus on lifestyle modifications following discharge      Education:  low sodium diet and HTN, proper use of sublingual NTG, Education class:  Common Heart Medications and Education class: Understanding Heart Disease  Plan: Avoid Processed foods, engage in regular exercise, eliminate salt shaker at the table, use salt substitutes, check labels for sodium content, monitor home BP and monitor daily wt  Readiness to change: Action: (Changing behavior)

## 2022-01-10 NOTE — PROGRESS NOTES
Cardiac Rehabilitation Plan of Care   Discharge          Today's date: 1/10/2022   # of Exercise Sessions Completed: 36  Patient name: Claire Valentin      : 1960  Age: 59 y o  MRN: [id number]  Referring Physician: Damari Miller MD  Cardiologist: Arlin Brower MD  Provider: Igor Morgan  Clinician: Amador Quintana, MS, CEP, CCRP        Dx:   Encounter Diagnosis   Name Primary?  Ischemic cardiomyopathy      Date of onset: 21      SUMMARY OF PROGRESS:Discharge note for Stanton Contreras  She had 17% improvement in functional capacity increasing Her 6 MWT distance by 26% walking 1240ft  Her exercise tolerance (max METs in tolerated in cardiac rehab) increased by 64%  She had a 5% declinein the DUKE activity estimated MET level with ADLs and physical activity  However, she reports improved stamina with household chores  Her [facility] QOL decreased by 17%  PHQ-9 score increased from 16 to 23  GUILLERMINA-7 increased from 12 to 17  Her PCP was notified who reached out to Stanton Conrteras via [address]  When addressed, Stanton Contreras admits to seasonal depression and reports excellent support from her 3 sisters  She was encouraged to see professional help through therapy or discussing medical options with her PCP  Her weight decreased by 12 pounds  Waist circumference decreased by 2 inches  Rate Your Plate score did not improve but went down from 61 to 53  Stanton Contreras has been meeting with a dietitian  Stanton Contreras attended our heart healthy eating classes as well  Her A1c improved from 10 2 to 5 9  Stanton Contreras has not been supplementing CR sessions with consistent home exercise but has occasionally used her stationary bike  She reports increased stamina, strength and reduced SOB with home activity but reports to feel "winded" in cardiac rehab    I reminded her of how far she has come in her exercise intensities while in cardiac rehab and increased respirations are necessary when exercising discussing the difference between feeling "winded" from exercise and SOB  She felt encouraged by her fitness improvements  *** tolerates 40 mins at *** - *** METs plus wt training  *** on telemetry  RHR *** - ***, ExHR *** - ***  Resting BP *** - *** with appropriate response to exercise reaching *** - ***  All group education classes on cardiac risk factor modification were attended by the patient  Discharge plans include ***  Encouraged Pt to continue exercise  Frequency: 4-6 days/wk, Intensity: RPE 4-5, Time: 40-50 mins daily, 150-200 mins/wk  Pt was encouraged to continue eating heart healthy  Pt was encouraged to remain compliant with medications and f/u with cardiologist with any cardiac symptoms, medication management and updated lipid profile         Medication compliance: Yes   Comments: Pt reports to be compliant with medications  Fall Risk: Low   Comments: Ambulates with a steady gait with no assist device and Denies a fall in the past 6 months    EKG Interpretation:  NSR, LBBB      EXERCISE ASSESSMENT and PLAN    Current Exercise Program in Rehab:       Frequency: 3 days/week Supplement with home exercise 2+ days/wk as tolerated       Minutes: 40         METS: 1 7 - 3 4            HR: 87 - 102   RPE: 4-5         Modalities: Treadmill, UBE, NuStep and Recumbent bike      Exercise Progression 30 Day Goals :    Frequency: 3 days/week of cardiac rehab     Supplement with home exercise 2+ days/wk as tolerated    Minutes: 40                            >150 mins/wk of moderate intensity exercise   METS: 2 0 - 3 8   HR:     RPE: 4-5   Modalities: Treadmill, UBE, Lifecycle, Rower, NuStep and Recumbent bike    Strength trainin-3 days / week  12-15 repetitions  1-2 sets per modality    Modalities: Leg Press, Lateral Raise, Arm Curl and Seated Row    Home Exercise: Type: stationary bike, Frequency: 2 days/week, Duration: 15 mins  - no consistently    Goals: improved DASI score by 10%, Increase in exercise capacity by 40% - based on peak METs tolerated in cardiac rehab exercise session, Exercise 5 days/wk, >150mins/wk of moderate intensity exercise, Improved 6MWT distance by 10%, Resume ADLs with increased strength, Return to work unrestricted, Attend Rehab regularly and start a home exercise program    Progression Toward Goals:  Pt is progressing and showing improvement  toward the following goals:  improved DASI score by 10%, Increase in exercise capacity by 40% - based on peak METs tolerated in cardiac rehab exercise session, Exercise 5 days/wk, >150mins/wk of moderate intensity exercise, Improved 6MWT distance by 10%, Resume ADLs with increased strength, Return to work unrestricted, Attend Rehab regularly and start a home exercise program   , Patient will attend CR 3x/wk regularly, continue home biking in the next 30 days, Will continue to educate and progress as tolerated  Loreto Mooney does not consistently supplement CR with home exercise    Education: benefit of exercise for CAD risk factors, home exercise guidelines, AHA guidelines to achieve >150 mins/wk of moderate exercise, RPE scale and class: Risk Factors for Heart Disease   Plan:education on home exercise guidelines and home exercise 30+ mins 2 days opposite CR  Readiness to change: Preparation:  (Getting ready to change)       NUTRITION ASSESSMENT AND PLAN    Weight control:    Starting weight: 177 4   Current weight:   171  Waist circumference:    Startin 5   Current:      Diabetes: IFG, Patient reported fasting BS , on Insulin , using a CGM  A1c: 10 2    last measured: 21    Lipid management: Discussed diet and lipid management and Last lipid profile 21  Chol 119    HDL 31  LDL 64    Goals:reduced BMI to < 25, HDL >40, reduced waist circumference <35 inches (F), fasting BG , improved A1c  < 7 0%, Wt  loss 1-2 ppw,  goal of 10 lbs  , reduce portion sizes of meat to 3oz or less, increase intake of fish, shellfish, reduce cheese intake or use reduced-fat, eat 3 or more servings of whole grains a day, eliminate butter and daily saturated fat intake <7%/13g    Progression Toward Goals: Pt is progressing and showing improvement  toward the following goals:  reduced BMI to < 25, HDL >40, reduced waist circumference <35 inches (F), fasting BG , improved A1c  < 7 0%, Wt  loss 1-2 ppw,  goal of 10 lbs  , reduce portion sizes of meat to 3oz or less, increase intake of fish, shellfish, reduce cheese intake or use reduced-fat, eat 3 or more servings of whole grains a day, eliminate butter and daily saturated fat intake <7%/13g   , Patient will decrease portion sizes in the next 30 days, Will continue to educate and progress as tolerated   - A1C is down from 10 2 to 5 9, wt loss of 6lbs in 60 days    Education: heart healthy eating  low sodium diet  hydration  nutrition for  lipid management  nutrition for Improved BG control  target goal for A1c <7 0  exercise and diabetes management   wt  loss   education class: Heart Healthy Eating  education class:  Label Reading  Plan: refer to diabetes educator, switch to low fat cheeses, replace refined grain bread with whole grain bread, reduce portion sizes, reduce red meat 1x/wk, switch to skim or 1% milk, avoid processed foods, monitor home blood glucose, drink more water, learn how to read food labels, replace sugar with stevia or truvia and keep added daily sugar <25g/day  Readiness to change: Action:  (Changing behavior)      PSYCHOSOCIAL ASSESSMENT AND PLAN    Emotional:  Depression assessment:  PHQ-9 = 21            Anxiety measure:  UGILLERMINA-7 = 17  Self-reported stress level:  8  Back to work FT, holidays, home renovation  Social support: Good  and Patient has friends available for support when needed     Goals:  Reduce perceived stress to 1-3/10, improved [facility] QOL < 27, PHQ-9 - reduced severity by one level, Feelings in [facility] Score < 3, Physical Fitness in [facility] Score < 3, Social Support in [place] Score < 3, Daily Activity in [place] Score < 3, Social Activities in [place] Score < 3, Pain in [place] Score < 3, Overall Health in [place] Score < 3, Quality of Life in [place] Score < 3 , Increased interest in doing things, improved sleep, Improved appetite, improved concentration, improved positive thoughts of well being, increased energy, stop worrying, take time to relax, Feel less anxious and Handle anger/frustration better    Progression Toward Goals: Pt is progressing and showing improvement  toward the following goals:   Feelings in [place] Score < 3, Physical Fitness in [place] Score < 3, Social Support in [place] Score < 3, Daily Activity in [place] Score < 3, Social Activities in [place] Score < 3, Pain in [place] Score < 3, Overall Health in [place] Score < 3, Quality of Life in [place] Score < 3 , Not progressing toward the following goals:  Reduce perceived stress to 1-3/10, improved [facility] QOL < 27, PHQ-9 - reduced severity by one level,Increased interest in doing things, improved sleep, Improved appetite, improved concentration, improved positive thoughts of well being, increased energy, stop worrying, take time to relax, Feel less anxious and Handle anger/frustration better  , Patient will take time to relax in the next 30 days, Will continue to educate and progress as tolerated      Education: signs/sxs of depression, benefits of a positive support system, stress management techniques, depression and CAD, benefits of mental health counseling, class:  Stress and Your Health  and class:  Relaxation  Plan: PHQ-9 >5 will refer to MD, Refer to Hernandez Mccall, Practice relaxation techniques, Exercise, Enjoy a hobby and stay involved in [employer], return to sewing and knitting  Readiness to change: Contemplation:  (Acknowledging that there is a problem but not yet ready or sure of wanting to make a change)      OTHER CORE COMPONENTS     Tobacco:   Social History     Tobacco Use   Smoking Status Never Smoker   Smokeless Tobacco Never Used       Tobacco Use Intervention:   N/A:  Patient is a non-smoker     Anginal Symptoms:  nausea/vomiting and lightheadedness   NTG use: Compliant with carrying NTG, Reviewed Proper use and Pt has not used NTG since event    Blood pressure:    Restin/62 - 132/66   Exercise: 128/76 - 146/70    Goals: reduced dietary sodium <2300mg, moderate intensity exercise >150 mins/wk and medication compliance    Progression Toward Goals: Pt is progressing and showing improvement  toward the following goals:  reduced dietary sodium <2300mg, moderate intensity exercise >150 mins/wk and medication compliance   , Patient will attend CR 3x/wk, continue home exercise program, continue low sodium diet in the next 30 days, Will continue to educate and progress as tolerated      Education:  low sodium diet and HTN, proper use of sublingual NTG, Education class:  Common Heart Medications and Education class: Understanding Heart Disease  Plan: Avoid Processed foods, engage in regular exercise, eliminate salt shaker at the table, use salt substitutes, check labels for sodium content, monitor home BP and monitor daily wt  Readiness to change: Action:  (Changing behavior)

## 2022-01-20 ENCOUNTER — TELEPHONE (OUTPATIENT)
Dept: CARDIOLOGY CLINIC | Facility: CLINIC | Age: 62
End: 2022-01-20

## 2022-01-20 PROBLEM — R06.00 DOE (DYSPNEA ON EXERTION): Status: ACTIVE | Noted: 2022-01-20

## 2022-01-20 PROBLEM — R06.09 DOE (DYSPNEA ON EXERTION): Status: ACTIVE | Noted: 2022-01-20

## 2022-01-20 NOTE — TELEPHONE ENCOUNTER
Patient signed up to a fitness program at [employer]  She completed cardiac rehab on 1/10/22  While having an assessment her pulse ox went down to 80% on room air  She c o fatigue, she has mild sob when wearing a mask and upon exertion  Her current weight is 164lbs she has not noticed any significant weight gain  She sometimes gets dizzy when bending over  She does have lower extremity edema bilaterally but has not worsened recently  I advised her to avoid processed foods and read labels for sodium content  She does not monitor her bp at home   Please advise

## 2022-01-20 NOTE — PROGRESS NOTES
Cardiology Follow Up    Sergio Osman  1960  [id number]  [facility]  [address]  [address] [place] [id number] [phone]    1  Ischemic cardiomyopathy  sacubitril-valsartan (Entresto) 49-51 MG TABS   2  Left bundle branch block     3  Essential hypertension     4  Coronary artery disease involving native coronary artery of native heart without angina pectoris     5  Bilateral leg edema     6  BARROS (dyspnea on exertion)     7  Mixed hyperlipidemia           Discussion/Summary: All of her assessed cardiac problems are stable  I have reviewed her medications and will increase her Entresto to 49 - 51 mg BID  The rest of her medications remain the same  No cardiac testing is ordered  RTO 9 months  Interval History: She is doing better since coronary stenting of her LAD on 9/2/2021  She has more energy, is walking more, has less SOB  EF is 37 % by echo 8/2021  LDL 28    /68  She denies CP or chest pressure      Patient Active Problem List   Diagnosis    Fatigue due to excessive exertion    Mixed hyperlipidemia    Abnormal stress echo    Type 2 diabetes mellitus with hyperglycemia, with long-term current use of insulin (HCC)    Microalbuminuria    Bilateral leg edema    Colon polyps    Gastroesophageal reflux disease with esophagitis without hemorrhage    Dizziness    Left bundle branch block    CAD (coronary artery disease)    New Ischemic Cardiomyopathy/Systolic CHF    Right paraclinoid meningioma    Essential hypertension    Wound of left leg    BARROS (dyspnea on exertion)     Past Medical History:   Diagnosis Date    Colon polyp     Dizziness     Edema     Hyperlipidemia     Hypertension     Type 2 diabetes mellitus (HCC)     Vitamin D deficiency     Wears glasses      Social History     Socioeconomic History    Marital status: Single     Spouse name: Not on file    Number of children: Not on file    Years of education: Not on file    Highest education level: Not on file   Occupational History    Occupation: deed recorder   Tobacco Use    Smoking status: Never Smoker    Smokeless tobacco: Never Used   Vaping Use    Vaping Use: Never used   Substance and Sexual Activity    Alcohol use: No    Drug use: No    Sexual activity: Not Currently   Other Topics Concern    Not on file   Social History Narrative    Do you currently or have you served in the [employer]: No    Were you activated, into active duty, as a member of the [employer] or as a Reservist: No    Occupation:     Marital status: Single    Exercise level: None    Diet: Regular    General stress level: Medium    Alcohol intake: None    Caffeine intake:  Moderate    1 cup of coffee in the morning    Chewing tobacco: none    Illicit drugs: none    Guns present in home: No    Seat belts used routinely: Yes    Sunscreen used routinely: Yes    Smoke alarm in home: Yes    Advance directive: Yes     Social Determinants of Health     Financial Resource Strain: Not on file   Food Insecurity: Not on file   Transportation Needs: Not on file   Physical Activity: Not on file   Stress: Not on file   Social Connections: Not on file   Intimate Partner Violence: Not on file   Housing Stability: Not on file      Family History   Problem Relation Age of Onset    Diabetes Mother     Hypertension Mother     Sudden death Mother         SCD    Hyperlipidemia Mother     Heart attack Mother     Breast cancer Mother 79    Diabetes Father     Arrhythmia Father         pacemaker/ defib    Clotting disorder Father         eliquis    Heart failure Father     Heart disease Father     Melanoma Father     Cancer Father 61        bladder cancer    Breast cancer Sister 44    BRCA1 Negative Sister     No Known Problems Maternal Grandmother     No Known Problems Maternal Grandfather     No Known Problems Paternal Grandmother     No Known Problems Paternal Grandfather     No Known Problems Sister     No Known Problems Sister     Breast cancer Maternal Aunt         unknown age   Faith [place] Esophageal cancer Maternal Aunt 61    Cancer Maternal Aunt         unknown age or type    No Known Problems Maternal Aunt     Lung cancer Paternal Aunt         unknonw age- in her [de-identified]    Colon cancer Maternal Uncle         unknwon age   Fiath [place] Pancreatic cancer Maternal Uncle 61    Anuerysm Neg Hx      Past Surgical History:   Procedure Laterality Date    BREAST EXCISIONAL BIOPSY Right     COLONOSCOPY  2018    Colonoscpoy due in 3 years    EGD  2018    MAMMO (HISTORICAL) Bilateral 2020    MAMMO NEEDLE LOCALIZATION RIGHT (ALL INC) Right 2009    WISDOM TOOTH EXTRACTION         Current Outpatient Medications:     aspirin 81 MG tablet, Take 1 tablet by mouth daily, Disp: , Rfl:     Blood Pressure Monitor KIT, Use 2 (two) times a day, Disp: 1 kit, Rfl: 0    clopidogrel (PLAVIX) 75 mg tablet, Take 1 tablet (75 mg total) by mouth daily, Disp: 90 tablet, Rfl: 3    Continuous Blood Gluc  (FreeStyle Feng 14 Day [place]) CORBY, USE AS DIRECTED, Disp: 2 each, Rfl: 5    Continuous Blood Gluc Sensor (FreeStyle Feng 14 Day Sensor) MISC, USE AS DIRECTED, Disp: 2 each, Rfl: 5    Dulaglutide 0 75 MG/0 5ML SOPN, Inject 0 5 mL (0 75 mg total) under the skin once a week, Disp: 3 mL, Rfl: 1    Empagliflozin 10 MG TABS, Take 1 tablet (10 mg total) by mouth every morning, Disp: 90 tablet, Rfl: 3    esomeprazole (NexIUM) 20 mg capsule, Take 20 mg by mouth every morning before breakfast, Disp: , Rfl:     metFORMIN (GLUCOPHAGE) 1000 MG tablet, Take 1 tablet (1,000 mg total) by mouth 2 (two) times a day with meals, Disp: 180 tablet, Rfl: 3    nitroglycerin (NITROSTAT) 0 4 mg SL tablet, Place 1 tablet (0 4 mg total) under the tongue every 5 (five) minutes as needed for chest pain, Disp: 24 tablet, Rfl: 1   NovoFine 32G X 6 MM MISC, USE AS DIRECTED ONCE A DAY WITH INSULIN, Disp: 30 each, Rfl: 5    silver sulfadiazine (Silvadene) 1 % cream, Apply topically daily (Patient not taking: Reported on 12/13/2021 ), Disp: 50 g, Rfl: 1    TURMERIC PO, Take 1 capsule by mouth daily, Disp: , Rfl:     atorvastatin (LIPITOR) 80 mg tablet, Take 1 tablet (80 mg total) by mouth daily with dinner, Disp: 90 tablet, Rfl: 1    carvedilol (COREG) 6 25 mg tablet, Take 1 tablet (6 25 mg total) by mouth 2 (two) times a day with meals, Disp: 60 tablet, Rfl: 2    Continuous Blood Gluc  (FreeStyle Feng 14 Day [place]) CORBY, FreeStyle Feng 14 Day [place], Disp: , Rfl:     Continuous Blood Gluc Sensor (FreeStyle Feng 14 Day Sensor) MISC, FreeStyle Feng 14 Day Sensor kit, Disp: , Rfl:     furosemide (LASIX) 20 mg tablet, Take 1 tablet (20 mg total) by mouth 2 (two) times a day (Patient taking differently: Take 20 mg by mouth daily  ), Disp: 60 tablet, Rfl: 3    sacubitril-valsartan (Entresto) 49-51 MG TABS, Take 1 tablet by mouth 2 (two) times a day, Disp: 180 tablet, Rfl: 4  No Known Allergies  Vitals:    11/04/21 1334   BP: 124/68   Pulse: 60   SpO2: 98%   Weight: 78 8 kg (173 lb 12 8 oz)   Height: 5' 6" (1 676 m)     Weight (last 2 days)     None         Blood pressure 124/68, pulse 60, height 5' 6" (1 676 m), weight 78 8 kg (173 lb 12 8 oz), SpO2 98 %, not currently breastfeeding , Body mass index is 28 05 kg/m²      Labs:  Appointment on 10/28/2021   Component Date Value    Cholesterol 10/28/2021 70     Triglycerides 10/28/2021 72     HDL, Direct 10/28/2021 28*    LDL Calculated 10/28/2021 28     Non-HDL-Chol (CHOL-HDL) 10/28/2021 42    Appointment on 09/15/2021   Component Date Value    WBC 09/15/2021 6 26     RBC 09/15/2021 4 21     Hemoglobin 09/15/2021 13 0     Hematocrit 09/15/2021 39 4     MCV 09/15/2021 94     MCH 09/15/2021 30 9     MCHC 09/15/2021 33 0     RDW 09/15/2021 12 5     Platelets 83/38/0151 204  MPV 09/15/2021 11 2     Sodium 09/15/2021 139     Potassium 09/15/2021 4 4     Chloride 09/15/2021 106     CO2 09/15/2021 23     ANION GAP 09/15/2021 10     BUN 09/15/2021 24     Creatinine 09/15/2021 1 13     Glucose 09/15/2021 159*    Calcium 09/15/2021 9 5     eGFR 09/15/2021 52    Admission on 09/02/2021, Discharged on 09/03/2021   Component Date Value    Sodium 09/02/2021 140     Potassium 09/02/2021 3 8     Chloride 09/02/2021 107     CO2 09/02/2021 29     ANION GAP 09/02/2021 4     BUN 09/02/2021 16     Creatinine 09/02/2021 0 76     Glucose 09/02/2021 147*    Calcium 09/02/2021 8 6     eGFR 09/02/2021 84     Activated Clotting Time,* 09/02/2021 280*    Specimen Type 09/02/2021 VENOUS     Activated Clotting Time,* 09/02/2021 243*    Specimen Type 09/02/2021 VENOUS     POC Glucose 09/02/2021 176*    Sodium 09/03/2021 138     Potassium 09/03/2021 3 8     Chloride 09/03/2021 108     CO2 09/03/2021 27     ANION GAP 09/03/2021 3*    BUN 09/03/2021 17     Creatinine 09/03/2021 0 81     Glucose 09/03/2021 121     Calcium 09/03/2021 8 9     eGFR 09/03/2021 78     POC Glucose 09/03/2021 132     POC Glucose 09/03/2021 152*   Admission on 08/29/2021, Discharged on 09/02/2021   Component Date Value    WBC 08/29/2021 5 72     RBC 08/29/2021 4 72     Hemoglobin 08/29/2021 14 3     Hematocrit 08/29/2021 42 4     MCV 08/29/2021 90     MCH 08/29/2021 30 3     MCHC 08/29/2021 33 7     RDW 08/29/2021 12 1     MPV 08/29/2021 11 2     Platelets 74/17/5945 190     nRBC 08/29/2021 0     Neutrophils Relative 08/29/2021 60     Immat GRANS % 08/29/2021 1     Lymphocytes Relative 08/29/2021 29     Monocytes Relative 08/29/2021 8     Eosinophils Relative 08/29/2021 1     Basophils Relative 08/29/2021 1     Neutrophils Absolute 08/29/2021 3 52     Immature Grans Absolute 08/29/2021 0 03     Lymphocytes Absolute 08/29/2021 1 63     Monocytes Absolute 08/29/2021 0 44     Eosinophils Absolute 08/29/2021 0 06     Basophils Absolute 08/29/2021 0 04     Sodium 08/29/2021 138     Potassium 08/29/2021 4 7     Chloride 08/29/2021 102     CO2 08/29/2021 25     ANION GAP 08/29/2021 11     BUN 08/29/2021 22     Creatinine 08/29/2021 1 05     Glucose 08/29/2021 390*    Calcium 08/29/2021 9 3     AST 08/29/2021 18     ALT 08/29/2021 40     Alkaline Phosphatase 08/29/2021 71     Total Protein 08/29/2021 7 4     Albumin 08/29/2021 3 6     Total Bilirubin 08/29/2021 0 72     eGFR 08/29/2021 57     Troponin I 08/29/2021 <0 02     Troponin I 08/29/2021 <0 02     Hemoglobin A1C 08/29/2021 10 2*    EAG 08/29/2021 246     CRP 08/29/2021 <3 0     POC Glucose 08/29/2021 223*    Troponin I 08/30/2021 <0 02     Cholesterol 08/30/2021 119     Triglycerides 08/30/2021 119     HDL, Direct 08/30/2021 31*    LDL Calculated 08/30/2021 64     Sodium 08/30/2021 141     Potassium 08/30/2021 3 7     Chloride 08/30/2021 105     CO2 08/30/2021 25     ANION GAP 08/30/2021 11     BUN 08/30/2021 16     Creatinine 08/30/2021 0 97     Glucose 08/30/2021 235*    Calcium 08/30/2021 8 6     eGFR 08/30/2021 62     WBC 08/30/2021 6 34     RBC 08/30/2021 4 10     Hemoglobin 08/30/2021 12 8     Hematocrit 08/30/2021 37 5     MCV 08/30/2021 92     MCH 08/30/2021 31 2     MCHC 08/30/2021 34 1     RDW 08/30/2021 12 3     Platelets 38/69/2754 183     MPV 08/30/2021 11 0     POC Glucose 08/30/2021 253*    POC Glucose 08/30/2021 286*    POC Glucose 08/30/2021 262*    POC Glucose 08/30/2021 198*    WBC 08/31/2021 6 05     RBC 08/31/2021 4 47     Hemoglobin 08/31/2021 13 5     Hematocrit 08/31/2021 41 2     MCV 08/31/2021 92     MCH 08/31/2021 30 2     MCHC 08/31/2021 32 8     RDW 08/31/2021 12 2     MPV 08/31/2021 11 3     Platelets 31/18/8675 184     nRBC 08/31/2021 0     Neutrophils Relative 08/31/2021 54     Immat GRANS % 08/31/2021 0     Lymphocytes Relative 08/31/2021 37     Monocytes Relative 08/31/2021 7     Eosinophils Relative 08/31/2021 1     Basophils Relative 08/31/2021 1     Neutrophils Absolute 08/31/2021 3 25     Immature Grans Absolute 08/31/2021 0 02     Lymphocytes Absolute 08/31/2021 2 23     Monocytes Absolute 08/31/2021 0 44     Eosinophils Absolute 08/31/2021 0 08     Basophils Absolute 08/31/2021 0 03     Sodium 08/31/2021 141     Potassium 08/31/2021 3 8     Chloride 08/31/2021 106     CO2 08/31/2021 27     ANION GAP 08/31/2021 8     BUN 08/31/2021 14     Creatinine 08/31/2021 0 95     Glucose 08/31/2021 318*    Calcium 08/31/2021 8 4     Corrected Calcium 08/31/2021 9 5     AST 08/31/2021 15     ALT 08/31/2021 30     Alkaline Phosphatase 08/31/2021 56     Total Protein 08/31/2021 5 9*    Albumin 08/31/2021 2 6*    Total Bilirubin 08/31/2021 0 31     eGFR 08/31/2021 64     POC Glucose 08/31/2021 277*    NT-proBNP 08/31/2021 1,033*    Ventricular Rate 08/30/2021 63     Atrial Rate 08/30/2021 63     [place] Interval 08/30/2021 160     QRSD Interval 08/30/2021 144     QT Interval 08/30/2021 482     QTC Interval 08/30/2021 493     P Axis 08/30/2021 56     QRS Axis 08/30/2021 -47     T Wave Axis 08/30/2021 90     POC Glucose 08/31/2021 272*    Ventricular Rate 08/29/2021 68     Atrial Rate 08/29/2021 71     W[place] Interval 08/29/2021 152     QRSD Interval 08/29/2021 140     QT Interval 08/29/2021 466     QTC Interval 08/29/2021 496     P Axis 08/29/2021 63     QRS Axis 08/29/2021 -64     T Wave Axis 08/29/2021 86     POC Glucose 08/31/2021 264*    POC Glucose 08/31/2021 283*    Sodium 09/01/2021 142     Potassium 09/01/2021 3 4*    Chloride 09/01/2021 105     CO2 09/01/2021 29     ANION GAP 09/01/2021 8     BUN 09/01/2021 17     Creatinine 09/01/2021 0 94     Glucose 09/01/2021 206*    Calcium 09/01/2021 8 7     eGFR 09/01/2021 65     POC Glucose 09/01/2021 221*    Magnesium 09/01/2021 1 9     POC Glucose 09/01/2021 246*    POC Glucose 09/01/2021 256*    POC Glucose 09/01/2021 212*    Sodium 09/02/2021 142     Potassium 09/02/2021 3 9     Chloride 09/02/2021 105     CO2 09/02/2021 28     ANION GAP 09/02/2021 9     BUN 09/02/2021 16     Creatinine 09/02/2021 0 97     Glucose 09/02/2021 191*    Calcium 09/02/2021 8 8     eGFR 09/02/2021 62     POC Glucose 09/02/2021 167*    Ventricular Rate 09/01/2021 67     Atrial Rate 09/01/2021 67     MA Interval 09/01/2021 170     QRSD Interval 09/01/2021 142     QT Interval 09/01/2021 494     QTC Interval 09/01/2021 521     P Axis 09/01/2021 46     QRS Axis 09/01/2021 -39     T Wave Axis 09/01/2021 74     POC Glucose 09/02/2021 171*    POC Glucose 09/02/2021 131    Orders Only on 05/25/2021   Component Date Value    Right Eye Diabetic Retin* 05/25/2021 Mild     Left Eye Diabetic Retino* 05/25/2021 Mild      Imaging: No results found  Review of Systems:  Review of Systems   Constitutional: Positive for fatigue  Negative for diaphoresis, fever and unexpected weight change  HENT: Negative  Respiratory: Positive for shortness of breath  Negative for cough and wheezing  Cardiovascular: Negative for chest pain, palpitations and leg swelling  Gastrointestinal: Negative for abdominal pain, diarrhea and nausea  Musculoskeletal: Negative for gait problem and myalgias  Skin: Negative for rash  Neurological: Negative for dizziness and numbness  Psychiatric/Behavioral: Negative  Physical Exam:  Physical Exam  Constitutional:       Appearance: She is well-developed  HENT:      Head: Normocephalic and atraumatic  Eyes:      Pupils: Pupils are equal, round, and reactive to light  Neck:      Vascular: No JVD  Cardiovascular:      Rate and Rhythm: Regular rhythm  Pulses: Normal pulses  Carotid pulses are 2+ on the right side and 2+ on the left side       Heart sounds: S1 normal and S2 normal    Pulmonary: Effort: Pulmonary effort is normal       Breath sounds: Normal breath sounds  No wheezing or rales  Abdominal:      General: Bowel sounds are normal       Palpations: Abdomen is soft  Tenderness: There is no abdominal tenderness  Musculoskeletal:         General: No tenderness  Normal range of motion  Cervical back: Normal range of motion and neck supple  Skin:     General: Skin is warm  Neurological:      Mental Status: She is alert and oriented to person, place, and time  Cranial Nerves: No cranial nerve deficit  Deep Tendon Reflexes: Reflexes are normal and symmetric

## 2022-02-08 NOTE — PROGRESS NOTES
Cardiac Rehabilitation Plan of Care   Discharge          Today's date: 1/10/2022   # of Exercise Sessions Completed: 36  Patient name: Reine Hodgkin      : 1960  Age: 59 y o  MRN: [id number]  Referring Physician: Dennis Delgado MD  Cardiologist: Jacob Melendrez MD  Provider: Kevin Mauro  Clinician: Julia Edmonds, MS, CEP, CCRP        Dx:   Encounter Diagnosis   Name Primary?  Ischemic cardiomyopathy      Date of onset: 21      SUMMARY OF PROGRESS:Discharge note for Kai More  She had 17% improvement in functional capacity increasing Her 6 MWT distance by 26% walking 1240ft  Her exercise tolerance (max METs in tolerated in cardiac rehab) increased by 64%  She had a 5% declinein the DUKE activity estimated MET level with ADLs and physical activity  However, she reports improved stamina with household chores  Her [facility] QOL decreased by 17%  PHQ-9 score increased from 16 to 23  GUILLERMINA-7 increased from 12 to 17  Her PCP was notified who reached out to Kai Alcocers via [address]  When addressed, Kai More admits to seasonal depression and reports excellent support from her 3 sisters  She was encouraged to see professional help through therapy or discussing medical options with her PCP  Her weight decreased by 12 pounds  Waist circumference decreased by 2 inches  Rate Your Plate score did not improve but went down from 61 to 53  Kai More has been meeting with a dietitian  Kai More attended our heart healthy eating classes as well  Her A1c improved from 10 2 to 5 9  Kai More has not been supplementing CR sessions with consistent home exercise but has occasionally used her stationary bike  She reports increased stamina, strength and reduced SOB with home activity but reports to feel "winded" in cardiac rehab    I reminded her of how far she has come in her exercise intensities while in cardiac rehab and increased respirations are necessary when exercising discussing the difference between feeling "winded" from exercise and SOB  She felt encouraged by her fitness improvements  Bruce tolerates 40 mins at 1 7 - 3 6 METs plus wt training  NSR,  LBBB on telemetry  RHR 70 - 82, ExHR 80 - 110  Resting /72 - 130/66 with appropriate response to exercise reaching 134/70 - 160/60  All group education classes on cardiac risk factor modification were attended by the patient  Discharge plans include joining [address] [place] [address]  Encouraged Pt to continue exercise  Frequency: 4-6 days/wk, Intensity: RPE 4-5, Time: 40-50 mins daily, 150-200 mins/wk  Pt was encouraged to continue eating heart healthy  Pt was encouraged to remain compliant with medications and f/u with cardiologist with any cardiac symptoms, medication management and updated lipid profile         Medication compliance: Yes   Comments: Pt reports to be compliant with medications  Fall Risk: Low   Comments: Ambulates with a steady gait with no assist device and Denies a fall in the past 6 months    EKG Interpretation:  NSR, LBBB      EXERCISE ASSESSMENT and PLAN    Current Exercise Program in Rehab:       Frequency: 3 days/week Supplement with home exercise 2+ days/wk as tolerated       Minutes: 40         METS: 1 7 - 3 4            HR: 87 - 102   RPE: 4-5         Modalities: Treadmill, UBE, NuStep and Recumbent bike      Exercise Goals :    Frequency: 3 - 5 days/week    Minutes: 40                            >150 mins/wk of moderate intensity exercise   METS: 2 0 - 3 8   HR:     RPE: 4-5   Modalities: Treadmill, UBE, Lifecycle, Rower, NuStep and Recumbent bike    Strength trainin-3 days / week  12-15 repetitions  1-2 sets per modality    Modalities: Leg Press, Lateral Raise, Arm Curl and Seated Row    Home Exercise: Type: stationary bike, Frequency: 2 days/week, Duration: 15 mins  - not consistently    Goals:  Exercise 5 days/wk, >150mins/wk of moderate intensity exercise,  Progression Toward Goals:  Goals not met: improved DASI score by 10%,   , Goals met: Increase in exercise capacity by 40% - based on peak METs tolerated in cardiac rehab exercise session, Exercise 5 days/wk, >150mins/wk of moderate intensity exercise, Improved 6MWT distance by 10%, Resume ADLs with increased strength, Return to work unrestricted, Attend Rehab regularly and start a home exercise program , Patient will be encouraged to focus on lifestyle modifications following discharge  Education: benefit of exercise for CAD risk factors, home exercise guidelines, AHA guidelines to achieve >150 mins/wk of moderate exercise, RPE scale, class: Risk Factors for Heart Disease and exercise instructions/guidelines for discharge    Plan: [facility]  Readiness to change: Maintenance: (Maintaining the behavior change)      NUTRITION ASSESSMENT AND PLAN    Weight control:    Starting weight: 177 4   Current weight:   165  Waist circumference:    Startin 5   Current:   37 5  Diabetes: IFG, Patient reported fasting BS , on Insulin , using a CGM  A1c: 5 9   last measured: 21    Lipid management: Discussed diet and lipid management and Last lipid profile 10/28/21  Chol 70  TRG 72  HDL 28  LDL 29    Goals:reduced BMI to < 25, HDL >40, reduced waist circumference <35 inches (F), Wt  loss 1-2 ppw,  goal of 10 lbs  , reduce portion sizes of meat to 3oz or less, increase intake of fish, shellfish, reduce cheese intake or use reduced-fat, eat 3 or more servings of whole grains a day, eliminate butter and daily saturated fat intake <7%/13g    Progression Toward Goals: Goals not met: reduced BMI to < 25, HDL >40, reduced waist circumference <35 inches (F),   , Goals met: Wt  loss 1-2 ppw,  goal of 10 lbs  , reduce portion sizes of meat to 3oz or less  , Patient will be encouraged to focus on lifestyle modifications following discharge   - A1C is down from 10 2 to 5 9, wt loss of 6lbs in 60 days, Cliff Tinsley has been consulting with dietitian    Education: heart healthy eating  low sodium diet  hydration  nutrition for  lipid management  nutrition for Improved BG control  target goal for A1c <7 0  exercise and diabetes management   wt  loss   education class: Heart Healthy Eating  education class:  Label Reading  Plan: refer to diabetes educator, switch to low fat cheeses, replace refined grain bread with whole grain bread, reduce portion sizes, reduce red meat 1x/wk, switch to skim or 1% milk, avoid processed foods, monitor home blood glucose, drink more water, learn how to read food labels, replace sugar with stevia or truvia and keep added daily sugar <25g/day, f/u with dietitian  Readiness to change: Action:  (Changing behavior)      PSYCHOSOCIAL ASSESSMENT AND PLAN    Emotional:  Depression assessment:  PHQ-9 = 23            Anxiety measure:  GUILLERMINA-7 = 17   Self-reported stress level:  8  Back to work FT, holidays, home renovation  Social support: Good  and Patient has friends available for support when needed , depends on her 3 sisters for emotional support    Goals:  Reduce perceived stress to 1-3/10, improved [facility], improved Physical Fitness improved Overall Health, improved Quality of Life, Increased interest in doing things, improved sleep, Improved appetite, improved concentration, improved positive thoughts of well being, increased energy, stop worrying, take time to relax, Feel less anxious and Handle anger/frustration better    Progression Toward Goals: Goals not met: Reduce perceived stress to 1-3/10, improved [facility] QOL < 27, PHQ-9 - reduced severity by one level, Feelings in [place] Score < 3, Physical Fitness in [place] Score < 3, Social Support in [place] Score < 3, Daily Activity in [place] Score < 3, Social Activities in [place] Score < 3, Pain in [place] Score < 3, Overall Health in [place] Score < 3, Quality of Life in [facility] Score < 3 , Increased interest in doing things, improved sleep, Improved appetite, improved concentration, improved positive thoughts of well being, increased energy, stop worrying, take time to relax, Feel less anxious and Handle anger/frustration better  , Patient will be encouraged to focus on lifestyle modifications following discharge  Education: signs/sxs of depression, benefits of a positive support system, stress management techniques, depression and CAD, benefits of mental health counseling, class:  Stress and Your Health  and class:  Relaxation  Plan: PHQ-9 >5 will refer to MD, Refer to Hernandez Mccall, Practice relaxation techniques, Exercise, Enjoy a hobby and stay involved in [employer], return to [employer] and [employer], call PCP for behavioral health  Readiness to change: Contemplation:  (Acknowledging that there is a problem but not yet ready or sure of wanting to make a change)      OTHER CORE COMPONENTS     Tobacco:   Social History     Tobacco Use   Smoking Status Never Smoker   Smokeless Tobacco Never Used       Tobacco Use Intervention:   N/A:  Patient is a non-smoker     Anginal Symptoms:  nausea/vomiting and lightheadedness   NTG use: Compliant with carrying NTG, Reviewed Proper use and Pt has not used NTG since event    Blood pressure:    Restin/72 - 130/66   Exercise: 134/70 - 160/60    Goals: reduced dietary sodium <2300mg, moderate intensity exercise >150 mins/wk and medication compliance    Progression Toward Goals: Goals met: reduced dietary sodium <2300mg, moderate intensity exercise >150 mins/wk and medication compliance , Patient will be encouraged to focus on lifestyle modifications following discharge      Education:  low sodium diet and HTN, proper use of sublingual NTG, Education class:  Common Heart Medications and Education class: Understanding Heart Disease  Plan: Avoid Processed foods, engage in regular exercise, eliminate salt shaker at the table, use salt substitutes, check labels for sodium content, monitor home BP and monitor daily wt  Readiness to change: Action: (Changing behavior)

## 2022-02-23 ENCOUNTER — TELEPHONE (OUTPATIENT)
Dept: ENDOCRINOLOGY | Facility: CLINIC | Age: 62
End: 2022-02-23

## 2022-03-10 ENCOUNTER — VBI (OUTPATIENT)
Dept: ADMINISTRATIVE | Facility: OTHER | Age: 62
End: 2022-03-10

## 2022-03-14 DIAGNOSIS — E11.9 TYPE 2 DIABETES MELLITUS WITHOUT COMPLICATION, WITH LONG-TERM CURRENT USE OF INSULIN (HCC): ICD-10-CM

## 2022-03-14 DIAGNOSIS — Z79.4 TYPE 2 DIABETES MELLITUS WITHOUT COMPLICATION, WITH LONG-TERM CURRENT USE OF INSULIN (HCC): ICD-10-CM

## 2022-04-05 DIAGNOSIS — I25.5 ISCHEMIC CARDIOMYOPATHY: ICD-10-CM

## 2022-04-05 RX ORDER — CARVEDILOL 6.25 MG/1
6.25 TABLET ORAL 2 TIMES DAILY WITH MEALS
Qty: 60 TABLET | Refills: 5 | Status: SHIPPED | OUTPATIENT
Start: 2022-04-05

## 2022-04-15 ENCOUNTER — RA CDI HCC (OUTPATIENT)
Dept: OTHER | Facility: HOSPITAL | Age: 62
End: 2022-04-15

## 2022-04-15 ENCOUNTER — APPOINTMENT (OUTPATIENT)
Dept: LAB | Facility: CLINIC | Age: 62
End: 2022-04-15
Payer: COMMERCIAL

## 2022-04-15 DIAGNOSIS — Z79.4 TYPE 2 DIABETES MELLITUS WITH HYPERGLYCEMIA, WITH LONG-TERM CURRENT USE OF INSULIN (HCC): ICD-10-CM

## 2022-04-15 DIAGNOSIS — E11.65 TYPE 2 DIABETES MELLITUS WITH HYPERGLYCEMIA, WITH LONG-TERM CURRENT USE OF INSULIN (HCC): ICD-10-CM

## 2022-04-15 DIAGNOSIS — Z79.4 TYPE 2 DIABETES MELLITUS WITHOUT COMPLICATION, WITH LONG-TERM CURRENT USE OF INSULIN (HCC): ICD-10-CM

## 2022-04-15 DIAGNOSIS — E11.9 TYPE 2 DIABETES MELLITUS WITHOUT COMPLICATION, WITH LONG-TERM CURRENT USE OF INSULIN (HCC): ICD-10-CM

## 2022-04-15 LAB
CREAT UR-MCNC: 70.9 MG/DL
EST. AVERAGE GLUCOSE BLD GHB EST-MCNC: 157 MG/DL
HBA1C MFR BLD: 7.1 %
MICROALBUMIN UR-MCNC: 40.3 MG/L (ref 0–20)
MICROALBUMIN/CREAT 24H UR: 57 MG/G CREATININE (ref 0–30)

## 2022-04-15 PROCEDURE — 3051F HG A1C>EQUAL 7.0%<8.0%: CPT | Performed by: INTERNAL MEDICINE

## 2022-04-15 PROCEDURE — 82570 ASSAY OF URINE CREATININE: CPT

## 2022-04-15 PROCEDURE — 82043 UR ALBUMIN QUANTITATIVE: CPT

## 2022-04-15 PROCEDURE — 3060F POS MICROALBUMINURIA REV: CPT | Performed by: INTERNAL MEDICINE

## 2022-04-15 PROCEDURE — 36415 COLL VENOUS BLD VENIPUNCTURE: CPT

## 2022-04-15 PROCEDURE — 83036 HEMOGLOBIN GLYCOSYLATED A1C: CPT

## 2022-04-15 NOTE — PROGRESS NOTES
[facility]  coding opportunities       Chart reviewed, no opportunity found: CHART REVIEWED, NO OPPORTUNITY FOUND        Patients Insurance        Commercial Insurance: [employer]

## 2022-04-18 NOTE — OCCUPATIONAL THERAPY NOTE
Occupational Therapy Screen      Patient Name: Jeremiah Pastrana  Today's Date: 9/1/2021    Received orders, reviewed chart and spoke to PT and RN who report pt is close to her baseline and has been performing ADLs in the room independently  PT recommending outpatient at DC  No acute OT needs  OT will DC  Please reconsult if needed       SERVANDO Chavis, OTR/L bradycardia 2/2 betablocker toxicity vs digoxin  digoxin levels 0.9, s/p dig binding ab  s/p glucagon gtt and ICU monitoring  f/u cardio on when to restart anti-arrythmics  Cardio Radha bradycardia 2/2 betablocker toxicity vs digoxin  digoxin levels 0.9, s/p dig binding ab  s/p glucagon gtt and ICU monitoring  f/u cardio on when to restart anti-arrythmics  Cardio Radha  Now on low-dose bb for AF, monitor for signs of bradycardia

## 2022-04-19 ENCOUNTER — OFFICE VISIT (OUTPATIENT)
Dept: ENDOCRINOLOGY | Facility: CLINIC | Age: 62
End: 2022-04-19
Payer: COMMERCIAL

## 2022-04-19 VITALS
BODY MASS INDEX: 25.55 KG/M2 | DIASTOLIC BLOOD PRESSURE: 74 MMHG | WEIGHT: 159 LBS | SYSTOLIC BLOOD PRESSURE: 116 MMHG | HEART RATE: 84 BPM | HEIGHT: 66 IN

## 2022-04-19 DIAGNOSIS — E11.9 CONTROLLED TYPE 2 DIABETES MELLITUS WITHOUT COMPLICATION, WITH LONG-TERM CURRENT USE OF INSULIN (HCC): Primary | ICD-10-CM

## 2022-04-19 DIAGNOSIS — R80.9 MICROALBUMINURIA: ICD-10-CM

## 2022-04-19 DIAGNOSIS — E78.2 MIXED HYPERLIPIDEMIA: ICD-10-CM

## 2022-04-19 DIAGNOSIS — I10 ESSENTIAL HYPERTENSION: ICD-10-CM

## 2022-04-19 DIAGNOSIS — Z79.4 TYPE 2 DIABETES MELLITUS WITH HYPERGLYCEMIA, WITH LONG-TERM CURRENT USE OF INSULIN (HCC): ICD-10-CM

## 2022-04-19 DIAGNOSIS — Z79.4 CONTROLLED TYPE 2 DIABETES MELLITUS WITHOUT COMPLICATION, WITH LONG-TERM CURRENT USE OF INSULIN (HCC): Primary | ICD-10-CM

## 2022-04-19 DIAGNOSIS — E55.9 VITAMIN D DEFICIENCY: ICD-10-CM

## 2022-04-19 DIAGNOSIS — E11.65 TYPE 2 DIABETES MELLITUS WITH HYPERGLYCEMIA, WITH LONG-TERM CURRENT USE OF INSULIN (HCC): ICD-10-CM

## 2022-04-19 PROCEDURE — 99214 OFFICE O/P EST MOD 30 MIN: CPT | Performed by: PHYSICIAN ASSISTANT

## 2022-04-19 PROCEDURE — 95251 CONT GLUC MNTR ANALYSIS I&R: CPT | Performed by: PHYSICIAN ASSISTANT

## 2022-04-19 PROCEDURE — 3066F NEPHROPATHY DOC TX: CPT | Performed by: INTERNAL MEDICINE

## 2022-04-19 RX ORDER — ACETAMINOPHEN 160 MG
2000 TABLET,DISINTEGRATING ORAL DAILY
Qty: 30 CAPSULE | Refills: 5
Start: 2022-04-19

## 2022-04-19 NOTE — PATIENT INSTRUCTIONS
Take Vitamin D 2,000 units daily    Scan sensor at least 3 every 8 hours  Download sensor at [email or web address]  com and link to our office at [phone]  If you need help, let us know  If unable, you can write down sensor readings and send log to office  Return to the gym and reduce carb intake/increase protein at breakfast to help with the post-breakfast blood sugar spikes and hunger after breakfast   If diarrhea continues to be a problem, let us know and we can try to reduce metformin and try extended release metformin

## 2022-04-19 NOTE — ASSESSMENT & PLAN NOTE
A1C 7 1- slightly above target of 7  Continue current regimen for now  Encouraged her to return to exercising at the gym  CGM data extremely limited-- advised her to scan sensor at least every 8 hours to capture all data  Discussed products she can use to help skin irritation  Continue to focus on dietary improvements, especially by reducing carb intake and increasing protein intake at breakfast   Send BG readings or Feng download between visits for review  Recent episode of diarrhea likely diet related  She will let us know if she has frequent diarrhea and we can try to reduce dose of metformin or switch to extended release     Lab Results   Component Value Date    HGBA1C 7 1 (H) 04/15/2022

## 2022-04-19 NOTE — PROGRESS NOTES
Established Patient Progress Note      Chief Complaint   Patient presents with    Diabetes Type 2        History of Present Illness: Mehrdad Kathleen is a 59 y o  female with a history of type 2 diabetes without long term use of insulin since 2009  Reports complications of retinopathy and microalbuminuria  She also has history of CAD  Denies recent illness or hospitalizations  Denies recent severe hypoglycemic or severe hyperglycemic episodes  Denies any issues with her current regimen  home glucose monitoring: are performed regularly  Finished cardiac rehab and has joined gym, but hasn't gone in a few weeks due to [employer] activities  Alia Garsia was discontinued at the last visit due to weight loss with lifestyle modification, weight loss,  and low A1C  She has met with dietician, most recently 1/4/2022  Current regimen:   Trulicity 2 97XI weekly   Metformin 1000mg twice per day  Jardiance 10mg daily     CGM Interpretation  Mehrdad Kathleen   Device used [facility] use   Indication: Type 2 Diabetes  More than 72 hours of data was reviewed  Report to be scanned to chart  Date Range: 4/6/2022-4/19/2022  Analysis of data:   Average Glucose: 155  Coefficient of Variation: 29 1%   Time in Target Range: 69%   Time Above Range: 27% high, 4%very high   Time Below Range: 0%   Interpretation of data:   Data is limited due to infrequent scanning of CGM device  There does seem to be a pattern of hyperglycemia after breakfast  Advised her to scan sensor at least every 8 hours to capture full data  Last Eye Exam: UTD, getting injections, improving  Last Foot Exam: UTD, saw podiatry last week  Reports cold feet  Has hypertension: Taking carvedilol, entresto, furosemide  Follows with cardiology, hx CAD/ICM  Has hyperlipidemia: Taking atorvastatin      For Vitamin D Deficiency, has not been taking supplements       Patient Active Problem List   Diagnosis    Fatigue due to excessive exertion    Mixed hyperlipidemia    Abnormal stress echo    Type 2 diabetes mellitus with hyperglycemia, with long-term current use of insulin (HCC)    Microalbuminuria    Bilateral leg edema    Colon polyps    Gastroesophageal reflux disease with esophagitis without hemorrhage    Dizziness    Left bundle branch block    CAD (coronary artery disease)    New Ischemic Cardiomyopathy/Systolic CHF    Right paraclinoid meningioma    Essential hypertension    Wound of left leg    BARROS (dyspnea on exertion)    Vitamin D deficiency      Past Medical History:   Diagnosis Date    Colon polyp     Dizziness     Edema     Hyperlipidemia     Hypertension     Type 2 diabetes mellitus ([address] )     Vitamin D deficiency     Wears glasses       Past Surgical History:   Procedure Laterality Date    BREAST EXCISIONAL BIOPSY Right 2009    COLONOSCOPY  04/11/2018    Colonoscpoy due in 3 years    EGD  04/11/2018    MAMMO (HISTORICAL) Bilateral 05/04/2020 2018    MAMMO NEEDLE LOCALIZATION RIGHT (ALL INC) Right 7/8/2009    WISDOM TOOTH EXTRACTION        Family History   Problem Relation Age of Onset    Diabetes Mother     Hypertension Mother     Sudden death Mother         SCD    Hyperlipidemia Mother     Heart attack Mother     Breast cancer Mother 79    Diabetes type II Mother     Diabetes Father     Arrhythmia Father         pacemaker/ defib    Clotting disorder Father         eliquis    Heart failure Father     Heart disease Father     Melanoma Father     Cancer Father 61        bladder cancer    Diabetes type II Father     Breast cancer Sister 44    BRCA1 Negative Sister     Thyroid cancer Sister     No Known Problems Maternal Grandmother     No Known Problems Maternal Grandfather     No Known Problems Paternal Grandmother     No Known Problems Paternal Grandfather     No Known Problems Sister     No Known Problems Sister     Breast cancer Maternal Aunt         unknown age   [facility] Esophageal cancer Maternal Aunt 61    Cancer Maternal Aunt         unknown age or type    No Known Problems Maternal Aunt     Lung cancer Paternal Aunt         unknonw age- in her [de-identified]    Colon cancer Maternal Uncle         unknwon age   [place] Pancreatic cancer Maternal Uncle 61    Thyroid cancer Sister     Anuerysm Neg Hx      Social History     Tobacco Use    Smoking status: Never Smoker    Smokeless tobacco: Never Used   Substance Use Topics    Alcohol use: No     No Known Allergies      Current Outpatient Medications:     aspirin 81 MG tablet, Take 1 tablet by mouth daily, Disp: , Rfl:     atorvastatin (LIPITOR) 80 mg tablet, Take 1 tablet (80 mg total) by mouth daily with dinner, Disp: 90 tablet, Rfl: 1    Blood Pressure Monitor KIT, Use 2 (two) times a day, Disp: 1 kit, Rfl: 0    carvedilol (COREG) 6 25 mg tablet, Take 1 tablet (6 25 mg total) by mouth 2 (two) times a day with meals, Disp: 60 tablet, Rfl: 5    clopidogrel (PLAVIX) 75 mg tablet, Take 1 tablet (75 mg total) by mouth daily, Disp: 90 tablet, Rfl: 3    Continuous Blood Gluc  (FreeStyle Feng 14 Day [place]) CORBY, USE AS DIRECTED, Disp: 2 each, Rfl: 5    Dulaglutide 0 75 MG/0 5ML SOPN, Inject 0 5 mL (0 75 mg total) under the skin once a week, Disp: 3 mL, Rfl: 1    Empagliflozin 10 MG TABS, Take 1 tablet (10 mg total) by mouth every morning, Disp: 90 tablet, Rfl: 3    esomeprazole (NexIUM) 20 mg capsule, Take 20 mg by mouth every morning before breakfast, Disp: , Rfl:     furosemide (LASIX) 20 mg tablet, Take 1 tablet (20 mg total) by mouth 2 (two) times a day (Patient taking differently: Take 20 mg by mouth daily  ), Disp: 60 tablet, Rfl: 3    metFORMIN (GLUCOPHAGE) 1000 MG tablet, TAKE 1 TABLET BY MOUTH TWICE A DAY WITH MEALS, Disp: 180 tablet, Rfl: 3    NovoFine 32G X 6 MM MISC, USE AS DIRECTED ONCE A DAY WITH INSULIN, Disp: 30 each, Rfl: 5    sacubitril-valsartan (Entresto) 49-51 MG TABS, Take 1 tablet by mouth 2 (two) times a day, Disp: 180 tablet, Rfl: 4    Cholecalciferol (Vitamin D3) 50 MCG (2000 UT) capsule, Take 1 capsule (2,000 Units total) by mouth daily, Disp: 30 capsule, Rfl: 5    Continuous Blood Gluc  (FreeStyle Loo 14 Day [place]) CORBY, FreeStyle Feng 14 Day [place] (Patient not taking: Reported on 4/19/2022), Disp: , Rfl:     Continuous Blood Gluc Sensor (FreeStyle Feng 14 Day Sensor) MISC, USE AS DIRECTED (Patient not taking: Reported on 4/19/2022 ), Disp: 2 each, Rfl: 5    Continuous Blood Gluc Sensor (FreeStyle Feng 14 Day Sensor) MISC, FreeStyle Feng 14 Day Sensor kit (Patient not taking: Reported on 4/19/2022), Disp: , Rfl:     nitroglycerin (NITROSTAT) 0 4 mg SL tablet, Place 1 tablet (0 4 mg total) under the tongue every 5 (five) minutes as needed for chest pain (Patient not taking: Reported on 4/19/2022 ), Disp: 24 tablet, Rfl: 1    silver sulfadiazine (Silvadene) 1 % cream, Apply topically daily (Patient not taking: Reported on 12/13/2021 ), Disp: 50 g, Rfl: 1    Review of Systems   Constitutional: Positive for fatigue  Negative for activity change, appetite change, chills, diaphoresis, fever and unexpected weight change  HENT: Negative for trouble swallowing and voice change  Eyes: Negative for visual disturbance  Respiratory: Negative for shortness of breath  Cardiovascular: Negative for chest pain and palpitations  Gastrointestinal: Positive for diarrhea (after easter dinner)  Negative for abdominal pain and constipation  Endocrine: Negative for cold intolerance, heat intolerance, polydipsia, polyphagia and polyuria  Genitourinary: Negative for frequency and menstrual problem  Musculoskeletal: Negative for arthralgias and myalgias  Skin: Negative for rash  Dry skin   Allergic/Immunologic: Negative for food allergies  Neurological: Negative for dizziness and tremors  Hematological: Negative for adenopathy  Psychiatric/Behavioral: Negative for sleep disturbance     All other systems reviewed and are negative  Physical Exam:  Body mass index is 25 66 kg/m²  /74   Pulse 84   Ht 5' 6" (1 676 m)   Wt 72 1 kg (159 lb)   BMI 25 66 kg/m²    Wt Readings from Last 3 Encounters:   04/19/22 72 1 kg (159 lb)   01/04/22 77 1 kg (170 lb)   01/03/22 78 kg (172 lb)       Physical Exam  Vitals reviewed  Constitutional:       General: She is not in acute distress  Appearance: She is well-developed  HENT:      Head: Normocephalic and atraumatic  Eyes:      Conjunctiva/sclera: Conjunctivae normal       Pupils: Pupils are equal, round, and reactive to light  Neck:      Thyroid: No thyromegaly  Cardiovascular:      Rate and Rhythm: Normal rate and regular rhythm  Heart sounds: Normal heart sounds  Pulmonary:      Effort: Pulmonary effort is normal  No respiratory distress  Breath sounds: Normal breath sounds  No wheezing or rales  Abdominal:      General: Bowel sounds are normal  There is no distension  Palpations: Abdomen is soft  Tenderness: There is no abdominal tenderness  Musculoskeletal:         General: Normal range of motion  Cervical back: Normal range of motion and neck supple  Skin:     General: Skin is warm and dry  Neurological:      Mental Status: She is alert and oriented to person, place, and time             Labs:     Component      Latest Ref Rng & Units 9/15/2021 10/28/2021 12/3/2021           1:37 PM  6:09 AM  6:29 AM   Sodium      136 - 145 mmol/L 139     Potassium      3 5 - 5 3 mmol/L 4 4     Chloride      100 - 108 mmol/L 106     CO2      21 - 32 mmol/L 23     Anion Gap      4 - 13 mmol/L 10     BUN      5 - 25 mg/dL 24     Creatinine      0 60 - 1 30 mg/dL 1 13     Glucose, Random      65 - 140 mg/dL 159 (H)     Calcium      8 3 - 10 1 mg/dL 9 5     eGFR      ml/min/1 73sq m 52     Cholesterol      50 - 200 mg/dL  70    Triglycerides      <=150 mg/dL  72    HDL      >=40 mg/dL  28 (L)    LDL Calculated      0 - 100 mg/dL  28    Non-HDL Cholesterol      mg/dl  42    EXT Creatinine Urine      mg/dL      MICROALBUM ,U,RANDOM      0 0 - 20 0 mg/L      MICROALBUMIN/CREATININE RATIO      0 - 30 mg/g creatinine      Hemoglobin A1C      Normal 3 8-5 6%; PreDiabetic 5 7-6 4%; Diabetic >=6 5%; Glycemic control for adults with diabetes <7 0% %      eAG, EST AVG Glucose      mg/dl      Vit D, 25-Hydroxy      30 0 - 100 0 ng/mL   27 7 (L)     Component      Latest Ref Rng & Units 12/7/2021 4/15/2022 4/15/2022           9:42 AM  7:01 AM  7:01 AM   Sodium      136 - 145 mmol/L      Potassium      3 5 - 5 3 mmol/L      Chloride      100 - 108 mmol/L      CO2      21 - 32 mmol/L      Anion Gap      4 - 13 mmol/L      BUN      5 - 25 mg/dL      Creatinine      0 60 - 1 30 mg/dL      Glucose, Random      65 - 140 mg/dL      Calcium      8 3 - 10 1 mg/dL      eGFR      ml/min/1 73sq m      Cholesterol      50 - 200 mg/dL      Triglycerides      <=150 mg/dL      HDL      >=40 mg/dL      LDL Calculated      0 - 100 mg/dL      Non-HDL Cholesterol      mg/dl      EXT Creatinine Urine      mg/dL  70 9    MICROALBUM ,U,RANDOM      0 0 - 20 0 mg/L  40 3 (H)    MICROALBUMIN/CREATININE RATIO      0 - 30 mg/g creatinine  57 (H)    Hemoglobin A1C      Normal 3 8-5 6%; PreDiabetic 5 7-6 4%; Diabetic >=6 5%; Glycemic control for adults with diabetes <7 0% % 5 9  7 1 (H)   eAG, EST AVG Glucose      mg/dl   157   Vit D, 25-Hydroxy      30 0 - 100 0 ng/mL          Impression & Plan:    Problem List Items Addressed This Visit        Endocrine    Type 2 diabetes mellitus with hyperglycemia, with long-term current use of insulin (HCC)     A1C 7 1- slightly above target of 7  Continue current regimen for now  Encouraged her to return to exercising at the gym  CGM data extremely limited-- advised her to scan sensor at least every 8 hours to capture all data  Discussed products she can use to help skin irritation    Continue to focus on dietary improvements, especially by reducing carb intake and increasing protein intake at breakfast   Send BG readings or Feng download between visits for review  Recent episode of diarrhea likely diet related  She will let us know if she has frequent diarrhea and we can try to reduce dose of metformin or switch to extended release  Lab Results   Component Value Date    HGBA1C 7 1 (H) 04/15/2022               Cardiovascular and Mediastinum    Essential hypertension     Well controlled on current regimen  Other    Mixed hyperlipidemia     Continue statin and lifestyle modification  Relevant Orders    TSH, 3rd generation    T4, free    Microalbuminuria     This is improving  Continue current regimen  Vitamin D deficiency     She forgot to start supplements after last visit  Advised her to start OTC Vitamin D 2,000 units daily  Other Visit Diagnoses     Controlled type 2 diabetes mellitus without complication, with long-term current use of insulin ([facility])    -  Primary    Relevant Medications    Cholecalciferol (Vitamin D3) 50 MCG (2000 UT) capsule    Other Relevant Orders    Hemoglobin A1C    Comprehensive metabolic panel          Orders Placed This Encounter   Procedures    Hemoglobin A1C     Standing Status:   Future     Standing Expiration Date:   4/19/2023    Comprehensive metabolic panel     This is a patient instruction: Patient fasting for 8 hours or longer recommended  Standing Status:   Future     Standing Expiration Date:   4/19/2023    TSH, 3rd generation     This is a patient instruction: This test is non-fasting  Please drink two glasses of water morning of bloodwork  Standing Status:   Future     Standing Expiration Date:   4/19/2023    T4, free     Standing Status:   Future     Standing Expiration Date:   4/19/2023       Patient Instructions   Take Vitamin D 2,000 units daily    Scan sensor at least 3 every 8 hours  Download sensor at [email or web address]  com and link to our office at [id number]  If you need help, let us know  If unable, you can write down sensor readings and send log to office  Return to the gym and reduce carb intake/increase protein at breakfast to help with the post-breakfast blood sugar spikes and hunger after breakfast   If diarrhea continues to be a problem, let us know and we can try to reduce metformin and try extended release metformin  Discussed with the patient and all questioned fully answered  She will call me if any problems arise  Follow-up appointment in 3-4 months       Counseled patient on diagnostic results, prognosis, risk and benefit of treatment options, instruction for management, importance of treatment compliance, Risk  factor reduction and impressions    Mana Locke PA-C

## 2022-04-19 NOTE — ASSESSMENT & PLAN NOTE
She forgot to start supplements after last visit  Advised her to start OTC Vitamin D 2,000 units daily

## 2022-04-20 ENCOUNTER — TELEPHONE (OUTPATIENT)
Dept: ADMINISTRATIVE | Facility: OTHER | Age: 62
End: 2022-04-20

## 2022-04-20 NOTE — LETTER
Diabetic Eye Exam Form    Date Requested: 22  Patient: Cecelia Valladares  Patient : 1960   Referring Provider: Ailin Mccurdy MD    DIABETIC Eye Exam Date _______________________________    Type of Exam MUST be documented for Diabetic Eye Exams  Please CHECK ONE  Retinal Exam       Dilated Retinal Exam       OCT       Optomap-Iris Exam      Fundus Photography     Left Eye - Please check Retinopathy AND Type or No Retinopathy      Exam did show retinopathy    Exam did not show retinopathy         Mild     Proliferative           Moderate    Severe            None         Right Eye - Please check Retinopathy AND Type or No Retinopathy     Exam did show retinopathy    Exam did not show retinopathy         Mild     Proliferative        Moderate    Severe        None       Comments __________________________________________________________    Practice Providing Exam ______________________________________________    Exam Performed By (print name) _______________________________________      Provider Signature ___________________________________________________    These reports are needed for  compliance  Please fax this completed form and a copy of the Diabetic Eye Exam report to our office located at [place] [address] as soon as possible via [phone] karen Barker: Phone [phone]  We thank you for your assistance in treating our mutual patient     Dr Skylar Hayden - [phone] F [phone]

## 2022-04-20 NOTE — LETTER
Diabetic Eye Exam Form    Date Requested: 05/10/22  Patient: Rochelle Ontiveros  Patient : 1960   Referring Provider: Prosper Sanders MD    DIABETIC Eye Exam Date _______________________________    Type of Exam MUST be documented for Diabetic Eye Exams  Please CHECK ONE  Retinal Exam       Dilated Retinal Exam       OCT       Optomap-Iris Exam      Fundus Photography     Left Eye - Please check Retinopathy AND Type or No Retinopathy      Exam did show retinopathy    Exam did not show retinopathy         Mild     Proliferative           Moderate    Severe            None         Right Eye - Please check Retinopathy AND Type or No Retinopathy     Exam did show retinopathy    Exam did not show retinopathy         Mild     Proliferative        Moderate    Severe        None       Comments __________________________________________________________    Practice Providing Exam ______________________________________________    Exam Performed By (print name) _______________________________________      Provider Signature ___________________________________________________    These reports are needed for  compliance  Please fax this completed form and a copy of the Diabetic Eye Exam report to our office located at [place] [address] as soon as possible via [phone] karen Mosher: Phone [phone]  We thank you for your assistance in treating our mutual patient     Dr Lisa Rodriguez - [phone] F [phone]

## 2022-04-20 NOTE — TELEPHONE ENCOUNTER
----- Message from Larisa Arroyo sent at 4/19/2022  9:21 AM EDT -----  Regarding: HM DM EYE EXAM  04/19/22 9:22 AM    Hello, our patient Juanito Larson has had Diabetic Eye Exam completed/performed  Please assist in updating the patient chart by calling Dario Motta MD    Phone: [phone];  Fax: [phone]        Thank you,  Latha Gibson  [facility]

## 2022-04-20 NOTE — LETTER
Diabetic Eye Exam Form    Date Requested: 22  Patient: Sandie Flores  Patient : 1960   Referring Provider: Nakul Roberts MD    DIABETIC Eye Exam Date _______________________________    Type of Exam MUST be documented for Diabetic Eye Exams  Please CHECK ONE  Retinal Exam       Dilated Retinal Exam       OCT       Optomap-Iris Exam      Fundus Photography     Left Eye - Please check Retinopathy AND Type or No Retinopathy      Exam did show retinopathy    Exam did not show retinopathy         Mild     Proliferative           Moderate    Severe            None         Right Eye - Please check Retinopathy AND Type or No Retinopathy     Exam did show retinopathy    Exam did not show retinopathy         Mild     Proliferative        Moderate    Severe        None       Comments __________________________________________________________    Practice Providing Exam ______________________________________________    Exam Performed By (print name) _______________________________________      Provider Signature ___________________________________________________    These reports are needed for  compliance  Please fax this completed form and a copy of the Diabetic Eye Exam report to our office located at [place] [address] as soon as possible via [phone] attention Doris Napoles: Phone [phone]  We thank you for your assistance in treating our mutual patient     Dr Ranjith Weldon - [phone] F [phone]

## 2022-04-21 NOTE — ASSESSMENT & PLAN NOTE
EF 37% with RWMA   Reports chronic dyspnea on exertion that has slightly worsened in the last 6 months   Also an episode of dizziness with diaphoresis nausea and vomiting as described below   Also reports slow worsening of chronic lower extremity edema for which she takes PRN Lasix 20 mg daily    NT proBNP 1033  Baseline weight around 180 lb  Started on IV Lasix 40 mg BID    · Output 24 hours +190 mL    Net output +1 3 L  · Weight 185 lb today  · Appears volume overloaded on exam with lower extremity edema and JVD   No evidence of low output    Alert-The patient is alert, awake and responds to voice. The patient is oriented to time, place, and person. The triage nurse is able to obtain subjective information.

## 2022-04-22 ENCOUNTER — OFFICE VISIT (OUTPATIENT)
Dept: FAMILY MEDICINE CLINIC | Facility: CLINIC | Age: 62
End: 2022-04-22
Payer: COMMERCIAL

## 2022-04-22 VITALS
DIASTOLIC BLOOD PRESSURE: 72 MMHG | TEMPERATURE: 97.4 F | OXYGEN SATURATION: 99 % | RESPIRATION RATE: 16 BRPM | HEART RATE: 51 BPM | SYSTOLIC BLOOD PRESSURE: 124 MMHG | BODY MASS INDEX: 25.73 KG/M2 | WEIGHT: 160.13 LBS | HEIGHT: 66 IN

## 2022-04-22 DIAGNOSIS — I25.10 CORONARY ARTERY DISEASE INVOLVING NATIVE CORONARY ARTERY OF NATIVE HEART WITHOUT ANGINA PECTORIS: ICD-10-CM

## 2022-04-22 DIAGNOSIS — R60.0 BILATERAL LEG EDEMA: ICD-10-CM

## 2022-04-22 DIAGNOSIS — Z79.4 TYPE 2 DIABETES MELLITUS WITH HYPERGLYCEMIA, WITH LONG-TERM CURRENT USE OF INSULIN (HCC): Primary | ICD-10-CM

## 2022-04-22 DIAGNOSIS — E78.2 MIXED HYPERLIPIDEMIA: ICD-10-CM

## 2022-04-22 DIAGNOSIS — E11.65 TYPE 2 DIABETES MELLITUS WITH HYPERGLYCEMIA, WITH LONG-TERM CURRENT USE OF INSULIN (HCC): Primary | ICD-10-CM

## 2022-04-22 DIAGNOSIS — E11.8 DIABETIC FOOT (HCC): ICD-10-CM

## 2022-04-22 DIAGNOSIS — Z11.4 ENCOUNTER FOR SCREENING FOR HIV: ICD-10-CM

## 2022-04-22 DIAGNOSIS — D32.9 MENINGIOMA (HCC): ICD-10-CM

## 2022-04-22 DIAGNOSIS — I25.5 ISCHEMIC CARDIOMYOPATHY: ICD-10-CM

## 2022-04-22 PROCEDURE — 3008F BODY MASS INDEX DOCD: CPT | Performed by: INTERNAL MEDICINE

## 2022-04-22 PROCEDURE — 3078F DIAST BP <80 MM HG: CPT | Performed by: INTERNAL MEDICINE

## 2022-04-22 PROCEDURE — 3725F SCREEN DEPRESSION PERFORMED: CPT | Performed by: INTERNAL MEDICINE

## 2022-04-22 PROCEDURE — 99214 OFFICE O/P EST MOD 30 MIN: CPT | Performed by: INTERNAL MEDICINE

## 2022-04-22 PROCEDURE — 90677 PCV20 VACCINE IM: CPT | Performed by: INTERNAL MEDICINE

## 2022-04-22 PROCEDURE — 90471 IMMUNIZATION ADMIN: CPT | Performed by: INTERNAL MEDICINE

## 2022-04-22 PROCEDURE — 3074F SYST BP LT 130 MM HG: CPT | Performed by: INTERNAL MEDICINE

## 2022-04-22 NOTE — PROGRESS NOTES
Assessment/Plan:    Type 2 diabetes mellitus with hyperglycemia, with long-term current use of insulin ([facility])    Lab Results   Component Value Date    HGBA1C 7 1 (H) 04/15/2022   Bruce's A1c pretty good continue metformin, jardiance, and trulicity-try to get into a diet/exercise program-seeing endocrine now-just saw podiatry on 4/13    New Ischemic Cardiomyopathy/Systolic CHF  Seems pretty well compensated  On Entresto, carvedilol, lasix-advised to watch sodium in diet, no chest pain reported    Essential hypertension  Well controlled       Diagnoses and all orders for this visit:    Type 2 diabetes mellitus with hyperglycemia, with long-term current use of insulin ([facility] [address] )  -     Lipid panel; Future  -     CBC and differential; Future  -     Pneumococcal Conjugate Vaccine 20-valent (Pcv20)    Coronary artery disease involving native coronary artery of native heart without angina pectoris  -     Lipid panel; Future  -     CBC and differential; Future    Diabetic foot [facility])  Comments:  Exam done by podiatry on 4/13/22    New Ischemic Cardiomyopathy/Systolic CHF    Right paraclinoid meningioma    Bilateral leg edema    Mixed hyperlipidemia          Subjective:      Patient ID: Debra Calix is a 59 y o  female  [place] with hx of DM II, Ischemic cardiomyopathy, CAD s/p stenting, HTN, HL-doing well, she's been back at work since October, and is getting around pretty well  Says her legs swell at times depending on what she eats, and she's due to see Dr Alvaro Quintanilla again in August Bruce's A1c today is 7 6%, pretty good considering it used to be 10 plus      The following portions of the patient's history were reviewed and updated as appropriate: current medications, past family history, past medical history and past social history  Review of Systems   Constitutional: Negative  HENT: Negative  Respiratory: Negative  Cardiovascular: Positive for leg swelling  Gastrointestinal: Negative      Neurological: Negative  Hematological: Negative  Psychiatric/Behavioral: Negative  Objective:         Physical Exam  Constitutional:       Appearance: Normal appearance  HENT:      Head: Normocephalic and atraumatic  Right Ear: External ear normal       Left Ear: External ear normal       Nose: No congestion  Mouth/Throat:      Mouth: Mucous membranes are moist       Pharynx: No posterior oropharyngeal erythema  Eyes:      Extraocular Movements: Extraocular movements intact  Pupils: Pupils are equal, round, and reactive to light  Cardiovascular:      Rate and Rhythm: Normal rate and regular rhythm  Pulmonary:      Effort: Pulmonary effort is normal       Breath sounds: Normal breath sounds  Abdominal:      General: Abdomen is flat  Palpations: Abdomen is soft  Musculoskeletal:         General: Normal range of motion  Cervical back: Normal range of motion and neck supple  Right lower leg: Edema present  Left lower leg: Edema present  Skin:     General: Skin is warm  Capillary Refill: Capillary refill takes less than 2 seconds  Comments: Left elbow rash   Neurological:      General: No focal deficit present  Mental Status: She is alert and oriented to person, place, and time  Psychiatric:         Mood and Affect: Mood normal          Behavior: Behavior normal          Thought Content: Thought content normal          Judgment: Judgment normal            BMI Counseling: Body mass index is 25 84 kg/m²  The BMI is above normal  Nutrition recommendations include reducing portion sizes, decreasing overall calorie intake, 3-5 servings of fruits/vegetables daily, reducing fast food intake, consuming healthier snacks, decreasing soda and/or juice intake, moderation in carbohydrate intake, increasing intake of lean protein, reducing intake of saturated fat and trans fat and reducing intake of cholesterol   Exercise recommendations include strength training exercises

## 2022-04-22 NOTE — ASSESSMENT & PLAN NOTE
Lab Results   Component Value Date    HGBA1C 7 1 (H) 04/15/2022   Bruce's A1c pretty good continue metformin, jardiance, and trulicity-try to get into a diet/exercise program-seeing endocrine now-just saw podiatry on 4/13

## 2022-04-25 NOTE — TELEPHONE ENCOUNTER
Upon review of the In Basket request and the patient's chart, initial outreach has been made via fax, please see Contacts section for details       Thank you  Destin Townsend

## 2022-05-02 NOTE — TELEPHONE ENCOUNTER
As a follow-up, a second attempt has been made for outreach via fax, please see Contacts section for details      Thank you  Darcy Jonas

## 2022-05-10 NOTE — TELEPHONE ENCOUNTER
As a final attempt, a third outreach has been made via telephone call  The outcome of the telephone call was a fax request form to be sent to Office  Please see Contacts section for details  This encounter will be closed and completed by end of day  Should we receive the requested information because of previous outreach attempts, the requested patient's chart will be updated appropriately       Thank you  Juan F Burroughs None

## 2022-06-11 DIAGNOSIS — R60.0 LOCALIZED EDEMA: ICD-10-CM

## 2022-06-13 RX ORDER — FUROSEMIDE 20 MG/1
20 TABLET ORAL DAILY
Qty: 90 TABLET | Refills: 1 | Status: SHIPPED | OUTPATIENT
Start: 2022-06-13

## 2022-06-23 ENCOUNTER — APPOINTMENT (OUTPATIENT)
Dept: LAB | Facility: CLINIC | Age: 62
End: 2022-06-23
Payer: COMMERCIAL

## 2022-06-23 DIAGNOSIS — D32.9 MENINGIOMA (HCC): ICD-10-CM

## 2022-06-23 LAB
BUN SERPL-MCNC: 22 MG/DL (ref 5–25)
CREAT SERPL-MCNC: 0.82 MG/DL (ref 0.6–1.3)
GFR SERPL CREATININE-BSD FRML MDRD: 75 ML/MIN/1.73SQ M

## 2022-06-23 PROCEDURE — 36415 COLL VENOUS BLD VENIPUNCTURE: CPT

## 2022-06-23 PROCEDURE — 84520 ASSAY OF UREA NITROGEN: CPT

## 2022-06-23 PROCEDURE — 82565 ASSAY OF CREATININE: CPT

## 2022-06-24 ENCOUNTER — HOSPITAL ENCOUNTER (OUTPATIENT)
Dept: MRI IMAGING | Facility: HOSPITAL | Age: 62
Discharge: HOME | End: 2022-06-24
Payer: COMMERCIAL

## 2022-06-24 DIAGNOSIS — D32.9 MENINGIOMA (HCC): ICD-10-CM

## 2022-06-24 PROCEDURE — G1004 CDSM NDSC: HCPCS

## 2022-06-24 PROCEDURE — A9585 GADOBUTROL INJECTION: HCPCS | Performed by: PHYSICIAN ASSISTANT

## 2022-06-24 PROCEDURE — 70553 MRI BRAIN STEM W/O & W/DYE: CPT

## 2022-06-24 RX ADMIN — GADOBUTROL 7 ML: 604.72 INJECTION INTRAVENOUS at 11:10

## 2022-07-18 DIAGNOSIS — Z79.4 TYPE 2 DIABETES MELLITUS WITHOUT COMPLICATION, WITH LONG-TERM CURRENT USE OF INSULIN (HCC): ICD-10-CM

## 2022-07-18 DIAGNOSIS — E11.9 TYPE 2 DIABETES MELLITUS WITHOUT COMPLICATION, WITH LONG-TERM CURRENT USE OF INSULIN (HCC): ICD-10-CM

## 2022-07-22 ENCOUNTER — OFFICE VISIT (OUTPATIENT)
Dept: NEUROSURGERY | Facility: CLINIC | Age: 62
End: 2022-07-22
Payer: COMMERCIAL

## 2022-07-22 VITALS
RESPIRATION RATE: 18 BRPM | SYSTOLIC BLOOD PRESSURE: 120 MMHG | HEIGHT: 66 IN | WEIGHT: 155 LBS | TEMPERATURE: 97.5 F | BODY MASS INDEX: 24.91 KG/M2 | HEART RATE: 69 BPM | DIASTOLIC BLOOD PRESSURE: 60 MMHG

## 2022-07-22 DIAGNOSIS — D32.9 MENINGIOMA (HCC): Primary | ICD-10-CM

## 2022-07-22 PROCEDURE — 3074F SYST BP LT 130 MM HG: CPT | Performed by: PHYSICIAN ASSISTANT

## 2022-07-22 PROCEDURE — 99214 OFFICE O/P EST MOD 30 MIN: CPT | Performed by: PHYSICIAN ASSISTANT

## 2022-07-22 PROCEDURE — 3078F DIAST BP <80 MM HG: CPT | Performed by: PHYSICIAN ASSISTANT

## 2022-07-22 NOTE — PROGRESS NOTES
Neurosurgery Office Note  Javier Stephens 57 y o  female MRN: [id number]      Assessment/Plan      Patient is a 59years old woman with history of diabetes mellitus, left bundle branch block, carotid disease, cardiomyopathy, congestive heart failure, hypertension, and right paraclinoid meningioma here for 6 months follow-up  Had F/U MRI of brain with and without contrast which demonstrates mild increment in size of tumor without significant mass effect, minimal vasogenic edema  No MLS, or acute intracranial hemorrhage  The tumor is adjacent to MCA, M1 segment  Patient reports occasional dizziness, denies any headache, seizure, visual issues, N/V, speech or swallowing issues  Denies  weakness in the limbs  Denies B/B dysfunction  No gait issues  Exam:  A&OX3,PERRL,  EOMI 2 mm conj bilaterally, SF  Jonny  Finger to nose test normal and without drift bilaterally  Strength 5/5 bilat and sensation to LT intact  DTR 2+ wo clonus bilaterally  Stable gait  Hx, PEx, and images reviewed with the patient and her sister  The images shows more or stable size with insignificant mass effect  I advised the patient to call office in case she develops new neurological symptoms  Otherwise, follow up in 9 months with brain MRI  All questions and concerns were answered to patient's satisfaction  Patient and her sister expressed their understandings and agreed with the plan  Plan:  1  MRI of brain w/wo contrast/9 months  2  BUN & Creatinine  3  Advised to go to ER with new neurological symptoms  4  F/U in 9 months  5  Call with question or cocnern    I spent 30 min minutes with the patient today in which >50% of the time was spent counseling/coordination of care regarding diagnosis, imaging review, symptoms and treatment plan       C/C: "   Brain Meningioma follow up"    Chief Complaint   Patient presents with    Follow-up     6mon f/u meningioma            History of Present Illness     Patient is a 59years old woman with history of diabetes mellitus, left bundle branch block, carotid disease, cardiomyopathy, congestive heart failure, hypertension, and right paraclinoid meningioma here for 6 months follow-up  Had F/U MRI of brain with and without contrast which demonstrates mild increment in size of tumor without significant mass effect, minimal vasogenic edema  No MLS, or acute intracranial hemorrhage  The tumor is adjacent to MCA, M1 segment  Patient reports occasional dizziness, denies any headache, seizure, visual issues, N/V, speech or swallowing issues  Denies  weakness in the limbs  Denies B/B dysfunction  No gait issues  Patient denies fever, chills, rigors, cough or chest pain  Denies smoking cigarettes or drinking alcohol  Images findings as described in the assessment section above  REVIEW OF SYSTEMS  ROS personally reviewed and updated  Review of Systems   Constitutional: Negative  HENT: Negative  Eyes: Positive for visual disturbance (diabetic retnopathy )  VF done by Dr Nhi Fischer in [place]  P# [phone]   Respiratory: Negative  Cardiovascular: Negative  H/o HTN/ Hyperlipidemia   Gastrointestinal: Positive for constipation  H/o GERD   Endocrine: Negative  Diabetes Type 2   Genitourinary: Negative  Musculoskeletal: Positive for gait problem (occasinaly issues with balance )  Skin: Negative  Allergic/Immunologic: Negative  Neurological: Positive for dizziness  Negative for headaches  Hematological: Bruises/bleeds easily  On Plavix/ Aspirin   Psychiatric/Behavioral: Negative for decreased concentration  All other systems reviewed and are negative          Meds/Allergies     Current Outpatient Medications   Medication Sig Dispense Refill    aspirin 81 MG tablet Take 1 tablet by mouth daily      atorvastatin (LIPITOR) 80 mg tablet Take 1 tablet (80 mg total) by mouth daily with dinner 90 tablet 1    carvedilol (COREG) 6 25 mg tablet Take 1 tablet (6 25 mg total) by mouth 2 (two) times a day with meals 60 tablet 5    Cholecalciferol (Vitamin D3) 50 MCG (2000 UT) capsule Take 1 capsule (2,000 Units total) by mouth daily 30 capsule 5    clopidogrel (PLAVIX) 75 mg tablet Take 1 tablet (75 mg total) by mouth daily 90 tablet 3    Continuous Blood Gluc  (FreeStyle Feng 14 Day [place]) CORBY USE AS DIRECTED 2 each 5    Continuous Blood Gluc Sensor (FreeStyle Feng 14 Day Sensor) MISC FreeStyle Feng 14 Day Sensor kit      Dulaglutide 0 75 MG/0 5ML SOPN Inject 0 5 mL (0 75 mg total) under the skin once a week 2 mL 1    Empagliflozin 10 MG TABS Take 1 tablet (10 mg total) by mouth every morning 90 tablet 3    furosemide (LASIX) 20 mg tablet Take 1 tablet (20 mg total) by mouth daily 90 tablet 1    metFORMIN (GLUCOPHAGE) 1000 MG tablet TAKE 1 TABLET BY MOUTH TWICE A DAY WITH MEALS 180 tablet 3    nitroglycerin (NITROSTAT) 0 4 mg SL tablet Place 1 tablet (0 4 mg total) under the tongue every 5 (five) minutes as needed for chest pain 24 tablet 1    NovoFine 32G X 6 MM MISC USE AS DIRECTED ONCE A DAY WITH INSULIN 30 each 5    sacubitril-valsartan (Entresto) 49-51 MG TABS Take 1 tablet by mouth 2 (two) times a day 180 tablet 4     No current facility-administered medications for this visit         No Known Allergies    PAST HISTORY    Past Medical History:   Diagnosis Date    Colon polyp     Coronary artery disease     Depression     Diabetes mellitus ([facility] [address] )     Dizziness     Edema     Hyperlipidemia     Hypertension     Ischemic cardiomyopathy     Left bundle branch block     Type 2 diabetes mellitus ([facility] [address] )     Vitamin D deficiency     Wears glasses        Past Surgical History:   Procedure Laterality Date    BREAST EXCISIONAL BIOPSY Right 2009    COLONOSCOPY  04/11/2018    Colonoscpoy due in 3 years    EGD  04/11/2018    MAMMO (HISTORICAL) Bilateral 05/04/2020 2018    MAMMO NEEDLE LOCALIZATION RIGHT (ALL INC) Right 7/8/2009    TED TOOTH EXTRACTION         Social History     Tobacco Use    Smoking status: Never Smoker    Smokeless tobacco: Never Used   Vaping Use    Vaping Use: Never used   Substance Use Topics    Alcohol use: No    Drug use: No       Family History   Problem Relation Age of Onset    Hypertension Mother     Sudden death Mother         SCD    Hyperlipidemia Mother     Heart attack Mother     Breast cancer Mother 79    Diabetes type II Mother     Arrhythmia Father         pacemaker/ defib    Clotting disorder Father         eliquis    Heart failure Father     Heart disease Father     Melanoma Father     Cancer Father 61        bladder cancer    Diabetes type II Father     Breast cancer Sister 44    Thyroid cancer Sister     No Known Problems Sister     No Known Problems Sister     Thyroid cancer Sister     No Known Problems Maternal Grandmother     No Known Problems Maternal Grandfather     No Known Problems Paternal Grandmother     No Known Problems Paternal Grandfather     Breast cancer Maternal Aunt         unknown age   [facility] Esophageal cancer Maternal Aunt 61    Cancer Maternal Aunt         unknown age or type    No Known Problems Maternal Aunt     Colon cancer Maternal Uncle         unknwon age   [facility] Pancreatic cancer Maternal Uncle 61    Lung cancer Paternal Aunt         unknonw age- in her [de-identified]    Anuerysm Neg Hx          Above history personally reviewed  EXAM    Vitals:not currently breastfeeding  ,There is no height or weight on file to calculate BMI  Physical Exam  Constitutional:       Appearance: Normal appearance  HENT:      Head: Normocephalic and atraumatic  Eyes:      Extraocular Movements: Extraocular movements intact  Pupils: Pupils are equal, round, and reactive to light  Cardiovascular:      Rate and Rhythm: Normal rate and regular rhythm  Pulses: Normal pulses     Pulmonary:      Effort: Pulmonary effort is normal    Musculoskeletal:         General: Normal range of motion  Cervical back: Normal range of motion  Skin:     General: Skin is warm  Neurological:      General: No focal deficit present  Mental Status: She is alert and oriented to person, place, and time  GCS: GCS eye subscore is 4  GCS verbal subscore is 5  GCS motor subscore is 6  Cranial Nerves: Cranial nerves are intact  Sensory: Sensation is intact  Motor: Motor function is intact  Coordination: Finger-Nose-Finger Test normal       Deep Tendon Reflexes: Reflexes are normal and symmetric  Reflex Scores:       Tricep reflexes are 2+ on the right side and 2+ on the left side  Bicep reflexes are 2+ on the right side and 2+ on the left side  Brachioradialis reflexes are 2+ on the right side and 2+ on the left side  Patellar reflexes are 2+ on the right side and 2+ on the left side  Achilles reflexes are 2+ on the right side and 2+ on the left side  Psychiatric:         Speech: Speech normal          Neurologic Exam     Mental Status   Oriented to person, place, and time  Speech: speech is normal   Level of consciousness: alert    Cranial Nerves     CN III, IV, VI   Pupils are equal, round, and reactive to light  Right pupil: Size: 2 mm  Shape: regular  Reactivity: brisk  Left pupil: Size: 2 mm  Shape: regular  Reactivity: brisk     Nystagmus: none     CN XI   CN XI normal      Motor Exam   Muscle bulk: normal  Overall muscle tone: normal  Right arm tone: normal  Left arm tone: normal  Right arm pronator drift: absent  Left arm pronator drift: absent  Right leg tone: normal  Left leg tone: normal    Sensory Exam   Light touch normal      Gait, Coordination, and Reflexes     Coordination   Finger to nose coordination: normal    Reflexes   Right brachioradialis: 2+  Left brachioradialis: 2+  Right biceps: 2+  Left biceps: 2+  Right triceps: 2+  Left triceps: 2+  Right patellar: 2+  Left patellar: 2+  Right achilles: 2+  Left achilles: 2+  Right : 2+  Left : 2+  Right Marte: absent  Left Marte: absent  Right ankle clonus: absent  Left pendular knee jerk: absent        MEDICAL DECISION MAKING    Imaging Studies:     MRI brain with and without contrast    Result Date: 6/27/2022  Narrative: MRI BRAIN WITH AND WITHOUT CONTRAST INDICATION: D32 9: Benign neoplasm of meninges, unspecified  COMPARISON:  MRI brain with and without contrast December 17, 2021 and August 30, 2021  Rose Ramirezist CTA head and neck with and without contrast August 29, 2021  TECHNIQUE: Sagittal T1, axial T2, axial FLAIR, axial T1, axial Calvin, axial diffusion  Sagittal, axial T1 postcontrast   Axial bravo postcontrast with coronal reconstructions  IV Contrast:  7 mL of Gadobutrol injection (SINGLE-DOSE)  IMAGE QUALITY:   Diagnostic  FINDINGS: BRAIN PARENCHYMA:  1 9 x 1 6 x 1 3 cm right anterior clinoid partially calcified extra-axial lesion with lobulated appearance and avid enhancement (11:65, 9:14), compatible with meningioma, unchanged when remeasured by this user  Mild mass effect along adjacent right inferior frontal lobe with minimal surrounding vasogenic edema  This lesion is in close proximity to right MCA M1 segment  There is no intra-axial mass or midline shift  No acute intracranial hemorrhage  Chronic microhemorrhages in bilateral cerebral hemispheres (left greater than right)  Normal posterior fossa  No diffusion-weighted signal abnormality to suggest acute infarction     Small scattered hyperintensities on T2/FLAIR imaging are noted in the periventricular and subcortical white matter demonstrating an appearance that is statistically most likely to represent mild microangiopathic change  VENTRICLES:  Normal for the patient's age   SELLA AND PITUITARY GLAND:  Normal  ORBITS:  Normal  PARANASAL SINUSES:  Mild mucosal thickening in bilateral maxillary sinuses with small left maxillary mucus retention cyst  Small mucous retention cyst with inspissated material in right lateral sphenoid sinus extending to right pterygoid process, unchanged  Pneumatized bilateral optic struts and bilateral anterior clinoid process  VASCULATURE:  Evaluation of the major intracranial vasculature demonstrates appropriate flow voids  CALVARIUM AND SKULL BASE:  Normal  EXTRACRANIAL SOFT TISSUES:  Normal      Impression: Unchanged 1 9 cm partially calcified meningioma in right anterior clinoid with mild mass effect along adjacent right inferior frontal lobe with minimal surrounding vasogenic edema  No acute intracranial abnormality   Workstation performed: [id number]       I have personally reviewed pertinent reports   , I have personally reviewed pertinent films in PACS and I have personally reviewed pertinent films in PACS with a Radiologist

## 2022-07-25 ENCOUNTER — TELEPHONE (OUTPATIENT)
Dept: FAMILY MEDICINE CLINIC | Facility: CLINIC | Age: 62
End: 2022-07-25

## 2022-07-25 DIAGNOSIS — I25.5 ISCHEMIC CARDIOMYOPATHY: ICD-10-CM

## 2022-07-25 DIAGNOSIS — E11.65 TYPE 2 DIABETES MELLITUS WITH HYPERGLYCEMIA, WITH LONG-TERM CURRENT USE OF INSULIN (HCC): Primary | ICD-10-CM

## 2022-07-25 DIAGNOSIS — Z79.4 TYPE 2 DIABETES MELLITUS WITH HYPERGLYCEMIA, WITH LONG-TERM CURRENT USE OF INSULIN (HCC): Primary | ICD-10-CM

## 2022-07-25 RX ORDER — FLASH GLUCOSE SENSOR
KIT MISCELLANEOUS
Qty: 2 EACH | Refills: 5 | Status: SHIPPED | OUTPATIENT
Start: 2022-07-25 | End: 2022-08-26

## 2022-07-25 RX ORDER — ATORVASTATIN CALCIUM 80 MG/1
80 TABLET, FILM COATED ORAL
Qty: 90 TABLET | Refills: 1 | Status: SHIPPED | OUTPATIENT
Start: 2022-07-25

## 2022-07-25 NOTE — TELEPHONE ENCOUNTER
Patient needs refill for:  Atorvastatin 80 mg 1 tab daily # 30  Freestyle Feng 14 Day sensor  Send to [facility] on [address]

## 2022-07-28 ENCOUNTER — TELEPHONE (OUTPATIENT)
Dept: FAMILY MEDICINE CLINIC | Facility: CLINIC | Age: 62
End: 2022-07-28

## 2022-07-28 DIAGNOSIS — Z12.31 BREAST CANCER SCREENING BY MAMMOGRAM: Primary | ICD-10-CM

## 2022-07-28 NOTE — TELEPHONE ENCOUNTER
Nicol Nimco received a call that it is time to schedule her annual mammogram   Can an order be placed in [facility] so she can go ahead & schedule this

## 2022-07-29 ENCOUNTER — TELEPHONE (OUTPATIENT)
Dept: FAMILY MEDICINE CLINIC | Facility: CLINIC | Age: 62
End: 2022-07-29

## 2022-07-29 NOTE — TELEPHONE ENCOUNTER
Patient called, picked up her Freestyle [place] from pharmacy and when she puts the Freestyle sensors in her arm it says incompatible  Wants advice what she should do?

## 2022-08-10 ENCOUNTER — VBI (OUTPATIENT)
Dept: ADMINISTRATIVE | Facility: OTHER | Age: 62
End: 2022-08-10

## 2022-08-18 ENCOUNTER — APPOINTMENT (OUTPATIENT)
Dept: LAB | Facility: CLINIC | Age: 62
End: 2022-08-18
Payer: COMMERCIAL

## 2022-08-18 DIAGNOSIS — Z11.4 ENCOUNTER FOR SCREENING FOR HIV: ICD-10-CM

## 2022-08-18 DIAGNOSIS — I25.10 CORONARY ARTERY DISEASE INVOLVING NATIVE CORONARY ARTERY OF NATIVE HEART WITHOUT ANGINA PECTORIS: ICD-10-CM

## 2022-08-18 DIAGNOSIS — E11.9 CONTROLLED TYPE 2 DIABETES MELLITUS WITHOUT COMPLICATION, WITH LONG-TERM CURRENT USE OF INSULIN (HCC): ICD-10-CM

## 2022-08-18 DIAGNOSIS — Z79.4 TYPE 2 DIABETES MELLITUS WITH HYPERGLYCEMIA, WITH LONG-TERM CURRENT USE OF INSULIN (HCC): ICD-10-CM

## 2022-08-18 DIAGNOSIS — E11.65 TYPE 2 DIABETES MELLITUS WITH HYPERGLYCEMIA, WITH LONG-TERM CURRENT USE OF INSULIN (HCC): ICD-10-CM

## 2022-08-18 DIAGNOSIS — E78.2 MIXED HYPERLIPIDEMIA: ICD-10-CM

## 2022-08-18 DIAGNOSIS — Z79.4 CONTROLLED TYPE 2 DIABETES MELLITUS WITHOUT COMPLICATION, WITH LONG-TERM CURRENT USE OF INSULIN (HCC): ICD-10-CM

## 2022-08-18 LAB
ALBUMIN SERPL BCP-MCNC: 4.1 G/DL (ref 3.5–5)
ALP SERPL-CCNC: 44 U/L (ref 34–104)
ALT SERPL W P-5'-P-CCNC: 24 U/L (ref 7–52)
ANION GAP SERPL CALCULATED.3IONS-SCNC: 6 MMOL/L (ref 4–13)
AST SERPL W P-5'-P-CCNC: 19 U/L (ref 13–39)
BASOPHILS # BLD AUTO: 0.04 THOUSANDS/ΜL (ref 0–0.1)
BASOPHILS NFR BLD AUTO: 1 % (ref 0–1)
BILIRUB SERPL-MCNC: 0.62 MG/DL (ref 0.2–1)
BUN SERPL-MCNC: 18 MG/DL (ref 5–25)
CALCIUM SERPL-MCNC: 9.4 MG/DL (ref 8.4–10.2)
CHLORIDE SERPL-SCNC: 105 MMOL/L (ref 96–108)
CHOLEST SERPL-MCNC: 89 MG/DL
CO2 SERPL-SCNC: 30 MMOL/L (ref 21–32)
CREAT SERPL-MCNC: 0.89 MG/DL (ref 0.6–1.3)
EOSINOPHIL # BLD AUTO: 0.11 THOUSAND/ΜL (ref 0–0.61)
EOSINOPHIL NFR BLD AUTO: 2 % (ref 0–6)
ERYTHROCYTE [DISTWIDTH] IN BLOOD BY AUTOMATED COUNT: 12.4 % (ref 11.6–15.1)
EST. AVERAGE GLUCOSE BLD GHB EST-MCNC: 171 MG/DL
GFR SERPL CREATININE-BSD FRML MDRD: 68 ML/MIN/1.73SQ M
GLUCOSE P FAST SERPL-MCNC: 146 MG/DL (ref 65–99)
HBA1C MFR BLD: 7.6 %
HCT VFR BLD AUTO: 42.6 % (ref 34.8–46.1)
HDLC SERPL-MCNC: 30 MG/DL
HGB BLD-MCNC: 13.8 G/DL (ref 11.5–15.4)
IMM GRANULOCYTES # BLD AUTO: 0.02 THOUSAND/UL (ref 0–0.2)
IMM GRANULOCYTES NFR BLD AUTO: 0 % (ref 0–2)
LDLC SERPL CALC-MCNC: 43 MG/DL (ref 0–100)
LYMPHOCYTES # BLD AUTO: 2.19 THOUSANDS/ΜL (ref 0.6–4.47)
LYMPHOCYTES NFR BLD AUTO: 40 % (ref 14–44)
MCH RBC QN AUTO: 30.9 PG (ref 26.8–34.3)
MCHC RBC AUTO-ENTMCNC: 32.4 G/DL (ref 31.4–37.4)
MCV RBC AUTO: 95 FL (ref 82–98)
MONOCYTES # BLD AUTO: 0.38 THOUSAND/ΜL (ref 0.17–1.22)
MONOCYTES NFR BLD AUTO: 7 % (ref 4–12)
NEUTROPHILS # BLD AUTO: 2.76 THOUSANDS/ΜL (ref 1.85–7.62)
NEUTS SEG NFR BLD AUTO: 50 % (ref 43–75)
NONHDLC SERPL-MCNC: 59 MG/DL
NRBC BLD AUTO-RTO: 0 /100 WBCS
PLATELET # BLD AUTO: 184 THOUSANDS/UL (ref 149–390)
PMV BLD AUTO: 11.1 FL (ref 8.9–12.7)
POTASSIUM SERPL-SCNC: 4.1 MMOL/L (ref 3.5–5.3)
PROT SERPL-MCNC: 6.8 G/DL (ref 6.4–8.4)
RBC # BLD AUTO: 4.47 MILLION/UL (ref 3.81–5.12)
SODIUM SERPL-SCNC: 141 MMOL/L (ref 135–147)
T4 FREE SERPL-MCNC: 1.12 NG/DL (ref 0.76–1.46)
TRIGL SERPL-MCNC: 80 MG/DL
TSH SERPL DL<=0.05 MIU/L-ACNC: 3.11 UIU/ML (ref 0.45–4.5)
WBC # BLD AUTO: 5.5 THOUSAND/UL (ref 4.31–10.16)

## 2022-08-18 PROCEDURE — 80053 COMPREHEN METABOLIC PANEL: CPT

## 2022-08-18 PROCEDURE — 36415 COLL VENOUS BLD VENIPUNCTURE: CPT

## 2022-08-18 PROCEDURE — 84443 ASSAY THYROID STIM HORMONE: CPT

## 2022-08-18 PROCEDURE — 83036 HEMOGLOBIN GLYCOSYLATED A1C: CPT

## 2022-08-18 PROCEDURE — 87389 HIV-1 AG W/HIV-1&-2 AB AG IA: CPT

## 2022-08-18 PROCEDURE — 84439 ASSAY OF FREE THYROXINE: CPT

## 2022-08-18 PROCEDURE — 3051F HG A1C>EQUAL 7.0%<8.0%: CPT | Performed by: INTERNAL MEDICINE

## 2022-08-18 PROCEDURE — 80061 LIPID PANEL: CPT

## 2022-08-18 PROCEDURE — 85025 COMPLETE CBC W/AUTO DIFF WBC: CPT

## 2022-08-19 LAB — HIV 1+2 AB+HIV1 P24 AG SERPL QL IA: NORMAL

## 2022-08-22 ENCOUNTER — OFFICE VISIT (OUTPATIENT)
Dept: ENDOCRINOLOGY | Facility: CLINIC | Age: 62
End: 2022-08-22
Payer: COMMERCIAL

## 2022-08-22 VITALS
BODY MASS INDEX: 25.13 KG/M2 | SYSTOLIC BLOOD PRESSURE: 130 MMHG | HEART RATE: 99 BPM | DIASTOLIC BLOOD PRESSURE: 70 MMHG | WEIGHT: 156.4 LBS | HEIGHT: 66 IN

## 2022-08-22 DIAGNOSIS — E78.2 MIXED HYPERLIPIDEMIA: ICD-10-CM

## 2022-08-22 DIAGNOSIS — E11.65 TYPE 2 DIABETES MELLITUS WITH HYPERGLYCEMIA, WITH LONG-TERM CURRENT USE OF INSULIN (HCC): Primary | ICD-10-CM

## 2022-08-22 DIAGNOSIS — E55.9 VITAMIN D DEFICIENCY: ICD-10-CM

## 2022-08-22 DIAGNOSIS — Z79.4 TYPE 2 DIABETES MELLITUS WITH HYPERGLYCEMIA, WITH LONG-TERM CURRENT USE OF INSULIN (HCC): Primary | ICD-10-CM

## 2022-08-22 PROCEDURE — 3078F DIAST BP <80 MM HG: CPT | Performed by: INTERNAL MEDICINE

## 2022-08-22 PROCEDURE — 3075F SYST BP GE 130 - 139MM HG: CPT | Performed by: INTERNAL MEDICINE

## 2022-08-22 PROCEDURE — 99214 OFFICE O/P EST MOD 30 MIN: CPT | Performed by: INTERNAL MEDICINE

## 2022-08-22 RX ORDER — DULAGLUTIDE 1.5 MG/.5ML
1.5 INJECTION, SOLUTION SUBCUTANEOUS WEEKLY
Qty: 6 ML | Refills: 0 | Status: SHIPPED | OUTPATIENT
Start: 2022-08-22

## 2022-08-22 NOTE — PROGRESS NOTES
Follow-up Patient Progress Note      CC: diabetes     History of Present Illness:   61yr female with type 2 diabetes since 2009, retinopathy, microalbuminuria, HTN, HLD, CAD s/p HUNTER 9/21, ischemic CMpathy, rt paraclinoid meningioma diagnosed during recent hospitalization and fatigue  Last visit was 4/19/22      Since last visit, lost 3 lbs  Total 16 ;bs since 12/21   she reports no new symptoms         CGM review: dates 08/09/22 - 08/22/22          Usage 35%     Av glu 140 mg/dL        CV 28%  TIR 87%          TAR 10+3%         TBR 0%  G;ycemic patterns show fasting normoglycemia and patient is not scanning most of daytime      Current meds:  Jardiance 10 mg qdaily, Metformin 4400LY PO BID, Trulicity 1 73VS weekly     Opthamology: Follows [facility]  Podiatry: yes  Influenza: yes, COVID - yes  Dental: No issues  Pancreatitis: No     Ace/ARB: entresto  Statin:lipitor  Thyroid issues: no  FH: brother and 2 sisters, mother and father have diabetes        Patient Active Problem List   Diagnosis    Fatigue due to excessive exertion    Mixed hyperlipidemia    Abnormal stress echo    Type 2 diabetes mellitus with hyperglycemia, with long-term current use of insulin (HCC)    Microalbuminuria    Bilateral leg edema    Colon polyps    Gastroesophageal reflux disease with esophagitis without hemorrhage    Dizziness    Left bundle branch block    CAD (coronary artery disease)    New Ischemic Cardiomyopathy/Systolic CHF    Right paraclinoid meningioma    Essential hypertension    Wound of left leg    BARROS (dyspnea on exertion)    Vitamin D deficiency     Past Medical History:   Diagnosis Date    Colon polyp     Coronary artery disease     Depression     Diabetes mellitus ([address] )     Dizziness     Edema     Hyperlipidemia     Hypertension     Ischemic cardiomyopathy     Left bundle branch block     Type 2 diabetes mellitus ([facility] [address] [facility]     Vitamin D deficiency     Wears glasses       Past Surgical History:   Procedure Laterality Date    BREAST EXCISIONAL BIOPSY Right 2009    COLONOSCOPY  2018    Colonoscpoy due in 3 years    EGD  2018    MAMMO (HISTORICAL) Bilateral 2020    MAMMO NEEDLE LOCALIZATION RIGHT (ALL INC) Right 2009    WISDOM TOOTH EXTRACTION        Family History   Problem Relation Age of Onset    Hypertension Mother     Sudden death Mother         SCD    Hyperlipidemia Mother     Heart attack Mother     Breast cancer Mother 79    Diabetes type II Mother     Arrhythmia Father         pacemaker/ defib    Clotting disorder Father         eliquis    Heart failure Father     Heart disease Father     Melanoma Father     Cancer Father 61        bladder cancer    Diabetes type II Father     Breast cancer Sister 44    Thyroid cancer Sister     No Known Problems Sister     No Known Problems Sister     Thyroid cancer Sister     No Known Problems Maternal Grandmother     No Known Problems Maternal Grandfather     No Known Problems Paternal Grandmother     No Known Problems Paternal Grandfather     Breast cancer Maternal Aunt         unknown age   [facility] Esophageal cancer Maternal Aunt 61    Cancer Maternal Aunt         unknown age or type    No Known Problems Maternal Aunt     Colon cancer Maternal Uncle         unknwon age   [facility] Pancreatic cancer Maternal Uncle 61    Lung cancer Paternal Aunt         unknonw age- in her [de-identified]    Anuerysm Neg Hx      Social History     Tobacco Use    Smoking status: Never Smoker    Smokeless tobacco: Never Used   Substance Use Topics    Alcohol use: No     No Known Allergies      Current Outpatient Medications:     aspirin 81 MG tablet, Take 1 tablet by mouth daily, Disp: , Rfl:     atorvastatin (LIPITOR) 80 mg tablet, Take 1 tablet (80 mg total) by mouth daily with dinner, Disp: 90 tablet, Rfl: 1    carvedilol (COREG) 6 25 mg tablet, Take 1 tablet (6 25 mg total) by mouth 2 (two) times a day with meals, Disp: 60 tablet, Rfl: 5    Cholecalciferol (Vitamin D3) 50 MCG (2000 UT) capsule, Take 1 capsule (2,000 Units total) by mouth daily, Disp: 30 capsule, Rfl: 5    clopidogrel (PLAVIX) 75 mg tablet, Take 1 tablet (75 mg total) by mouth daily, Disp: 90 tablet, Rfl: 3    Continuous Blood Gluc  (FreeStyle Feng 14 Day [place]) CORBY, USE AS DIRECTED, Disp: 2 each, Rfl: 5    Continuous Blood Gluc Sensor (FreeStyle Feng 14 Day Sensor) MISC, USE AS DIRECTED, Disp: 2 each, Rfl: 5    Dulaglutide 0 75 MG/0 5ML SOPN, Inject 0 5 mL (0 75 mg total) under the skin once a week, Disp: 2 mL, Rfl: 1    Empagliflozin 10 MG TABS, Take 1 tablet (10 mg total) by mouth every morning, Disp: 90 tablet, Rfl: 3    furosemide (LASIX) 20 mg tablet, Take 1 tablet (20 mg total) by mouth daily, Disp: 90 tablet, Rfl: 1    metFORMIN (GLUCOPHAGE) 1000 MG tablet, TAKE 1 TABLET BY MOUTH TWICE A DAY WITH MEALS, Disp: 180 tablet, Rfl: 3    nitroglycerin (NITROSTAT) 0 4 mg SL tablet, Place 1 tablet (0 4 mg total) under the tongue every 5 (five) minutes as needed for chest pain, Disp: 24 tablet, Rfl: 1    sacubitril-valsartan (Entresto) 49-51 MG TABS, Take 1 tablet by mouth 2 (two) times a day, Disp: 180 tablet, Rfl: 4    NovoFine 32G X 6 MM MISC, USE AS DIRECTED ONCE A DAY WITH INSULIN, Disp: 30 each, Rfl: 5    Review of Systems   Constitutional: Positive for fatigue  HENT: Negative  Eyes: Negative  Respiratory: Negative  Cardiovascular: Negative  Gastrointestinal: Negative  Endocrine: Negative  Musculoskeletal: Negative  Skin: Negative  Allergic/Immunologic: Negative  Neurological: Negative  Hematological: Negative  Psychiatric/Behavioral: Negative  Physical Exam:  Body mass index is 25 24 kg/m²    /70   Pulse 99   Ht 5' 6" (1 676 m)   Wt 70 9 kg (156 lb 6 4 oz)   BMI 25 24 kg/m²    Vitals:    08/22/22 0826   Weight: 70 9 kg (156 lb 6 4 oz)        Physical Exam  Constitutional: Appearance: She is well-developed  HENT:      Head: Normocephalic  Eyes:      Pupils: Pupils are equal, round, and reactive to light  Neck:      Thyroid: No thyromegaly  Cardiovascular:      Rate and Rhythm: Normal rate  Heart sounds: Normal heart sounds  Pulmonary:      Effort: Pulmonary effort is normal       Breath sounds: Normal breath sounds  Abdominal:      General: Bowel sounds are normal       Palpations: Abdomen is soft  Musculoskeletal:         General: No deformity  Cervical back: Normal range of motion  Skin:     Capillary Refill: Capillary refill takes less than 2 seconds  Coloration: Skin is not pale  Findings: No rash  Neurological:      Mental Status: She is alert and oriented to person, place, and time  Labs:   Lab Results   Component Value Date    HGBA1C 7 6 (H) 08/18/2022       Lab Results   Component Value Date    PGF2NRUWYJND 3 108 08/18/2022       Lab Results   Component Value Date    CREATININE 0 89 08/18/2022    CREATININE 0 82 06/23/2022    CREATININE 1 13 09/15/2021    BUN 18 08/18/2022    K 4 1 08/18/2022     08/18/2022    CO2 30 08/18/2022     eGFR   Date Value Ref Range Status   08/18/2022 68 ml/min/1 73sq m Final       Lab Results   Component Value Date    ALT 24 08/18/2022    AST 19 08/18/2022    ALKPHOS 44 08/18/2022       Lab Results   Component Value Date    CHOLESTEROL 89 08/18/2022    CHOLESTEROL 70 10/28/2021    CHOLESTEROL 119 08/30/2021     Lab Results   Component Value Date    HDL 30 (L) 08/18/2022    HDL 28 (L) 10/28/2021    HDL 31 (L) 08/30/2021     Lab Results   Component Value Date    TRIG 80 08/18/2022    TRIG 72 10/28/2021    TRIG 119 08/30/2021     Lab Results   Component Value Date    NONHDLC 59 08/18/2022    [place] 42 10/28/2021    [place] 74 02/10/2021         Impression:  1  Type 2 diabetes mellitus with hyperglycemia, with long-term current use of insulin ([address] )    2  Vitamin D deficiency    3   Mixed hyperlipidemia         Plan:    Diagnoses and all orders for this visit:    Type 2 diabetes mellitus with hyperglycemia, with long-term current use of insulin ([address] )  She is uncontrolled with A1c 7 6%  up from 5 9% 8 months ago  Mostly related to postprandial hyperglycemia  Today we discussed options and agreed to increase trulicity and include strength training  Advised to exercise for 15 min after meals to help postprandial hyperglycemia  Advised to scan torito sensor before meals and bedtime for effective data  Follow up in 3 months  -     Dulaglutide (Trulicity) 1 5 CL/2 6ZG SOPN; Inject 0 5 mL (1 5 mg total) under the skin once a week  -     Hemoglobin A1C; Future  -     Microalbumin / creatinine urine ratio; Future    Vitamin D deficiency    Mixed hyperlipidemia        I have spent 35 minutes with patient today in which greater than 50% of this time was spent in counseling/coordination of care  Discussed with the patient and all questioned fully answered  She will call me if any problems arise  Educated/ Counseled patient on diagnostic test results, prognosis, risk vs benefit of treatment options, importance of treatment compliance, healthy life and lifestyle choices        [address]

## 2022-08-22 NOTE — PATIENT INSTRUCTIONS
Please increase muscle building exercise like weights or resistance bands  Increase Trulicity to 7 3FC weekly after completing the smaller dose

## 2022-08-26 ENCOUNTER — TELEPHONE (OUTPATIENT)
Dept: FAMILY MEDICINE CLINIC | Facility: CLINIC | Age: 62
End: 2022-08-26

## 2022-08-26 DIAGNOSIS — E11.65 TYPE 2 DIABETES MELLITUS WITH HYPERGLYCEMIA, WITH LONG-TERM CURRENT USE OF INSULIN (HCC): ICD-10-CM

## 2022-08-26 DIAGNOSIS — Z79.4 TYPE 2 DIABETES MELLITUS WITHOUT COMPLICATION, WITH LONG-TERM CURRENT USE OF INSULIN (HCC): ICD-10-CM

## 2022-08-26 DIAGNOSIS — Z79.4 TYPE 2 DIABETES MELLITUS WITH HYPERGLYCEMIA, WITH LONG-TERM CURRENT USE OF INSULIN (HCC): ICD-10-CM

## 2022-08-26 DIAGNOSIS — E11.9 TYPE 2 DIABETES MELLITUS WITHOUT COMPLICATION, WITH LONG-TERM CURRENT USE OF INSULIN (HCC): ICD-10-CM

## 2022-08-26 RX ORDER — FLASH GLUCOSE SCANNING READER
EACH MISCELLANEOUS
Qty: 2 EACH | Refills: 5 | Status: SHIPPED | OUTPATIENT
Start: 2022-08-26

## 2022-08-26 RX ORDER — FLASH GLUCOSE SENSOR
KIT MISCELLANEOUS
Qty: 2 EACH | Refills: 5 | Status: SHIPPED | OUTPATIENT
Start: 2022-08-26

## 2022-08-26 NOTE — TELEPHONE ENCOUNTER
Patient called, says she needs a new script for her [facility]  The new script should be for:    1  Freestyle Feng # 2 [place]  2   Freestyle Feng # 2 Sensor    This is the new model and patient says the old model is being phased out    Pharmacy: [facility] on [address]

## 2022-08-30 NOTE — TELEPHONE ENCOUNTER
Fax request Mom wanted to check in with Malia about the marble patient swallowed. Mom has been checking his stool for the past 5 days and still nothing has come out. Mom states nothing was found on the xray and they've been doing the miralax since then.

## 2022-09-04 DIAGNOSIS — Z79.4 TYPE 2 DIABETES MELLITUS WITHOUT COMPLICATION, WITH LONG-TERM CURRENT USE OF INSULIN (HCC): ICD-10-CM

## 2022-09-04 DIAGNOSIS — E11.9 TYPE 2 DIABETES MELLITUS WITHOUT COMPLICATION, WITH LONG-TERM CURRENT USE OF INSULIN (HCC): ICD-10-CM

## 2022-09-04 DIAGNOSIS — I25.5 ISCHEMIC CARDIOMYOPATHY: ICD-10-CM

## 2022-09-06 RX ORDER — EMPAGLIFLOZIN 10 MG/1
TABLET, FILM COATED ORAL
Qty: 90 TABLET | Refills: 3 | Status: SHIPPED | OUTPATIENT
Start: 2022-09-06

## 2022-09-14 ENCOUNTER — HOSPITAL ENCOUNTER (OUTPATIENT)
Dept: RADIOLOGY | Age: 62
Discharge: HOME | End: 2022-09-14
Payer: COMMERCIAL

## 2022-09-14 VITALS — WEIGHT: 155 LBS | BODY MASS INDEX: 24.91 KG/M2 | HEIGHT: 66 IN

## 2022-09-14 DIAGNOSIS — Z12.31 ENCOUNTER FOR SCREENING MAMMOGRAM FOR MALIGNANT NEOPLASM OF BREAST: ICD-10-CM

## 2022-09-14 DIAGNOSIS — Z12.31 BREAST CANCER SCREENING BY MAMMOGRAM: ICD-10-CM

## 2022-09-14 PROCEDURE — 77063 BREAST TOMOSYNTHESIS BI: CPT

## 2022-09-14 PROCEDURE — 77067 SCR MAMMO BI INCL CAD: CPT

## 2022-09-23 ENCOUNTER — OFFICE VISIT (OUTPATIENT)
Dept: FAMILY MEDICINE CLINIC | Facility: CLINIC | Age: 62
End: 2022-09-23
Payer: COMMERCIAL

## 2022-09-23 VITALS
WEIGHT: 162.13 LBS | HEIGHT: 66 IN | TEMPERATURE: 97.1 F | SYSTOLIC BLOOD PRESSURE: 118 MMHG | BODY MASS INDEX: 26.06 KG/M2 | RESPIRATION RATE: 16 BRPM | HEART RATE: 77 BPM | DIASTOLIC BLOOD PRESSURE: 60 MMHG | OXYGEN SATURATION: 98 %

## 2022-09-23 DIAGNOSIS — Z12.31 ENCOUNTER FOR SCREENING MAMMOGRAM FOR MALIGNANT NEOPLASM OF BREAST: ICD-10-CM

## 2022-09-23 DIAGNOSIS — Z79.4 TYPE 2 DIABETES MELLITUS WITHOUT COMPLICATION, WITH LONG-TERM CURRENT USE OF INSULIN (HCC): Primary | ICD-10-CM

## 2022-09-23 DIAGNOSIS — Z79.4 TYPE 2 DIABETES MELLITUS WITH HYPERGLYCEMIA, WITH LONG-TERM CURRENT USE OF INSULIN (HCC): ICD-10-CM

## 2022-09-23 DIAGNOSIS — E11.9 TYPE 2 DIABETES MELLITUS WITHOUT COMPLICATION, WITH LONG-TERM CURRENT USE OF INSULIN (HCC): Primary | ICD-10-CM

## 2022-09-23 DIAGNOSIS — E55.9 VITAMIN D DEFICIENCY: ICD-10-CM

## 2022-09-23 DIAGNOSIS — E11.65 TYPE 2 DIABETES MELLITUS WITH HYPERGLYCEMIA, WITH LONG-TERM CURRENT USE OF INSULIN (HCC): ICD-10-CM

## 2022-09-23 DIAGNOSIS — E78.2 MIXED HYPERLIPIDEMIA: ICD-10-CM

## 2022-09-23 DIAGNOSIS — I25.10 CORONARY ARTERY DISEASE INVOLVING NATIVE CORONARY ARTERY OF NATIVE HEART WITHOUT ANGINA PECTORIS: ICD-10-CM

## 2022-09-23 DIAGNOSIS — I25.5 ISCHEMIC CARDIOMYOPATHY: ICD-10-CM

## 2022-09-23 DIAGNOSIS — K63.5 POLYP OF COLON, UNSPECIFIED PART OF COLON, UNSPECIFIED TYPE: ICD-10-CM

## 2022-09-23 DIAGNOSIS — Z23 INFLUENZA VACCINE ADMINISTERED: ICD-10-CM

## 2022-09-23 DIAGNOSIS — I10 ESSENTIAL HYPERTENSION: ICD-10-CM

## 2022-09-23 DIAGNOSIS — R60.0 BILATERAL LEG EDEMA: ICD-10-CM

## 2022-09-23 PROCEDURE — 3074F SYST BP LT 130 MM HG: CPT | Performed by: INTERNAL MEDICINE

## 2022-09-23 PROCEDURE — 90682 RIV4 VACC RECOMBINANT DNA IM: CPT | Performed by: INTERNAL MEDICINE

## 2022-09-23 PROCEDURE — 3725F SCREEN DEPRESSION PERFORMED: CPT | Performed by: INTERNAL MEDICINE

## 2022-09-23 PROCEDURE — 90471 IMMUNIZATION ADMIN: CPT | Performed by: INTERNAL MEDICINE

## 2022-09-23 PROCEDURE — 3078F DIAST BP <80 MM HG: CPT | Performed by: INTERNAL MEDICINE

## 2022-09-23 PROCEDURE — 99214 OFFICE O/P EST MOD 30 MIN: CPT | Performed by: INTERNAL MEDICINE

## 2022-09-23 NOTE — PROGRESS NOTES
Assessment/Plan:         Problem List Items Addressed This Visit        Digestive    Colon polyps     utd on colonoscopy             Endocrine    Type 2 diabetes mellitus with hyperglycemia, with long-term current use of insulin ([facility])       Lab Results   Component Value Date    HGBA1C 7 6 (H) 08/18/2022   A1c is down to 7 6% which is great for her-seeing Endo with Dr Natividad Comer dosage was increased            Cardiovascular and Mediastinum    CAD (coronary artery disease)     Sees Cardiology on halloween will probably get another echo-is on entresto, carvedilol, and asa and plavix          New Ischemic Cardiomyopathy/Systolic CHF    Essential hypertension       Other    Mixed hyperlipidemia    Bilateral leg edema     Bump up lasix a bit when legs swell watch sodium         Vitamin D deficiency     Will wait till she's 70 for DXA scan-on Calcium Vitamin D           Other Visit Diagnoses     Type 2 diabetes mellitus without complication, with long-term current use of insulin ([facility] [address] )    -  Primary    Relevant Orders    Ambulatory Referral to Ophthalmology    Influenza vaccine administered        Relevant Orders    influenza vaccine, quadrivalent, recombinant, PF, 0 5 mL, for patients 18 yr+ (FLUBLOK) (Completed)    Encounter for screening mammogram for malignant neoplasm of breast        UTD and reviewed            Subjective:      Patient ID: Nella Kay is a 57 y o  female      Caprice Calk here with hx of CAD, CHF, DM II most recent A1c% was 7 6%, HL-doing well-she is interested in doing a DXA scan but is ok to wait on it-her ophthalmologist left the practice and she needs a new one      The following portions of the patient's history were reviewed and updated as appropriate:   Past Medical History:  She has a past medical history of Colon polyp, Coronary artery disease, Depression, Diabetes mellitus ([address] ), Dizziness, Edema, Hyperlipidemia, Hypertension, Ischemic cardiomyopathy, Left bundle branch block, Type 2 diabetes mellitus ([address] ), Vitamin D deficiency, and Wears glasses  ,  _______________________________________________________________________  Medical Problems:  does not have any pertinent problems on file ,  _______________________________________________________________________  Past Surgical History:   has a past surgical history that includes Colonoscopy (04/11/2018); Mammo (historical) (Bilateral, 05/04/2020); Kyle tooth extraction; EGD (04/11/2018); Mammo needle localization right (all inc) (Right, 07/08/2009); Breast excisional biopsy (Right, 2009); and Mammo (historical) (Bilateral, 09/14/2022)  ,  _______________________________________________________________________  Family History:  family history includes Arrhythmia in her father; BRCA1 Negative in her sister; Breast cancer in her maternal aunt; Breast cancer (age of onset: 44) in her sister; Breast cancer (age of onset: 79) in her mother; Cancer in her maternal aunt; Cancer (age of onset: 61) in her father; Clotting disorder in her father; Colon cancer in her maternal uncle; Diabetes type II in her father and mother; Esophageal cancer (age of onset: 61) in her maternal aunt; Heart attack in her mother; Heart disease in her father; Heart failure in her father; Hyperlipidemia in her mother; Hypertension in her mother; Lung cancer in her paternal aunt; Melanoma in her father; No Known Problems in her maternal aunt, maternal grandfather, maternal grandmother, paternal grandfather, paternal grandmother, sister, and sister; Pancreatic cancer (age of onset: 61) in her maternal uncle; Sudden death in her mother; Thyroid cancer in her sister and sister  ,  _______________________________________________________________________  Social History:   reports that she has never smoked   She has never used smokeless tobacco  She reports that she does not drink alcohol and does not use drugs ,  _______________________________________________________________________  Allergies:  has No Known Allergies     _______________________________________________________________________  Current Outpatient Medications   Medication Sig Dispense Refill    aspirin 81 MG tablet Take 1 tablet by mouth daily      atorvastatin (LIPITOR) 80 mg tablet Take 1 tablet (80 mg total) by mouth daily with dinner 90 tablet 1    CALCIUM PO 1 po daily BID      carvedilol (COREG) 6 25 mg tablet Take 1 tablet (6 25 mg total) by mouth 2 (two) times a day with meals 60 tablet 5    Cholecalciferol (Vitamin D3) 50 MCG (2000 UT) capsule Take 1 capsule (2,000 Units total) by mouth daily 30 capsule 5    clopidogrel (PLAVIX) 75 mg tablet Take 1 tablet (75 mg total) by mouth daily 90 tablet 3    Continuous Blood Gluc  (Adreale 14 Day Index) CORBY USE AS DIRECTED 2 each 5    Continuous Blood Gluc  (XiaoSheng.fm 2 Index) CORBY FreeStyle Feng 2 Index   USE AS DIRECTED      Dulaglutide (Trulicity) 1 5 LT/1 8ML SOPN Inject 0 5 mL (1 5 mg total) under the skin once a week 6 mL 0    furosemide (LASIX) 20 mg tablet Take 1 tablet (20 mg total) by mouth daily 90 tablet 1    Jardiance 10 MG TABS TAKE 1 TABLET BY MOUTH EVERY DAY IN THE MORNING 90 tablet 3    metFORMIN (GLUCOPHAGE) 1000 MG tablet TAKE 1 TABLET BY MOUTH TWICE A DAY WITH MEALS 180 tablet 3    nitroglycerin (NITROSTAT) 0 4 mg SL tablet Place 1 tablet (0 4 mg total) under the tongue every 5 (five) minutes as needed for chest pain 24 tablet 1    sacubitril-valsartan (Entresto) 49-51 MG TABS Take 1 tablet by mouth 2 (two) times a day 180 tablet 4    Continuous Blood Gluc Sensor (DynaOpticsStyle Feng 14 Day Sensor) [facility] USE AS DIRECTED 2 each 5    Continuous Blood Gluc Sensor (FreeStyle Feng 2 Sensor) MISC FreeStyle Feng 2 Sensor kit   USE AS DIRECTED       No current facility-administered medications for this visit  _______________________________________________________________________  Review of Systems   Constitutional: Negative  HENT: Negative  Respiratory: Negative  Cardiovascular: Positive for leg swelling  Musculoskeletal: Negative  Neurological: Negative  Hematological: Negative  Psychiatric/Behavioral: Negative  Objective:  Vitals:    09/23/22 0903   BP: 118/60   BP Location: Left arm   Patient Position: Sitting   Cuff Size: Adult   Pulse: 77   Resp: 16   Temp: (!) 97 1 °F (36 2 °C)   TempSrc: Temporal   SpO2: 98%   Weight: 73 5 kg (162 lb 2 oz)   Height: 5' 6" (1 676 m)     Body mass index is 26 17 kg/m²  Physical Exam  Constitutional:       Appearance: Normal appearance  HENT:      Head: Normocephalic and atraumatic  Right Ear: External ear normal       Left Ear: External ear normal       Nose: Nose normal       Mouth/Throat:      Mouth: Mucous membranes are moist    Eyes:      Extraocular Movements: Extraocular movements intact  Cardiovascular:      Rate and Rhythm: Normal rate and regular rhythm  Heart sounds: Normal heart sounds  Pulmonary:      Effort: Pulmonary effort is normal       Breath sounds: Normal breath sounds  Abdominal:      Palpations: Abdomen is soft  Musculoskeletal:      Cervical back: Normal range of motion  Right lower leg: Edema present  Left lower leg: Edema present  Skin:     General: Skin is warm  Capillary Refill: Capillary refill takes less than 2 seconds  Neurological:      General: No focal deficit present  Mental Status: She is alert and oriented to person, place, and time  Psychiatric:         Mood and Affect: Mood normal          Thought Content:  Thought content normal

## 2022-09-23 NOTE — ASSESSMENT & PLAN NOTE
Lab Results   Component Value Date    HGBA1C 7 6 (H) 08/18/2022   A1c is down to 7 6% which is great for her-seeing Endo with Dr Ramy Hudson dosage was increased

## 2022-09-23 NOTE — ASSESSMENT & PLAN NOTE
Sees Cardiology on halloween will probably get another echo-is on entresto, carvedilol, and asa and plavix

## 2022-10-01 DIAGNOSIS — I25.5 ISCHEMIC CARDIOMYOPATHY: ICD-10-CM

## 2022-10-03 RX ORDER — CARVEDILOL 6.25 MG/1
TABLET ORAL
Qty: 180 TABLET | Refills: 0 | Status: SHIPPED | OUTPATIENT
Start: 2022-10-03

## 2022-10-12 ENCOUNTER — TELEPHONE (OUTPATIENT)
Dept: ENDOCRINOLOGY | Facility: CLINIC | Age: 62
End: 2022-10-12

## 2022-10-12 NOTE — TELEPHONE ENCOUNTER
10/12  for pt TCB to advise if she wants to stay w/Dr Arellano, but come to the [place] office for her appts

## 2022-10-31 ENCOUNTER — OFFICE VISIT (OUTPATIENT)
Dept: CARDIOLOGY CLINIC | Facility: CLINIC | Age: 62
End: 2022-10-31

## 2022-10-31 VITALS
HEIGHT: 66 IN | HEART RATE: 75 BPM | RESPIRATION RATE: 18 BRPM | DIASTOLIC BLOOD PRESSURE: 62 MMHG | WEIGHT: 156.5 LBS | BODY MASS INDEX: 25.15 KG/M2 | SYSTOLIC BLOOD PRESSURE: 110 MMHG

## 2022-10-31 DIAGNOSIS — I44.7 LEFT BUNDLE BRANCH BLOCK: ICD-10-CM

## 2022-10-31 DIAGNOSIS — I10 ESSENTIAL HYPERTENSION: ICD-10-CM

## 2022-10-31 DIAGNOSIS — I25.10 CORONARY ARTERY DISEASE INVOLVING NATIVE CORONARY ARTERY OF NATIVE HEART WITHOUT ANGINA PECTORIS: ICD-10-CM

## 2022-10-31 DIAGNOSIS — I25.5 ISCHEMIC CARDIOMYOPATHY: Primary | ICD-10-CM

## 2022-10-31 PROBLEM — R06.09 DOE (DYSPNEA ON EXERTION): Status: RESOLVED | Noted: 2022-01-20 | Resolved: 2022-10-31

## 2022-10-31 RX ORDER — CARVEDILOL 6.25 MG/1
6.25 TABLET ORAL 2 TIMES DAILY WITH MEALS
Qty: 180 TABLET | Refills: 4 | Status: SHIPPED | OUTPATIENT
Start: 2022-10-31

## 2022-10-31 NOTE — PROGRESS NOTES
Cardiology Follow Up    Aamir Macias  1960 [id number] [address] [place]  [address]  [address]  [address] [place]  [id number] [phone]    1  New Ischemic Cardiomyopathy/Systolic CHF  POCT ECG    carvedilol (COREG) 6 25 mg tablet    Echo complete w/ contrast if indicated   2  Left bundle branch block  Echo complete w/ contrast if indicated   3  Essential hypertension  Echo complete w/ contrast if indicated   4  Coronary artery disease involving native coronary artery of native heart without angina pectoris  Echo complete w/ contrast if indicated         Discussion/Summary:     1  Continue same medications  2  Repeat echo - if EF is down, consider increasing Entresto / Coreg doses  Consider biV ICD  3  RTO 9 months  Interval History: She is doing well and is active without CP,  SOB, palpitations, dizziness  Her weight is down 173 to 156 lbs  She has CAD with LAD stenting in 9/2021  EF was 37 % at that time  /62  She has a chronic LBBB          Patient Active Problem List   Diagnosis   • Fatigue due to excessive exertion   • Mixed hyperlipidemia   • Abnormal stress echo   • Type 2 diabetes mellitus with hyperglycemia, with long-term current use of insulin (HCC)   • Microalbuminuria   • Bilateral leg edema   • Colon polyps   • Gastroesophageal reflux disease with esophagitis without hemorrhage   • Dizziness   • Left bundle branch block   • CAD (coronary artery disease)   • New Ischemic Cardiomyopathy/Systolic CHF   • Right paraclinoid meningioma   • Essential hypertension   • Wound of left leg   • Vitamin D deficiency     Past Medical History:   Diagnosis Date   • Colon polyp    • Coronary artery disease    • Depression    • Diabetes mellitus (HCC)    • Dizziness    • Edema    • Hyperlipidemia    • Hypertension    • Ischemic cardiomyopathy    • Left bundle branch block    • [facility] )    • Vitamin D deficiency    • Wears glasses      Social History     Socioeconomic History   • Marital status: Single     Spouse name: Not on file   • Number of children: Not on file   • Years of education: Not on file   • Highest education level: Not on file   Occupational History   • Occupation: deed recorder   Tobacco Use   • Smoking status: Never Smoker   • Smokeless tobacco: Never Used   Vaping Use   • Vaping Use: Never used   Substance and Sexual Activity   • Alcohol use: No   • Drug use: No   • Sexual activity: Not Currently     Birth control/protection: Post-menopausal   Other Topics Concern   • Not on file   Social History Narrative    Do you currently or have you served in the [employer]: No    Were you activated, into active duty, as a member of the [employer] or as a Reservist: No    Occupation:     Marital status: Single    Exercise level: None    Diet: Regular    General stress level: Medium    Alcohol intake: None    Caffeine intake:  Moderate    1 cup of coffee in the morning    Chewing tobacco: none    Illicit drugs: none    Guns present in home: No    Seat belts used routinely: Yes    Sunscreen used routinely: Yes    Smoke alarm in home: Yes    Advance directive: Yes     Social Determinants of Health     Financial Resource Strain: Not on file   Food Insecurity: Not on file   Transportation Needs: Not on file   Physical Activity: Not on file   Stress: Not on file   Social Connections: Not on file   Intimate Partner Violence: Not on file   Housing Stability: Not on file      Family History   Problem Relation Age of Onset   • Hypertension Mother    • Sudden death Mother         SCD   • Hyperlipidemia Mother    • Heart attack Mother    • Breast cancer Mother 79   • Diabetes type II Mother    • Arrhythmia Father         pacemaker/ defib   • Clotting disorder Father         eliquis   • Heart failure Father    • Heart disease Father    • Melanoma Father    • Cancer Father 61 bladder cancer   • Diabetes type II Father    • BRCA1 Negative Sister    • Breast cancer Sister 44   • Thyroid cancer Sister    • No Known Problems Sister    • No Known Problems Sister    • Thyroid cancer Sister    • No Known Problems Maternal Grandmother    • No Known Problems Maternal Grandfather    • No Known Problems Paternal Grandmother    • No Known Problems Paternal Grandfather    • Breast cancer Maternal Aunt         unknown age   • Esophageal cancer Maternal Aunt 61   • Cancer Maternal Aunt         unknown age or type   • No Known Problems Maternal Aunt    • Colon cancer Maternal Uncle         unknwon age   • Pancreatic cancer Maternal Uncle 61   • Lung cancer Paternal Aunt         unknonw age- in her [de-identified]   • Anuerysm Neg Hx      Past Surgical History:   Procedure Laterality Date   • BREAST EXCISIONAL BIOPSY Right     benign   • COLONOSCOPY  2018    Colonoscpoy due in 3 years   • EGD  2018   • MAMMO (HISTORICAL) Bilateral 2020   • MAMMO (HISTORICAL) Bilateral 2022   • MAMMO NEEDLE LOCALIZATION RIGHT (ALL INC) Right 2009   • WISDOM TOOTH EXTRACTION         Current Outpatient Medications:   •  aspirin 81 MG tablet, Take 1 tablet by mouth daily, Disp: , Rfl:   •  atorvastatin (LIPITOR) 80 mg tablet, Take 1 tablet (80 mg total) by mouth daily with dinner, Disp: 90 tablet, Rfl: 1  •  CALCIUM PO, 1 po daily BID, Disp: , Rfl:   •  carvedilol (COREG) 6 25 mg tablet, Take 1 tablet (6 25 mg total) by mouth 2 (two) times a day with meals, Disp: 180 tablet, Rfl: 4  •  Cholecalciferol (Vitamin D3) 50 MCG (2000 UT) capsule, Take 1 capsule (2,000 Units total) by mouth daily, Disp: 30 capsule, Rfl: 5  •  clopidogrel (PLAVIX) 75 mg tablet, Take 1 tablet (75 mg total) by mouth daily, Disp: 90 tablet, Rfl: 3  •  Continuous Blood Gluc  (FreeStyle Feng 14 Day [place]) CORBY, USE AS DIRECTED, Disp: 2 each, Rfl: 5  •  Continuous Blood Gluc  (FreeStyle Loo 2 [place]) CORBY, FreeStyle Feng 2 [place]  USE AS DIRECTED, Disp: , Rfl:   •  Continuous Blood Gluc Sensor (FreeStyle Feng 14 Day Sensor) MISC, USE AS DIRECTED, Disp: 2 each, Rfl: 5  •  Continuous Blood Gluc Sensor (FreeStyle Feng 2 Sensor) MISC, FreeStyle Feng 2 Sensor kit  USE AS DIRECTED, Disp: , Rfl:   •  Dulaglutide (Trulicity) 1 5 DM/2 1EI SOPN, Inject 0 5 mL (1 5 mg total) under the skin once a week, Disp: 6 mL, Rfl: 0  •  furosemide (LASIX) 20 mg tablet, Take 1 tablet (20 mg total) by mouth daily, Disp: 90 tablet, Rfl: 1  •  Jardiance 10 MG TABS, TAKE 1 TABLET BY MOUTH EVERY DAY IN THE MORNING, Disp: 90 tablet, Rfl: 3  •  metFORMIN (GLUCOPHAGE) 1000 MG tablet, TAKE 1 TABLET BY MOUTH TWICE A DAY WITH MEALS, Disp: 180 tablet, Rfl: 3  •  sacubitril-valsartan (Entresto) 49-51 MG TABS, Take 1 tablet by mouth 2 (two) times a day, Disp: 180 tablet, Rfl: 4  •  nitroglycerin (NITROSTAT) 0 4 mg SL tablet, Place 1 tablet (0 4 mg total) under the tongue every 5 (five) minutes as needed for chest pain (Patient not taking: Reported on 10/31/2022), Disp: 24 tablet, Rfl: 1  No Known Allergies  Vitals:    10/31/22 0804   BP: 110/62   BP Location: Left arm   Patient Position: Sitting   Cuff Size: Standard   Pulse: 75   Resp: 18   Weight: 71 kg (156 lb 8 oz)   Height: 5' 6" (1 676 m)     Weight (last 2 days)     Date/Time Weight    10/31/22 0804 71 (156 5)         Blood pressure 110/62, pulse 75, resp  rate 18, height 5' 6" (1 676 m), weight 71 kg (156 lb 8 oz), not currently breastfeeding , Body mass index is 25 26 kg/m²      Labs:  Appointment on 08/18/2022   Component Date Value   • Hemoglobin A1C 08/18/2022 7 6 (A)   • EAG 08/18/2022 171    • Sodium 08/18/2022 141    • Potassium 08/18/2022 4 1    • Chloride 08/18/2022 105    • CO2 08/18/2022 30    • ANION GAP 08/18/2022 6    • BUN 08/18/2022 18    • Creatinine 08/18/2022 0 89    • Glucose, Fasting 08/18/2022 146 (A)   • Calcium 08/18/2022 9 4    • AST 08/18/2022 19    • ALT 08/18/2022 24    • Alkaline Phosphatase 08/18/2022 44    • Total Protein 08/18/2022 6 8    • Albumin 08/18/2022 4 1    • Total Bilirubin 08/18/2022 0 62    • eGFR 08/18/2022 68    • TSH 3RD GENERATON 08/18/2022 3  108    • Free T4 08/18/2022 1 12    • Cholesterol 08/18/2022 89    • Triglycerides 08/18/2022 80    • HDL, Direct 08/18/2022 30 (A)   • LDL Calculated 08/18/2022 43    • Non-HDL-Chol (CHOL-HDL) 08/18/2022 59    • WBC 08/18/2022 5 50    • RBC 08/18/2022 4 47    • Hemoglobin 08/18/2022 13 8    • Hematocrit 08/18/2022 42 6    • MCV 08/18/2022 95    • MCH 08/18/2022 30 9    • MCHC 08/18/2022 32 4    • RDW 08/18/2022 12 4    • MPV 08/18/2022 11 1    • Platelets 11/93/5416 184    • nRBC 08/18/2022 0    • Neutrophils Relative 08/18/2022 50    • Immat GRANS % 08/18/2022 0    • Lymphocytes Relative 08/18/2022 40    • Monocytes Relative 08/18/2022 7    • Eosinophils Relative 08/18/2022 2    • Basophils Relative 08/18/2022 1    • Neutrophils Absolute 08/18/2022 2 76    • Immature Grans Absolute 08/18/2022 0 02    • Lymphocytes Absolute 08/18/2022 2 19    • Monocytes Absolute 08/18/2022 0 38    • Eosinophils Absolute 08/18/2022 0 11    • Basophils Absolute 08/18/2022 0 04    • HIV-1/HIV-2 Ab 08/18/2022 Non-Reactive    Appointment on 06/23/2022   Component Date Value   • BUN 06/23/2022 22    • Creatinine 06/23/2022 0 82    • eGFR 06/23/2022 75      Imaging: No results found  Review of Systems:  Review of Systems   Constitutional: Negative for diaphoresis, fatigue, fever and unexpected weight change  HENT: Negative  Respiratory: Negative for cough, shortness of breath and wheezing  Cardiovascular: Negative for chest pain, palpitations and leg swelling  Gastrointestinal: Negative for abdominal pain, diarrhea and nausea  Musculoskeletal: Negative for gait problem and myalgias  Skin: Negative for rash  Neurological: Negative for dizziness and numbness  Psychiatric/Behavioral: Negative          Physical Exam:  Physical Exam  Constitutional:       Appearance: She is well-developed  HENT:      Head: Normocephalic and atraumatic  Eyes:      Pupils: Pupils are equal, round, and reactive to light  Neck:      Vascular: No JVD  Cardiovascular:      Rate and Rhythm: Regular rhythm  Pulses: Normal pulses  Carotid pulses are 2+ on the right side and 2+ on the left side  Heart sounds: S1 normal and S2 normal    Pulmonary:      Effort: Pulmonary effort is normal       Breath sounds: Normal breath sounds  No wheezing or rales  Abdominal:      General: Bowel sounds are normal       Palpations: Abdomen is soft  Tenderness: There is no abdominal tenderness  Musculoskeletal:         General: No tenderness  Normal range of motion  Cervical back: Normal range of motion and neck supple  Skin:     General: Skin is warm  Neurological:      Mental Status: She is alert and oriented to person, place, and time  Cranial Nerves: No cranial nerve deficit  Deep Tendon Reflexes: Reflexes are normal and symmetric

## 2022-11-01 DIAGNOSIS — I25.5 ISCHEMIC CARDIOMYOPATHY: ICD-10-CM

## 2022-11-01 RX ORDER — ATORVASTATIN CALCIUM 80 MG/1
TABLET, FILM COATED ORAL
Qty: 90 TABLET | Refills: 1 | Status: SHIPPED | OUTPATIENT
Start: 2022-11-01

## 2022-11-14 DIAGNOSIS — Z79.4 TYPE 2 DIABETES MELLITUS WITH HYPERGLYCEMIA, WITH LONG-TERM CURRENT USE OF INSULIN (HCC): ICD-10-CM

## 2022-11-14 DIAGNOSIS — E11.65 TYPE 2 DIABETES MELLITUS WITH HYPERGLYCEMIA, WITH LONG-TERM CURRENT USE OF INSULIN (HCC): ICD-10-CM

## 2022-11-14 RX ORDER — DULAGLUTIDE 1.5 MG/.5ML
INJECTION, SOLUTION SUBCUTANEOUS
Qty: 2 ML | Refills: 2 | Status: SHIPPED | OUTPATIENT
Start: 2022-11-14

## 2022-11-21 ENCOUNTER — APPOINTMENT (OUTPATIENT)
Dept: LAB | Facility: CLINIC | Age: 62
End: 2022-11-21

## 2022-11-21 DIAGNOSIS — E11.65 TYPE 2 DIABETES MELLITUS WITH HYPERGLYCEMIA, WITH LONG-TERM CURRENT USE OF INSULIN (HCC): ICD-10-CM

## 2022-11-21 DIAGNOSIS — Z79.4 TYPE 2 DIABETES MELLITUS WITH HYPERGLYCEMIA, WITH LONG-TERM CURRENT USE OF INSULIN (HCC): ICD-10-CM

## 2022-11-21 LAB
EST. AVERAGE GLUCOSE BLD GHB EST-MCNC: 171 MG/DL
HBA1C MFR BLD: 7.6 %

## 2022-11-22 ENCOUNTER — OFFICE VISIT (OUTPATIENT)
Dept: ENDOCRINOLOGY | Facility: CLINIC | Age: 62
End: 2022-11-22

## 2022-11-22 ENCOUNTER — APPOINTMENT (OUTPATIENT)
Dept: LAB | Facility: CLINIC | Age: 62
End: 2022-11-22

## 2022-11-22 VITALS
BODY MASS INDEX: 25.43 KG/M2 | TEMPERATURE: 98.2 F | HEIGHT: 66 IN | WEIGHT: 158.2 LBS | OXYGEN SATURATION: 96 % | DIASTOLIC BLOOD PRESSURE: 72 MMHG | HEART RATE: 74 BPM | SYSTOLIC BLOOD PRESSURE: 124 MMHG

## 2022-11-22 DIAGNOSIS — E55.9 VITAMIN D DEFICIENCY: ICD-10-CM

## 2022-11-22 DIAGNOSIS — E11.65 TYPE 2 DIABETES MELLITUS WITH HYPERGLYCEMIA, WITH LONG-TERM CURRENT USE OF INSULIN (HCC): Primary | ICD-10-CM

## 2022-11-22 DIAGNOSIS — R60.0 BILATERAL LEG EDEMA: ICD-10-CM

## 2022-11-22 DIAGNOSIS — E78.2 MIXED HYPERLIPIDEMIA: ICD-10-CM

## 2022-11-22 DIAGNOSIS — Z79.4 TYPE 2 DIABETES MELLITUS WITH HYPERGLYCEMIA, WITH LONG-TERM CURRENT USE OF INSULIN (HCC): Primary | ICD-10-CM

## 2022-11-22 NOTE — PROGRESS NOTES
Follow-up Patient Progress Note      CC: diabetes     History of Present Illness:   61yr female with type 2 diabetes since 2009, retinopathy, microalbuminuria, HTN, HLD, CAD s/p HUNTER 9/21, ischemic CMpathy, rt paraclinoid meningioma diagnosed during recent hospitalization and fatigue  Last visit was 4/19/22      Since last visit, gained 2 lbs  Total 16 lbs since 12/21  she reports no new symptoms         CGM review: dates 11/09/22 - 11/22/22          Usage 31%     Av glu 164 mg/dL        CV 23%  TIR 69%          TAR 28+3%         TBR 0%  G;ycemic patterns show postprandial hyperglycemia but only 31% scanned      Current meds:  Jardiance 10 mg qdaily, Metformin 2186IB PO BID, Trulicity 0 75mg weekly     Opthamology: Follows [facility]  Podiatry: yes  Influenza: yes, COVID - yes  Dental: No issues  Pancreatitis: No     Ace/ARB: entresto  Statin:lipitor  Thyroid issues: no  FH: brother and 2 sisters, mother and father have diabetes      Patient Active Problem List   Diagnosis   • Fatigue due to excessive exertion   • Mixed hyperlipidemia   • Abnormal stress echo   • Type 2 diabetes mellitus with hyperglycemia, with long-term current use of insulin (HCC)   • Microalbuminuria   • Bilateral leg edema   • Colon polyps   • Gastroesophageal reflux disease with esophagitis without hemorrhage   • Dizziness   • Left bundle branch block   • CAD (coronary artery disease)   • New Ischemic Cardiomyopathy/Systolic CHF   • Right paraclinoid meningioma   • Essential hypertension   • Wound of left leg   • Vitamin D deficiency     Past Medical History:   Diagnosis Date   • Colon polyp    • Coronary artery disease    • Depression    • Diabetes mellitus (HCC)    • Dizziness    • Edema    • Hyperlipidemia    • Hypertension    • Ischemic cardiomyopathy    • Left bundle branch block    • Type 2 diabetes mellitus ([facility] )    • Vitamin D deficiency    • Wears glasses       Past Surgical History:   Procedure Laterality Date   • BREAST EXCISIONAL BIOPSY Right     benign   • COLONOSCOPY  2018    Colonoscpoy due in 3 years   • EGD  2018   • MAMMO (HISTORICAL) Bilateral 2020   • MAMMO (HISTORICAL) Bilateral 2022   • MAMMO NEEDLE LOCALIZATION RIGHT (ALL INC) Right 2009   • WISDOM TOOTH EXTRACTION        Family History   Problem Relation Age of Onset   • Hypertension Mother    • Sudden death Mother         SCD   • Hyperlipidemia Mother    • Heart attack Mother    • Breast cancer Mother 79   • Diabetes type II Mother    • Arrhythmia Father         pacemaker/ defib   • Clotting disorder Father         eliquis   • Heart failure Father    • Heart disease Father    • Melanoma Father    • Cancer Father 61        bladder cancer   • Diabetes type II Father    • BRCA1 Negative Sister    • Breast cancer Sister 44   • Thyroid cancer Sister    • No Known Problems Sister    • No Known Problems Sister    • Thyroid cancer Sister    • No Known Problems Maternal Grandmother    • No Known Problems Maternal Grandfather    • No Known Problems Paternal Grandmother    • No Known Problems Paternal Grandfather    • Breast cancer Maternal Aunt         unknown age   • Esophageal cancer Maternal Aunt 61   • Cancer Maternal Aunt         unknown age or type   • No Known Problems Maternal Aunt    • Colon cancer Maternal Uncle         unknwon age   • Pancreatic cancer Maternal Uncle 61   • Lung cancer Paternal Aunt         unknonw age- in her [de-identified]   • Anuerysm Neg Hx      Social History     Tobacco Use   • Smoking status: Never   • Smokeless tobacco: Never   Substance Use Topics   • Alcohol use: No     No Known Allergies      Current Outpatient Medications:   •  aspirin 81 MG tablet, Take 1 tablet by mouth daily, Disp: , Rfl:   •  atorvastatin (LIPITOR) 80 mg tablet, TAKE 1 TABLET BY MOUTH DAILY WITH DINNER, Disp: 90 tablet, Rfl: 1  •  CALCIUM PO, 1 po daily BID, Disp: , Rfl:   •  carvedilol (COREG) 6 25 mg tablet, Take 1 tablet (6 25 mg total) by mouth 2 (two) times a day with meals, Disp: 180 tablet, Rfl: 4  •  Cholecalciferol (Vitamin D3) 50 MCG (2000 UT) capsule, Take 1 capsule (2,000 Units total) by mouth daily, Disp: 30 capsule, Rfl: 5  •  clopidogrel (PLAVIX) 75 mg tablet, Take 1 tablet (75 mg total) by mouth daily, Disp: 90 tablet, Rfl: 3  •  Continuous Blood Gluc  (FreeStyle [place]oo 2 Eleva) CORBY, FreeStyle Feng 2 Eleva  USE AS DIRECTED, Disp: , Rfl:   •  Continuous Blood Gluc Sensor (FreeStyle Feng 2 Sensor) MISC, FreeStyle Feng 2 Sensor kit  USE AS DIRECTED, Disp: , Rfl:   •  furosemide (LASIX) 20 mg tablet, Take 1 tablet (20 mg total) by mouth daily, Disp: 90 tablet, Rfl: 1  •  Jardiance 10 MG TABS, TAKE 1 TABLET BY MOUTH EVERY DAY IN THE MORNING, Disp: 90 tablet, Rfl: 3  •  metFORMIN (GLUCOPHAGE) 1000 MG tablet, TAKE 1 TABLET BY MOUTH TWICE A DAY WITH MEALS, Disp: 180 tablet, Rfl: 3  •  nitroglycerin (NITROSTAT) 0 4 mg SL tablet, Place 1 tablet (0 4 mg total) under the tongue every 5 (five) minutes as needed for chest pain, Disp: 24 tablet, Rfl: 1  •  sacubitril-valsartan (Entresto) 49-51 MG TABS, Take 1 tablet by mouth 2 (two) times a day, Disp: 180 tablet, Rfl: 4  •  Trulicity 1 5 HI/7 9LH SOPN, INJECT 0 5ML UNDER THE SKIN ONE TIME PER WEEK, Disp: 2 mL, Rfl: 2  •  Continuous Blood Gluc  (FreeStyle Feng 14 Day Eleva) CORBY, USE AS DIRECTED (Patient not taking: Reported on 11/22/2022), Disp: 2 each, Rfl: 5  •  Continuous Blood Gluc Sensor (FreeStyle Feng 14 Day Sensor) MISC, USE AS DIRECTED (Patient not taking: Reported on 11/22/2022), Disp: 2 each, Rfl: 5    Review of Systems   Constitutional: Positive for fatigue  HENT: Negative  Eyes: Negative  Respiratory: Negative  Cardiovascular: Negative  Gastrointestinal: Negative  Endocrine: Negative  Musculoskeletal: Negative  Skin: Negative  Allergic/Immunologic: Negative  Neurological: Negative  Hematological: Negative  Psychiatric/Behavioral: Negative  Physical Exam:  Body mass index is 25 53 kg/m²  /72 (BP Location: Left arm, Patient Position: Sitting, Cuff Size: Large)   Pulse 74   Temp 98 2 °F (36 8 °C) (Tympanic)   Ht 5' 6" (1 676 m)   Wt 71 8 kg (158 lb 3 2 oz)   SpO2 96%   BMI 25 53 kg/m²    Vitals:    11/22/22 0953   Weight: 71 8 kg (158 lb 3 2 oz)        Physical Exam  Constitutional:       Appearance: She is well-developed  HENT:      Head: Normocephalic  Mouth/Throat:      Mouth: Oropharynx is clear and moist    Eyes:      Pupils: Pupils are equal, round, and reactive to light  Neck:      Thyroid: No thyromegaly  Cardiovascular:      Rate and Rhythm: Normal rate  Heart sounds: Normal heart sounds  Pulmonary:      Effort: Pulmonary effort is normal       Breath sounds: Normal breath sounds  Abdominal:      General: Bowel sounds are normal       Palpations: Abdomen is soft  Musculoskeletal:         General: No deformity  Cervical back: Normal range of motion  Right lower leg: Edema present  Left lower leg: Edema present  Skin:     Capillary Refill: Capillary refill takes less than 2 seconds  Coloration: Skin is not pale  Findings: No rash  Neurological:      Mental Status: She is alert and oriented to person, place, and time     Psychiatric:         Mood and Affect: Mood and affect normal          Labs:   Lab Results   Component Value Date    HGBA1C 7 6 (H) 11/21/2022       Lab Results   Component Value Date    JOW1QTQIIWWD 3 108 08/18/2022       Lab Results   Component Value Date    CREATININE 0 89 08/18/2022    CREATININE 0 82 06/23/2022    CREATININE 1 13 09/15/2021    BUN 18 08/18/2022    K 4 1 08/18/2022     08/18/2022    CO2 30 08/18/2022     eGFR   Date Value Ref Range Status   08/18/2022 68 ml/min/1 73sq m Final       Lab Results   Component Value Date    ALT 24 08/18/2022    AST 19 08/18/2022    ALKPHOS 44 08/18/2022       Lab Results Component Value Date    CHOLESTEROL 89 08/18/2022    CHOLESTEROL 70 10/28/2021    CHOLESTEROL 119 08/30/2021     Lab Results   Component Value Date    HDL 30 (L) 08/18/2022    HDL 28 (L) 10/28/2021    HDL 31 (L) 08/30/2021     Lab Results   Component Value Date    TRIG 80 08/18/2022    TRIG 72 10/28/2021    TRIG 119 08/30/2021     Lab Results   Component Value Date    NONHDLC 59 08/18/2022    [place] 42 10/28/2021    [place] 74 02/10/2021         Impression:  1  Type 2 diabetes mellitus with hyperglycemia, with long-term current use of insulin ([address] )    2  Vitamin D deficiency    3  Mixed hyperlipidemia    4  Bilateral leg edema         Plan:    Diagnoses and all orders for this visit:    Type 2 diabetes mellitus with hyperglycemia, with long-term current use of insulin ([address] )  She is uncontrolled with an A1c of 7 6%  Goal is 7%  Today we agreed to increase Trulicity to 1 5 mg weekly, continue Jardiance 10 mg q day and metformin 1000 mg twice daily  In future will consider increasing Jardiance as tolerated  Follow-up in 3 months   -     Hemoglobin A1C; Future  -     Comprehensive metabolic panel; Future    Vitamin D deficiency  Suggested replacement with vitamin D3 5000 International Units daily over-the-counter  Mixed hyperlipidemia    Bilateral leg edema  She has bilateral pitting pedal edema  Advised to use compression stockings and exercise  Elevate legs at bedtime  She may benefit from titration of her diuresis  Will need to work in conjunction with Cardiology  I have spent 35 minutes with patient today in which greater than 50% of this time was spent in counseling/coordination of care  Discussed with the patient and all questioned fully answered  She will call me if any problems arise  Educated/ Counseled patient on diagnostic test results, prognosis, risk vs benefit of treatment options, importance of treatment compliance, healthy life and lifestyle choices        Cathern Opitz Eileen

## 2022-11-30 ENCOUNTER — NEW PATIENT (OUTPATIENT)
Dept: URBAN - METROPOLITAN AREA CLINIC 6 | Facility: CLINIC | Age: 62
End: 2022-11-30

## 2022-11-30 DIAGNOSIS — Z79.4: ICD-10-CM

## 2022-11-30 DIAGNOSIS — H25.13: ICD-10-CM

## 2022-11-30 DIAGNOSIS — E11.3493: ICD-10-CM

## 2022-11-30 PROCEDURE — 92004 COMPRE OPH EXAM NEW PT 1/>: CPT

## 2022-11-30 PROCEDURE — 92250 FUNDUS PHOTOGRAPHY W/I&R: CPT

## 2022-11-30 PROCEDURE — 92015 DETERMINE REFRACTIVE STATE: CPT

## 2022-11-30 ASSESSMENT — VISUAL ACUITY
OD_SC: 20/60
OS_PH: 20/40
OS_SC: 20/60

## 2022-11-30 ASSESSMENT — TONOMETRY
OS_IOP_MMHG: 16
OD_IOP_MMHG: 17

## 2022-12-08 ENCOUNTER — HOSPITAL ENCOUNTER (OUTPATIENT)
Dept: NON INVASIVE DIAGNOSTICS | Facility: CLINIC | Age: 62
Discharge: HOME | End: 2022-12-08

## 2022-12-08 VITALS
BODY MASS INDEX: 25.39 KG/M2 | WEIGHT: 158 LBS | SYSTOLIC BLOOD PRESSURE: 124 MMHG | HEIGHT: 66 IN | HEART RATE: 74 BPM | DIASTOLIC BLOOD PRESSURE: 72 MMHG

## 2022-12-08 DIAGNOSIS — I44.7 LEFT BUNDLE BRANCH BLOCK: ICD-10-CM

## 2022-12-08 DIAGNOSIS — I10 ESSENTIAL HYPERTENSION: ICD-10-CM

## 2022-12-08 DIAGNOSIS — I25.5 ISCHEMIC CARDIOMYOPATHY: ICD-10-CM

## 2022-12-08 DIAGNOSIS — I25.10 CORONARY ARTERY DISEASE INVOLVING NATIVE CORONARY ARTERY OF NATIVE HEART WITHOUT ANGINA PECTORIS: ICD-10-CM

## 2022-12-08 LAB
AORTIC ROOT: 3.2 CM
APICAL FOUR CHAMBER EJECTION FRACTION: 53 %
ASCENDING AORTA: 3.3 CM
E WAVE DECELERATION TIME: 180 MS
FRACTIONAL SHORTENING: 43 (ref 28–44)
INTERVENTRICULAR SEPTUM IN DIASTOLE (PARASTERNAL SHORT AXIS VIEW): 0.9 CM
INTERVENTRICULAR SEPTUM: 0.9 CM (ref 0.6–1.1)
LAAS-AP4: 8.8 CM2
LEFT ATRIUM SIZE: 3.7 CM
LEFT INTERNAL DIMENSION IN SYSTOLE: 2.5 CM (ref 2.1–4)
LEFT VENTRICULAR INTERNAL DIMENSION IN DIASTOLE: 4.4 CM (ref 3.5–6)
LEFT VENTRICULAR POSTERIOR WALL IN END DIASTOLE: 0.9 CM
LEFT VENTRICULAR STROKE VOLUME: 65 ML
LVSV (TEICH): 65 ML
MV E'TISSUE VEL-SEP: 6 CM/S
MV PEAK A VEL: 1.18 M/S
MV PEAK E VEL: 73 CM/S
MV STENOSIS PRESSURE HALF TIME: 52 MS
MV VALVE AREA P 1/2 METHOD: 4.23
RIGHT ATRIUM AREA SYSTOLE A4C: 9.5 CM2
RIGHT VENTRICLE ID DIMENSION: 3 CM
SL CV LV EF: 55
SL CV PED ECHO LEFT VENTRICLE DIASTOLIC VOLUME (MOD BIPLANE) 2D: 87 ML
SL CV PED ECHO LEFT VENTRICLE SYSTOLIC VOLUME (MOD BIPLANE) 2D: 22 ML
TR MAX PG: 23 MMHG
TR PEAK VELOCITY: 2.4 M/S
TRICUSPID VALVE PEAK REGURGITATION VELOCITY: 2.4 M/S

## 2022-12-12 DIAGNOSIS — R60.0 LOCALIZED EDEMA: ICD-10-CM

## 2022-12-12 RX ORDER — FUROSEMIDE 20 MG/1
TABLET ORAL
Qty: 90 TABLET | Refills: 1 | Status: SHIPPED | OUTPATIENT
Start: 2022-12-12

## 2022-12-13 DIAGNOSIS — I25.5 ISCHEMIC CARDIOMYOPATHY: ICD-10-CM

## 2022-12-13 RX ORDER — SACUBITRIL AND VALSARTAN 49; 51 MG/1; MG/1
1 TABLET, FILM COATED ORAL 2 TIMES DAILY
Qty: 180 TABLET | Refills: 3 | Status: SHIPPED | OUTPATIENT
Start: 2022-12-13

## 2023-01-16 ENCOUNTER — CLINICAL SUPPORT (OUTPATIENT)
Dept: FAMILY MEDICINE CLINIC | Facility: CLINIC | Age: 63
End: 2023-01-16

## 2023-01-16 DIAGNOSIS — Z23 COVID-19 VACCINE ADMINISTERED: Primary | ICD-10-CM

## 2023-01-16 DIAGNOSIS — I25.5 ISCHEMIC CARDIOMYOPATHY: ICD-10-CM

## 2023-01-16 DIAGNOSIS — I25.110 CORONARY ARTERY DISEASE INVOLVING NATIVE HEART WITH UNSTABLE ANGINA PECTORIS, UNSPECIFIED VESSEL OR LESION TYPE (HCC): ICD-10-CM

## 2023-01-17 RX ORDER — CLOPIDOGREL BISULFATE 75 MG/1
75 TABLET ORAL DAILY
Qty: 90 TABLET | Refills: 3 | Status: SHIPPED | OUTPATIENT
Start: 2023-01-17

## 2023-03-22 ENCOUNTER — APPOINTMENT (OUTPATIENT)
Dept: LAB | Facility: CLINIC | Age: 63
End: 2023-03-22

## 2023-03-22 DIAGNOSIS — E11.65 TYPE 2 DIABETES MELLITUS WITH HYPERGLYCEMIA, WITH LONG-TERM CURRENT USE OF INSULIN (HCC): ICD-10-CM

## 2023-03-22 DIAGNOSIS — Z79.4 TYPE 2 DIABETES MELLITUS WITH HYPERGLYCEMIA, WITH LONG-TERM CURRENT USE OF INSULIN (HCC): ICD-10-CM

## 2023-03-22 LAB
ALBUMIN SERPL BCP-MCNC: 3.8 G/DL (ref 3.5–5)
ALP SERPL-CCNC: 52 U/L (ref 34–104)
ALT SERPL W P-5'-P-CCNC: 32 U/L (ref 7–52)
ANION GAP SERPL CALCULATED.3IONS-SCNC: 7 MMOL/L (ref 4–13)
AST SERPL W P-5'-P-CCNC: 22 U/L (ref 13–39)
BILIRUB SERPL-MCNC: 0.63 MG/DL (ref 0.2–1)
BUN SERPL-MCNC: 27 MG/DL (ref 5–25)
CALCIUM SERPL-MCNC: 9.2 MG/DL (ref 8.4–10.2)
CHLORIDE SERPL-SCNC: 105 MMOL/L (ref 96–108)
CO2 SERPL-SCNC: 28 MMOL/L (ref 21–32)
CREAT SERPL-MCNC: 0.85 MG/DL (ref 0.6–1.3)
EST. AVERAGE GLUCOSE BLD GHB EST-MCNC: 214 MG/DL
GFR SERPL CREATININE-BSD FRML MDRD: 72 ML/MIN/1.73SQ M
GLUCOSE P FAST SERPL-MCNC: 208 MG/DL (ref 65–99)
HBA1C MFR BLD: 9.1 %
POTASSIUM SERPL-SCNC: 4 MMOL/L (ref 3.5–5.3)
PROT SERPL-MCNC: 6.5 G/DL (ref 6.4–8.4)
SODIUM SERPL-SCNC: 140 MMOL/L (ref 135–147)

## 2023-03-23 ENCOUNTER — OFFICE VISIT (OUTPATIENT)
Dept: FAMILY MEDICINE CLINIC | Facility: CLINIC | Age: 63
End: 2023-03-23

## 2023-03-23 VITALS
WEIGHT: 159.38 LBS | RESPIRATION RATE: 16 BRPM | SYSTOLIC BLOOD PRESSURE: 118 MMHG | HEART RATE: 66 BPM | OXYGEN SATURATION: 99 % | DIASTOLIC BLOOD PRESSURE: 80 MMHG | TEMPERATURE: 97.2 F | HEIGHT: 66 IN | BODY MASS INDEX: 25.61 KG/M2

## 2023-03-23 DIAGNOSIS — I25.10 CORONARY ARTERY DISEASE INVOLVING NATIVE CORONARY ARTERY OF NATIVE HEART WITHOUT ANGINA PECTORIS: ICD-10-CM

## 2023-03-23 DIAGNOSIS — E11.9 TYPE 2 DIABETES MELLITUS WITHOUT COMPLICATION, WITH LONG-TERM CURRENT USE OF INSULIN (HCC): ICD-10-CM

## 2023-03-23 DIAGNOSIS — Z13.31 POSITIVE DEPRESSION SCREENING: ICD-10-CM

## 2023-03-23 DIAGNOSIS — I10 ESSENTIAL HYPERTENSION: ICD-10-CM

## 2023-03-23 DIAGNOSIS — Z12.31 ENCOUNTER FOR SCREENING MAMMOGRAM FOR MALIGNANT NEOPLASM OF BREAST: ICD-10-CM

## 2023-03-23 DIAGNOSIS — F32.A MODERATELY SEVERE DEPRESSION: ICD-10-CM

## 2023-03-23 DIAGNOSIS — E55.9 VITAMIN D DEFICIENCY: ICD-10-CM

## 2023-03-23 DIAGNOSIS — D32.9 MENINGIOMA (HCC): ICD-10-CM

## 2023-03-23 DIAGNOSIS — E11.65 TYPE 2 DIABETES MELLITUS WITH HYPERGLYCEMIA, WITH LONG-TERM CURRENT USE OF INSULIN (HCC): ICD-10-CM

## 2023-03-23 DIAGNOSIS — R60.0 BILATERAL LEG EDEMA: ICD-10-CM

## 2023-03-23 DIAGNOSIS — I25.5 ISCHEMIC CARDIOMYOPATHY: ICD-10-CM

## 2023-03-23 DIAGNOSIS — Z00.00 ANNUAL PHYSICAL EXAM: Primary | ICD-10-CM

## 2023-03-23 DIAGNOSIS — Z79.4 TYPE 2 DIABETES MELLITUS WITH HYPERGLYCEMIA, WITH LONG-TERM CURRENT USE OF INSULIN (HCC): ICD-10-CM

## 2023-03-23 DIAGNOSIS — Z13.820 OSTEOPOROSIS SCREENING: ICD-10-CM

## 2023-03-23 DIAGNOSIS — Z79.4 TYPE 2 DIABETES MELLITUS WITHOUT COMPLICATION, WITH LONG-TERM CURRENT USE OF INSULIN (HCC): ICD-10-CM

## 2023-03-23 DIAGNOSIS — E78.2 MIXED HYPERLIPIDEMIA: ICD-10-CM

## 2023-03-23 NOTE — PROGRESS NOTES
[facility]    NAME: Nella Kay  AGE: 57 y o  SEX: female  : 1960     DATE: 3/23/2023     Assessment and Plan:     Problem List Items Addressed This Visit        Endocrine    Type 2 diabetes mellitus with hyperglycemia, with long-term current use of insulin ([facility] )       Lab Results   Component Value Date    HGBA1C 9 1 (H) 2023   Kenya Boyd disappointed because her A1c went back up to 9 1 from 7 6-has been eating rather poorly lately and not getting exercise-still working and says she doesn't always take care of herself-was out of her Trulicity for awhile-I refilled it at the 3 mg dose-was going to adjust up the jardiance too but she just filled it so she wants to hold off on that    Sees Endocrine now too         Relevant Medications    dulaglutide (Trulicity) 3  9SB injection    Other Relevant Orders    CBC and differential    Lipid panel    TSH, 3rd generation    Vitamin D 25 hydroxy    Microalbumin / creatinine urine ratio       Cardiovascular and Mediastinum    CAD (coronary artery disease)     Followed by Dr Verma Chance ok, stable after stenting, compliant with meds-EF went up to 55%         New Ischemic Cardiomyopathy/Systolic CHF    Essential hypertension       Nervous and Auditory    Right paraclinoid meningioma       Other    Mixed hyperlipidemia     On high dose statin         Bilateral leg edema     Kenya Boyd eats too much salt, and she is going to cut back-will adjust lasix accordingly         Vitamin D deficiency   Other Visit Diagnoses     Annual physical exam    -  Primary    Positive depression screening        Score: 19    Moderately severe depression        Type 2 diabetes mellitus without complication, with long-term current use of insulin (HCC)        Relevant Medications    dulaglutide (Trulicity) 3 WF/4 0WI injection    Encounter for screening mammogram for malignant neoplasm of breast        Due in September 2023    Relevant Orders    Mammo screening bilateral w 3d & cad    Osteoporosis screening        Relevant Orders    DXA bone density spine hip and pelvis          Immunizations and preventive care screenings were discussed with patient today  Appropriate education was printed on patient's after visit summary  Counseling:  Alcohol/drug use: discussed moderation in alcohol intake, the recommendations for healthy alcohol use, and avoidance of illicit drug use  Dental Health: discussed importance of regular tooth brushing, flossing, and dental visits  · Exercise: the importance of regular exercise/physical activity was discussed  Recommend exercise 3-5 times per week for at least 30 minutes  Return in about 4 months (around 7/23/2023)  Chief Complaint:     Chief Complaint   Patient presents with   • Physical Exam      History of Present Illness:     Adult Annual Physical   Patient here for a comprehensive physical exam  The patient reports problems - sugar numbers  Diet and Physical Activity  · Diet/Nutrition: diabetic diet  · Exercise: no formal exercise  Depression Screening  PHQ-2/9 Depression Screening    Little interest or pleasure in doing things: 2 - more than half the days  Feeling down, depressed, or hopeless: 2 - more than half the days  Trouble falling or staying asleep, or sleeping too much: 3 - nearly every day  Feeling tired or having little energy: 3 - nearly every day  Poor appetite or overeating: 3 - nearly every day  Feeling bad about yourself - or that you are a failure or have let yourself or your family down: 3 - nearly every day  Trouble concentrating on things, such as reading the newspaper or watching television: 3 - nearly every day  Moving or speaking so slowly that other people could have noticed   Or the opposite - being so fidgety or restless that you have been moving around a lot more than usual: 0 - not at all  Thoughts that you would be better off dead, or of hurting yourself in some way: 0 - not at all  PHQ-2 Score: 4  PHQ-2 Interpretation: POSITIVE depression screen  PHQ-9 Score: 19   PHQ-9 Interpretation: Moderately severe depression        General Health  · Sleep: sleeps poorly  · Hearing: normal - bilateral   · Vision: vision problems: gets shots in retina  · Dental: regular dental visits  /GYN Health  · Patient is: postmenopausal  · Last menstrual period: n/a  · Contraceptive method: n/a  Review of Systems:     Review of Systems   Constitutional: Negative  HENT: Negative  Respiratory: Negative  Cardiovascular: Positive for leg swelling  Neurological: Positive for dizziness  Hematological: Negative  Psychiatric/Behavioral: Positive for dysphoric mood and sleep disturbance        Past Medical History:     Past Medical History:   Diagnosis Date   • Colon polyp    • Coronary artery disease    • Depression    • Diabetes mellitus (HCC)    • Dizziness    • Edema    • Hyperlipidemia    • Hypertension    • Ischemic cardiomyopathy    • Left bundle branch block    • Type 2 diabetes mellitus (HCC)    • Vitamin D deficiency    • Wears glasses       Past Surgical History:     Past Surgical History:   Procedure Laterality Date   • BREAST EXCISIONAL BIOPSY Right 2009    benign   • CARDIAC SURGERY  9/2/2021   • COLONOSCOPY  04/11/2018    Colonoscpoy due in 3 years   • EGD  04/11/2018   • MAMMO (HISTORICAL) Bilateral 05/04/2020 2018   • MAMMO (HISTORICAL) Bilateral 09/14/2022   • MAMMO NEEDLE LOCALIZATION RIGHT (ALL INC) Right 07/08/2009   • WISDOM TOOTH EXTRACTION        Social History:     Social History     Socioeconomic History   • Marital status: Single     Spouse name: None   • Number of children: None   • Years of education: None   • Highest education level: None   Occupational History   • Occupation: deed recorder   Tobacco Use   • Smoking status: Never   • Smokeless tobacco: Never   Vaping Use   • Vaping Use: Never used Substance and Sexual Activity   • Alcohol use: No   • Drug use: No   • Sexual activity: Not Currently     Birth control/protection: Post-menopausal   Other Topics Concern   • None   Social History Narrative    Do you currently or have you served in the [employer]: No    Were you activated, into active duty, as a member of the [employer] or as a Reservist: No    Occupation:     Marital status: Single    Exercise level: None    Diet: Regular    General stress level: Medium    Alcohol intake: None    Caffeine intake:  Moderate    1 cup of coffee in the morning    Chewing tobacco: none    Illicit drugs: none    Guns present in home: No    Seat belts used routinely: Yes    Sunscreen used routinely: Yes    Smoke alarm in home: Yes    Advance directive: Yes     Social Determinants of Health     Financial Resource Strain: Not on file   Food Insecurity: Not on file   Transportation Needs: Not on file   Physical Activity: Not on file   Stress: Not on file   Social Connections: Not on file   Intimate Partner Violence: Not on file   Housing Stability: Not on file      Family History:     Family History   Problem Relation Age of Onset   • Hypertension Mother    • Sudden death Mother         SCD   • Hyperlipidemia Mother    • Heart attack Mother    • Breast cancer Mother 79   • Diabetes type II Mother    • Arrhythmia Father         pacemaker/ defib   • Clotting disorder Father         eliquis   • Heart failure Father    • Heart disease Father    • Melanoma Father    • Cancer Father         bladder cancer   • Diabetes type II Father    • Asthma Father    • BRCA1 Negative Sister    • Breast cancer Sister 44   • Thyroid cancer Sister    • No Known Problems Sister    • No Known Problems Sister    • Thyroid cancer Sister    • No Known Problems Maternal Grandmother    • No Known Problems Maternal Grandfather    • No Known Problems Paternal Grandmother    • No Known Problems Paternal Grandfather    • Breast cancer Maternal Aunt         unknown age   • Esophageal cancer Maternal Aunt 61   • Cancer Maternal Aunt         unknown age or type   • No Known Problems Maternal Aunt    • Colon cancer Maternal Uncle         unknwon age   • Pancreatic cancer Maternal Uncle 61   • Lung cancer Paternal Aunt         unknonw age- in her [de-identified]   • Anuerysm Neg Hx       Current Medications:     Current Outpatient Medications   Medication Sig Dispense Refill   • aspirin 81 MG tablet Take 1 tablet by mouth daily     • atorvastatin (LIPITOR) 80 mg tablet TAKE 1 TABLET BY MOUTH DAILY WITH DINNER 90 tablet 1   • CALCIUM PO 1 po daily BID     • carvedilol (COREG) 6 25 mg tablet Take 1 tablet (6 25 mg total) by mouth 2 (two) times a day with meals 180 tablet 4   • Cholecalciferol (Vitamin D3) 50 MCG (2000 UT) capsule Take 1 capsule (2,000 Units total) by mouth daily 30 capsule 5   • clopidogrel (PLAVIX) 75 mg tablet Take 1 tablet (75 mg total) by mouth daily 90 tablet 3   • Continuous Blood Gluc  (BIG [place] 2 [place]) [place] FreeStyle Feng 2 [place]   USE AS DIRECTED     • Continuous Blood Gluc Sensor (FreeStyle Feng 2 Sensor) MISC FreeStyle Feng 2 Sensor kit   USE AS DIRECTED     • dulaglutide (Trulicity) 3 TN/6 3OY injection Inject 0 5 mL (3 mg total) under the skin every 7 days 6 mL 1   • furosemide (LASIX) 20 mg tablet TAKE 1 TABLET BY MOUTH EVERY DAY 90 tablet 1   • Jardiance 10 MG TABS TAKE 1 TABLET BY MOUTH EVERY DAY IN THE MORNING 90 tablet 3   • metFORMIN (GLUCOPHAGE) 1000 MG tablet TAKE 1 TABLET BY MOUTH TWICE A DAY WITH MEALS 180 tablet 3   • nitroglycerin (NITROSTAT) 0 4 mg SL tablet Place 1 tablet (0 4 mg total) under the tongue every 5 (five) minutes as needed for chest pain 24 tablet 1   • sacubitril-valsartan (Entresto) 49-51 MG TABS Take 1 tablet by mouth 2 (two) times a day 180 tablet 3   • Trulicity 1 5 HZ/1 8CA SOPN INJECT 0 5ML UNDER THE SKIN ONE TIME PER WEEK 2 mL 2     No current facility-administered medications for this visit  Allergies:     No Known Allergies   Physical Exam:     /80 (BP Location: Left arm, Patient Position: Sitting, Cuff Size: Adult)   Pulse 66   Temp (!) 97 2 °F (36 2 °C) (Tympanic)   Resp 16   Ht 5' 6" (1 676 m)   Wt 72 3 kg (159 lb 6 oz)   SpO2 99%   BMI 25 72 kg/m²     Physical Exam  Constitutional:       Appearance: Normal appearance  HENT:      Head: Normocephalic and atraumatic  Right Ear: External ear normal       Left Ear: External ear normal       Nose: Nose normal       Mouth/Throat:      Mouth: Mucous membranes are moist    Eyes:      Extraocular Movements: Extraocular movements intact  Pupils: Pupils are equal, round, and reactive to light  Neck:      Vascular: No carotid bruit  Cardiovascular:      Rate and Rhythm: Normal rate and regular rhythm  Pulses: no weak pulses          Dorsalis pedis pulses are 2+ on the right side and 2+ on the left side  Posterior tibial pulses are 2+ on the right side and 2+ on the left side  Heart sounds: Normal heart sounds  Pulmonary:      Effort: Pulmonary effort is normal       Breath sounds: Normal breath sounds  Abdominal:      Palpations: Abdomen is soft  Musculoskeletal:      Cervical back: Normal range of motion  Right lower leg: Edema present  Left lower leg: Edema present  Comments: Venous stasis changes   Feet:      Right foot:      Skin integrity: No ulcer, skin breakdown, erythema, warmth, callus or dry skin  Left foot:      Skin integrity: No ulcer, skin breakdown, erythema, warmth, callus or dry skin  Neurological:      General: No focal deficit present  Mental Status: She is alert and oriented to person, place, and time  Psychiatric:         Mood and Affect: Mood normal          Behavior: Behavior normal          Thought Content: Thought content normal          Judgment: Judgment normal       Patient's shoes and socks removed      Right Foot/Ankle Right Foot Inspection  Skin Exam: skin normal and skin intact  No dry skin, no warmth, no callus, no erythema, no maceration, no abnormal color, no pre-ulcer, no ulcer and no callus  Toe Exam: ROM and strength within normal limits  Sensory   Proprioception: intact  Monofilament testing: intact    Vascular  The right DP pulse is 2+  The right PT pulse is 2+  Left Foot/Ankle  Left Foot Inspection  Skin Exam: skin normal and skin intact  No dry skin, no warmth, no erythema, no maceration, normal color, no pre-ulcer, no ulcer and no callus  Toe Exam: ROM and strength within normal limits  Sensory   Proprioception: intact  Monofilament testing: intact    Vascular  The left DP pulse is 2+  The left PT pulse is 2+  Assign Risk Category  No deformity present  No loss of protective sensation  No weak pulses  Risk: 0      Galdino Pelletier MD  [facility]     BMI Counseling: Body mass index is 25 72 kg/m²  The BMI is above normal  Nutrition recommendations include reducing portion sizes, decreasing overall calorie intake, 3-5 servings of fruits/vegetables daily, reducing fast food intake, consuming healthier snacks, decreasing soda and/or juice intake, moderation in carbohydrate intake, increasing intake of lean protein, reducing intake of saturated fat and trans fat and reducing intake of cholesterol  Exercise recommendations include exercising 3-5 times per week, joining a gym and strength training exercises  Depression Screening Follow-up Plan: Patient's depression screening was positive with a PHQ-2 score of 4  Their PHQ-9 score was 19  Clinically patient does not have depression  No treatment is required

## 2023-03-23 NOTE — ASSESSMENT & PLAN NOTE
Lab Results   Component Value Date    HGBA1C 9 1 (H) 03/22/2023   Kai More disappointed because her A1c went back up to 9 1 from 7 6-has been eating rather poorly lately and not getting exercise-still working and says she doesn't always take care of herself-was out of her Trulicity for awhile-I refilled it at the 3 mg dose-was going to adjust up the jardiance too but she just filled it so she wants to hold off on that    Sees Endocrine now too

## 2023-04-05 ENCOUNTER — TELEPHONE (OUTPATIENT)
Dept: ENDOCRINOLOGY | Facility: CLINIC | Age: 63
End: 2023-04-05

## 2023-04-22 ENCOUNTER — HOSPITAL ENCOUNTER (OUTPATIENT)
Dept: MRI IMAGING | Facility: HOSPITAL | Age: 63
Discharge: HOME | End: 2023-04-22

## 2023-04-22 DIAGNOSIS — D32.9 MENINGIOMA (HCC): ICD-10-CM

## 2023-04-22 RX ADMIN — GADOBUTROL 7 ML: 604.72 INJECTION INTRAVENOUS at 09:04

## 2023-04-28 ENCOUNTER — OFFICE VISIT (OUTPATIENT)
Dept: NEUROSURGERY | Facility: CLINIC | Age: 63
End: 2023-04-28

## 2023-04-28 VITALS
BODY MASS INDEX: 25.55 KG/M2 | HEIGHT: 66 IN | TEMPERATURE: 98 F | SYSTOLIC BLOOD PRESSURE: 136 MMHG | HEART RATE: 74 BPM | RESPIRATION RATE: 18 BRPM | WEIGHT: 159 LBS | DIASTOLIC BLOOD PRESSURE: 74 MMHG

## 2023-04-28 DIAGNOSIS — D32.9 MENINGIOMA (HCC): Primary | ICD-10-CM

## 2023-04-28 NOTE — PROGRESS NOTES
"Neurosurgery Office Note  Marguerite Romero 57 y o  female MRN: [id number]      Assessment/Plan      Patient is a 57years old pleasant woman with history of right paraclinoid meningioma, here today for 9 months follow-up with MRI of brain  Image shows stable 1 8 cm right parenchymal meningioma with minimal adjacent parenchymal edema compared with 2 images done on 8/30/2021  The lesion appears close proximity to/abutting the right prechiasmatic optic nerve also\" some T2 supraclinoid ICA and right MCA M1 segment  Patient reports doing fine, no headache, nausea, vomiting, vision issues, speech or swallowing problem  No seizures, loss of consciousness, vertigo, gait issues or bowel/bladder dysfunction  Exam-patient alert and oriented x3  PERRL, EOMI 2 mm conj bilaterally  Symmetric face and speech fluent   Jonny  Finger-to-nose test is normal bilaterally and without drift  Strength 5/5 and sensation to light intact throughout  DTR 2+ without clonus bilaterally  Gait stable  Hx, PEx, and images reviewed with the patient  Management plan discussed  CT meningioma appears stable and the patient without interval neurological symptoms  I would recommend follow-up with MRI in 1 year  Advised to call office or go to emergency room with development of new neurological symptoms  All questions and concerns were answered to patient satisfaction  Verbalized her understanding's and agreed with the plan  Plan:   1  MRI brain w/wo contrast 1 year  2  BUN & Creatinine  3  F/U in one year, shared visit, Dr Mel Miller  4  Advised to call office or go to ER with development of  new neurological syx  5   Call with questions or concerns    I have spent a total time of 45 minutes on 04/28/23 in caring for this patient including Diagnostic results, Prognosis, Risks and benefits of tx options, Instructions for management, Patient and family education, Importance of tx compliance, Risk factor reductions, Impressions, Counseling / " "Coordination of care, Documenting in the medical record, Reviewing / ordering tests, medicine, procedures   and Obtaining or reviewing history    Chief Complaint   Patient presents with   • Follow-up     9 Mo  F/u Meningioma- Brain MRI 4/22/23         C/C: \" Here for 9 months follow up of brain meningioma\"    HPI  All patient's medical histories were reviewed and updated as appropriate: Allergies, current medication list, past medical history, past surgical history, family history, social history, and current medical lists  Patient is a 57years old pleasant lady with a right paraclinoid meningioma, here today for 9 months follow-up with MRI  Findings as described in the assessment section above  Patient reports is doing fine, no headache, vision issues, seizures, nausea, vomiting, weakness in the extremities, or bowel/bladder dysfunction  She has history of hypertension, diabetes mellitus, GERD, cardiomyopathy, occasional dizziness  No fever, chills, rigors, cough or chest pain  REVIEW OF SYSTEMS  Review of system personally reviewed and updated  Review of Systems   Constitutional: Negative  HENT: Negative  Eyes: Negative  Respiratory: Negative  Cardiovascular: Negative  Gastrointestinal: Negative  Endocrine: Negative  Genitourinary: Negative  Musculoskeletal: Negative  Skin: Negative  Allergic/Immunologic: Negative  Neurological: Negative          9 Mo  F/u Meningioma- Brain MRI 4/22/23   Hematological: Negative  Psychiatric/Behavioral: Negative  All other systems reviewed and are negative  ROS obtained by MA  Reviewed  See HPI       Meds/Allergies     Current Outpatient Medications   Medication Sig Dispense Refill   • aspirin 81 MG tablet Take 1 tablet by mouth daily     • atorvastatin (LIPITOR) 80 mg tablet TAKE 1 TABLET BY MOUTH DAILY WITH DINNER 90 tablet 1   • CALCIUM PO 1 po daily BID     • carvedilol (COREG) 6 25 mg tablet Take 1 tablet (6 25 mg " total) by mouth 2 (two) times a day with meals 180 tablet 4   • Cholecalciferol (Vitamin D3) 50 MCG (2000 UT) capsule Take 1 capsule (2,000 Units total) by mouth daily 30 capsule 5   • clopidogrel (PLAVIX) 75 mg tablet Take 1 tablet (75 mg total) by mouth daily 90 tablet 3   • furosemide (LASIX) 20 mg tablet TAKE 1 TABLET BY MOUTH EVERY DAY 90 tablet 1   • Insulin Glargine-yfgn (Semglee, yfgn,) 100 UNIT/ML SOPN Inject 0 2 mL (20 Units total) under the skin daily at bedtime 15 mL 1   • Jardiance 10 MG TABS TAKE 1 TABLET BY MOUTH EVERY DAY IN THE MORNING 90 tablet 3   • metFORMIN (GLUCOPHAGE) 1000 MG tablet TAKE 1 TABLET BY MOUTH TWICE A DAY WITH MEALS 180 tablet 3   • sacubitril-valsartan (Entresto) 49-51 MG TABS Take 1 tablet by mouth 2 (two) times a day 180 tablet 3   • Continuous Blood Gluc  (FreeStyle [place] 2 [place]) CORBY FreeStyle [place] 2 [place]   USE AS DIRECTED     • Continuous Blood Gluc Sensor (FreeStyle Feng 2 Sensor) MISC Use 1 applicator every 14 (fourteen) days 2 each 5   • dulaglutide (Trulicity) 9 47 DJ/4 5FQ injection Inject 0 5 mL (0 75 mg total) under the skin once a week (Patient not taking: Reported on 4/28/2023) 2 mL 0   • Insulin Pen Needle (BD Pen Needle Brittney U/F) 32G X 4 MM MISC Use daily as needed with insulin pen 100 each 2   • nitroglycerin (NITROSTAT) 0 4 mg SL tablet Place 1 tablet (0 4 mg total) under the tongue every 5 (five) minutes as needed for chest pain (Patient not taking: Reported on 4/28/2023) 24 tablet 1     No current facility-administered medications for this visit         No Known Allergies    PAST HISTORY    Past Medical History:   Diagnosis Date   • Colon polyp    • Coronary artery disease    • Depression    • Diabetes mellitus (HCC)    • Dizziness    • Edema    • Hyperlipidemia    • Hypertension    • Ischemic cardiomyopathy    • Left bundle branch block    • Type 2 diabetes mellitus ([facility] [address] )    • Vitamin D deficiency    • Wears glasses        Past Surgical History: "  Procedure Laterality Date   • BREAST EXCISIONAL BIOPSY Right     benign   • CARDIAC SURGERY  2021   • COLONOSCOPY  2018    Colonoscpoy due in 3 years   • EGD  2018   • MAMMO (HISTORICAL) Bilateral 2020   • MAMMO (HISTORICAL) Bilateral 2022   • MAMMO NEEDLE LOCALIZATION RIGHT (ALL INC) Right 2009   • WISDOM TOOTH EXTRACTION         Social History     Tobacco Use   • Smoking status: Never   • Smokeless tobacco: Never   Vaping Use   • Vaping Use: Never used   Substance Use Topics   • Alcohol use: No   • Drug use: No       Family History   Problem Relation Age of Onset   • Hypertension Mother    • Sudden death Mother         SCD   • Hyperlipidemia Mother    • Heart attack Mother    • Breast cancer Mother 79   • Diabetes type II Mother    • Arrhythmia Father         pacemaker/ defib   • Clotting disorder Father         eliquis   • Heart failure Father    • Heart disease Father    • Melanoma Father    • Cancer Father         bladder cancer   • Diabetes type II Father    • Asthma Father    • BRCA1 Negative Sister    • Breast cancer Sister 44   • Thyroid cancer Sister    • No Known Problems Sister    • No Known Problems Sister    • Thyroid cancer Sister    • No Known Problems Maternal Grandmother    • No Known Problems Maternal Grandfather    • No Known Problems Paternal Grandmother    • No Known Problems Paternal Grandfather    • Breast cancer Maternal Aunt         unknown age   • Esophageal cancer Maternal Aunt 61   • Cancer Maternal Aunt         unknown age or type   • No Known Problems Maternal Aunt    • Colon cancer Maternal Uncle         unknwon age   • Pancreatic cancer Maternal Uncle 61   • Lung cancer Paternal Aunt         unknonw age- in her [de-identified]   • Anuerysm Neg Hx          Above history personally reviewed  EXAM    Vitals:Blood pressure 136/74, pulse 74, temperature 98 °F (36 7 °C), temperature source Temporal, resp   rate 18, height 5' 6\" (1 676 m), weight " 72 1 kg (159 lb), not currently breastfeeding  ,Body mass index is 25 66 kg/m²  Physical Exam  Constitutional:       Appearance: Normal appearance  HENT:      Head: Normocephalic and atraumatic  Eyes:      Extraocular Movements: Extraocular movements intact  Pupils: Pupils are equal, round, and reactive to light  Cardiovascular:      Rate and Rhythm: Normal rate  Pulses: Normal pulses  Pulmonary:      Effort: Pulmonary effort is normal    Musculoskeletal:         General: Normal range of motion  Cervical back: Normal range of motion  Neurological:      General: No focal deficit present  Mental Status: She is alert  GCS: GCS eye subscore is 4  GCS verbal subscore is 5  GCS motor subscore is 6  Cranial Nerves: Cranial nerves 2-12 are intact  Sensory: Sensation is intact  Motor: Motor function is intact  Coordination: Finger-Nose-Finger Test normal       Deep Tendon Reflexes: Reflexes are normal and symmetric  Reflex Scores:       Tricep reflexes are 2+ on the right side and 2+ on the left side  Bicep reflexes are 2+ on the right side and 2+ on the left side  Brachioradialis reflexes are 2+ on the right side and 2+ on the left side  Patellar reflexes are 2+ on the right side and 2+ on the left side  Achilles reflexes are 2+ on the right side and 2+ on the left side  Psychiatric:         Speech: Speech normal          Neurologic Exam     Mental Status   Speech: speech is normal   Level of consciousness: alert    Cranial Nerves   Cranial nerves II through XII intact  CN III, IV, VI   Pupils are equal, round, and reactive to light  Right pupil: Size: 2 mm  Shape: regular  Reactivity: brisk  Left pupil: Size: 2 mm  Shape: regular  Reactivity: brisk     Nystagmus: none     CN XI   CN XI normal      Motor Exam   Muscle bulk: normal  Overall muscle tone: normal  Right arm tone: normal  Left arm tone: normal  Right arm pronator drift: absent  Left arm pronator drift: absent  Right leg tone: normal  Left leg tone: normal    Sensory Exam   Light touch normal      Gait, Coordination, and Reflexes     Coordination   Finger to nose coordination: normal    Reflexes   Right brachioradialis: 2+  Left brachioradialis: 2+  Right biceps: 2+  Left biceps: 2+  Right triceps: 2+  Left triceps: 2+  Right patellar: 2+  Left patellar: 2+  Right achilles: 2+  Left achilles: 2+  Right : 2+  Left : 2+  Right Marte: absent  Left Marte: absent  Right ankle clonus: absent  Left pendular knee jerk: absent        MEDICAL DECISION MAKING    Imaging Studies:     MRI brain with and without contrast    Result Date: 4/26/2023  Narrative: MRI BRAIN WITH AND WITHOUT CONTRAST INDICATION: D32 9: Benign neoplasm of meninges, unspecified  COMPARISON:  None  TECHNIQUE: Multiplanar, multisequence imaging of the brain was performed before and after gadolinium administration  IV Contrast:  7 mL of Gadobutrol injection (SINGLE-DOSE)  IMAGE QUALITY:   Diagnostic  FINDINGS: BRAIN PARENCHYMA: Redemonstrated homogeneously enhancing extra-axial lesion centered in the right paraclinoid region most compatible with meningioma  It is stable in size when compared to 8/30/2021 measuring 1 3 x 1 7 x 1 8 cm  The lesion is in close proximity to/abutting  right prechiasmatic optic nerve (series 12 image 59)  It is in close proximity to the right supraclinoid ICA and right MCA M1 segment  There is a stable minimal adjacent parenchymal edema  There is no discrete intra-axial mass, mass effect or midline shift  There is no intracranial hemorrhage  Normal posterior fossa  Diffusion imaging is unremarkable  Scattered nonspecific foci of T2/FLAIR hyperintensities involving periventricular and subcortical white matter, most compatible with mild microangiopathic change  Postcontrast imaging of the brain demonstrates no abnormal enhancement  VENTRICLES:  Normal for the patient's age   SELLA AND PITUITARY GLAND:  Normal  ORBITS:  Normal  PARANASAL SINUSES: Stable right sphenoid sinus mucous retention cyst  Left maxillary sinus mucous retention cyst  VASCULATURE:  Evaluation of the major intracranial vasculature demonstrates appropriate flow voids  CALVARIUM AND SKULL BASE:  Normal  EXTRACRANIAL SOFT TISSUES:  Normal      Impression: 1  Stable 1 8 cm right paraclinoid meningioma and minimal adjacent parenchymal edema compared to 8/30/2021  The lesion is in close proximity to/abutting the right prechiasmatic optic nerve  It is in close proximity to the right supraclinoid ICA and right MCA M1 segment  2   Mild chronic microangiopathy   Workstation performed: [id number]       I have personally reviewed pertinent reports   , I have personally reviewed pertinent films in PACS and I have personally reviewed pertinent films in PACS with a Radiologist

## 2023-04-29 DIAGNOSIS — E11.9 TYPE 2 DIABETES MELLITUS WITHOUT COMPLICATION, WITH LONG-TERM CURRENT USE OF INSULIN (HCC): ICD-10-CM

## 2023-04-29 DIAGNOSIS — Z79.4 TYPE 2 DIABETES MELLITUS WITHOUT COMPLICATION, WITH LONG-TERM CURRENT USE OF INSULIN (HCC): ICD-10-CM

## 2023-04-29 DIAGNOSIS — I25.5 ISCHEMIC CARDIOMYOPATHY: ICD-10-CM

## 2023-05-01 RX ORDER — ATORVASTATIN CALCIUM 80 MG/1
TABLET, FILM COATED ORAL
Qty: 90 TABLET | Refills: 1 | Status: SHIPPED | OUTPATIENT
Start: 2023-05-01

## 2023-06-16 DIAGNOSIS — R60.0 LOCALIZED EDEMA: ICD-10-CM

## 2023-06-16 RX ORDER — FUROSEMIDE 20 MG/1
TABLET ORAL
Qty: 90 TABLET | Refills: 1 | Status: SHIPPED | OUTPATIENT
Start: 2023-06-16

## 2023-06-29 ENCOUNTER — TELEPHONE (OUTPATIENT)
Dept: ENDOCRINOLOGY | Facility: CLINIC | Age: 63
End: 2023-06-29

## 2023-06-29 NOTE — TELEPHONE ENCOUNTER
Pt is requesting a paper copy of the refill left at the  for her to   Please call when she can pick it up

## 2023-06-30 DIAGNOSIS — Z79.4 TYPE 2 DIABETES MELLITUS WITH HYPERGLYCEMIA, WITH LONG-TERM CURRENT USE OF INSULIN (HCC): ICD-10-CM

## 2023-06-30 DIAGNOSIS — E11.65 TYPE 2 DIABETES MELLITUS WITH HYPERGLYCEMIA, WITH LONG-TERM CURRENT USE OF INSULIN (HCC): ICD-10-CM

## 2023-06-30 NOTE — TELEPHONE ENCOUNTER
Script pending signature from provider already entered as paper print   Advised provider it will print at checkout

## 2023-07-04 DIAGNOSIS — E11.9 TYPE 2 DIABETES MELLITUS WITHOUT COMPLICATION, WITH LONG-TERM CURRENT USE OF INSULIN (HCC): ICD-10-CM

## 2023-07-04 DIAGNOSIS — I25.5 ISCHEMIC CARDIOMYOPATHY: ICD-10-CM

## 2023-07-04 DIAGNOSIS — Z79.4 TYPE 2 DIABETES MELLITUS WITHOUT COMPLICATION, WITH LONG-TERM CURRENT USE OF INSULIN (HCC): ICD-10-CM

## 2023-07-10 RX ORDER — EMPAGLIFLOZIN 10 MG/1
TABLET, FILM COATED ORAL
Qty: 90 TABLET | Refills: 3 | Status: SHIPPED | OUTPATIENT
Start: 2023-07-10

## 2023-07-12 ENCOUNTER — APPOINTMENT (OUTPATIENT)
Dept: LAB | Facility: CLINIC | Age: 63
End: 2023-07-12
Payer: COMMERCIAL

## 2023-07-12 DIAGNOSIS — Z79.4 TYPE 2 DIABETES MELLITUS WITH HYPERGLYCEMIA, WITH LONG-TERM CURRENT USE OF INSULIN (HCC): ICD-10-CM

## 2023-07-12 DIAGNOSIS — E11.65 TYPE 2 DIABETES MELLITUS WITH HYPERGLYCEMIA, WITH LONG-TERM CURRENT USE OF INSULIN (HCC): ICD-10-CM

## 2023-07-12 LAB
25(OH)D3 SERPL-MCNC: 32.6 NG/ML (ref 30–100)
BASOPHILS # BLD AUTO: 0.02 THOUSANDS/ÂΜL (ref 0–0.1)
BASOPHILS NFR BLD AUTO: 0 % (ref 0–1)
CHOLEST SERPL-MCNC: 74 MG/DL
CREAT UR-MCNC: 113 MG/DL
EOSINOPHIL # BLD AUTO: 0.08 THOUSAND/ÂΜL (ref 0–0.61)
EOSINOPHIL NFR BLD AUTO: 2 % (ref 0–6)
ERYTHROCYTE [DISTWIDTH] IN BLOOD BY AUTOMATED COUNT: 12.3 % (ref 11.6–15.1)
HCT VFR BLD AUTO: 38.4 % (ref 34.8–46.1)
HDLC SERPL-MCNC: 29 MG/DL
HGB BLD-MCNC: 12.7 G/DL (ref 11.5–15.4)
IMM GRANULOCYTES # BLD AUTO: 0.02 THOUSAND/UL (ref 0–0.2)
IMM GRANULOCYTES NFR BLD AUTO: 0 % (ref 0–2)
LDLC SERPL CALC-MCNC: 29 MG/DL (ref 0–100)
LYMPHOCYTES # BLD AUTO: 1.64 THOUSANDS/ÂΜL (ref 0.6–4.47)
LYMPHOCYTES NFR BLD AUTO: 30 % (ref 14–44)
MCH RBC QN AUTO: 31.4 PG (ref 26.8–34.3)
MCHC RBC AUTO-ENTMCNC: 33.1 G/DL (ref 31.4–37.4)
MCV RBC AUTO: 95 FL (ref 82–98)
MICROALBUMIN UR-MCNC: 18.7 MG/L (ref 0–20)
MICROALBUMIN/CREAT 24H UR: 17 MG/G CREATININE (ref 0–30)
MONOCYTES # BLD AUTO: 0.44 THOUSAND/ÂΜL (ref 0.17–1.22)
MONOCYTES NFR BLD AUTO: 8 % (ref 4–12)
NEUTROPHILS # BLD AUTO: 3.3 THOUSANDS/ÂΜL (ref 1.85–7.62)
NEUTS SEG NFR BLD AUTO: 60 % (ref 43–75)
NONHDLC SERPL-MCNC: 45 MG/DL
NRBC BLD AUTO-RTO: 0 /100 WBCS
PLATELET # BLD AUTO: 157 THOUSANDS/UL (ref 149–390)
PMV BLD AUTO: 11.2 FL (ref 8.9–12.7)
RBC # BLD AUTO: 4.04 MILLION/UL (ref 3.81–5.12)
TRIGL SERPL-MCNC: 78 MG/DL
TSH SERPL DL<=0.05 MIU/L-ACNC: 2.35 UIU/ML (ref 0.45–4.5)
WBC # BLD AUTO: 5.5 THOUSAND/UL (ref 4.31–10.16)

## 2023-07-12 PROCEDURE — 36415 COLL VENOUS BLD VENIPUNCTURE: CPT

## 2023-07-12 PROCEDURE — 85025 COMPLETE CBC W/AUTO DIFF WBC: CPT

## 2023-07-12 PROCEDURE — 82306 VITAMIN D 25 HYDROXY: CPT

## 2023-07-12 PROCEDURE — 80061 LIPID PANEL: CPT

## 2023-07-12 PROCEDURE — 83036 HEMOGLOBIN GLYCOSYLATED A1C: CPT

## 2023-07-12 PROCEDURE — 84443 ASSAY THYROID STIM HORMONE: CPT

## 2023-07-13 ENCOUNTER — TELEPHONE (OUTPATIENT)
Dept: ENDOCRINOLOGY | Facility: CLINIC | Age: 63
End: 2023-07-13

## 2023-07-13 ENCOUNTER — OFFICE VISIT (OUTPATIENT)
Dept: ENDOCRINOLOGY | Facility: CLINIC | Age: 63
End: 2023-07-13
Payer: COMMERCIAL

## 2023-07-13 VITALS
DIASTOLIC BLOOD PRESSURE: 62 MMHG | BODY MASS INDEX: 25.23 KG/M2 | HEIGHT: 66 IN | RESPIRATION RATE: 18 BRPM | OXYGEN SATURATION: 98 % | WEIGHT: 157 LBS | SYSTOLIC BLOOD PRESSURE: 110 MMHG | HEART RATE: 72 BPM | TEMPERATURE: 98.5 F

## 2023-07-13 DIAGNOSIS — E11.65 TYPE 2 DIABETES MELLITUS WITH HYPERGLYCEMIA, WITH LONG-TERM CURRENT USE OF INSULIN (HCC): Primary | ICD-10-CM

## 2023-07-13 DIAGNOSIS — E11.42 DIABETIC PERIPHERAL NEUROPATHY ASSOCIATED WITH TYPE 2 DIABETES MELLITUS (HCC): ICD-10-CM

## 2023-07-13 DIAGNOSIS — Z79.4 TYPE 2 DIABETES MELLITUS WITH HYPERGLYCEMIA, WITH LONG-TERM CURRENT USE OF INSULIN (HCC): Primary | ICD-10-CM

## 2023-07-13 DIAGNOSIS — E55.9 VITAMIN D DEFICIENCY: ICD-10-CM

## 2023-07-13 DIAGNOSIS — E78.2 MIXED HYPERLIPIDEMIA: ICD-10-CM

## 2023-07-13 LAB
EST. AVERAGE GLUCOSE BLD GHB EST-MCNC: 269 MG/DL
HBA1C MFR BLD: 11 %

## 2023-07-13 PROCEDURE — 95251 CONT GLUC MNTR ANALYSIS I&R: CPT | Performed by: INTERNAL MEDICINE

## 2023-07-13 PROCEDURE — 99214 OFFICE O/P EST MOD 30 MIN: CPT | Performed by: INTERNAL MEDICINE

## 2023-07-13 RX ORDER — INSULIN GLARGINE-YFGN 100 [IU]/ML
35 INJECTION, SOLUTION SUBCUTANEOUS
Qty: 30 ML | Refills: 1 | Status: SHIPPED | OUTPATIENT
Start: 2023-07-13

## 2023-07-13 NOTE — TELEPHONE ENCOUNTER
While pt was checking out her and Dr. Sacha Elise were asking about how to get the Dexcom here at the office, can you please advise.

## 2023-07-13 NOTE — TELEPHONE ENCOUNTER
Please let the patient know she is already linked to share her data online with Joel Servin, She must upload her data to her account on her end and then we can view it without having to pull it directly off her sensor. If she needs assistants she can contact the [facility] support team and they can walk her through it.      Thank you

## 2023-07-13 NOTE — PROGRESS NOTES
Follow-up Patient Progress Note      CC: diabetes     History of Present Illness:   61yr female with type 2 diabetes since 2009, retinopathy, microalbuminuria, HTN, HLD, CAD s/p HUNTER 9/21, ischemic CMpathy, rt paraclinoid meningioma diagnosed during recent hospitalization and fatigue. Last visit was 11/22/22.     Since last visit, she lost 2 lbs.  She just procured Trulcity last week.     CGM review: dates 3/30/24 -4/12/23          Usage 70%     Av glu 210 mg/dL        CV 23%  TIR 36%          TAR 64%         TBR 0%  G;ycemic patterns show severe fasting and postprandial hyperglycemia     Current meds:  Jardiance 10 mg qdaily  Metformin 1000mg PO BID  Semglee 65T qhs  Trulicity 4.06HE weekly      Opthamology: Follows [facility]  Podiatry: yes  Influenza: yes, COVID - yes  Dental: No issues  Pancreatitis: No     Ace/ARB: entresto  Statin:lipitor  Thyroid issues: no  FH: brother and 2 sisters, mother and father have diabetes.       Patient Active Problem List   Diagnosis   • Fatigue due to excessive exertion   • Mixed hyperlipidemia   • Abnormal stress echo   • Type 2 diabetes mellitus with hyperglycemia, with long-term current use of insulin (HCC)   • Microalbuminuria   • Bilateral leg edema   • Colon polyps   • Gastroesophageal reflux disease with esophagitis without hemorrhage   • Dizziness   • Left bundle branch block   • CAD (coronary artery disease)   • New Ischemic Cardiomyopathy/Systolic CHF   • Right paraclinoid meningioma   • Essential hypertension   • Wound of left leg   • Vitamin D deficiency     Past Medical History:   Diagnosis Date   • Colon polyp    • Coronary artery disease    • Depression    • Diabetes mellitus (HCC)    • Dizziness    • Edema    • Hyperlipidemia    • Hypertension    • Ischemic cardiomyopathy    • Left bundle branch block    • Type 2 diabetes mellitus (HCC)    • Vitamin D deficiency    • Wears glasses       Past Surgical History:   Procedure Laterality Date   • BREAST EXCISIONAL BIOPSY Right     benign   • CARDIAC SURGERY  2021   • COLONOSCOPY  2018    Colonoscpoy due in 3 years   • EGD  2018   • MAMMO (HISTORICAL) Bilateral 2020   • MAMMO (HISTORICAL) Bilateral 2022   • MAMMO NEEDLE LOCALIZATION RIGHT (ALL INC) Right 2009   • WISDOM TOOTH EXTRACTION        Family History   Problem Relation Age of Onset   • Hypertension Mother    • Sudden death Mother         SCD   • Hyperlipidemia Mother    • Heart attack Mother    • Breast cancer Mother 79   • Diabetes type II Mother    • Arrhythmia Father         pacemaker/ defib   • Clotting disorder Father         eliquis   • Heart failure Father    • Heart disease Father    • Melanoma Father    • Cancer Father         bladder cancer   • Diabetes type II Father    • Asthma Father    • BRCA1 Negative Sister    • Breast cancer Sister 44   • Thyroid cancer Sister    • No Known Problems Sister    • No Known Problems Sister    • Thyroid cancer Sister    • No Known Problems Maternal Grandmother    • No Known Problems Maternal Grandfather    • No Known Problems Paternal Grandmother    • No Known Problems Paternal Grandfather    • Breast cancer Maternal Aunt         unknown age   • Esophageal cancer Maternal Aunt 61   • Cancer Maternal Aunt         unknown age or type   • No Known Problems Maternal Aunt    • Colon cancer Maternal Uncle         unknwon age   • Pancreatic cancer Maternal Uncle 61   • Lung cancer Paternal Aunt         unknonw age- in her 80s   • Anuerysm Neg Hx      Social History     Tobacco Use   • Smoking status: Never   • Smokeless tobacco: Never   Substance Use Topics   • Alcohol use: No     No Known Allergies      Current Outpatient Medications:   •  aspirin 81 MG tablet, Take 1 tablet by mouth daily, Disp: , Rfl:   •  atorvastatin (LIPITOR) 80 mg tablet, TAKE 1 TABLET BY MOUTH EVERY DAY WITH DINNER, Disp: 90 tablet, Rfl: 1  •  CALCIUM PO, 1 po daily BID, Disp: , Rfl:   • carvedilol (COREG) 6.25 mg tablet, Take 1 tablet (6.25 mg total) by mouth 2 (two) times a day with meals, Disp: 180 tablet, Rfl: 4  •  Cholecalciferol (Vitamin D3) 50 MCG (2000 UT) capsule, Take 1 capsule (2,000 Units total) by mouth daily, Disp: 30 capsule, Rfl: 5  •  clopidogrel (PLAVIX) 75 mg tablet, Take 1 tablet (75 mg total) by mouth daily, Disp: 90 tablet, Rfl: 3  •  Continuous Blood Gluc  (FreeStyle [place] 2 [place]) CORBY, FreeStyle Feng 2 [place]  USE AS DIRECTED, Disp: , Rfl:   •  Continuous Blood Gluc Sensor (FreeStyle Feng 2 Sensor) MISC, Use 1 applicator every 14 (fourteen) days, Disp: 2 each, Rfl: 5  •  dulaglutide (Trulicity) 7.16 HM/2.1MA injection, Inject 0.5 mL (0.75 mg total) under the skin once a week, Disp: 2 mL, Rfl: 2  •  furosemide (LASIX) 20 mg tablet, TAKE 1 TABLET BY MOUTH EVERY DAY, Disp: 90 tablet, Rfl: 1  •  Insulin Glargine-yfgn (Semglee, yfgn,) 100 UNIT/ML SOPN, Inject 0.2 mL (20 Units total) under the skin daily at bedtime, Disp: 15 mL, Rfl: 1  •  Insulin Pen Needle (BD Pen Needle Brittney U/F) 32G X 4 MM MISC, Use daily as needed with insulin pen, Disp: 100 each, Rfl: 2  •  Jardiance 10 MG TABS tablet, TAKE 1 TABLET BY MOUTH EVERY DAY IN THE MORNING, Disp: 90 tablet, Rfl: 3  •  metFORMIN (GLUCOPHAGE) 1000 MG tablet, TAKE 1 TABLET BY MOUTH TWICE A DAY WITH MEALS, Disp: 180 tablet, Rfl: 3  •  sacubitril-valsartan (Entresto) 49-51 MG TABS, Take 1 tablet by mouth 2 (two) times a day, Disp: 180 tablet, Rfl: 3  •  nitroglycerin (NITROSTAT) 0.4 mg SL tablet, Place 1 tablet (0.4 mg total) under the tongue every 5 (five) minutes as needed for chest pain (Patient not taking: Reported on 4/28/2023), Disp: 24 tablet, Rfl: 1    Review of Systems   HENT: Negative. Eyes: Negative. Respiratory: Negative. Cardiovascular: Negative. Gastrointestinal: Negative. Endocrine: Negative. Musculoskeletal: Negative. Skin: Negative. Allergic/Immunologic: Negative.     Neurological: Negative. Hematological: Negative. Psychiatric/Behavioral: Negative. Physical Exam:  Body mass index is 25.34 kg/m². /62 (BP Location: Left arm, Patient Position: Sitting, Cuff Size: Standard)   Pulse 72   Temp 98.5 °F (36.9 °C) (Tympanic)   Resp 18   Ht 5' 6" (1.676 m)   Wt 71.2 kg (157 lb)   SpO2 98%   BMI 25.34 kg/m²    Vitals:    07/13/23 0822   Weight: 71.2 kg (157 lb)        Physical Exam  Constitutional:       General: She is not in acute distress. Appearance: She is well-developed. She is not ill-appearing. HENT:      Head: Normocephalic and atraumatic. Nose: Nose normal.      Mouth/Throat:      Pharynx: Oropharynx is clear. Eyes:      Extraocular Movements: Extraocular movements intact. Conjunctiva/sclera: Conjunctivae normal.   Neck:      Thyroid: No thyromegaly. Cardiovascular:      Rate and Rhythm: Normal rate. Pulmonary:      Effort: Pulmonary effort is normal.   Musculoskeletal:         General: No deformity. Cervical back: Normal range of motion. Skin:     Capillary Refill: Capillary refill takes less than 2 seconds. Coloration: Skin is not pale. Findings: No rash. Neurological:      Mental Status: She is alert and oriented to person, place, and time.    Psychiatric:         Behavior: Behavior normal.         Labs:   Lab Results   Component Value Date    HGBA1C 11.0 (H) 07/12/2023       Lab Results   Component Value Date    FDE5WQKIIDOE 2.352 07/12/2023       Lab Results   Component Value Date    CREATININE 0.85 03/22/2023    CREATININE 0.89 08/18/2022    CREATININE 0.82 06/23/2022    BUN 27 (H) 03/22/2023    K 4.0 03/22/2023     03/22/2023    CO2 28 03/22/2023     eGFR   Date Value Ref Range Status   03/22/2023 72 ml/min/1.73sq m Final       Lab Results   Component Value Date    ALT 32 03/22/2023    AST 22 03/22/2023    ALKPHOS 52 03/22/2023       Lab Results   Component Value Date    CHOLESTEROL 74 07/12/2023    CHOLESTEROL 89 08/18/2022    CHOLESTEROL 70 10/28/2021     Lab Results   Component Value Date    HDL 29 (L) 07/12/2023    HDL 30 (L) 08/18/2022    HDL 28 (L) 10/28/2021     Lab Results   Component Value Date    TRIG 78 07/12/2023    TRIG 80 08/18/2022    TRIG 72 10/28/2021     Lab Results   Component Value Date    NONHDLC 45 07/12/2023    [address] 59 08/18/2022    [address] 42 10/28/2021         Impression:  1. Type 2 diabetes mellitus with hyperglycemia, with long-term current use of insulin ([address])    2. Vitamin D deficiency    3. Mixed hyperlipidemia    4. Diabetic peripheral neuropathy associated with type 2 diabetes mellitus ([address])         Plan:    Hayde Sprague was seen today for diabetes type 2. Diagnoses and all orders for this visit:    Type 2 diabetes mellitus with hyperglycemia, with long-term current use of insulin ([address]). She remains uncontrolled with an A1c of 11%. Goal is less than 7%. AGP actually shows improvement in average glucose to 210 from 263 mg/dL last visit. TAR improved to 64 from 100%. She has just started using Trulicity. Today we discussed options and agreed to increase basal insulin as listed below. Continue metformin 1000 mg p.o. twice daily, Jardiance 10 mg daily and Trulicity 1.40 mg weekly. Follow-up in 6 weeks with repeat CGM data review. -     Insulin Glargine-yfgn (Semglee, yfgn,) 100 UNIT/ML SOPN; Inject 0.35 mL (35 Units total) under the skin daily at bedtime    Vitamin D deficiency. She is on vitamin D replacement with improvement in levels. Mixed hyperlipidemia. Continue Lipitor. Diabetic peripheral neuropathy associated with type 2 diabetes mellitus ([address])        I have spent 32 minutes with patient today in which greater than 50% of this time was spent in counseling/coordination of care. Discussed with the patient and all questioned fully answered. She will call me if any problems arise.     Educated/ Counseled patient on diagnostic test results, prognosis, risk vs benefit of treatment options, importance of treatment compliance, healthy life and lifestyle choices.       [facility]

## 2023-07-14 ENCOUNTER — RA CDI HCC (OUTPATIENT)
Dept: OTHER | Facility: HOSPITAL | Age: 63
End: 2023-07-14

## 2023-07-14 NOTE — PROGRESS NOTES
[address] [facility] coding opportunities       Chart reviewed, no opportunity found: CHART REVIEWED, NO OPPORTUNITY FOUND     Patients Insurance     Commercial Insurance: Fred Mello

## 2023-07-17 RX ORDER — INSULIN GLARGINE 100 [IU]/ML
INJECTION, SOLUTION SUBCUTANEOUS
COMMUNITY
Start: 2023-06-24 | End: 2023-07-19

## 2023-07-19 ENCOUNTER — OFFICE VISIT (OUTPATIENT)
Dept: FAMILY MEDICINE CLINIC | Facility: CLINIC | Age: 63
End: 2023-07-19
Payer: COMMERCIAL

## 2023-07-19 VITALS
DIASTOLIC BLOOD PRESSURE: 62 MMHG | OXYGEN SATURATION: 99 % | RESPIRATION RATE: 16 BRPM | HEIGHT: 66 IN | HEART RATE: 70 BPM | BODY MASS INDEX: 25.31 KG/M2 | WEIGHT: 157.5 LBS | SYSTOLIC BLOOD PRESSURE: 118 MMHG | TEMPERATURE: 99 F

## 2023-07-19 DIAGNOSIS — I25.10 CORONARY ARTERY DISEASE INVOLVING NATIVE CORONARY ARTERY OF NATIVE HEART WITHOUT ANGINA PECTORIS: ICD-10-CM

## 2023-07-19 DIAGNOSIS — I25.5 ISCHEMIC CARDIOMYOPATHY: ICD-10-CM

## 2023-07-19 DIAGNOSIS — E78.2 MIXED HYPERLIPIDEMIA: ICD-10-CM

## 2023-07-19 DIAGNOSIS — Z12.31 ENCOUNTER FOR SCREENING MAMMOGRAM FOR MALIGNANT NEOPLASM OF BREAST: ICD-10-CM

## 2023-07-19 DIAGNOSIS — I10 ESSENTIAL HYPERTENSION: ICD-10-CM

## 2023-07-19 DIAGNOSIS — Z12.11 COLON CANCER SCREENING: ICD-10-CM

## 2023-07-19 DIAGNOSIS — Z79.4 TYPE 2 DIABETES MELLITUS WITH HYPERGLYCEMIA, WITH LONG-TERM CURRENT USE OF INSULIN (HCC): Primary | ICD-10-CM

## 2023-07-19 DIAGNOSIS — E11.65 TYPE 2 DIABETES MELLITUS WITH HYPERGLYCEMIA, WITH LONG-TERM CURRENT USE OF INSULIN (HCC): Primary | ICD-10-CM

## 2023-07-19 DIAGNOSIS — R60.0 BILATERAL LEG EDEMA: ICD-10-CM

## 2023-07-19 PROCEDURE — 99214 OFFICE O/P EST MOD 30 MIN: CPT | Performed by: INTERNAL MEDICINE

## 2023-07-19 NOTE — ASSESSMENT & PLAN NOTE
EF as of most recent echo was 55%, pt denies any sob, long, pnd symptoms-on Entresto, furosemide, carvedilol, and SGLT2/GLP1 will see Cardiology again in August

## 2023-07-19 NOTE — PROGRESS NOTES
Assessment/Plan:         Problem List Items Addressed This Visit    None        Subjective:      Patient ID: Ricardo Rivas is a 70 y.o. female. Karina Nations here with hx of CAD, CHF, mainly Hfpef now, DM II, HL, diabetic retinopathy-doing ok, still working at the [employer] full time-her A1c% went up to 11, she is taking her metformin, Trulicity, lower dose Jardiance, and is now on 35 units of insulin-sees Endocrine with Dr Stevan Bush a CGM too now-denies any chest pain or sob, is followed by Ophthalmology and Cardiology as well. Has 1 stent in her heart-other labs reviewed with patient as well, of note, cholesterol panel is very well controlled on high dose atorvastatin      The following portions of the patient's history were reviewed and updated as appropriate:   Past Medical History:  She has a past medical history of Colon polyp, Coronary artery disease, Depression, Diabetes mellitus ([address]), Dizziness, Edema, Hyperlipidemia, Hypertension, Ischemic cardiomyopathy, Left bundle branch block, Type 2 diabetes mellitus ([address]), Vitamin D deficiency, and Wears glasses. ,  _______________________________________________________________________  Medical Problems:  does not have any pertinent problems on file.,  _______________________________________________________________________  Past Surgical History:   has a past surgical history that includes Colonoscopy (04/11/2018); Mammo (historical) (Bilateral, 05/04/2020); [place] tooth extraction; EGD (04/11/2018); Mammo needle localization right (all inc) (Right, 07/08/2009); Breast excisional biopsy (Right, 2009); Mammo (historical) (Bilateral, 09/14/2022); and Cardiac surgery (9/2/2021). ,  _______________________________________________________________________  Family History:  family history includes Arrhythmia in her father; Asthma in her father; BRCA1 Negative in her sister; Breast cancer in her maternal aunt; Breast cancer (age of onset: 44) in her sister; Breast cancer (age of onset: 79) in her mother; Cancer in her father and maternal aunt; Clotting disorder in her father; Colon cancer in her maternal uncle; Diabetes type II in her father and mother; Esophageal cancer (age of onset: 61) in her maternal aunt; Heart attack in her mother; Heart disease in her father; Heart failure in her father; Hyperlipidemia in her mother; Hypertension in her mother; Lung cancer in her paternal aunt; Melanoma in her father; No Known Problems in her maternal aunt, maternal grandfather, maternal grandmother, paternal grandfather, paternal grandmother, sister, and sister; Pancreatic cancer (age of onset: 61) in her maternal uncle; Sudden death in her mother; Thyroid cancer in her sister and sister. ,  _______________________________________________________________________  Social History:   reports that she has never smoked. She has never used smokeless tobacco. She reports that she does not drink alcohol and does not use drugs. ,  _______________________________________________________________________  Allergies:  is allergic to dust mite extract. .  _______________________________________________________________________  Current Outpatient Medications   Medication Sig Dispense Refill   • aspirin 81 MG tablet Take 1 tablet by mouth daily     • atorvastatin (LIPITOR) 80 mg tablet TAKE 1 TABLET BY MOUTH EVERY DAY WITH DINNER 90 tablet 1   • CALCIUM PO 1 po daily     • carvedilol (COREG) 6.25 mg tablet Take 1 tablet (6.25 mg total) by mouth 2 (two) times a day with meals 180 tablet 4   • Cholecalciferol (Vitamin D3) 50 MCG (2000 UT) capsule Take 1 capsule (2,000 Units total) by mouth daily 30 capsule 5   • clopidogrel (PLAVIX) 75 mg tablet Take 1 tablet (75 mg total) by mouth daily 90 tablet 3   • dulaglutide (Trulicity) 2.60 HE/3.8SL injection Inject 0.5 mL (0.75 mg total) under the skin once a week 2 mL 2   • furosemide (LASIX) 20 mg tablet TAKE 1 TABLET BY MOUTH EVERY DAY 90 tablet 1   • Insulin Glargine-yfgn (Semglee, yfgn,) 100 UNIT/ML SOPN Inject 0.35 mL (35 Units total) under the skin daily at bedtime 30 mL 1   • Jardiance 10 MG TABS tablet TAKE 1 TABLET BY MOUTH EVERY DAY IN THE MORNING 90 tablet 3   • metFORMIN (GLUCOPHAGE) 1000 MG tablet TAKE 1 TABLET BY MOUTH TWICE A DAY WITH MEALS 180 tablet 3   • sacubitril-valsartan (Entresto) 49-51 MG TABS Take 1 tablet by mouth 2 (two) times a day 180 tablet 3   • Continuous Blood Gluc  (FreeStyle [place] 2 [place]) CORBY FreeStyle [place] 2 [place]   USE AS DIRECTED     • Continuous Blood Gluc Sensor (FreeStyle Feng 2 Sensor) MISC Use 1 applicator every 14 (fourteen) days 2 each 5   • Insulin Pen Needle (BD Pen Needle Brittney U/F) 32G X 4 MM MISC Use daily as needed with insulin pen 100 each 2     No current facility-administered medications for this visit.     _______________________________________________________________________  Review of Systems   Constitutional: Negative. Respiratory: Negative. Cardiovascular: Positive for leg swelling. Negative for chest pain and palpitations. Gastrointestinal: Negative. Neurological: Negative. Hematological: Negative. Psychiatric/Behavioral: Negative. Objective:  Vitals:    07/19/23 1021   BP: 118/62   BP Location: Left arm   Patient Position: Sitting   Cuff Size: Adult   Pulse: 70   Resp: 16   Temp: 99 °F (37.2 °C)   TempSrc: Tympanic   SpO2: 99%   Weight: 71.4 kg (157 lb 8 oz)   Height: 5' 6" (1.676 m)     Body mass index is 25.42 kg/m². Physical Exam  Constitutional:       Appearance: Normal appearance. HENT:      Head: Normocephalic and atraumatic. Right Ear: External ear normal.      Left Ear: External ear normal.      Nose: Nose normal.      Mouth/Throat:      Mouth: Mucous membranes are moist.   Eyes:      Extraocular Movements: Extraocular movements intact. Pupils: Pupils are equal, round, and reactive to light.    Cardiovascular:      Rate and Rhythm: Normal rate and regular rhythm. Pulses: no weak pulses          Dorsalis pedis pulses are 2+ on the right side and 2+ on the left side. Posterior tibial pulses are 2+ on the right side and 2+ on the left side. Heart sounds: Normal heart sounds. Pulmonary:      Effort: Pulmonary effort is normal.      Breath sounds: Normal breath sounds. Abdominal:      Palpations: Abdomen is soft. Musculoskeletal:         General: Normal range of motion. Cervical back: Normal range of motion and neck supple. Right lower leg: Edema present. Left lower leg: Edema present. Feet:      Right foot:      Skin integrity: No ulcer, skin breakdown, erythema, warmth, callus or dry skin. Left foot:      Skin integrity: No ulcer, skin breakdown, erythema, warmth, callus or dry skin. Skin:     General: Skin is warm. Capillary Refill: Capillary refill takes less than 2 seconds. Neurological:      General: No focal deficit present. Mental Status: She is alert and oriented to person, place, and time. Psychiatric:         Mood and Affect: Mood normal.         Behavior: Behavior normal.       Patient's shoes and socks removed. Right Foot/Ankle   Right Foot Inspection  Skin Exam: skin normal and skin intact. No dry skin, no warmth, no callus, no erythema, no maceration, no abnormal color, no pre-ulcer, no ulcer and no callus. Toe Exam: ROM and strength within normal limits. Sensory   Monofilament testing: intact    Vascular  Capillary refills: < 3 seconds  The right DP pulse is 2+. The right PT pulse is 2+. Left Foot/Ankle  Left Foot Inspection  Skin Exam: skin normal and skin intact. No dry skin, no warmth, no erythema, no maceration, normal color, no pre-ulcer, no ulcer and no callus. Toe Exam: ROM and strength within normal limits. Sensory   Monofilament testing: intact    Vascular  Capillary refills: < 3 seconds  The left DP pulse is 2+. The left PT pulse is 2+.      Assign Risk Category  No deformity present  No loss of protective sensation  No weak pulses  Risk: 0

## 2023-07-19 NOTE — ASSESSMENT & PLAN NOTE
Lab Results   Component Value Date    HGBA1C 11.0 (H) 07/12/2023   A1c worsened to 11%, which [place] was not happy with, but says she's been eating a lot of fruit, etc-is followed by Endocrinology, and they bumped up her insulin to 35 units daily-she is also on Trulicity, metformin, and lower dose Jardiance as it was causing some dizziness-she is supposed to follow up in 6 weeks with Endo and I told her to ask about increasing Trulicity dosage, especially in light of her heart history

## 2023-08-24 NOTE — SPEECH THERAPY NOTE
Speech Language/Pathology  Speech/Language Pathology  Assessment    Patient Name: Nader Forte  Today's Date: 8/30/2021     Problem List  Principal Problem:    Dizziness  Active Problems:    Mixed hyperlipidemia    Essential hypertension    Type 2 diabetes mellitus without complication, with long-term current use of insulin ([address] )    Left bundle branch block    Past Medical History  Past Medical History:   Diagnosis Date    Colon polyp     Dizziness     Edema     Hyperlipidemia     Hypertension     Type 2 diabetes mellitus ([address] )     Vitamin D deficiency     Wears glasses      Past Surgical History  Past Surgical History:   Procedure Laterality Date    BREAST EXCISIONAL BIOPSY Right 2009    COLONOSCOPY  04/11/2018    Colonoscpoy due in 3 years    EGD  04/11/2018    MAMMO (HISTORICAL) Bilateral 05/04/2020 2018    MAMMO NEEDLE LOCALIZATION RIGHT (ALL INC) Right 7/8/2009    WISDOM TOOTH EXTRACTION         Nader Forte is a 59 y o  female with a PMH of hypertension, hyperlipidemia, type 2 diabetes, chronic lower extremity edema, who presents with dizziness, lightheadedness, room spinning sensation, nausea, vomiting that occurred this afternoon while she was outside  Her symptoms improved shortly after arriving to the emergency department  She notes very minimal dizziness when she first goes to ambulate  She also recalls a similar episode of lightheadedness and dizziness that occurred about a month ago  Consult received for speech/swallow eval on stroke pathway  Pt passed nsg swallow screen; tolerating regular diet w/o s/s dysphagia or aspiration  No speech/language deficits reported  NIH score is 0  MRI: pending  No need for formal speech/swallow eval at this time  Reconsult if needed      Nati Escobar CCC-SLP  Speech Pathologist  Available via  tiger text Render In Strict Bullet Format?: No Continue Regimen: Clobetasol Detail Level: Zone Initiate Treatment: ketoconazole 2 % topical cream \\nQuantity: 15.0 g  Days Supply: 30\\nSig: Apply to AA on face bid Samples Given: Strataderm scar therapy

## 2023-08-29 ENCOUNTER — OFFICE VISIT (OUTPATIENT)
Dept: ENDOCRINOLOGY | Facility: CLINIC | Age: 63
End: 2023-08-29
Payer: COMMERCIAL

## 2023-08-29 VITALS
HEART RATE: 73 BPM | WEIGHT: 162 LBS | DIASTOLIC BLOOD PRESSURE: 58 MMHG | BODY MASS INDEX: 26.03 KG/M2 | TEMPERATURE: 98 F | HEIGHT: 66 IN | OXYGEN SATURATION: 98 % | SYSTOLIC BLOOD PRESSURE: 98 MMHG

## 2023-08-29 DIAGNOSIS — E11.65 TYPE 2 DIABETES MELLITUS WITH HYPERGLYCEMIA, WITH LONG-TERM CURRENT USE OF INSULIN (HCC): Primary | ICD-10-CM

## 2023-08-29 DIAGNOSIS — Z79.4 TYPE 2 DIABETES MELLITUS WITH HYPERGLYCEMIA, WITH LONG-TERM CURRENT USE OF INSULIN (HCC): Primary | ICD-10-CM

## 2023-08-29 DIAGNOSIS — I25.5 ISCHEMIC CARDIOMYOPATHY: ICD-10-CM

## 2023-08-29 DIAGNOSIS — E78.2 MIXED HYPERLIPIDEMIA: ICD-10-CM

## 2023-08-29 DIAGNOSIS — E55.9 VITAMIN D DEFICIENCY: ICD-10-CM

## 2023-08-29 DIAGNOSIS — E11.42 DIABETIC PERIPHERAL NEUROPATHY ASSOCIATED WITH TYPE 2 DIABETES MELLITUS (HCC): ICD-10-CM

## 2023-08-29 PROCEDURE — 95251 CONT GLUC MNTR ANALYSIS I&R: CPT | Performed by: INTERNAL MEDICINE

## 2023-08-29 PROCEDURE — 99214 OFFICE O/P EST MOD 30 MIN: CPT | Performed by: INTERNAL MEDICINE

## 2023-08-29 RX ORDER — DULAGLUTIDE 1.5 MG/.5ML
1.5 INJECTION, SOLUTION SUBCUTANEOUS WEEKLY
Qty: 6 ML | Refills: 1 | Status: SHIPPED | OUTPATIENT
Start: 2023-08-29

## 2023-08-29 RX ORDER — INSULIN GLARGINE 100 [IU]/ML
30 INJECTION, SOLUTION SUBCUTANEOUS
COMMUNITY
Start: 2023-08-24

## 2023-08-29 RX ORDER — CARVEDILOL 6.25 MG/1
3.12 TABLET ORAL 2 TIMES DAILY WITH MEALS
Qty: 180 TABLET | Refills: 4 | Status: SHIPPED | OUTPATIENT
Start: 2023-08-29

## 2023-08-29 NOTE — PROGRESS NOTES
Follow-up Patient Progress Note      CC: diabetes     History of Present Illness:   61yr female with type 2 diabetes since 2009, retinopathy, microalbuminuria, HTN, HLD, CAD s/p HUNTER 9/21, ischemic CMpathy, rt paraclinoid meningioma diagnosed during recent hospitalization and fatigue. Last visit was 7/13/23.     Since last visit, she lost 2 lbs. She just procured Trulcity last week.     CGM review:   Device: torito dates 8/15/23 -8/29/23          Usage 80%     Av glu: 150 mg/dL        CV 35%  TIR 70%          TAR 27%         TBR 3%  G;ycemic patterns show severe fasting and postprandial hyperglycemia     Current meds:  Jardiance 10 mg qdaily  Metformin 1000mg PO BID  Lantus 47K qhs  Trulicity 1.23QC weekly      Opthamology: Follows [facility]  Podiatry: yes  Influenza: yes, COVID - yes  Dental: No issues  Pancreatitis: No     Ace/ARB: entresto  Statin:lipitor  Thyroid issues: no  FH: brother and 2 sisters, mother and father have diabetes.     Patient Active Problem List   Diagnosis   • Fatigue due to excessive exertion   • Mixed hyperlipidemia   • Abnormal stress echo   • Type 2 diabetes mellitus with hyperglycemia, with long-term current use of insulin (HCC)   • Microalbuminuria   • Bilateral leg edema   • Colon polyps   • Gastroesophageal reflux disease with esophagitis without hemorrhage   • Dizziness   • Left bundle branch block   • CAD (coronary artery disease)   • New Ischemic Cardiomyopathy/Systolic CHF   • Right paraclinoid meningioma   • Essential hypertension   • Wound of left leg   • Vitamin D deficiency   • Diabetic peripheral neuropathy associated with type 2 diabetes mellitus ([address])     Past Medical History:   Diagnosis Date   • Colon polyp    • Coronary artery disease    • Depression    • Diabetes mellitus (HCC)    • Dizziness    • Edema    • Hyperlipidemia    • Hypertension    • Ischemic cardiomyopathy    • Left bundle branch block    • Type 2 diabetes mellitus ([address])    • Vitamin D deficiency • Wears glasses       Past Surgical History:   Procedure Laterality Date   • BREAST EXCISIONAL BIOPSY Right     benign   • CARDIAC SURGERY  2021   • COLONOSCOPY  2018    Colonoscpoy due in 3 years   • EGD  2018   • MAMMO (HISTORICAL) Bilateral 2020   • MAMMO (HISTORICAL) Bilateral 2022   • MAMMO NEEDLE LOCALIZATION RIGHT (ALL INC) Right 2009   • WISDOM TOOTH EXTRACTION        Family History   Problem Relation Age of Onset   • Hypertension Mother    • Sudden death Mother         SCD   • Hyperlipidemia Mother    • Heart attack Mother    • Breast cancer Mother 79   • Diabetes type II Mother    • Arrhythmia Father         pacemaker/ defib   • Clotting disorder Father         eliquis   • Heart failure Father    • Heart disease Father    • Melanoma Father    • Cancer Father         bladder cancer   • Diabetes type II Father    • Asthma Father    • BRCA1 Negative Sister    • Breast cancer Sister 44   • Thyroid cancer Sister    • No Known Problems Sister    • No Known Problems Sister    • Thyroid cancer Sister    • No Known Problems Maternal Grandmother    • No Known Problems Maternal Grandfather    • No Known Problems Paternal Grandmother    • No Known Problems Paternal Grandfather    • Breast cancer Maternal Aunt         unknown age   • Esophageal cancer Maternal Aunt 61   • Cancer Maternal Aunt         unknown age or type   • No Known Problems Maternal Aunt    • Colon cancer Maternal Uncle         unknwon age   • Pancreatic cancer Maternal Uncle 61   • Lung cancer Paternal Aunt         unknonw age- in her 80s   • Anuerysm Neg Hx      Social History     Tobacco Use   • Smoking status: Never   • Smokeless tobacco: Never   Substance Use Topics   • Alcohol use: No     Allergies   Allergen Reactions   • Dust Mite Extract Other (See Comments)         Current Outpatient Medications:   •  aspirin 81 MG tablet, Take 1 tablet by mouth daily, Disp: , Rfl:   •  atorvastatin (LIPITOR) 80 mg tablet, TAKE 1 TABLET BY MOUTH EVERY DAY WITH DINNER, Disp: 90 tablet, Rfl: 1  •  CALCIUM PO, 1 po daily, Disp: , Rfl:   •  carvedilol (COREG) 6.25 mg tablet, Take 0.5 tablets (3.125 mg total) by mouth 2 (two) times a day with meals, Disp: 180 tablet, Rfl: 4  •  Cholecalciferol (Vitamin D3) 50 MCG (2000 UT) capsule, Take 1 capsule (2,000 Units total) by mouth daily, Disp: 30 capsule, Rfl: 5  •  clopidogrel (PLAVIX) 75 mg tablet, Take 1 tablet (75 mg total) by mouth daily, Disp: 90 tablet, Rfl: 3  •  dulaglutide (Trulicity) 1.5 UQ/8.7NB injection, Inject 0.5 mL (1.5 mg total) under the skin once a week, Disp: 6 mL, Rfl: 1  •  furosemide (LASIX) 20 mg tablet, TAKE 1 TABLET BY MOUTH EVERY DAY, Disp: 90 tablet, Rfl: 1  •  Jardiance 10 MG TABS tablet, TAKE 1 TABLET BY MOUTH EVERY DAY IN THE MORNING, Disp: 90 tablet, Rfl: 3  •  Lantus SoloStar 100 units/mL SOPN, Inject 30 Units under the skin daily at bedtime, Disp: , Rfl:   •  metFORMIN (GLUCOPHAGE) 1000 MG tablet, TAKE 1 TABLET BY MOUTH TWICE A DAY WITH MEALS, Disp: 180 tablet, Rfl: 3  •  sacubitril-valsartan (Entresto) 49-51 MG TABS, Take 1 tablet by mouth 2 (two) times a day, Disp: 180 tablet, Rfl: 3  •  Continuous Blood Gluc  (FreeStyle [place] 2 [place]) CORBY, FreeStyle [place] 2 [place]  USE AS DIRECTED, Disp: , Rfl:   •  Continuous Blood Gluc Sensor (FreeStyle Feng 2 Sensor) MISC, Use 1 applicator every 14 (fourteen) days, Disp: 2 each, Rfl: 5  •  Insulin Pen Needle (BD Pen Needle Brittney U/F) 32G X 4 MM MISC, Use daily as needed with insulin pen, Disp: 100 each, Rfl: 2    Review of Systems   HENT: Negative. Eyes: Negative. Respiratory: Negative. Cardiovascular: Negative. Gastrointestinal: Negative. Endocrine: Negative. Musculoskeletal: Negative. Skin: Negative. Allergic/Immunologic: Negative. Neurological: Negative. Hematological: Negative. Psychiatric/Behavioral: Negative.         Physical Exam:  Body mass index is 26.15 kg/m². BP 98/58 (BP Location: Right arm, Patient Position: Sitting, Cuff Size: Adult)   Pulse 73   Temp 98 °F (36.7 °C)   Ht 5' 6" (1.676 m)   Wt 73.5 kg (162 lb)   SpO2 98%   BMI 26.15 kg/m²    Vitals:    08/29/23 0814   Weight: 73.5 kg (162 lb)        Physical Exam  Constitutional:       General: She is not in acute distress. Appearance: She is well-developed. She is not ill-appearing. HENT:      Head: Normocephalic and atraumatic. Nose: Nose normal.      Mouth/Throat:      Pharynx: Oropharynx is clear. Eyes:      Extraocular Movements: Extraocular movements intact. Conjunctiva/sclera: Conjunctivae normal.   Neck:      Thyroid: No thyromegaly. Cardiovascular:      Rate and Rhythm: Normal rate. Pulmonary:      Effort: Pulmonary effort is normal.   Musculoskeletal:         General: No deformity. Cervical back: Normal range of motion. Skin:     Capillary Refill: Capillary refill takes less than 2 seconds. Coloration: Skin is not pale. Findings: No rash. Neurological:      Mental Status: She is alert and oriented to person, place, and time.    Psychiatric:         Behavior: Behavior normal.         Labs:   Lab Results   Component Value Date    HGBA1C 11.0 (H) 07/12/2023       Lab Results   Component Value Date    ZAL3AYKXHVOL 2.352 07/12/2023       Lab Results   Component Value Date    CREATININE 0.85 03/22/2023    CREATININE 0.89 08/18/2022    CREATININE 0.82 06/23/2022    BUN 27 (H) 03/22/2023    K 4.0 03/22/2023     03/22/2023    CO2 28 03/22/2023     eGFR   Date Value Ref Range Status   03/22/2023 72 ml/min/1.73sq m Final       Lab Results   Component Value Date    ALT 32 03/22/2023    AST 22 03/22/2023    ALKPHOS 52 03/22/2023       Lab Results   Component Value Date    CHOLESTEROL 74 07/12/2023    CHOLESTEROL 89 08/18/2022    CHOLESTEROL 70 10/28/2021     Lab Results   Component Value Date    HDL 29 (L) 07/12/2023    HDL 30 (L) 08/18/2022    HDL 28 (L) 10/28/2021     Lab Results   Component Value Date    TRIG 78 07/12/2023    TRIG 80 08/18/2022    TRIG 72 10/28/2021     Lab Results   Component Value Date    NONHDLC 45 07/12/2023    [address] 08/18/2022    [address] 10/28/2021         Impression:  1. Type 2 diabetes mellitus with hyperglycemia, with long-term current use of insulin ([address])    2. Diabetic peripheral neuropathy associated with type 2 diabetes mellitus ([address])    3. Mixed hyperlipidemia    4. Vitamin D deficiency    5. New Ischemic Cardiomyopathy/Systolic CHF         Plan:    Suleiman Cruz was seen today for follow-up and diabetes type 2. Diagnoses and all orders for this visit:    Type 2 diabetes mellitus with hyperglycemia, with long-term current use of insulin ([address]). She seems to have improved with TIR 70%, TAR 27% and TBR 3%. Lowest interstitial glucose was 54 mg/dL around midnight. She has significant postprandial hyperglycemia after dinner. Overall she is in much better control than before. Today we agreed to reduce Lantus 30 units nightly, increase Trulicity 1.5 mg weekly and continue metformin 1000 mg p.o. twice daily and Jardiance 10 mg daily. Follow-up in 3 months with repeat CGM data. -     Hemoglobin A1C; Future  -     Albumin / creatinine urine ratio; Future  -     dulaglutide (Trulicity) 1.5 IR/0.3QC injection; Inject 0.5 mL (1.5 mg total) under the skin once a week    Diabetic peripheral neuropathy associated with type 2 diabetes mellitus ([address]). Advise daily exercise. Mixed hyperlipidemia. Her HDL is low. Advise daily activity. Vitamin D deficiency. Advised vitamin D3 5000 IU daily OTC. New Ischemic Cardiomyopathy/Systolic CHF. Blood pressures are somewhat soft. She feels weak. Since she is on [place] as well as Coreg, we agreed to cut back Coreg to half tablet as listed below. Advised to confirm this with Dr. Tracy Bull, her cardiologist.  -     carvedilol (COREG) 6.25 mg tablet;  Take 0.5 tablets (3.125 mg total) by mouth 2 (two) times a day with meals        I have spent 35 minutes with patient today in which greater than 50% of this time was spent in counseling/coordination of care. Discussed with the patient and all questioned fully answered. She will call me if any problems arise. Educated/ Counseled patient on diagnostic test results, prognosis, risk vs benefit of treatment options, importance of treatment compliance, healthy life and lifestyle choices.       [place]

## 2023-09-26 ENCOUNTER — HOSPITAL ENCOUNTER (OUTPATIENT)
Dept: RADIOLOGY | Facility: IMAGING CENTER | Age: 63
Discharge: HOME | End: 2023-09-26
Payer: COMMERCIAL

## 2023-09-26 VITALS — HEIGHT: 66 IN | WEIGHT: 162.04 LBS | BODY MASS INDEX: 26.04 KG/M2

## 2023-09-26 DIAGNOSIS — Z12.31 ENCOUNTER FOR SCREENING MAMMOGRAM FOR MALIGNANT NEOPLASM OF BREAST: ICD-10-CM

## 2023-09-26 DIAGNOSIS — Z13.820 OSTEOPOROSIS SCREENING: ICD-10-CM

## 2023-09-26 PROCEDURE — 77063 BREAST TOMOSYNTHESIS BI: CPT

## 2023-09-26 PROCEDURE — 77080 DXA BONE DENSITY AXIAL: CPT

## 2023-09-26 PROCEDURE — 77067 SCR MAMMO BI INCL CAD: CPT

## 2023-10-17 ENCOUNTER — OFFICE VISIT (OUTPATIENT)
Dept: CARDIOLOGY CLINIC | Facility: CLINIC | Age: 63
End: 2023-10-17
Payer: COMMERCIAL

## 2023-10-17 VITALS
BODY MASS INDEX: 25.89 KG/M2 | DIASTOLIC BLOOD PRESSURE: 62 MMHG | OXYGEN SATURATION: 99 % | SYSTOLIC BLOOD PRESSURE: 128 MMHG | WEIGHT: 160.4 LBS | HEART RATE: 76 BPM

## 2023-10-17 DIAGNOSIS — I44.7 LEFT BUNDLE BRANCH BLOCK: ICD-10-CM

## 2023-10-17 DIAGNOSIS — I10 ESSENTIAL HYPERTENSION: ICD-10-CM

## 2023-10-17 DIAGNOSIS — I25.10 CORONARY ARTERY DISEASE INVOLVING NATIVE CORONARY ARTERY OF NATIVE HEART WITHOUT ANGINA PECTORIS: Primary | ICD-10-CM

## 2023-10-17 DIAGNOSIS — I42.8 NONISCHEMIC CARDIOMYOPATHY (HCC): ICD-10-CM

## 2023-10-17 PROCEDURE — 99214 OFFICE O/P EST MOD 30 MIN: CPT | Performed by: INTERNAL MEDICINE

## 2023-10-18 PROBLEM — I42.8 NONISCHEMIC CARDIOMYOPATHY (HCC): Status: ACTIVE | Noted: 2023-10-18

## 2023-10-18 PROCEDURE — 93000 ELECTROCARDIOGRAM COMPLETE: CPT | Performed by: INTERNAL MEDICINE

## 2023-10-18 NOTE — PROGRESS NOTES
Cardiology Follow Up    [facility]  1960  [address] [facility], [address]  [place] [address]  [address]  [address]  [phone]    1. Coronary artery disease involving native coronary artery of native heart without angina pectoris        2. Essential hypertension        3. Left bundle branch block  POCT ECG      4. Nonischemic cardiomyopathy (HCC)  POCT ECG            Discussion/Summary:All of her assessed cardiac problems are stable. I have reviewed her medications and made no changes. I advised her to take an extra 20 mg of Lasix in the afternoon every 4 days if needed for LE edema / weight gain. No cardiac testing was ordered. RTO 9 months. Interval History: She has not had any cardiac problems since her last office visit. She overall feels good and denies CP, SOB, significant LE edema. Her weight is up from 156 to 160 lbs. She has CAD with an LAD stent placed in 9/2021. EF was 37 % at that time. She has a chronic LBBB. ECHO 12/2022 - EF 55%. /62  A1C 11.0  LDL 29  Creatinine 0.85    She is not very active. She is on Lasix 20 mg daily.       Patient Active Problem List   Diagnosis    Fatigue due to excessive exertion    Mixed hyperlipidemia    Abnormal stress echo    Type 2 diabetes mellitus with hyperglycemia, with long-term current use of insulin (HCC)    Microalbuminuria    Bilateral leg edema    Colon polyps    Gastroesophageal reflux disease with esophagitis without hemorrhage    Dizziness    Left bundle branch block    CAD (coronary artery disease)    New Ischemic Cardiomyopathy/Systolic CHF    Right paraclinoid meningioma    Essential hypertension    Wound of left leg    Vitamin D deficiency    Diabetic peripheral neuropathy associated with type 2 diabetes mellitus ([address])    Nonischemic cardiomyopathy (HCC)     Past Medical History:   Diagnosis Date    Colon polyp     Coronary artery disease Depression     Diabetes mellitus ([address])     Dizziness     Edema     Hyperlipidemia     Hypertension     Ischemic cardiomyopathy     Left bundle branch block     Type 2 diabetes mellitus (HCC)     Vitamin D deficiency     Wears glasses      Social History     Socioeconomic History    Marital status: Single     Spouse name: Not on file    Number of children: Not on file    Years of education: Not on file    Highest education level: Not on file   Occupational History    Occupation: deed recorder   Tobacco Use    Smoking status: Never    Smokeless tobacco: Never   Vaping Use    Vaping Use: Never used   Substance and Sexual Activity    Alcohol use: No    Drug use: No    Sexual activity: Not Currently     Birth control/protection: Post-menopausal   Other Topics Concern    Not on file   Social History Narrative    Do you currently or have you served in the [facility] [address]: No    Were you activated, into active duty, as a member of the [employer] or as a Reservist: No    Occupation:     Marital status: Single    Exercise level: None    Diet: Regular    General stress level: Medium    Alcohol intake: None    Caffeine intake:  Moderate    1 cup of coffee in the morning    Chewing tobacco: none    Illicit drugs: none    Guns present in home: No    Seat belts used routinely: Yes    Sunscreen used routinely: Yes    Smoke alarm in home: Yes    Advance directive: Yes     Social Determinants of Health     Financial Resource Strain: Not on file   Food Insecurity: Not on file   Transportation Needs: Not on file   Physical Activity: Not on file   Stress: Not on file   Social Connections: Not on file   Intimate Partner Violence: Not on file   Housing Stability: Not on file      Family History   Problem Relation Age of Onset    Hypertension Mother     Sudden death Mother         SCD    Hyperlipidemia Mother     Heart attack Mother     Breast cancer Mother 79    Diabetes type II Mother     Arrhythmia Father pacemaker/ defib    Clotting disorder Father         eliquis    Heart failure Father     Heart disease Father     Melanoma Father     Cancer Father         bladder cancer    Diabetes type II Father     Asthma Father     BRCA1 Negative Sister     Breast cancer Sister 44    Thyroid cancer Sister     No Known Problems Sister     No Known Problems Sister     Thyroid cancer Sister     No Known Problems Maternal Grandmother     No Known Problems Maternal Grandfather     No Known Problems Paternal Grandmother     No Known Problems Paternal Grandfather     Breast cancer Maternal Aunt         unknown age    Esophageal cancer Maternal Aunt 61    Cancer Maternal Aunt         unknown age or type    No Known Problems Maternal Aunt     Colon cancer Maternal Uncle         unknwon age    Pancreatic cancer Maternal Uncle 61    Lung cancer Paternal Aunt         unknonw age- in her 80s    Anuerysm Neg Hx      Past Surgical History:   Procedure Laterality Date    BREAST EXCISIONAL BIOPSY Right     benign    CARDIAC SURGERY  2021    COLONOSCOPY  2018    Colonoscpoy due in 3 years    EGD  2018    MAMMO (HISTORICAL) Bilateral 2020    MAMMO (HISTORICAL) Bilateral 2022    MAMMO NEEDLE LOCALIZATION RIGHT (ALL INC) Right 2009    WISDOM TOOTH EXTRACTION         Current Outpatient Medications:     aspirin 81 MG tablet, Take 1 tablet by mouth daily, Disp: , Rfl:     atorvastatin (LIPITOR) 80 mg tablet, TAKE 1 TABLET BY MOUTH EVERY DAY WITH DINNER, Disp: 90 tablet, Rfl: 1    CALCIUM PO, 1 po daily, Disp: , Rfl:     carvedilol (COREG) 6.25 mg tablet, Take 0.5 tablets (3.125 mg total) by mouth 2 (two) times a day with meals, Disp: 180 tablet, Rfl: 4    Cholecalciferol (Vitamin D3) 50 MCG (2000 UT) capsule, Take 1 capsule (2,000 Units total) by mouth daily, Disp: 30 capsule, Rfl: 5    clopidogrel (PLAVIX) 75 mg tablet, Take 1 tablet (75 mg total) by mouth daily, Disp: 90 tablet, Rfl: 3    Continuous Blood Gluc  (FreeStyle [place]and[place] 2 Young) CORBY, FreeStyle Feng 2 Young  USE AS DIRECTED, Disp: , Rfl:     Continuous Blood Gluc Sensor (FreeStyle Feng 2 Sensor) MISC, Use 1 applicator every 14 (fourteen) days, Disp: 2 each, Rfl: 5    dulaglutide (Trulicity) 1.5 GH/8.1UP injection, Inject 0.5 mL (1.5 mg total) under the skin once a week, Disp: 6 mL, Rfl: 1    furosemide (LASIX) 20 mg tablet, TAKE 1 TABLET BY MOUTH EVERY DAY, Disp: 90 tablet, Rfl: 1    Insulin Pen Needle (BD Pen Needle Brittney U/F) 32G X 4 MM MISC, Use daily as needed with insulin pen, Disp: 100 each, Rfl: 2    Jardiance 10 MG TABS tablet, TAKE 1 TABLET BY MOUTH EVERY DAY IN THE MORNING, Disp: 90 tablet, Rfl: 3    Lantus SoloStar 100 units/mL SOPN, Inject 30 Units under the skin daily at bedtime, Disp: , Rfl:     metFORMIN (GLUCOPHAGE) 1000 MG tablet, TAKE 1 TABLET BY MOUTH TWICE A DAY WITH MEALS, Disp: 180 tablet, Rfl: 3    sacubitril-valsartan (Entresto) 49-51 MG TABS, Take 1 tablet by mouth 2 (two) times a day, Disp: 180 tablet, Rfl: 3  Allergies   Allergen Reactions    Dust Mite Extract Other (See Comments)     Vitals:    10/17/23 0932   BP: 128/62   BP Location: Left arm   Patient Position: Sitting   Cuff Size: Adult   Pulse: 76   SpO2: 99%   Weight: 72.8 kg (160 lb 6.4 oz)     Weight (last 2 days)       Date/Time Weight    10/17/23 0932 72.8 (160.4)           Blood pressure 128/62, pulse 76, weight 72.8 kg (160 lb 6.4 oz), SpO2 99 %, not currently breastfeeding., Body mass index is 25.89 kg/m².     Labs:  Appointment on 07/12/2023   Component Date Value    WBC 07/12/2023 5.50     RBC 07/12/2023 4.04     Hemoglobin 07/12/2023 12.7     Hematocrit 07/12/2023 38.4     MCV 07/12/2023 95     MCH 07/12/2023 31.4     MCHC 07/12/2023 33.1     RDW 07/12/2023 12.3     MPV 07/12/2023 11.2     Platelets 79/87/8466 157     nRBC 07/12/2023 0     Neutrophils Relative 07/12/2023 60     Immat GRANS % 07/12/2023 0     Lymphocytes Relative 07/12/2023 30 Monocytes Relative 07/12/2023 8     Eosinophils Relative 07/12/2023 2     Basophils Relative 07/12/2023 0     Neutrophils Absolute 07/12/2023 3.30     Immature Grans Absolute 07/12/2023 0.02     Lymphocytes Absolute 07/12/2023 1.64     Monocytes Absolute 07/12/2023 0.44     Eosinophils Absolute 07/12/2023 0.08     Basophils Absolute 07/12/2023 0.02     Cholesterol 07/12/2023 74     Triglycerides 07/12/2023 78     HDL, Direct 07/12/2023 29 (L)     LDL Calculated 07/12/2023 29     Non-HDL-Chol (CHOL-HDL) 07/12/2023 45     TSH 3RD GENERATON 07/12/2023 2. 352     Vit D, 25-Hydroxy 07/12/2023 32.6     Hemoglobin A1C 07/12/2023 11.0 (H)     EAG 07/12/2023 269      Imaging: Mammo screening bilateral w 3d & cad    Result Date: 9/27/2023  Narrative: DIAGNOSIS: Encounter for screening mammogram for malignant neoplasm of breast [Due in September 2023]  TECHNIQUE: Digital screening mammography was performed. Computer Aided Detection (CAD) analyzed all applicable images. COMPARISONS: Prior breast imaging dated: 09/14/2022, 07/28/2021, 05/04/2020, 05/02/2019, 04/09/2018, 07/28/2010, 11/11/2009, 05/20/2009, and 04/27/2009 RELEVANT HISTORY: Family Breast Cancer History: History of breast cancer in Mother, Sister, Maternal Aunt. Family Medical History: Family medical history includes BRCA1 Negative in sister, breast cancer in 3 relatives (maternal aunt, mother, sister), and colon cancer in maternal uncle. Personal History: No known relevant hormone history. Surgical history includes breast biopsy. No known relevant medical history. The patient is scheduled in a reminder system for screening mammography. 8-10% of cancers will be missed on mammography. Management of a palpable abnormality must be based on clinical grounds. Patients will be notified of their results via letter from our facility. Accredited by [employer] of [employer] and FDA.  RISK ASSESSMENT: 5 Year Tyrer-Cuzick: 7.6 % 10 Year Tyrer-Cuzick: 13.75 % Lifetime Tyrer-Cuzick: 27.08 % TISSUE DENSITY: The breasts are heterogeneously dense, which may obscure small masses. INDICATION: Ubaldo Castillo is a 70 y.o. female presenting for screening mammography. FINDINGS: There are no suspicious masses, grouped microcalcifications or areas of architectural distortion. The skin and nipple areolar complex are unremarkable. Impression: No mammographic evidence of malignancy. This patient has been identified as being at elevated risk for development of breast cancer based on the Tyrer-Cuzick model. She may benefit from supplemental screening. ASSESSMENT/BI-RADS CATEGORY: Left: 1 - Negative Right: 1 - Negative Overall: 1 - Negative RECOMMENDATION:      - Routine screening mammogram in 1 year for both breasts. Workstation ID: [id number]     DXA bone density spine hip and pelvis    Result Date: 9/27/2023  Narrative: DXA SCAN CLINICAL HISTORY: 70 years postmenopausal female. OTHER RISK FACTORS: Diabetes requiring insulin, Lasix therapy. PHARMACOLOGIC THERAPY FOR OSTEOPOROSIS:  None. TECHNIQUE: Bone densitometry was performed using a [employer] W bone densitometer. Regions of interest appear properly placed. COMPARISON: There are no prior DXA studies performed on this unit for comparison. RESULTS: LUMBAR SPINE Level: L1-L3  (L4 vertebra excluded from analysis due to local structural abnormalities or artifact): BMD: 0.906 gm/cm2 T-score: -1.0 LEFT  TOTAL HIP: BMD: 0.783 gm/cm2 T-score: -1.3 LEFT  FEMORAL NECK: BMD: 0.671 gm/cm2 T score: -1.6     Impression: 1. Low bone mass (osteopenia). 2.  The 10 year risk of hip fracture is 1.1% with the 10 year risk of major osteoporotic fracture being 9.4% as calculated by the [facility] fracture risk assessment tool (FRAX, which is based on data generated by the [facility] for Metabolic Bone Diseases).  3.  The current NOF guidelines recommend treating patients with a T-score of -2.5 or less in the lumbar spine or hips, or in post-menopausal women and men over the age of 48 with low bone mass (osteopenia) and a FRAX 10 year risk score of >3% for hip fracture and/or >20% for major osteoporotic fracture. 4.  The NOF recommends follow-up DXA in 1-2 years after initiating therapy for osteoporosis and every 2 years thereafter. More frequent evaluation is appropriate for patients with conditions associated with rapid bone loss, such as glucocorticoid therapy. The interval between DXA screenings may be longer for individuals without major risk factors and initial T-score in the normal or upper low bone mass range. The FRAX algorithm has certain limitations: -FRAX has not been validated in patients currently or previously treated with pharmacotherapy for osteoporosis. In such patients, clinical judgment must be exercised in interpreting FRAX scores. -Prior hip, vertebral and humeral fragility fractures appear to confer greater risk of subsequent fracture than fractures at other sites (this is especially true for individuals with severe vertebral fractures), but quantification of this incremental risk is not possible with FRAX. -FRAX underestimates fracture risk in patients with history of multiple fragility fractures. -FRAX may underestimate fracture risk in patients with history of frequent falls. -It is not appropriate to use FRAX to monitor treatment response. WHO CLASSIFICATION: Normal (a T-score of -1.0 or higher) Low bone mineral density (a T-score of less than -1.0 but higher than -2.5) Osteoporosis (a T-score of -2.5 or less) Severe osteoporosis (a T-score of -2.5 or less with a fragility fracture) Workstation performed: [id number]       Review of Systems:  Review of Systems   Constitutional:  Negative for diaphoresis, fatigue, fever and unexpected weight change. HENT: Negative. Respiratory:  Negative for cough, shortness of breath and wheezing. Cardiovascular:  Negative for chest pain, palpitations and leg swelling. Gastrointestinal:  Negative for abdominal pain, diarrhea and nausea. Musculoskeletal:  Negative for gait problem and myalgias. Skin:  Negative for rash. Neurological:  Negative for dizziness and numbness. Psychiatric/Behavioral: Negative. Physical Exam:  Physical Exam  Constitutional:       Appearance: She is well-developed. HENT:      Head: Normocephalic and atraumatic. Eyes:      Pupils: Pupils are equal, round, and reactive to light. Neck:      Vascular: No JVD. Cardiovascular:      Rate and Rhythm: Regular rhythm. Pulses: Normal pulses. Carotid pulses are 2+ on the right side and 2+ on the left side. Heart sounds: S1 normal and S2 normal.   Pulmonary:      Effort: Pulmonary effort is normal.      Breath sounds: Normal breath sounds. No wheezing or rales. Abdominal:      General: Bowel sounds are normal.      Palpations: Abdomen is soft. Tenderness: There is no abdominal tenderness. Musculoskeletal:         General: No tenderness. Normal range of motion. Cervical back: Normal range of motion and neck supple. Skin:     General: Skin is warm. Neurological:      Mental Status: She is alert and oriented to person, place, and time. Cranial Nerves: No cranial nerve deficit. Deep Tendon Reflexes: Reflexes are normal and symmetric.

## 2023-11-05 DIAGNOSIS — I25.5 ISCHEMIC CARDIOMYOPATHY: ICD-10-CM

## 2023-11-06 RX ORDER — ATORVASTATIN CALCIUM 80 MG/1
TABLET, FILM COATED ORAL
Qty: 90 TABLET | Refills: 1 | Status: SHIPPED | OUTPATIENT
Start: 2023-11-06

## 2023-11-14 ENCOUNTER — APPOINTMENT (OUTPATIENT)
Dept: LAB | Facility: CLINIC | Age: 63
End: 2023-11-14
Payer: COMMERCIAL

## 2023-11-14 DIAGNOSIS — Z79.4 TYPE 2 DIABETES MELLITUS WITH HYPERGLYCEMIA, WITH LONG-TERM CURRENT USE OF INSULIN (HCC): ICD-10-CM

## 2023-11-14 DIAGNOSIS — E11.65 TYPE 2 DIABETES MELLITUS WITH HYPERGLYCEMIA, WITH LONG-TERM CURRENT USE OF INSULIN (HCC): ICD-10-CM

## 2023-11-14 PROBLEM — R80.9 MICROALBUMINURIA: Status: RESOLVED | Noted: 2020-10-15 | Resolved: 2023-11-14

## 2023-11-14 PROBLEM — M85.89 OSTEOPENIA OF MULTIPLE SITES: Status: ACTIVE | Noted: 2023-11-14

## 2023-11-14 LAB
CREAT UR-MCNC: 83.9 MG/DL
EST. AVERAGE GLUCOSE BLD GHB EST-MCNC: 151 MG/DL
HBA1C MFR BLD: 6.9 %
MICROALBUMIN UR-MCNC: 17.1 MG/L
MICROALBUMIN/CREAT 24H UR: 20 MG/G CREATININE (ref 0–30)

## 2023-11-14 PROCEDURE — 83036 HEMOGLOBIN GLYCOSYLATED A1C: CPT

## 2023-11-14 PROCEDURE — 82043 UR ALBUMIN QUANTITATIVE: CPT

## 2023-11-14 PROCEDURE — 36415 COLL VENOUS BLD VENIPUNCTURE: CPT

## 2023-11-14 PROCEDURE — 82570 ASSAY OF URINE CREATININE: CPT

## 2023-11-15 ENCOUNTER — OFFICE VISIT (OUTPATIENT)
Dept: FAMILY MEDICINE CLINIC | Facility: CLINIC | Age: 63
End: 2023-11-15
Payer: COMMERCIAL

## 2023-11-15 ENCOUNTER — TELEPHONE (OUTPATIENT)
Dept: CARDIOLOGY CLINIC | Facility: CLINIC | Age: 63
End: 2023-11-15

## 2023-11-15 VITALS
WEIGHT: 159 LBS | BODY MASS INDEX: 26.49 KG/M2 | TEMPERATURE: 97.2 F | OXYGEN SATURATION: 100 % | SYSTOLIC BLOOD PRESSURE: 110 MMHG | HEIGHT: 65 IN | RESPIRATION RATE: 16 BRPM | HEART RATE: 74 BPM | DIASTOLIC BLOOD PRESSURE: 66 MMHG

## 2023-11-15 DIAGNOSIS — E55.9 VITAMIN D DEFICIENCY: ICD-10-CM

## 2023-11-15 DIAGNOSIS — E78.2 MIXED HYPERLIPIDEMIA: ICD-10-CM

## 2023-11-15 DIAGNOSIS — E11.9 TYPE 2 DIABETES MELLITUS WITHOUT COMPLICATION, WITH LONG-TERM CURRENT USE OF INSULIN (HCC): ICD-10-CM

## 2023-11-15 DIAGNOSIS — M85.89 OSTEOPENIA OF MULTIPLE SITES: ICD-10-CM

## 2023-11-15 DIAGNOSIS — I25.10 CORONARY ARTERY DISEASE INVOLVING NATIVE CORONARY ARTERY OF NATIVE HEART WITHOUT ANGINA PECTORIS: ICD-10-CM

## 2023-11-15 DIAGNOSIS — Z79.4 TYPE 2 DIABETES MELLITUS WITHOUT COMPLICATION, WITH LONG-TERM CURRENT USE OF INSULIN (HCC): ICD-10-CM

## 2023-11-15 DIAGNOSIS — E11.59 TYPE 2 DIABETES MELLITUS WITH OTHER CIRCULATORY COMPLICATION, UNSPECIFIED WHETHER LONG TERM INSULIN USE (HCC): ICD-10-CM

## 2023-11-15 DIAGNOSIS — Z12.31 ENCOUNTER FOR SCREENING MAMMOGRAM FOR MALIGNANT NEOPLASM OF BREAST: ICD-10-CM

## 2023-11-15 DIAGNOSIS — I25.5 ISCHEMIC CARDIOMYOPATHY: ICD-10-CM

## 2023-11-15 DIAGNOSIS — E11.42 DIABETIC PERIPHERAL NEUROPATHY ASSOCIATED WITH TYPE 2 DIABETES MELLITUS (HCC): Primary | ICD-10-CM

## 2023-11-15 DIAGNOSIS — I10 ESSENTIAL HYPERTENSION: ICD-10-CM

## 2023-11-15 DIAGNOSIS — Z23 NEED FOR COVID-19 VACCINE: ICD-10-CM

## 2023-11-15 PROCEDURE — 90480 ADMN SARSCOV2 VAC 1/ONLY CMP: CPT | Performed by: INTERNAL MEDICINE

## 2023-11-15 PROCEDURE — 99214 OFFICE O/P EST MOD 30 MIN: CPT | Performed by: INTERNAL MEDICINE

## 2023-11-15 PROCEDURE — 91320 SARSCV2 VAC 30MCG TRS-SUC IM: CPT | Performed by: INTERNAL MEDICINE

## 2023-11-15 NOTE — PROGRESS NOTES
Assessment/Plan:Bruce is doing quite well. Her A1c has come down from 11 to 6.9% on jardiance, metformin, lantus and Trulicity. The jardiance is likely helping with her heart failure too-she is watching diet some more and is trying to get in to an exercise program-non smoker-she is going to be seeing Endocrinology and Ophthalmology in the near future, already had her Flu shot and will get the covid booster today-on Entresto and EF has gotten up to 55%, still has some lower extremity edema, likely due to some diastolic dysfunction and sodium intake-she is going to try and cut back on sodium-Bruce is utd on her mammogram, DEXA, and colonoscopy-will check labs next visit-she gets her second dose of Shngrix this Friday         Problem List Items Addressed This Visit       Mixed hyperlipidemia    Type 2 diabetes mellitus ([address])    Relevant Medications    Empagliflozin (Jardiance) 10 MG TABS tablet    CAD (coronary artery disease)    New Ischemic Cardiomyopathy/Systolic CHF    Relevant Medications    Empagliflozin (Jardiance) 10 MG TABS tablet    Essential hypertension    Vitamin D deficiency    Diabetic peripheral neuropathy associated with type 2 diabetes mellitus ([address]) - Primary    Relevant Medications    Empagliflozin (Jardiance) 10 MG TABS tablet     Other Visit Diagnoses       Encounter for screening mammogram for malignant neoplasm of breast        Had normal mammogram in September    Need for COVID-19 vaccine        Relevant Orders    COVID-19 Pfizer mRNA vaccine 12 yr and older (GREY cap)              Subjective:      Patient ID: Frankie Grigsby is a 70 y.o. female.     Pt here for routine follow up-hx of DM II, CAD s/p stent in 2021, HTN, HL, retiinopathy-Bruce is doing really well, her A1c has come down from 11% to 6.9% and she is compliant with her meds-is seeing Endo, also followed by Cardiology and Ophthalmology-her EF went from 37% after her cardiac episode in 2021 and on Entresto is now up to 55% -still working but is considering [facility] in a year or so    Diabetes    Coronary Artery Disease  Risk factors include hyperlipidemia. Hypertension    Hyperlipidemia        The following portions of the patient's history were reviewed and updated as appropriate:   Past Medical History:  She has a past medical history of Colon polyp, Coronary artery disease, Depression, Diabetes mellitus ([address]), Dizziness, Edema, Hyperlipidemia, Hypertension, Ischemic cardiomyopathy, Left bundle branch block, Type 2 diabetes mellitus ([address]), Vitamin D deficiency, and Wears glasses. ,  _______________________________________________________________________  Medical Problems:  does not have any pertinent problems on file.,  _______________________________________________________________________  Past Surgical History:   has a past surgical history that includes Colonoscopy (04/11/2018); Mammo (historical) (Bilateral, 05/04/2020); [place] tooth extraction; EGD (04/11/2018); Mammo needle localization right (all inc) (Right, 07/08/2009); Breast excisional biopsy (Right, 2009); Mammo (historical) (Bilateral, 09/14/2022); and Cardiac surgery (9/2/2021). ,  _______________________________________________________________________  Family History:  family history includes Arrhythmia in her father; Asthma in her father; BRCA1 Negative in her sister; Breast cancer in her maternal aunt; Breast cancer (age of onset: 44) in her sister; Breast cancer (age of onset: 79) in her mother; Cancer in her father and maternal aunt; Clotting disorder in her father; Colon cancer in her maternal uncle; Diabetes type II in her father and mother; Esophageal cancer (age of onset: 61) in her maternal aunt;  Heart attack in her mother; Heart disease in her father; Heart failure in her father; Hyperlipidemia in her mother; Hypertension in her mother; Lung cancer in her paternal aunt; Melanoma in her father; No Known Problems in her maternal aunt, maternal grandfather, maternal grandmother, paternal grandfather, paternal grandmother, sister, and sister; Pancreatic cancer (age of onset: 61) in her maternal uncle; Sudden death in her mother; Thyroid cancer in her sister and sister. ,  _______________________________________________________________________  Social History:   reports that she has never smoked. She has never used smokeless tobacco. She reports that she does not drink alcohol and does not use drugs. ,  _______________________________________________________________________  Allergies:  is allergic to dust mite extract. .  _______________________________________________________________________  Current Outpatient Medications   Medication Sig Dispense Refill    aspirin 81 MG tablet Take 1 tablet by mouth daily      atorvastatin (LIPITOR) 80 mg tablet TAKE 1 TABLET BY MOUTH EVERY DAY WITH DINNER 90 tablet 1    CALCIUM PO 1 po daily      carvedilol (COREG) 6.25 mg tablet Take 0.5 tablets (3.125 mg total) by mouth 2 (two) times a day with meals 180 tablet 4    Cholecalciferol (Vitamin D3) 50 MCG (2000 UT) capsule Take 1 capsule (2,000 Units total) by mouth daily 30 capsule 5    clopidogrel (PLAVIX) 75 mg tablet Take 1 tablet (75 mg total) by mouth daily 90 tablet 3    Continuous Blood Gluc  (FreeStyle Luke 2 [place]) CORBY FreeStyle Luke 2 [place]   USE AS DIRECTED      Continuous Blood Gluc Sensor (FreeStyle Feng 2 Sensor) MISC Use 1 applicator every 14 (fourteen) days 2 each 5    dulaglutide (Trulicity) 1.5 IQ/0.2MB injection Inject 0.5 mL (1.5 mg total) under the skin once a week 6 mL 1    Empagliflozin (Jardiance) 10 MG TABS tablet Take 1 tablet (10 mg total) by mouth every morning 90 tablet 3    furosemide (LASIX) 20 mg tablet TAKE 1 TABLET BY MOUTH EVERY DAY 90 tablet 1    Insulin Pen Needle (BD Pen Needle Brittney U/F) 32G X 4 MM MISC Use daily as needed with insulin pen 100 each 2    Lantus SoloStar 100 units/mL SOPN Inject 30 Units under the skin daily at bedtime metFORMIN (GLUCOPHAGE) 1000 MG tablet TAKE 1 TABLET BY MOUTH TWICE A DAY WITH MEALS 180 tablet 3    sacubitril-valsartan (Entresto) 49-51 MG TABS Take 1 tablet by mouth 2 (two) times a day 180 tablet 3     No current facility-administered medications for this visit.     _______________________________________________________________________  Review of Systems   Constitutional: Negative. HENT: Negative. Respiratory: Negative. Cardiovascular: Negative. Gastrointestinal: Negative. Musculoskeletal: Negative. Neurological: Negative. Hematological: Negative. Psychiatric/Behavioral: Negative. Objective:  Vitals:    11/15/23 0928   BP: 110/66   BP Location: Left arm   Patient Position: Sitting   Cuff Size: Standard   Pulse: 74   Resp: 16   Temp: (!) 97.2 °F (36.2 °C)   TempSrc: Tympanic   SpO2: 100%   Weight: 72.1 kg (159 lb)   Height: 5' 5" (1.651 m)     Body mass index is 26.46 kg/m². Physical Exam  Constitutional:       Appearance: Normal appearance. HENT:      Head: Normocephalic and atraumatic. Right Ear: External ear normal.      Left Ear: External ear normal.      Nose: Nose normal.      Mouth/Throat:      Mouth: Mucous membranes are moist.   Eyes:      Extraocular Movements: Extraocular movements intact. Cardiovascular:      Rate and Rhythm: Normal rate and regular rhythm. Heart sounds: Normal heart sounds. Pulmonary:      Effort: Pulmonary effort is normal.      Breath sounds: Normal breath sounds. Musculoskeletal:      Cervical back: Normal range of motion and neck supple. Right lower leg: Edema present. Left lower leg: Edema present. Skin:     Capillary Refill: Capillary refill takes less than 2 seconds. Comments: Venous stasis changes R > L   Neurological:      General: No focal deficit present. Mental Status: She is alert and oriented to person, place, and time.    Psychiatric:         Mood and Affect: Mood normal. Behavior: Behavior normal.         Thought Content:  Thought content normal.         Judgment: Judgment normal.

## 2023-11-29 ENCOUNTER — OFFICE VISIT (OUTPATIENT)
Dept: ENDOCRINOLOGY | Facility: CLINIC | Age: 63
End: 2023-11-29
Payer: COMMERCIAL

## 2023-11-29 VITALS
RESPIRATION RATE: 12 BRPM | HEIGHT: 65 IN | SYSTOLIC BLOOD PRESSURE: 124 MMHG | WEIGHT: 161 LBS | TEMPERATURE: 97.5 F | DIASTOLIC BLOOD PRESSURE: 74 MMHG | BODY MASS INDEX: 26.82 KG/M2

## 2023-11-29 DIAGNOSIS — E11.42 DIABETIC PERIPHERAL NEUROPATHY ASSOCIATED WITH TYPE 2 DIABETES MELLITUS (HCC): ICD-10-CM

## 2023-11-29 DIAGNOSIS — E78.2 MIXED HYPERLIPIDEMIA: ICD-10-CM

## 2023-11-29 DIAGNOSIS — E55.9 VITAMIN D DEFICIENCY: ICD-10-CM

## 2023-11-29 DIAGNOSIS — Z79.4 TYPE 2 DIABETES MELLITUS WITH HYPERGLYCEMIA, WITH LONG-TERM CURRENT USE OF INSULIN (HCC): Primary | ICD-10-CM

## 2023-11-29 DIAGNOSIS — E11.65 TYPE 2 DIABETES MELLITUS WITH HYPERGLYCEMIA, WITH LONG-TERM CURRENT USE OF INSULIN (HCC): Primary | ICD-10-CM

## 2023-11-29 PROCEDURE — 95251 CONT GLUC MNTR ANALYSIS I&R: CPT | Performed by: INTERNAL MEDICINE

## 2023-11-29 PROCEDURE — 99214 OFFICE O/P EST MOD 30 MIN: CPT | Performed by: INTERNAL MEDICINE

## 2023-11-29 RX ORDER — FLASH GLUCOSE SENSOR
1 KIT MISCELLANEOUS CONTINUOUS
Qty: 1 EACH | Refills: 0 | Status: SHIPPED | OUTPATIENT
Start: 2023-11-29

## 2023-11-29 RX ORDER — BLOOD-GLUCOSE SENSOR
1 EACH MISCELLANEOUS
Qty: 2 EACH | Refills: 5 | Status: SHIPPED | OUTPATIENT
Start: 2023-11-29

## 2023-11-29 NOTE — PROGRESS NOTES
Follow-up Patient Progress Note      CC: diabetes     History of Present Illness:   61yr female with type 2 diabetes since 2009, retinopathy, microalbuminuria, HTN, HLD, CAD s/p HUNTER 9/21, ischemic CMpathy, rt paraclinoid meningioma diagnosed during recent hospitalization and fatigue. Last visit was 8/29/23. Since last visit, she lost 1 lbs. She just procured Trulcity last week. CGM review: reviewed device personally. Data was inaccurate on download. Device: torito    dates 11/23/23 -11/29/23          Usage: 80%     Av glu: 136 mg/dL        CV 35%  TIR 88%          TAR 12%         TBR 0%  G;ycemic patterns: mild postprandial hyperglycemia but mostly normoglycemia. Current meds:  Jardiance 10 mg qdaily  Metformin 1000mg PO BID  Lantus 04Y qhs  Trulicity 7.2TE weekly      Opthamology: Follows [facility]  Podiatry: yes  Influenza: yes, COVID - yes  Dental: No issues  Pancreatitis: No     Ace/ARB: entresto  Statin:lipitor  Thyroid issues: no  FH: brother and 2 sisters, mother and father have diabetes.     Patient Active Problem List   Diagnosis    Fatigue due to excessive exertion    Mixed hyperlipidemia    Abnormal stress echo    Type 2 diabetes mellitus (HCC)    Bilateral leg edema    Colon polyps    Gastroesophageal reflux disease with esophagitis without hemorrhage    Dizziness    Left bundle branch block    CAD (coronary artery disease)    New Ischemic Cardiomyopathy/Systolic CHF    Right paraclinoid meningioma    Essential hypertension    Wound of left leg    Vitamin D deficiency    Diabetic peripheral neuropathy associated with type 2 diabetes mellitus (HCC)    Nonischemic cardiomyopathy (HCC)    Osteopenia of multiple sites     Past Medical History:   Diagnosis Date    Colon polyp     Coronary artery disease     Depression     Diabetes mellitus (HCC)     Dizziness     Edema     Hyperlipidemia     Hypertension     Ischemic cardiomyopathy     Left bundle branch block     Type 2 diabetes mellitus ([address])     Vitamin D deficiency     Wears glasses       Past Surgical History:   Procedure Laterality Date    BREAST EXCISIONAL BIOPSY Right     benign    CARDIAC SURGERY  2021    COLONOSCOPY  2018    Colonoscpoy due in 3 years    EGD  2018    MAMMO (HISTORICAL) Bilateral 2020    MAMMO (HISTORICAL) Bilateral 2022    MAMMO NEEDLE LOCALIZATION RIGHT (ALL INC) Right 2009    WISDOM TOOTH EXTRACTION        Family History   Problem Relation Age of Onset    Hypertension Mother     Sudden death Mother         SCD    Hyperlipidemia Mother     Heart attack Mother     Breast cancer Mother 79    Diabetes type II Mother     Arrhythmia Father         pacemaker/ defib    Clotting disorder Father         eliquis    Heart failure Father     Heart disease Father     Melanoma Father     Cancer Father         bladder cancer    Diabetes type II Father     Asthma Father     BRCA1 Negative Sister     Breast cancer Sister 44    Thyroid cancer Sister     No Known Problems Sister     No Known Problems Sister     Thyroid cancer Sister     No Known Problems Maternal Grandmother     No Known Problems Maternal Grandfather     No Known Problems Paternal Grandmother     No Known Problems Paternal Grandfather     Breast cancer Maternal Aunt         unknown age    Esophageal cancer Maternal Aunt 61    Cancer Maternal Aunt         unknown age or type    No Known Problems Maternal Aunt     Colon cancer Maternal Uncle         unknwon age    Pancreatic cancer Maternal Uncle 61    Lung cancer Paternal Aunt         unknonw age- in her 80s    Anuerysm Neg Hx      Social History     Tobacco Use    Smoking status: Never    Smokeless tobacco: Never   Substance Use Topics    Alcohol use: No     Allergies   Allergen Reactions    Dust Mite Extract Other (See Comments)         Current Outpatient Medications:     aspirin 81 MG tablet, Take 1 tablet by mouth daily, Disp: , Rfl:     CALCIUM PO, 1 po daily, Disp: , Rfl:     carvedilol (COREG) 6.25 mg tablet, Take 0.5 tablets (3.125 mg total) by mouth 2 (two) times a day with meals, Disp: 180 tablet, Rfl: 4    Cholecalciferol (Vitamin D3) 50 MCG (2000 UT) capsule, Take 1 capsule (2,000 Units total) by mouth daily, Disp: 30 capsule, Rfl: 5    clopidogrel (PLAVIX) 75 mg tablet, Take 1 tablet (75 mg total) by mouth daily, Disp: 90 tablet, Rfl: 3    Continuous Blood Gluc  (FreeStyle [place] 2 [place]) CORBY, FreeStyle Feng 2 [place]  USE AS DIRECTED, Disp: , Rfl:     Continuous Blood Gluc Sensor (FreeStyle Feng 2 Sensor) MISC, Use 1 applicator every 14 (fourteen) days, Disp: 2 each, Rfl: 5    dulaglutide (Trulicity) 1.5 IA/4.3MQ injection, Inject 0.5 mL (1.5 mg total) under the skin once a week, Disp: 6 mL, Rfl: 1    Empagliflozin (Jardiance) 10 MG TABS tablet, Take 1 tablet (10 mg total) by mouth every morning, Disp: 90 tablet, Rfl: 3    furosemide (LASIX) 20 mg tablet, TAKE 1 TABLET BY MOUTH EVERY DAY, Disp: 90 tablet, Rfl: 1    Insulin Pen Needle (BD Pen Needle Brittney U/F) 32G X 4 MM MISC, Use daily as needed with insulin pen, Disp: 100 each, Rfl: 2    Lantus SoloStar 100 units/mL SOPN, Inject 30 Units under the skin daily at bedtime, Disp: , Rfl:     metFORMIN (GLUCOPHAGE) 1000 MG tablet, TAKE 1 TABLET BY MOUTH TWICE A DAY WITH MEALS, Disp: 180 tablet, Rfl: 3    sacubitril-valsartan (Entresto) 49-51 MG TABS, Take 1 tablet by mouth 2 (two) times a day, Disp: 180 tablet, Rfl: 3    atorvastatin (LIPITOR) 80 mg tablet, TAKE 1 TABLET BY MOUTH EVERY DAY WITH DINNER, Disp: 90 tablet, Rfl: 1    Review of Systems   HENT: Negative. Eyes: Negative. Respiratory: Negative. Cardiovascular: Negative. Gastrointestinal: Negative. Endocrine: Negative. Musculoskeletal: Negative. Skin: Negative. Allergic/Immunologic: Negative. Neurological: Negative. Hematological: Negative. Psychiatric/Behavioral: Negative.          Physical Exam:  Body mass index is 26.79 kg/m². Wt 73 kg (161 lb)   BMI 26.79 kg/m²    Vitals:    11/29/23 1004   Weight: 73 kg (161 lb)        Physical Exam  Constitutional:       General: She is not in acute distress. Appearance: She is well-developed. She is not ill-appearing. HENT:      Head: Normocephalic and atraumatic. Nose: Nose normal.      Mouth/Throat:      Pharynx: Oropharynx is clear. Eyes:      Extraocular Movements: Extraocular movements intact. Conjunctiva/sclera: Conjunctivae normal.   Neck:      Thyroid: No thyromegaly. Cardiovascular:      Rate and Rhythm: Normal rate. Pulmonary:      Effort: Pulmonary effort is normal.   Musculoskeletal:         General: No deformity. Cervical back: Normal range of motion. Skin:     Capillary Refill: Capillary refill takes less than 2 seconds. Coloration: Skin is not pale. Findings: No rash. Neurological:      Mental Status: She is alert and oriented to person, place, and time.    Psychiatric:         Behavior: Behavior normal.         Labs:   Lab Results   Component Value Date    HGBA1C 6.9 (H) 11/14/2023       Lab Results   Component Value Date    QSJ1XTPUPTRI 2.352 07/12/2023       Lab Results   Component Value Date    CREATININE 0.85 03/22/2023    CREATININE 0.89 08/18/2022    CREATININE 0.82 06/23/2022    BUN 27 (H) 03/22/2023    K 4.0 03/22/2023     03/22/2023    CO2 28 03/22/2023     eGFR   Date Value Ref Range Status   03/22/2023 72 ml/min/1.73sq m Final       Lab Results   Component Value Date    ALT 32 03/22/2023    AST 22 03/22/2023    ALKPHOS 52 03/22/2023       Lab Results   Component Value Date    CHOLESTEROL 74 07/12/2023    CHOLESTEROL 89 08/18/2022    CHOLESTEROL 70 10/28/2021     Lab Results   Component Value Date    HDL 29 (L) 07/12/2023    HDL 30 (L) 08/18/2022    HDL 28 (L) 10/28/2021     Lab Results   Component Value Date    TRIG 78 07/12/2023    TRIG 80 08/18/2022    TRIG 72 10/28/2021     Lab Results   Component Value Date [address] 45 07/12/2023    [address] 59 08/18/2022    [address] 42 10/28/2021         Impression:  1. Type 2 diabetes mellitus with hyperglycemia, with long-term current use of insulin ([address])    2. Diabetic peripheral neuropathy associated with type 2 diabetes mellitus ([address])    3. Mixed hyperlipidemia    4. Vitamin D deficiency         Plan:    Whitney Flores was seen today for follow-up. Diagnoses and all orders for this visit:    Type 2 diabetes mellitus with hyperglycemia, with long-term current use of insulin ([address]). She is improved significantly with A1c 6.9%. prior was 11%. TIR was 88% for last 1 week AGP. She reports difficulty scanning feng 2 overnight. Today we agreed to switch to feng 3 to improve quality of life. Continue metformin 4605DC po bid, Trulicity 0.3LU weekly, jardiance 10mg qdaily and reduce lantus 25u qhs. Advised to continue reducing lantus by 5 units every 2 weeks if glucose levels on sensors remain <150mg/dL fasting. Send feng data in 6 weeks for dose adjustments. Follow up in 3 months.    -     Hemoglobin A1C; Future  -     Continuous Blood Gluc Sensor (FreeStyle Feng 3 Sensor) MISC; Use 1 Units every 14 (fourteen) days  -     Continuous Blood Gluc  (FreeStyle Feng [place]) CORBY; Use 1 Units continuous  -     Ambulatory referral to Diabetic Education; Future -for feng 3    Diabetic peripheral neuropathy associated with type 2 diabetes mellitus ([address])    Mixed hyperlipidemia. Continue lipitor 80mg qhs. Last non HDL-c was 45mg/dL. Vitamin D deficiency. Take vit D3 5000IU daily OTC. I have spent 32 minutes with patient today in which greater than 50% of this time was spent in counseling/coordination of care. Discussed with the patient and all questioned fully answered. She will call me if any problems arise.     Educated/ Counseled patient on diagnostic test results, prognosis, risk vs benefit of treatment options, importance of treatment compliance, healthy life and lifestyle choices.       [facility]

## 2023-12-14 ENCOUNTER — ESTABLISHED COMPREHENSIVE EXAM (OUTPATIENT)
Dept: URBAN - METROPOLITAN AREA CLINIC 6 | Facility: CLINIC | Age: 63
End: 2023-12-14

## 2023-12-14 DIAGNOSIS — H25.13: ICD-10-CM

## 2023-12-14 DIAGNOSIS — Z79.4: ICD-10-CM

## 2023-12-14 DIAGNOSIS — E11.3493: ICD-10-CM

## 2023-12-14 PROCEDURE — 92014 COMPRE OPH EXAM EST PT 1/>: CPT

## 2023-12-14 ASSESSMENT — TONOMETRY
OD_IOP_MMHG: 12
OS_IOP_MMHG: 11

## 2023-12-14 ASSESSMENT — VISUAL ACUITY
OD_CC: 20/40+2
OS_CC: 20/30-2

## 2023-12-15 DIAGNOSIS — I25.5 ISCHEMIC CARDIOMYOPATHY: ICD-10-CM

## 2023-12-15 DIAGNOSIS — R60.0 LOCALIZED EDEMA: ICD-10-CM

## 2023-12-15 RX ORDER — SACUBITRIL AND VALSARTAN 49; 51 MG/1; MG/1
1 TABLET, FILM COATED ORAL 2 TIMES DAILY
Qty: 180 TABLET | Refills: 3 | Status: SHIPPED | OUTPATIENT
Start: 2023-12-15

## 2023-12-15 RX ORDER — FUROSEMIDE 20 MG/1
TABLET ORAL
Qty: 90 TABLET | Refills: 1 | Status: SHIPPED | OUTPATIENT
Start: 2023-12-15

## 2023-12-29 ENCOUNTER — TELEPHONE (OUTPATIENT)
Dept: ENDOCRINOLOGY | Facility: CLINIC | Age: 63
End: 2023-12-29

## 2023-12-29 NOTE — TELEPHONE ENCOUNTER
Patient called left  re: Trulicity not in stock at pharmacy. Requesting if we can send in alternative or re-route to different pharmacy. Patient would like a call back to discuss options.

## 2023-12-30 DIAGNOSIS — E11.65 TYPE 2 DIABETES MELLITUS WITH HYPERGLYCEMIA, WITH LONG-TERM CURRENT USE OF INSULIN (HCC): ICD-10-CM

## 2023-12-30 DIAGNOSIS — Z79.4 TYPE 2 DIABETES MELLITUS WITH HYPERGLYCEMIA, WITH LONG-TERM CURRENT USE OF INSULIN (HCC): ICD-10-CM

## 2024-01-11 DIAGNOSIS — Z79.4 TYPE 2 DIABETES MELLITUS WITH HYPERGLYCEMIA, WITH LONG-TERM CURRENT USE OF INSULIN (HCC): ICD-10-CM

## 2024-01-11 DIAGNOSIS — E11.65 TYPE 2 DIABETES MELLITUS WITH HYPERGLYCEMIA, WITH LONG-TERM CURRENT USE OF INSULIN (HCC): ICD-10-CM

## 2024-01-12 RX ORDER — BLOOD-GLUCOSE SENSOR
1 EACH MISCELLANEOUS
Qty: 6 EACH | Refills: 2 | Status: SHIPPED | OUTPATIENT
Start: 2024-01-12

## 2024-01-21 DIAGNOSIS — I25.5 ISCHEMIC CARDIOMYOPATHY: ICD-10-CM

## 2024-01-21 DIAGNOSIS — I25.110 CORONARY ARTERY DISEASE INVOLVING NATIVE HEART WITH UNSTABLE ANGINA PECTORIS, UNSPECIFIED VESSEL OR LESION TYPE (HCC): ICD-10-CM

## 2024-01-24 ENCOUNTER — TELEPHONE (OUTPATIENT)
Age: 64
End: 2024-01-24

## 2024-01-24 NOTE — TELEPHONE ENCOUNTER
Celine nurse  for [employer] called is there any care coordination needs or recourse needs you feel the patient might need   please call celine at [phone] extension [phone]  thank you

## 2024-01-27 RX ORDER — CLOPIDOGREL BISULFATE 75 MG/1
75 TABLET ORAL DAILY
Qty: 90 TABLET | Refills: 3 | Status: SHIPPED | OUTPATIENT
Start: 2024-01-27

## 2024-03-01 ENCOUNTER — RA CDI HCC (OUTPATIENT)
Dept: OTHER | Facility: HOSPITAL | Age: 64
End: 2024-03-01

## 2024-03-01 ENCOUNTER — APPOINTMENT (OUTPATIENT)
Dept: LAB | Facility: HOSPITAL | Age: 64
End: 2024-03-01
Payer: COMMERCIAL

## 2024-03-01 DIAGNOSIS — Z79.4 TYPE 2 DIABETES MELLITUS WITH HYPERGLYCEMIA, WITH LONG-TERM CURRENT USE OF INSULIN (HCC): ICD-10-CM

## 2024-03-01 DIAGNOSIS — D32.9 MENINGIOMA (HCC): ICD-10-CM

## 2024-03-01 DIAGNOSIS — E11.65 TYPE 2 DIABETES MELLITUS WITH HYPERGLYCEMIA, WITH LONG-TERM CURRENT USE OF INSULIN (HCC): ICD-10-CM

## 2024-03-01 LAB
BUN SERPL-MCNC: 24 MG/DL (ref 5–25)
CREAT SERPL-MCNC: 0.91 MG/DL (ref 0.6–1.3)
EST. AVERAGE GLUCOSE BLD GHB EST-MCNC: 229 MG/DL
GFR SERPL CREATININE-BSD FRML MDRD: 66 ML/MIN/1.73SQ M
HBA1C MFR BLD: 9.6 %

## 2024-03-01 PROCEDURE — 84520 ASSAY OF UREA NITROGEN: CPT

## 2024-03-01 PROCEDURE — 83036 HEMOGLOBIN GLYCOSYLATED A1C: CPT

## 2024-03-01 PROCEDURE — 82565 ASSAY OF CREATININE: CPT

## 2024-03-01 PROCEDURE — 36415 COLL VENOUS BLD VENIPUNCTURE: CPT

## 2024-03-01 NOTE — PROGRESS NOTES
HCC coding opportunities       Chart Reviewed number of suggestions sent to Provider: 2     Patients Insurance     Commercial Insurance: [employer]       1) E11.3213: Type 2 diabetes mellitus with mild nonproliferative diabetic retinopathy with macular edema, bilateral [E11.3213]     Refer to Ophthalmology note from 8/14/2023 - please review and assess and document per MEAT if applicable for 2024    2) E11.65: Type 2 diabetes mellitus with hyperglycemia [E11.65]     Last assessed on 11/29/2023 please review and assess and document per MEAT if applicable for 2024

## 2024-03-04 ENCOUNTER — OFFICE VISIT (OUTPATIENT)
Dept: ENDOCRINOLOGY | Facility: CLINIC | Age: 64
End: 2024-03-04
Payer: COMMERCIAL

## 2024-03-04 VITALS
BODY MASS INDEX: 26.16 KG/M2 | SYSTOLIC BLOOD PRESSURE: 132 MMHG | WEIGHT: 157 LBS | DIASTOLIC BLOOD PRESSURE: 70 MMHG | OXYGEN SATURATION: 95 % | HEIGHT: 65 IN | TEMPERATURE: 97.9 F | HEART RATE: 63 BPM | RESPIRATION RATE: 14 BRPM

## 2024-03-04 DIAGNOSIS — E11.9 TYPE 2 DIABETES MELLITUS WITHOUT COMPLICATION, WITH LONG-TERM CURRENT USE OF INSULIN (HCC): ICD-10-CM

## 2024-03-04 DIAGNOSIS — Z79.4 TYPE 2 DIABETES MELLITUS WITH HYPERGLYCEMIA, WITH LONG-TERM CURRENT USE OF INSULIN (HCC): Primary | ICD-10-CM

## 2024-03-04 DIAGNOSIS — E11.65 TYPE 2 DIABETES MELLITUS WITH HYPERGLYCEMIA, WITH LONG-TERM CURRENT USE OF INSULIN (HCC): Primary | ICD-10-CM

## 2024-03-04 DIAGNOSIS — I42.8 NONISCHEMIC CARDIOMYOPATHY (HCC): ICD-10-CM

## 2024-03-04 DIAGNOSIS — I25.5 ISCHEMIC CARDIOMYOPATHY: ICD-10-CM

## 2024-03-04 DIAGNOSIS — Z79.4 TYPE 2 DIABETES MELLITUS WITHOUT COMPLICATION, WITH LONG-TERM CURRENT USE OF INSULIN (HCC): ICD-10-CM

## 2024-03-04 DIAGNOSIS — E11.42 DIABETIC PERIPHERAL NEUROPATHY ASSOCIATED WITH TYPE 2 DIABETES MELLITUS (HCC): ICD-10-CM

## 2024-03-04 PROCEDURE — 99214 OFFICE O/P EST MOD 30 MIN: CPT | Performed by: INTERNAL MEDICINE

## 2024-03-04 RX ORDER — INSULIN GLARGINE 100 [IU]/ML
30 INJECTION, SOLUTION SUBCUTANEOUS
Qty: 30 ML | Refills: 0 | Status: SHIPPED | OUTPATIENT
Start: 2024-03-04

## 2024-03-04 RX ORDER — PEN NEEDLE, DIABETIC 32GX 5/32"
NEEDLE, DISPOSABLE MISCELLANEOUS
Qty: 100 EACH | Refills: 0 | Status: SHIPPED | OUTPATIENT
Start: 2024-03-04

## 2024-03-04 NOTE — ASSESSMENT & PLAN NOTE
She has lost control with A1c jump from 6.9 to 9.6%. reported lack of trulicity and dietary/lifestyle indiscretions.    Today we agreed to increase Lantus 30u qhs, metformin 1000mg po BID, Jardiance 25mg qdaily and switch to mounjaro 10mg weekly as trulicity unavailable.    I personally helped download and set up her torito 3 account on phone and set up sharing.  Follow up in 3 months.    Lab Results   Component Value Date    HGBA1C 9.6 (H) 03/01/2024

## 2024-03-04 NOTE — PROGRESS NOTES
"  Follow-up Patient Progress Note      CC: diabetes     History of Present Illness:   61yr female with type 2 diabetes since 2009, retinopathy, microalbuminuria, HTN, HLD, CAD s/p HUNTER 9/21, ischemic CMpathy, rt paraclinoid meningioma diagnosed during recent hospitalization and fatigue. Last visit was 11/29/23.     Since last visit, she lost 4 lbs. She could not get trulicity due to national shortage. Also diet and lifestyle indiscretions.     CGM review: Has not been using her sensor.  Device: torito    dates 11/23/23 -11/29/23          Usage: 80%     Av glu: 136 mg/dL        CV 35%  TIR 88%          TAR 12%         TBR 0%  G;ycemic patterns: mild postprandial hyperglycemia but mostly normoglycemia.     Current meds:  Jardiance 10 mg qdaily  Metformin 1000mg PO BID  Lantus 30u qhs  Trulicity 1.5mg weekly      Opthamology: Follows [facility]  Podiatry: yes  Influenza: yes, COVID - yes  Dental: No issues  Pancreatitis: No     Ace/ARB: entresto  Statin:lipitor  Thyroid issues: no  FH: brother and 2 sisters, mother and father have diabetes.       Physical Exam:  Body mass index is 26.13 kg/m².  /70 (BP Location: Left arm, Patient Position: Sitting)   Pulse 63   Temp 97.9 °F (36.6 °C) (Tympanic)   Resp 14   Ht 5' 5\" (1.651 m)   Wt 71.2 kg (157 lb)   SpO2 95%   BMI 26.13 kg/m²    Vitals:    03/04/24 0837   Weight: 71.2 kg (157 lb)        Physical Exam  Constitutional:       General: She is not in acute distress.     Appearance: She is well-developed. She is not ill-appearing.   HENT:      Head: Normocephalic and atraumatic.      Nose: Nose normal.      Mouth/Throat:      Pharynx: Oropharynx is clear.   Eyes:      Extraocular Movements: Extraocular movements intact.      Conjunctiva/sclera: Conjunctivae normal.   Neck:      Thyroid: No thyromegaly.   Cardiovascular:      Rate and Rhythm: Normal rate.   Pulmonary:      Effort: Pulmonary effort is normal.   Musculoskeletal:         General: No deformity. "      Cervical back: Normal range of motion.   Skin:     Capillary Refill: Capillary refill takes less than 2 seconds.      Coloration: Skin is not pale.      Findings: No rash.   Neurological:      Mental Status: She is alert and oriented to person, place, and time.   Psychiatric:         Behavior: Behavior normal.       Labs:   Lab Results   Component Value Date    HGBA1C 9.6 (H) 03/01/2024       Lab Results   Component Value Date    UNK2MRPCUIFM 2.352 07/12/2023       Lab Results   Component Value Date    CREATININE 0.91 03/01/2024    CREATININE 0.85 03/22/2023    CREATININE 0.89 08/18/2022    BUN 24 03/01/2024    K 4.0 03/22/2023     03/22/2023    CO2 28 03/22/2023     eGFR   Date Value Ref Range Status   03/01/2024 66 ml/min/1.73sq m Final       Lab Results   Component Value Date    ALT 32 03/22/2023    AST 22 03/22/2023    ALKPHOS 52 03/22/2023       Lab Results   Component Value Date    CHOLESTEROL 74 07/12/2023    CHOLESTEROL 89 08/18/2022    CHOLESTEROL 70 10/28/2021     Lab Results   Component Value Date    HDL 29 (L) 07/12/2023    HDL 30 (L) 08/18/2022    HDL 28 (L) 10/28/2021     Lab Results   Component Value Date    TRIG 78 07/12/2023    TRIG 80 08/18/2022    TRIG 72 10/28/2021     Lab Results   Component Value Date    NONHDLC 45 07/12/2023    NONHDLC 59 08/18/2022    NONHDLC 42 10/28/2021         Assessment/Plan:    Problem List Items Addressed This Visit          Endocrine    Type 2 diabetes mellitus (HCC) - Primary     She has lost control with A1c jump from 6.9 to 9.6%. reported lack of trulicity and dietary/lifestyle indiscretions.    Today we agreed to increase Lantus 30u qhs, metformin 1000mg po BID, Jardiance 25mg qdaily and switch to mounjaro 10mg weekly as trulicity unavailable.    I personally helped download and set up her torito 3 account on phone and set up sharing.  Follow up in 3 months.    Lab Results   Component Value Date    HGBA1C 9.6 (H) 03/01/2024            Relevant  Medications    tirzepatide 10 MG/0.5ML    metFORMIN (GLUCOPHAGE) 1000 MG tablet    Empagliflozin (Jardiance) 10 MG TABS tablet    Lantus SoloStar 100 units/mL SOPN    Insulin Pen Needle (BD Pen Needle Brittney U/F) 32G X 4 MM MISC    Diabetic peripheral neuropathy associated with type 2 diabetes mellitus (HCC)     Recommended daily exercise.  Lab Results   Component Value Date    HGBA1C 9.6 (H) 03/01/2024            Relevant Medications    tirzepatide 10 MG/0.5ML    metFORMIN (GLUCOPHAGE) 1000 MG tablet    Empagliflozin (Jardiance) 10 MG TABS tablet    Lantus SoloStar 100 units/mL SOPN       Cardiovascular and Mediastinum    New Ischemic Cardiomyopathy/Systolic CHF    Relevant Medications    Empagliflozin (Jardiance) 10 MG TABS tablet    Nonischemic cardiomyopathy (HCC)         I have spent a total time of 35 minutes on 03/04/24 in caring for this patient including greater than 50% of this time was spent in counseling/coordination of care as listed above.       Discussed with the patient and all questioned fully answered. She will contact me with concerns.    Grant Arellano

## 2024-03-14 PROBLEM — I51.89 DIASTOLIC DYSFUNCTION: Status: ACTIVE | Noted: 2024-03-14

## 2024-03-15 ENCOUNTER — OFFICE VISIT (OUTPATIENT)
Dept: FAMILY MEDICINE CLINIC | Facility: CLINIC | Age: 64
End: 2024-03-15
Payer: COMMERCIAL

## 2024-03-15 VITALS
TEMPERATURE: 97 F | SYSTOLIC BLOOD PRESSURE: 90 MMHG | BODY MASS INDEX: 26.33 KG/M2 | HEIGHT: 65 IN | WEIGHT: 158 LBS | OXYGEN SATURATION: 98 % | DIASTOLIC BLOOD PRESSURE: 60 MMHG | RESPIRATION RATE: 16 BRPM | HEART RATE: 67 BPM

## 2024-03-15 DIAGNOSIS — Z12.31 ENCOUNTER FOR SCREENING MAMMOGRAM FOR MALIGNANT NEOPLASM OF BREAST: ICD-10-CM

## 2024-03-15 DIAGNOSIS — Z12.11 COLON CANCER SCREENING: ICD-10-CM

## 2024-03-15 DIAGNOSIS — I10 ESSENTIAL HYPERTENSION: ICD-10-CM

## 2024-03-15 DIAGNOSIS — E11.65 TYPE 2 DIABETES MELLITUS WITH HYPERGLYCEMIA, WITH LONG-TERM CURRENT USE OF INSULIN (HCC): ICD-10-CM

## 2024-03-15 DIAGNOSIS — I25.5 ISCHEMIC CARDIOMYOPATHY: ICD-10-CM

## 2024-03-15 DIAGNOSIS — E78.2 MIXED HYPERLIPIDEMIA: ICD-10-CM

## 2024-03-15 DIAGNOSIS — I25.10 CORONARY ARTERY DISEASE INVOLVING NATIVE CORONARY ARTERY OF NATIVE HEART WITHOUT ANGINA PECTORIS: Primary | ICD-10-CM

## 2024-03-15 DIAGNOSIS — Z79.4 TYPE 2 DIABETES MELLITUS WITH HYPERGLYCEMIA, WITH LONG-TERM CURRENT USE OF INSULIN (HCC): ICD-10-CM

## 2024-03-15 DIAGNOSIS — M85.89 OSTEOPENIA OF MULTIPLE SITES: ICD-10-CM

## 2024-03-15 PROCEDURE — 99214 OFFICE O/P EST MOD 30 MIN: CPT | Performed by: INTERNAL MEDICINE

## 2024-03-15 RX ORDER — TIRZEPATIDE 5 MG/.5ML
5 INJECTION, SOLUTION SUBCUTANEOUS WEEKLY
Qty: 4 ML | Refills: 0 | Status: SHIPPED | OUTPATIENT
Start: 2024-04-14

## 2024-03-15 RX ORDER — TIRZEPATIDE 2.5 MG/.5ML
2.5 INJECTION, SOLUTION SUBCUTANEOUS WEEKLY
Qty: 2 ML | Refills: 3 | Status: SHIPPED | OUTPATIENT
Start: 2024-03-15

## 2024-03-15 NOTE — PROGRESS NOTES
Assessment/Plan:Bruce here for interval visit-A1c% high but wasn't able to get Trulicity- is on Lantus now and metformin and Jardiance-we could go up on her jardiance dose but she just filled it-will see if I can give her samples of Mounjaro-sees Cardiology in July-bp on the lower side-EF better since she first had her MI and stent-has some diastolic dysfunction too-now has CGM and monitor too-is going to be starting a walking program-follows with eye          Problem List Items Addressed This Visit       Mixed hyperlipidemia    Type 2 diabetes mellitus (HCC)    Relevant Medications    Mounjaro 2.5 MG/0.5ML    Mounjaro 5 MG/0.5ML (Start on 4/14/2024)    CAD (coronary artery disease) - Primary    New Ischemic Cardiomyopathy/Systolic CHF    Essential hypertension    Osteopenia of multiple sites     Other Visit Diagnoses       Colon cancer screening        Had scope in 2021-doesn't need again until 2026    Encounter for screening mammogram for malignant neoplasm of breast        Scheduled              Subjective:      Patient ID: Bruce Acosta is a 63 y.o. female.    Bruce with DM II, not well controlled with recent A1c of 9.6%, HTN, CAD s/p HUNTER, HL, CHF,osteopenia-doing ok, back on Lantus insulin because of inability to get Trulicity or Mounjaro at this point-also on metformin and jardiance-going to start walking again -just got 2 kittens, and she's really enjoying them    Coronary Artery Disease  Symptoms include leg swelling. Pertinent negatives include no shortness of breath. Risk factors include hyperlipidemia.   Hypertension  Pertinent negatives include no shortness of breath.   Diabetes    Hyperlipidemia  Pertinent negatives include no shortness of breath.       The following portions of the patient's history were reviewed and updated as appropriate:   Past Medical History:  She has a past medical history of Colon polyp, Coronary artery disease, Depression, Diabetes mellitus (HCC), Dizziness, Edema,  Hyperlipidemia, Hypertension, Ischemic cardiomyopathy, Left bundle branch block, Type 2 diabetes mellitus (HCC), Vitamin D deficiency, and Wears glasses.,  _______________________________________________________________________  Medical Problems:  does not have any pertinent problems on file.,  _______________________________________________________________________  Past Surgical History:   has a past surgical history that includes Colonoscopy (04/11/2018); Mammo (historical) (Bilateral, 05/04/2020); [place] tooth extraction; EGD (04/11/2018); Mammo needle localization right (all inc) (Right, 07/08/2009); Breast excisional biopsy (Right, 2009); Mammo (historical) (Bilateral, 09/14/2022); and Cardiac surgery (9/2/2021).,  _______________________________________________________________________  Family History:  family history includes Arrhythmia in her father; Asthma in her father; BRCA1 Negative in her sister; Breast cancer in her maternal aunt; Breast cancer (age of onset: 39) in her sister; Breast cancer (age of onset: 70) in her mother; Cancer in her maternal aunt; Cancer (age of onset: 60) in her father; Clotting disorder in her father; Colon cancer in her maternal uncle; Diabetes type II in her father and mother; Esophageal cancer (age of onset: 60) in her maternal aunt; Heart attack in her mother; Heart disease in her father; Heart failure in her father; Hyperlipidemia in her mother; Hypertension in her mother; Lung cancer in her paternal aunt; Melanoma in her father; No Known Problems in her maternal aunt, maternal grandfather, maternal grandmother, paternal grandfather, paternal grandmother, sister, and sister; Pancreatic cancer (age of onset: 59) in her maternal uncle; Sudden death in her mother; Thyroid cancer in her sister and sister.,  _______________________________________________________________________  Social History:   reports that she has never smoked. She has never used smokeless tobacco. She  reports that she does not drink alcohol and does not use drugs.,  _______________________________________________________________________  Allergies:  is allergic to dust mite extract..  _______________________________________________________________________  Current Outpatient Medications   Medication Sig Dispense Refill   • aspirin 81 MG tablet Take 1 tablet by mouth daily     • atorvastatin (LIPITOR) 80 mg tablet TAKE 1 TABLET BY MOUTH EVERY DAY WITH DINNER 90 tablet 1   • CALCIUM PO 1 po daily     • carvedilol (COREG) 6.25 mg tablet Take 0.5 tablets (3.125 mg total) by mouth 2 (two) times a day with meals 180 tablet 4   • Cholecalciferol (Vitamin D3) 50 MCG (2000 UT) capsule Take 1 capsule (2,000 Units total) by mouth daily 30 capsule 5   • clopidogrel (PLAVIX) 75 mg tablet TAKE 1 TABLET BY MOUTH EVERY DAY 90 tablet 3   • Continuous Blood Gluc Sensor (FreeStyle Feng 3 Sensor) MISC USE 1 UNITS EVERY 14 (FOURTEEN) DAYS 6 each 2   • Empagliflozin (Jardiance) 10 MG TABS tablet Take 1 tablet (10 mg total) by mouth every morning 90 tablet 1   • Entresto 49-51 MG TABS TAKE 1 TABLET BY MOUTH TWICE A  tablet 3   • furosemide (LASIX) 20 mg tablet TAKE 1 TABLET BY MOUTH EVERY DAY 90 tablet 1   • Insulin Pen Needle (BD Pen Needle [place] U/F) 32G X 4 MM MISC Use daily as needed with insulin pen 100 each 0   • Lantus SoloStar 100 units/mL SOPN Inject 0.3 mL (30 Units total) under the skin daily at bedtime 30 mL 0   • metFORMIN (GLUCOPHAGE) 1000 MG tablet Take 1 tablet (1,000 mg total) by mouth 2 (two) times a day with meals 180 tablet 1   • Mounjaro 2.5 MG/0.5ML Inject 0.5 mL (2.5 mg total) under the skin once a week 2 mL 3   • [START ON 4/14/2024] Mounjaro 5 MG/0.5ML Inject 0.5 mL (5 mg total) under the skin once a week Do not start before April 14, 2024. 4 mL 0     No current facility-administered medications for this visit.     _______________________________________________________________________  Review of Systems  "  Constitutional: Negative.    HENT: Negative.     Respiratory: Negative.  Negative for shortness of breath.    Cardiovascular:  Positive for leg swelling.   Hematological: Negative.    Psychiatric/Behavioral: Negative.           Objective:  Vitals:    03/15/24 0910   BP: 90/60   BP Location: Left arm   Patient Position: Sitting   Cuff Size: Standard   Pulse: 67   Resp: 16   Temp: (!) 97 °F (36.1 °C)   TempSrc: Tympanic   SpO2: 98%   Weight: 71.7 kg (158 lb)   Height: 5' 5\" (1.651 m)     Body mass index is 26.29 kg/m².     Physical Exam  Constitutional:       Appearance: Normal appearance.   HENT:      Head: Normocephalic and atraumatic.      Right Ear: External ear normal.      Left Ear: External ear normal.      Nose: Nose normal.      Mouth/Throat:      Mouth: Mucous membranes are moist.   Eyes:      Extraocular Movements: Extraocular movements intact.   Cardiovascular:      Rate and Rhythm: Normal rate and regular rhythm.      Heart sounds: Normal heart sounds.   Pulmonary:      Effort: Pulmonary effort is normal.      Breath sounds: Normal breath sounds.   Abdominal:      General: Abdomen is flat.      Palpations: Abdomen is soft.   Musculoskeletal:         General: Normal range of motion.      Cervical back: Normal range of motion and neck supple.   Skin:     General: Skin is warm.   Neurological:      General: No focal deficit present.      Mental Status: She is alert and oriented to person, place, and time.   Psychiatric:         Mood and Affect: Mood normal.         Behavior: Behavior normal.         Thought Content: Thought content normal.         Judgment: Judgment normal.       "

## 2024-04-27 ENCOUNTER — HOSPITAL ENCOUNTER (OUTPATIENT)
Dept: MRI IMAGING | Facility: HOSPITAL | Age: 64
Discharge: HOME | End: 2024-04-27
Payer: COMMERCIAL

## 2024-04-27 DIAGNOSIS — D32.9 MENINGIOMA (HCC): ICD-10-CM

## 2024-04-27 PROCEDURE — G1004 CDSM NDSC: HCPCS

## 2024-04-27 PROCEDURE — A9585 GADOBUTROL INJECTION: HCPCS | Performed by: INTERNAL MEDICINE

## 2024-04-27 PROCEDURE — 70553 MRI BRAIN STEM W/O & W/DYE: CPT

## 2024-04-27 RX ORDER — GADOBUTROL 604.72 MG/ML
7 INJECTION INTRAVENOUS
Status: COMPLETED | OUTPATIENT
Start: 2024-04-27 | End: 2024-04-27

## 2024-04-27 RX ADMIN — GADOBUTROL 7 ML: 604.72 INJECTION INTRAVENOUS at 10:20

## 2024-04-28 DIAGNOSIS — I25.5 ISCHEMIC CARDIOMYOPATHY: ICD-10-CM

## 2024-04-28 RX ORDER — ATORVASTATIN CALCIUM 80 MG/1
TABLET, FILM COATED ORAL
Qty: 90 TABLET | Refills: 1 | Status: SHIPPED | OUTPATIENT
Start: 2024-04-28

## 2024-05-03 ENCOUNTER — OFFICE VISIT (OUTPATIENT)
Dept: NEUROSURGERY | Facility: CLINIC | Age: 64
End: 2024-05-03
Payer: COMMERCIAL

## 2024-05-03 VITALS
SYSTOLIC BLOOD PRESSURE: 118 MMHG | TEMPERATURE: 98.2 F | OXYGEN SATURATION: 99 % | HEART RATE: 76 BPM | DIASTOLIC BLOOD PRESSURE: 62 MMHG | BODY MASS INDEX: 26.16 KG/M2 | HEIGHT: 65 IN | WEIGHT: 157 LBS

## 2024-05-03 DIAGNOSIS — D32.9 MENINGIOMA (HCC): Primary | ICD-10-CM

## 2024-05-03 PROCEDURE — 99213 OFFICE O/P EST LOW 20 MIN: CPT | Performed by: PHYSICIAN ASSISTANT

## 2024-05-03 NOTE — PROGRESS NOTES
"Neurosurgery Office Note  Bruce Acosta 63 y.o. 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  She is here today for annual follow-up with brain MRI.  Image shows stable right paraclinoid meningioma with unchanged mild edema within the inferior right frontal lobe, no acute infarction, edema or pathologic intra-axial enhancement.  Patient reports is doing fine denies any headache, seizures, vision issues, speech or swallowing problems.  No gait issue or bowel or bladder dysfunction.    Exam-patient alert and oriented x 3.  Moves all extremities.  PERRL, EOMI x 3 mm conjugate bilaterally.  Moves all extremities.  Finger-to-nose test is normal and without drift bilaterally.  Strength is 5/5 bilaterally and sensation to light touch intact throughout.  DTR 2+ no clonus bilaterally.    Hx, Exam, and images reviewed with the patient.  Management plan discussed.  Given stable right paraclinoid mass likely meningioma and the patient without symptoms related to the mass , no surgery is indicated at this time and I would recommend continue annual surveillance image.  MRI with and without contrast PT protocol order placed.  Advised patient to call office or go to emergency room with development of interval new neurological symptoms.  All questions and concerns were answered to patient's satisfaction.  Patient verbalized understanding's and agreed with the plan        C/C: \" Annual brain Meningioma F/U\"    HPI    See detailed  discussion above    REVIEW OF SYSTEMS  Review of system personally reviewed and updated as follows  Review of Systems   Constitutional: Negative.    HENT: Negative.     Eyes: Negative.    Respiratory: " Negative.     Cardiovascular: Negative.    Gastrointestinal: Negative.    Endocrine: Negative.    Genitourinary: Negative.    Musculoskeletal: Negative.    Skin: Negative.    Allergic/Immunologic: Negative.    Neurological: Negative.         9 Mo. F/u Meningioma- Brain MRI 4/22/23     Hematological: Negative.    Psychiatric/Behavioral: Negative.     All other systems reviewed and are negative.      ROS obtained by MA. Reviewed. See HPI.     Meds/Allergies     Current Outpatient Medications   Medication Sig Dispense Refill    aspirin 81 MG tablet Take 1 tablet by mouth daily      atorvastatin (LIPITOR) 80 mg tablet TAKE 1 TABLET BY MOUTH EVERY DAY WITH DINNER 90 tablet 1    CALCIUM PO 1 po daily      carvedilol (COREG) 6.25 mg tablet Take 0.5 tablets (3.125 mg total) by mouth 2 (two) times a day with meals 180 tablet 4    Cholecalciferol (Vitamin D3) 50 MCG (2000 UT) capsule Take 1 capsule (2,000 Units total) by mouth daily 30 capsule 5    clopidogrel (PLAVIX) 75 mg tablet TAKE 1 TABLET BY MOUTH EVERY DAY 90 tablet 3    Continuous Blood Gluc Sensor (FreeStyle Feng 3 Sensor) MISC USE 1 UNITS EVERY 14 (FOURTEEN) DAYS 6 each 2    Empagliflozin (Jardiance) 10 MG TABS tablet Take 1 tablet (10 mg total) by mouth every morning 90 tablet 1    Entresto 49-51 MG TABS TAKE 1 TABLET BY MOUTH TWICE A  tablet 3    furosemide (LASIX) 20 mg tablet TAKE 1 TABLET BY MOUTH EVERY DAY 90 tablet 1    Insulin Pen Needle (BD Pen Needle Brittney U/F) 32G X 4 MM MISC Use daily as needed with insulin pen 100 each 0    Lantus SoloStar 100 units/mL SOPN Inject 0.3 mL (30 Units total) under the skin daily at bedtime 30 mL 0    metFORMIN (GLUCOPHAGE) 1000 MG tablet Take 1 tablet (1,000 mg total) by mouth 2 (two) times a day with meals 180 tablet 1    Mounjaro 2.5 MG/0.5ML Inject 0.5 mL (2.5 mg total) under the skin once a week 2 mL 3    Mounjaro 5 MG/0.5ML Inject 0.5 mL (5 mg total) under the skin once a week Do not start before April 14,  2024. 4 mL 0     No current facility-administered medications for this visit.       Allergies   Allergen Reactions    Dust Mite Extract Other (See Comments)       PAST HISTORY    Past Medical History:   Diagnosis Date    Colon polyp     Coronary artery disease     Depression     Diabetes mellitus (HCC)     Dizziness     Edema     Hyperlipidemia     Hypertension     Ischemic cardiomyopathy     Left bundle branch block     Type 2 diabetes mellitus (HCC)     Vitamin D deficiency     Wears glasses        Past Surgical History:   Procedure Laterality Date    BREAST EXCISIONAL BIOPSY Right 2009    benign    CARDIAC SURGERY  9/2/2021    COLONOSCOPY  04/11/2018    Colonoscpoy due in 3 years    EGD  04/11/2018    MAMMO (HISTORICAL) Bilateral 05/04/2020 2018    MAMMO (HISTORICAL) Bilateral 09/14/2022    MAMMO NEEDLE LOCALIZATION RIGHT (ALL INC) Right 07/08/2009    WISDOM TOOTH EXTRACTION         Social History     Tobacco Use    Smoking status: Never    Smokeless tobacco: Never   Vaping Use    Vaping status: Never Used   Substance Use Topics    Alcohol use: No    Drug use: No       Family History   Problem Relation Age of Onset    Hypertension Mother     Sudden death Mother         SCD    Hyperlipidemia Mother     Heart attack Mother     Breast cancer Mother 70    Diabetes type II Mother     Arrhythmia Father         pacemaker/ defib    Clotting disorder Father         eliquis    Heart failure Father     Heart disease Father     Melanoma Father     Cancer Father 60        bladder cancer    Diabetes type II Father     Asthma Father     BRCA1 Negative Sister     Breast cancer Sister 39    Thyroid cancer Sister     No Known Problems Sister     No Known Problems Sister     Thyroid cancer Sister     No Known Problems Maternal Grandmother     No Known Problems Maternal Grandfather     No Known Problems Paternal Grandmother     No Known Problems Paternal Grandfather     Breast cancer Maternal Aunt         unknown age     Esophageal cancer Maternal Aunt 60    Cancer Maternal Aunt         unknown age or type    No Known Problems Maternal Aunt     Colon cancer Maternal Uncle         unknwon age    Pancreatic cancer Maternal Uncle 59    Lung cancer Paternal Aunt         unknonw age- in her 80s    Anuerysm Neg Hx          Above history personally reviewed.       EXAM    Vitals:not currently breastfeeding.,There is no height or weight on file to calculate BMI.     Physical Exam  Constitutional:       Appearance: Normal appearance.   HENT:      Head: Normocephalic and atraumatic.   Eyes:      Extraocular Movements: Extraocular movements intact.      Pupils: Pupils are equal, round, and reactive to light.   Musculoskeletal:      Cervical back: Normal range of motion.   Neurological:      General: No focal deficit present.      Mental Status: She is alert and oriented to person, place, and time.      Deep Tendon Reflexes:      Reflex Scores:       Tricep reflexes are 2+ on the right side and 2+ on the left side.       Bicep reflexes are 2+ on the right side and 2+ on the left side.       Brachioradialis reflexes are 2+ on the right side and 2+ on the left side.       Patellar reflexes are 2+ on the right side and 2+ on the left side.       Achilles reflexes are 2+ on the right side and 2+ on the left side.  Psychiatric:         Speech: Speech normal.         Neurologic Exam     Mental Status   Oriented to person, place, and time.   Speech: speech is normal   Level of consciousness: alert    Cranial Nerves     CN III, IV, VI   Pupils are equal, round, and reactive to light.  Right pupil: Size: 3 mm. Shape: regular. Reactivity: brisk.   Left pupil: Size: 3 mm. Shape: regular. Reactivity: brisk.     CN XI   CN XI normal.     Motor Exam   Muscle bulk: normal  Overall muscle tone: normal  Right arm tone: normal  Left arm tone: normal  Right arm pronator drift: absent  Left arm pronator drift: absent  Right leg tone: normal  Left leg tone:  normal    Sensory Exam   Light touch normal.     Gait, Coordination, and Reflexes     Reflexes   Right brachioradialis: 2+  Left brachioradialis: 2+  Right biceps: 2+  Left biceps: 2+  Right triceps: 2+  Left triceps: 2+  Right patellar: 2+  Left patellar: 2+  Right achilles: 2+  Left achilles: 2+  Right : 2+  Left : 2+  Right Marte: absent  Left Marte: absent  Right ankle clonus: absent  Left ankle clonus: absent        MEDICAL DECISION MAKING    Imaging Studies:     MRI brain with and without contrast    Result Date: 4/30/2024  Narrative: MRI BRAIN WITH AND WITHOUT CONTRAST INDICATION: D32.9: Benign neoplasm of meninges, unspecified. COMPARISON: MRI brain 4/22/2024 TECHNIQUE: Multiplanar, multisequence imaging of the brain was performed before and after gadolinium administration. IV Contrast:  7 mL of Gadobutrol injection (SINGLE-DOSE) IMAGE QUALITY:   Diagnostic. FINDINGS: BRAIN PARENCHYMA: There is a stable enhancing, extra-axial lesion along the right paraclinoid region measuring 1.6 x 1.8 x 1.1 cm (AP x ML x CC). The lesion again abuts the right prechiasmatic optic nerve and supraclinoid ICA segment. There is unchanged minimal edema within the inferior right frontal lobe secondary to the lesion There is no  midline shift. There is no intracranial hemorrhage.  Normal posterior fossa.  Diffusion imaging is unremarkable. Small scattered hyperintensities on T2/FLAIR imaging are noted in the periventricular and subcortical white matter demonstrating an appearance that is statistically most likely to represent mild microangiopathic change. Postcontrast imaging of the brain demonstrates no abnormal enhancement. VENTRICLES:  Normal for the patient's age. SELLA AND PITUITARY GLAND:  Normal. ORBITS:  Normal. PARANASAL SINUSES: Mild ethmoidal mucosal thickening. Small left maxillary sinus retention cyst. Mild right maxillary sinus mucosal thickening. The mastoid air cells are clear. VASCULATURE:  Evaluation  of the major intracranial vasculature demonstrates appropriate flow voids. CALVARIUM AND SKULL BASE: Marrow signal heterogeneity which can be seen with underlying red marrow proliferation. EXTRACRANIAL SOFT TISSUES:  Normal.     Impression: Stable right paraclinoid meningioma with unchanged mild edema within the inferior right frontal lobe. No acute infarction, edema, or pathologic intra-axial enhancement. Mild chronic microangiopathic ischemic changes. Workstation performed: [id number]       I have personally reviewed pertinent reports.   and I have personally reviewed pertinent films in PACS

## 2024-05-15 ENCOUNTER — TELEPHONE (OUTPATIENT)
Age: 64
End: 2024-05-15

## 2024-05-15 NOTE — TELEPHONE ENCOUNTER
Pt called in to reschedule her appt. Pt is now scheduled to see Dr. Arellano on 05/24/2024. Pt is aware and confirmed.

## 2024-05-26 DIAGNOSIS — E11.65 TYPE 2 DIABETES MELLITUS WITH HYPERGLYCEMIA, WITH LONG-TERM CURRENT USE OF INSULIN (HCC): ICD-10-CM

## 2024-05-26 DIAGNOSIS — Z79.4 TYPE 2 DIABETES MELLITUS WITH HYPERGLYCEMIA, WITH LONG-TERM CURRENT USE OF INSULIN (HCC): ICD-10-CM

## 2024-05-27 RX ORDER — INSULIN GLARGINE 100 [IU]/ML
INJECTION, SOLUTION SUBCUTANEOUS
Qty: 30 ML | Refills: 1 | Status: SHIPPED | OUTPATIENT
Start: 2024-05-27

## 2024-05-29 ENCOUNTER — TELEPHONE (OUTPATIENT)
Age: 64
End: 2024-05-29

## 2024-05-30 NOTE — TELEPHONE ENCOUNTER
PA for LANTUS    Submitted via    []CMM-KEY    [x][employer]-Case ID # [id number]   []Faxed to plan   []Other website    []Phone call Case ID #      Office notes sent, clinical questions answered. Awaiting determination    Turnaround time for your insurance to make a decision on your Prior Authorization can take 7-21 business days.

## 2024-06-06 DIAGNOSIS — I25.5 ISCHEMIC CARDIOMYOPATHY: ICD-10-CM

## 2024-06-07 RX ORDER — SACUBITRIL AND VALSARTAN 49; 51 MG/1; MG/1
1 TABLET, FILM COATED ORAL 2 TIMES DAILY
Qty: 180 TABLET | Refills: 0 | Status: SHIPPED | OUTPATIENT
Start: 2024-06-07

## 2024-08-02 ENCOUNTER — OFFICE VISIT (OUTPATIENT)
Dept: FAMILY MEDICINE CLINIC | Facility: CLINIC | Age: 64
End: 2024-08-02
Payer: COMMERCIAL

## 2024-08-02 VITALS
SYSTOLIC BLOOD PRESSURE: 116 MMHG | HEIGHT: 65 IN | BODY MASS INDEX: 25.49 KG/M2 | HEART RATE: 70 BPM | DIASTOLIC BLOOD PRESSURE: 78 MMHG | OXYGEN SATURATION: 95 % | WEIGHT: 153 LBS

## 2024-08-02 DIAGNOSIS — Z79.4 TYPE 2 DIABETES MELLITUS WITH HYPERGLYCEMIA, WITH LONG-TERM CURRENT USE OF INSULIN (HCC): ICD-10-CM

## 2024-08-02 DIAGNOSIS — M85.89 OSTEOPENIA OF MULTIPLE SITES: ICD-10-CM

## 2024-08-02 DIAGNOSIS — E11.42 DIABETIC PERIPHERAL NEUROPATHY ASSOCIATED WITH TYPE 2 DIABETES MELLITUS (HCC): ICD-10-CM

## 2024-08-02 DIAGNOSIS — Z12.31 ENCOUNTER FOR SCREENING MAMMOGRAM FOR MALIGNANT NEOPLASM OF BREAST: ICD-10-CM

## 2024-08-02 DIAGNOSIS — E11.9 TYPE 2 DIABETES MELLITUS WITHOUT COMPLICATION, WITH LONG-TERM CURRENT USE OF INSULIN (HCC): ICD-10-CM

## 2024-08-02 DIAGNOSIS — R60.0 BILATERAL LEG EDEMA: ICD-10-CM

## 2024-08-02 DIAGNOSIS — I25.10 CORONARY ARTERY DISEASE INVOLVING NATIVE CORONARY ARTERY OF NATIVE HEART WITHOUT ANGINA PECTORIS: ICD-10-CM

## 2024-08-02 DIAGNOSIS — Z23 ENCOUNTER FOR IMMUNIZATION: ICD-10-CM

## 2024-08-02 DIAGNOSIS — E11.65 TYPE 2 DIABETES MELLITUS WITH HYPERGLYCEMIA, WITH LONG-TERM CURRENT USE OF INSULIN (HCC): ICD-10-CM

## 2024-08-02 DIAGNOSIS — Z79.4 TYPE 2 DIABETES MELLITUS WITHOUT COMPLICATION, WITH LONG-TERM CURRENT USE OF INSULIN (HCC): ICD-10-CM

## 2024-08-02 DIAGNOSIS — I10 ESSENTIAL HYPERTENSION: ICD-10-CM

## 2024-08-02 DIAGNOSIS — E78.2 MIXED HYPERLIPIDEMIA: ICD-10-CM

## 2024-08-02 DIAGNOSIS — I25.5 ISCHEMIC CARDIOMYOPATHY: ICD-10-CM

## 2024-08-02 DIAGNOSIS — Z12.11 COLON CANCER SCREENING: ICD-10-CM

## 2024-08-02 DIAGNOSIS — Z00.00 ANNUAL PHYSICAL EXAM: Primary | ICD-10-CM

## 2024-08-02 DIAGNOSIS — I51.89 DIASTOLIC DYSFUNCTION: ICD-10-CM

## 2024-08-02 LAB — SL AMB POCT HEMOGLOBIN AIC: 6.9 (ref ?–6.5)

## 2024-08-02 PROCEDURE — 90678 RSV VACC PREF BIVALENT IM: CPT | Performed by: INTERNAL MEDICINE

## 2024-08-02 PROCEDURE — 99397 PER PM REEVAL EST PAT 65+ YR: CPT | Performed by: INTERNAL MEDICINE

## 2024-08-02 PROCEDURE — 99214 OFFICE O/P EST MOD 30 MIN: CPT | Performed by: INTERNAL MEDICINE

## 2024-08-02 PROCEDURE — 83036 HEMOGLOBIN GLYCOSYLATED A1C: CPT | Performed by: INTERNAL MEDICINE

## 2024-08-02 PROCEDURE — 90471 IMMUNIZATION ADMIN: CPT | Performed by: INTERNAL MEDICINE

## 2024-08-02 NOTE — PROGRESS NOTES
Assessment/Plan:Diabetes much better controlled with A1C% of 6.9%! Continue lantus, metformin, and jardiance, and she was on Mounjaro but is having trouble getting it-sees Rufino later this month-get labwork done-follows with Podiatry and Ophthalmology-will see Cardiology in October for her hx of CAD, s/p stenting, CHF but seems stable -watch sodium intake especially with b/l leg edema-bp well controlled -         Problem List Items Addressed This Visit       Mixed hyperlipidemia    Type 2 diabetes mellitus (HCC)    Relevant Orders    POCT hemoglobin A1c (Completed)    Albumin / creatinine urine ratio    CBC and differential    Comprehensive metabolic panel    Lipid panel    TSH, 3rd generation    Bilateral leg edema    CAD (coronary artery disease)    New Ischemic Cardiomyopathy/Systolic CHF    Essential hypertension    Diabetic peripheral neuropathy associated with type 2 diabetes mellitus (HCC)    Osteopenia of multiple sites    Diastolic dysfunction     Other Visit Diagnoses       Annual physical exam    -  Primary    Encounter for immunization        Relevant Orders    Respiratory Syncytial Virus (RSV) vaccine (recombinant) (Abrysvo) (Completed)    Encounter for screening mammogram for malignant neoplasm of breast        scheduled for September    Colon cancer screening        Had in 2021-not due again until 2026              Subjective:      Patient ID: Bruce Acosta is a 64 y.o. female.    Bruce here for annual physical, and to go over her hx of DM II, HTN, HL, CAD s/p stenting, CHF, diabetic retinopathy-doing well, thinking about retiring-trying to walk more-her A1c% today is 6.9% much improved-she sees Dr Arellano later this month-says for the past several months she's been having some trouble sleeping, thinking about things, a bit tearful today        The following portions of the patient's history were reviewed and updated as appropriate:   Past Medical History:  She has a past medical history of Colon  polyp, Coronary artery disease, Depression, Diabetes mellitus (HCC), Dizziness, Edema, Hyperlipidemia, Hypertension, Ischemic cardiomyopathy, Left bundle branch block, Type 2 diabetes mellitus (HCC), Vitamin D deficiency, and Wears glasses.,  _______________________________________________________________________  Medical Problems:  does not have any pertinent problems on file.,  _______________________________________________________________________  Past Surgical History:   has a past surgical history that includes Colonoscopy (04/11/2018); Mammo (historical) (Bilateral, 05/04/2020); [place] tooth extraction; EGD (04/11/2018); Mammo needle localization right (all inc) (Right, 07/08/2009); Breast excisional biopsy (Right, 2009); Mammo (historical) (Bilateral, 09/14/2022); and Cardiac surgery (9/2/2021).,  _______________________________________________________________________  Family History:  family history includes Arrhythmia in her father; Asthma in her father; BRCA1 Negative in her sister; Breast cancer in her maternal aunt; Breast cancer (age of onset: 39) in her sister; Breast cancer (age of onset: 70) in her mother; Cancer in her maternal aunt; Cancer (age of onset: 60) in her father; Clotting disorder in her father; Colon cancer in her maternal uncle; Diabetes type II in her father and mother; Esophageal cancer (age of onset: 60) in her maternal aunt; Heart attack in her mother; Heart disease in her father; Heart failure in her father; Hyperlipidemia in her mother; Hypertension in her mother; Lung cancer in her paternal aunt; Melanoma in her father; No Known Problems in her maternal aunt, maternal grandfather, maternal grandmother, paternal grandfather, paternal grandmother, sister, and sister; Pancreatic cancer (age of onset: 59) in her maternal uncle; Sudden death in her mother; Thyroid cancer in her sister and sister.,  _______________________________________________________________________  Social  History:   reports that she has never smoked. She has never used smokeless tobacco. She reports that she does not drink alcohol and does not use drugs.,  _______________________________________________________________________  Allergies:  is allergic to dust mite extract..  _______________________________________________________________________  Current Outpatient Medications   Medication Sig Dispense Refill    aspirin 81 MG tablet Take 1 tablet by mouth daily      atorvastatin (LIPITOR) 80 mg tablet TAKE 1 TABLET BY MOUTH EVERY DAY WITH DINNER 90 tablet 1    CALCIUM PO 1 po daily      carvedilol (COREG) 6.25 mg tablet Take 0.5 tablets (3.125 mg total) by mouth 2 (two) times a day with meals 180 tablet 4    Cholecalciferol (Vitamin D3) 50 MCG (2000 UT) capsule Take 1 capsule (2,000 Units total) by mouth daily 30 capsule 5    clopidogrel (PLAVIX) 75 mg tablet TAKE 1 TABLET BY MOUTH EVERY DAY 90 tablet 3    Continuous Blood Gluc Sensor (FreeStyle Feng 3 Sensor) MISC USE 1 UNITS EVERY 14 (FOURTEEN) DAYS 6 each 2    Empagliflozin (Jardiance) 10 MG TABS tablet Take 1 tablet (10 mg total) by mouth every morning 90 tablet 1    furosemide (LASIX) 20 mg tablet TAKE 1 TABLET BY MOUTH EVERY DAY 90 tablet 1    Insulin Pen Needle (BD Pen Needle Brittney U/F) 32G X 4 MM MISC Use daily as needed with insulin pen 100 each 0    Lantus SoloStar 100 units/mL SOPN INJECT 30 UNITS UNDER THE SKIN DAILY AT BEDTIME 30 mL 1    Mounjaro 5 MG/0.5ML Inject 0.5 mL (5 mg total) under the skin once a week Do not start before April 14, 2024. 4 mL 0    sacubitril-valsartan (Entresto) 49-51 MG TABS Take 1 tablet by mouth 2 (two) times a day 180 tablet 0    metFORMIN (GLUCOPHAGE) 1000 MG tablet TAKE 1 TABLET BY MOUTH TWICE A DAY WITH MEALS 180 tablet 1     No current facility-administered medications for this visit.     _______________________________________________________________________  Review of Systems   Constitutional:  Negative for fatigue.  "  Respiratory: Negative.     Cardiovascular:  Positive for leg swelling.   Gastrointestinal: Negative.    Musculoskeletal: Negative.    Psychiatric/Behavioral:  Positive for dysphoric mood and sleep disturbance.          Objective:  Vitals:    08/02/24 0921   BP: 116/78   BP Location: Left arm   Patient Position: Sitting   Cuff Size: Standard   Pulse: 70   SpO2: 95%   Weight: 69.4 kg (153 lb)   Height: 5' 5\" (1.651 m)     Body mass index is 25.46 kg/m².     Physical Exam  Constitutional:       Appearance: Normal appearance.   HENT:      Head: Normocephalic.      Right Ear: External ear normal.      Left Ear: External ear normal.      Nose: Nose normal.      Mouth/Throat:      Mouth: Mucous membranes are moist.   Eyes:      Extraocular Movements: Extraocular movements intact.      Pupils: Pupils are equal, round, and reactive to light.   Cardiovascular:      Rate and Rhythm: Normal rate.   Pulmonary:      Effort: Pulmonary effort is normal.   Abdominal:      General: Abdomen is flat.   Musculoskeletal:      Cervical back: Normal range of motion.      Right lower leg: Edema present.      Left lower leg: Edema present.   Skin:     General: Skin is warm.      Capillary Refill: Capillary refill takes less than 2 seconds.   Neurological:      General: No focal deficit present.      Mental Status: She is alert and oriented to person, place, and time.   Psychiatric:         Mood and Affect: Mood normal.         Recent Results (from the past 8736 hour(s))   Hemoglobin A1C    Collection Time: 11/14/23  6:27 AM   Result Value Ref Range    Hemoglobin A1C 6.9 (H) Normal 4.0-5.6%; PreDiabetic 5.7-6.4%; Diabetic >=6.5%; Glycemic control for adults with diabetes <7.0% %     mg/dl   Albumin / creatinine urine ratio    Collection Time: 11/14/23  6:27 AM   Result Value Ref Range    Creatinine, Ur 83.9 Reference range not established. mg/dL    Albumin,U,Random 17.1 <20.0 mg/L    Albumin Creat Ratio 20 0 - 30 mg/g creatinine "   Hemoglobin A1C    Collection Time: 03/01/24  7:26 AM   Result Value Ref Range    Hemoglobin A1C 9.6 (H) Normal 4.0-5.6%; PreDiabetic 5.7-6.4%; Diabetic >=6.5%; Glycemic control for adults with diabetes <7.0% %     mg/dl   BUN    Collection Time: 03/01/24  7:26 AM   Result Value Ref Range    BUN 24 5 - 25 mg/dL   Creatinine, serum    Collection Time: 03/01/24  7:26 AM   Result Value Ref Range    Creatinine 0.91 0.60 - 1.30 mg/dL    eGFR 66 ml/min/1.73sq m   POCT hemoglobin A1c    Collection Time: 08/02/24  9:58 AM   Result Value Ref Range    Hemoglobin A1C 6.9 (A) 6.5       Laboratory Results: I have personally reviewed the pertinent laboratory results/reports     Radiology/Other Diagnostic Testing Results: I have personally reviewed pertinent reports.

## 2024-08-20 DIAGNOSIS — R60.0 LOCALIZED EDEMA: ICD-10-CM

## 2024-08-20 RX ORDER — FUROSEMIDE 20 MG
TABLET ORAL
Qty: 90 TABLET | Refills: 1 | Status: SHIPPED | OUTPATIENT
Start: 2024-08-20

## 2024-08-21 DIAGNOSIS — I25.5 ISCHEMIC CARDIOMYOPATHY: ICD-10-CM

## 2024-08-21 NOTE — TELEPHONE ENCOUNTER
Reason for call:   [x] Refill   [] Prior Auth  [] Other:     Office:   [x] PCP/Provider - Annia Sumner MD   [] Specialty/Provider -     Medication: atorvastatin (LIPITOR) 80 mg tablet     Dose/Frequency: TAKE 1 TABLET BY MOUTH EVERY DAY WITH DINNER     Quantity: 90    Pharmacy: [facility]/pharmacy #[id number] [address]     Does the patient have enough for 3 days?   [x] Yes   [] No - Send as HP to POD

## 2024-08-22 ENCOUNTER — TELEPHONE (OUTPATIENT)
Dept: INTERNAL MEDICINE CLINIC | Facility: CLINIC | Age: 64
End: 2024-08-22

## 2024-08-22 ENCOUNTER — OFFICE VISIT (OUTPATIENT)
Dept: ENDOCRINOLOGY | Facility: CLINIC | Age: 64
End: 2024-08-22
Payer: COMMERCIAL

## 2024-08-22 VITALS
SYSTOLIC BLOOD PRESSURE: 124 MMHG | WEIGHT: 150.6 LBS | OXYGEN SATURATION: 93 % | HEIGHT: 65 IN | HEART RATE: 66 BPM | DIASTOLIC BLOOD PRESSURE: 62 MMHG | BODY MASS INDEX: 25.09 KG/M2 | TEMPERATURE: 97.6 F

## 2024-08-22 DIAGNOSIS — E78.2 MIXED HYPERLIPIDEMIA: ICD-10-CM

## 2024-08-22 DIAGNOSIS — M85.89 OSTEOPENIA OF MULTIPLE SITES: ICD-10-CM

## 2024-08-22 DIAGNOSIS — E11.65 TYPE 2 DIABETES MELLITUS WITH HYPERGLYCEMIA, WITH LONG-TERM CURRENT USE OF INSULIN (HCC): Primary | ICD-10-CM

## 2024-08-22 DIAGNOSIS — E55.9 VITAMIN D DEFICIENCY: ICD-10-CM

## 2024-08-22 DIAGNOSIS — E11.42 DIABETIC PERIPHERAL NEUROPATHY ASSOCIATED WITH TYPE 2 DIABETES MELLITUS (HCC): ICD-10-CM

## 2024-08-22 DIAGNOSIS — Z79.4 TYPE 2 DIABETES MELLITUS WITH HYPERGLYCEMIA, WITH LONG-TERM CURRENT USE OF INSULIN (HCC): Primary | ICD-10-CM

## 2024-08-22 PROCEDURE — 99214 OFFICE O/P EST MOD 30 MIN: CPT | Performed by: INTERNAL MEDICINE

## 2024-08-22 RX ORDER — ATORVASTATIN CALCIUM 80 MG/1
80 TABLET, FILM COATED ORAL
Qty: 90 TABLET | Refills: 1 | Status: SHIPPED | OUTPATIENT
Start: 2024-08-22

## 2024-08-22 NOTE — ASSESSMENT & PLAN NOTE
She  is better with A1c 6.9 %. She has been unable to use a personal CGM. Reports work related stress.    Today we agreed to continue Lantus 30u qhs, metformin 1000mg po BID, Jardiance 10mg qdaily and restart mounjaro 5mg weekly.    Share [facility] 3 data in 2 weeks.    Follow up in 3 months.    Lab Results   Component Value Date    HGBA1C 6.9 (A) 08/02/2024       Lab Results   Component Value Date    HGBA1C 6.9 (A) 08/02/2024

## 2024-08-22 NOTE — ASSESSMENT & PLAN NOTE
Generally stable symptomatically.  Lab Results   Component Value Date    HGBA1C 6.9 (A) 08/02/2024

## 2024-08-22 NOTE — ASSESSMENT & PLAN NOTE
9/26/23 DXA: osteopenia. FRAX 1.1% hip and 9.4% major osteoporotic fractures.    Advised vit D3 5000IU daily OTC and calcium 1200mg qdaily via diet and supplements combined.  weight bearing exercise.

## 2024-08-22 NOTE — PROGRESS NOTES
"    Follow-up Patient Progress Note      CC: diabetes     History of Present Illness:   61yr female with type 2 diabetes since 2009, retinopathy, microalbuminuria, HTN, HLD, CAD s/p HUNTER 9/21, ischemic CMpathy, rt paraclinoid meningioma diagnosed during recent hospitalization and fatigue. Last visit was 11/29/23.     Since last visit, she lost 7 lbs. She could not get trulicity due to national shortage. Also diet and lifestyle indiscretions.     CGM review: Has not been using her sensor.  Device: torito    dates 11/23/23 -11/29/23          Usage: 80%     Av glu: 136 mg/dL        CV 35%  TIR 88%          TAR 12%         TBR 0%  G;ycemic patterns: mild postprandial hyperglycemia but mostly normoglycemia.     Current meds:  Jardiance 10 mg qdaily  Metformin 1000mg PO BID  Lantus 30u qhs  Mounjaro 5mg last used several weeks ago      Opthamology: Follows [facility]  Podiatry: yes  Influenza: yes, COVID - yes  Dental: No issues  Pancreatitis: No     Ace/ARB: entresto  Statin:lipitor  Thyroid issues: no  FH: brother and 2 sisters, mother and father have diabetes.    9/26/23 DXA: osteopenia. FRAX 1.1% hip and 9.4% major osteoporotic fractures.    Physical Exam:  Body mass index is 25.06 kg/m².  /62   Pulse 66   Temp 97.6 °F (36.4 °C)   Ht 5' 5\" (1.651 m)   Wt 68.3 kg (150 lb 9.6 oz)   SpO2 93%   BMI 25.06 kg/m²    Vitals:    08/22/24 1343   Weight: 68.3 kg (150 lb 9.6 oz)        Physical Exam  Constitutional:       General: She is not in acute distress.     Appearance: She is well-developed.   HENT:      Head: Normocephalic and atraumatic.      Nose: Nose normal.   Eyes:      Conjunctiva/sclera: Conjunctivae normal.   Pulmonary:      Effort: Pulmonary effort is normal.   Abdominal:      General: There is no distension.   Musculoskeletal:      Cervical back: Normal range of motion and neck supple.   Skin:     Findings: No rash.      Comments: No icterus   Neurological:      Mental Status: She is alert and " oriented to person, place, and time.         Labs:   Lab Results   Component Value Date    HGBA1C 6.9 (A) 08/02/2024       Lab Results   Component Value Date    KWU5AGAZGJSO 2.352 07/12/2023       Lab Results   Component Value Date    CREATININE 0.91 03/01/2024    CREATININE 0.85 03/22/2023    CREATININE 0.89 08/18/2022    BUN 24 03/01/2024    K 4.0 03/22/2023     03/22/2023    CO2 28 03/22/2023     eGFR   Date Value Ref Range Status   03/01/2024 66 ml/min/1.73sq m Final       Lab Results   Component Value Date    ALT 32 03/22/2023    AST 22 03/22/2023    ALKPHOS 52 03/22/2023       Lab Results   Component Value Date    CHOLESTEROL 74 07/12/2023    CHOLESTEROL 89 08/18/2022    CHOLESTEROL 70 10/28/2021     Lab Results   Component Value Date    HDL 29 (L) 07/12/2023    HDL 30 (L) 08/18/2022    HDL 28 (L) 10/28/2021     Lab Results   Component Value Date    TRIG 78 07/12/2023    TRIG 80 08/18/2022    TRIG 72 10/28/2021     Lab Results   Component Value Date    NONHDLC 45 07/12/2023    NONHDLC 59 08/18/2022    NONHDLC 42 10/28/2021         Assessment/Plan:    1. Type 2 diabetes mellitus with hyperglycemia, with long-term current use of insulin (HCC)  Assessment & Plan:  She  is better with A1c 6.9 %. She has been unable to use a personal CGM. Reports work related stress.    Today we agreed to continue Lantus 30u qhs, metformin 1000mg po BID, Jardiance 10mg qdaily and restart mounjaro 5mg weekly.    Share Feng 3 data in 2 weeks.    Follow up in 3 months.    Lab Results   Component Value Date    HGBA1C 6.9 (A) 08/02/2024       Lab Results   Component Value Date    HGBA1C 6.9 (A) 08/02/2024     Orders:  -     tirzepatide 5 MG/0.5ML; Inject 0.5 mL (5 mg total) under the skin once a week  -     Hemoglobin A1C; Future  2. Osteopenia of multiple sites  Assessment & Plan:  9/26/23 DXA: osteopenia. FRAX 1.1% hip and 9.4% major osteoporotic fractures.    Advised vit D3 5000IU daily OTC and calcium 1200mg qdaily via diet  and supplements combined.  weight bearing exercise.  3. Diabetic peripheral neuropathy associated with type 2 diabetes mellitus (HCC)  Assessment & Plan:  Generally stable symptomatically.  Lab Results   Component Value Date    HGBA1C 6.9 (A) 08/02/2024     4. Mixed hyperlipidemia  Assessment & Plan:  Continue lipitor.  5. Vitamin D deficiency        I have spent a total time of 30 minutes on 08/22/24 in caring for this patient including greater than 50% of this time was spent in counseling/coordination of care as listed above.       Discussed with the patient and all questioned fully answered. She will contact me with concerns.    Grant Arellano

## 2024-08-27 NOTE — TELEPHONE ENCOUNTER
Patient called in and made aware that a refill was sent to [facility] for Lasix 20 mg on 8/20 for 90 tablet and 1 refill. Patient verbalized understanding and was appreciative of the call.

## 2024-09-25 ENCOUNTER — TELEPHONE (OUTPATIENT)
Dept: FAMILY MEDICINE CLINIC | Facility: CLINIC | Age: 64
End: 2024-09-25

## 2024-09-25 NOTE — TELEPHONE ENCOUNTER
We called patient to schedule a DM goot exam.  Patient sees podiatrist regularly for her foot exams.

## 2024-09-27 ENCOUNTER — HOSPITAL ENCOUNTER (OUTPATIENT)
Facility: HOSPITAL | Age: 64
End: 2024-09-27
Attending: INTERNAL MEDICINE
Payer: COMMERCIAL

## 2024-09-27 VITALS — WEIGHT: 150 LBS | HEIGHT: 65 IN | BODY MASS INDEX: 24.99 KG/M2

## 2024-09-27 DIAGNOSIS — Z12.31 ENCOUNTER FOR SCREENING MAMMOGRAM FOR MALIGNANT NEOPLASM OF BREAST: ICD-10-CM

## 2024-09-27 DIAGNOSIS — I25.5 ISCHEMIC CARDIOMYOPATHY: ICD-10-CM

## 2024-09-27 PROCEDURE — 77067 SCR MAMMO BI INCL CAD: CPT

## 2024-09-27 PROCEDURE — 77063 BREAST TOMOSYNTHESIS BI: CPT

## 2024-09-27 NOTE — TELEPHONE ENCOUNTER
Reason for call:   [x] Refill   [] Prior Auth  [] Other:     Office:   [] PCP/Provider -   [x] Specialty/Provider - Cardiology     Medication: Entresto     Dose/Frequency: 49-51 mg tablets taken by mouth 2x daily     Quantity: 180    Pharmacy: [facility]/pharmacy #[id number] [place], [place] - [address] [phone]     Does the patient have enough for 3 days?   [] Yes   [x] No - Send as HP to POD

## 2024-09-29 DIAGNOSIS — Z79.4 TYPE 2 DIABETES MELLITUS WITH HYPERGLYCEMIA, WITH LONG-TERM CURRENT USE OF INSULIN (HCC): ICD-10-CM

## 2024-09-29 DIAGNOSIS — E11.65 TYPE 2 DIABETES MELLITUS WITH HYPERGLYCEMIA, WITH LONG-TERM CURRENT USE OF INSULIN (HCC): ICD-10-CM

## 2024-09-29 DIAGNOSIS — I25.5 ISCHEMIC CARDIOMYOPATHY: ICD-10-CM

## 2024-09-30 RX ORDER — SACUBITRIL AND VALSARTAN 49; 51 MG/1; MG/1
1 TABLET, FILM COATED ORAL 2 TIMES DAILY
Qty: 180 TABLET | Refills: 4 | Status: SHIPPED | OUTPATIENT
Start: 2024-09-30

## 2024-09-30 RX ORDER — TIRZEPATIDE 5 MG/.5ML
INJECTION, SOLUTION SUBCUTANEOUS
Qty: 6 ML | Refills: 1 | Status: SHIPPED | OUTPATIENT
Start: 2024-09-30

## 2024-09-30 RX ORDER — CARVEDILOL 6.25 MG/1
3.12 TABLET ORAL 2 TIMES DAILY WITH MEALS
Qty: 90 TABLET | Refills: 1 | Status: SHIPPED | OUTPATIENT
Start: 2024-09-30

## 2024-10-23 NOTE — PROGRESS NOTES
Cardiology  Follow Up   Office Visit Note  Bruce Acosta   64 y.o.   female   MRN: [id number]  [facility] [place]  [address] [facility] [address]  [address]  [place] [address] [phone]    PCP: Annia Sumner MD  Cardiologist: Dr. Licea            Summary of recommendations  48 hr Monitor re: frequent dizziness  Echo cardiogram  BMP, BNP  Venous duplex - re: сергей LE edema, R > L  Follow up will be scheduled with Dr Licea 1 mo, an alternative provider if he is unavailable        Assessment/plan  Dizziness. Did have some episodes that were more pronounced than others.  She had one spell at [employer] that she had near syncope  Given the frequency of symptoms, will start with a 48-hour Holter monitor.  If this is unremarkable she may benefit from an event recorder  Bilateral lower extremity edema right greater than left.  Will order venous duplex, rule out DVT.  Will also obtain an updated echocardiogram  Shortness of breath.  Unclear etiology will await testing as above  CAD,S/p CAD with calcified 90% mid LAD lesion,  s/p overlapping HUNTER 9/2021 residual 70%-80% ostial 2nd diagonal, medically managed  On aspirin,  Plavix ,statin, beta-blocker  ICM with a recovered EF  HFpEF  Wt Readings from Last 3 Encounters:   10/24/24 66 kg (145 lb 9.6 oz)   09/27/24 68 kg (150 lb)   08/22/24 68.3 kg (150 lb 9.6 oz)     --Beta-blocker:   Carvedilol 6.25 mg b.i.d.  --Diuretic:   Lasix 20 mg daily   --ACE/ARB/ARNI:   Entresto 24/26 q.12h  --MRA  Hypertension, essential.  BP  116.72 on carvedilol 6.25 mg twice daily, Lasix, Entresto  Hyperlipidemia, mixed  Now on atorvastatin 80 mg daily .  Calculated LDL 29.  At goal, continue   Latest Reference Range & Units 10/28/21 06:09 08/18/22 06:26 07/12/23 06:20   Cholesterol See Comment mg/dL 70 89 74   Triglycerides See Comment mg/dL 72 80 78   HDL >=50 mg/dL 28 (L) 30 (L) 29 (L)   Non-HDL Cholesterol mg/dl 42 59 45   LDL Calculated 0 - 100 mg/dL 28 43 29     Type  2 diabetes mellitus.  Hemoglobin A1c 6.9 , improved from 9.6 per endocrinology  Family history premature CAD father MI 38  Right paraclinoid meningioma  Cardiac testing  Stress echocardiogram 09/28/2017:  Cannot rule out stress-induced ischemia of the mid-distal inferoseptal wall.  EKG stress test 11/11/2019:  Doni protocol x 6 mins;  achieving 7 Mets and 87% of MPHR.   no ischemia after maximal exercise.  No reproduction of symptoms.  TTE  8/30/21 EF 37% with hypokinesis of the mid anterior, basal-mid anteroseptal, basal-mid inferoseptal, entire inferior and apical septal wall, G1DD.  Normal RV size and function.  Mild-moderate MR.  Cardiac catheterization 9/1/21  Left main: The vessel was large sized. Angiography showed minor luminal irregularities.  LAD: The vessel was medium sized.  Mid LAD: There was a tubular 90 % stenosis just after D1. The lesion was heavily calcified and without evidence of thrombus. There was BANDAR grade 3 flow through the vessel (brisk flow). This is a likely culprit for the patient's clinical presentation. It appears amenable to percutaneous intervention. In a second lesion, there was a discrete 80 % stenosis at the origin of S2. The lesion was mildly calcified and without evidence of thrombus. There was BANDAR grade 3 flow through the vessel (brisk flow).  2nd diagonal: There was a tubular 70 % stenosis at the ostium of the vessel segment.  Circumflex: The vessel was medium sized. Angiography showed mild atherosclerosis.  RCA: The vessel was medium to large sized. Angiography showed mild atherosclerosis.   cardiac catheterization 9/2/21 (Right radial access)  Successful OCT-guided PCI, mid LAD, with successful treatment of two lesions mid LAD with overlapping drug-eluting stents.  TTE 12/8/2022.  EF 55%.  Wall motion normal.  Grade 1 DD.  Mild TR.            HPI  Bruce Acosta is a 60 yo female with uncontrolled type 2 diabetes, current Hg A1c 10.2, mixed hyperlipidemia, hypertension and a  strong family history premature CAD;  She is a lifelong nonsmoker.Home medication regimen includes aspirin 81 mg daily, Lasix 20 mg PRN, lisinopril 5 mg daily, simvastatin 40 mg daily. She follows with Dr. Licea.  She underwent an EKG stress test November 2019 that showed no ischemia after maximal exercise.      Adm 8/29-9/3/21 she initially presented to [facility] with dizziness, with a background of dyspnea on exertion.  She was severely hypertensive, mildly volume overloaded. She underwent a stroke pathway on admission.  Brain imaging revealed a benign meningioma, for which neurosurgery recommended outpatient follow-up, no urgent surgery..  An echocardiogram demonstrated a reduced ejection fraction, 37% with global hypokinesis with regional variations, presumed new onset.  Her NT proBNP was 1033. She was followed by Cardiology.  She was diuresed. Her ACE-inhibitor was held  Her EKG demonstrated sinus rhythm with a new left bundle branch block.  Troponins were unremarkable.  Hemoglobin A1c 10.2.     Initial left heart catheterization at [place] revealed a high risk LAD lesion, and an 80% ostial D2 lesion..  She was transferred to [facility] for high risk PCI.  September 2nd she underwent PCI deployment of 2 overlapping drug-eluting stents to the mid LAD with good result.  She was placed on dual antiplatelet therapy with aspirin and Plavix.  Lisinopril and Januvia were discontinued.  She was started on guideline directed medical therapy with Entresto,   Carvedilol and Jardiance.  Aggressive risk factor modification was recommended; she was followed by endocrinology for her uncontrolled diabetes.  Neurosurgery recommended visual field testing as an outpatient with ophthalmology before Neurosurgery follow-up in a few weeks.  Discharge weight 185lb September 1st  Discharge creatinine  0.81  Discharge diuretic Lasix 20 mg PRN    9/15/21  Hospital follow-up.  She is accompanied by her sister.       Overall she is feeling better.  No chest pain, shortness of breath, lightheadedness or dizziness.  Historically she has daytime somnolence.  She is going to assess whether this is improved and discussed with her cardiologist if it persists.    She is compliant with her medications-we were reviewed them.   In the past she was on aspirin 81 mg every other day.  She is now taking it daily.  She has Lasix 20 mg to use p.r.n. for lower extremity edema/ weight gain.  She does have some volume on board today.  I think she may benefit from using it twice a week for a few weeks.  She has normal renal function baseline    She is amenable to a CBC, BMP today. Wt 179 lb  We discussed cardiac rehab.  She is engaged.    We discussed importance of a heart healthy diet, low sodium.  We reviewed how to read food labels to facilitate adherence.  In the past, she was eating Ramen noodles.    Interval history  11/4/21; 10/31/22; 10/17/2023 OV Dr. Licea  Per his last note  Discussion/Summary:All of her assessed cardiac problems are stable. I have reviewed her medications and made no changes. I advised her to take an extra 20 mg of Lasix in the afternoon every 4 days if needed for LE edema / weight gain. No cardiac testing was ordered.  RTO 9 months.        Interval History: She has not had any cardiac problems since her last office visit. She overall feels good and denies CP, SOB, significant LE edema.  Her weight is up from 156 to 160 lbs.     She has CAD with an LAD stent placed in 9/2021. EF was 37 % at that time.     She has a chronic LBBB.     ECHO 12/2022 - EF 55%.  /62  A1C 11.0  LDL 29  Creatinine 0.85     She is not very active. She is on Lasix 20 mg daily.    10/24/24  PMH: HTN, LBBB, hx ICM with a recovered EF, DM2  She is here for a routine visit  She is sedentary  Her weight today is 145 pounds  Her EKG shows normal sinus rhythm left axis deviation left bundle branch block seen previously  She conveys that she  has intermittent dizziness.  She was at [employer] and was passed out.  She has episodes frequently a couple times a week.  She has bilateral lower extremity edema right greater than left.  She was scanned for DVT but this was several years ago.  She is sedentary.  She also has shortness of breath.  On exam she has chronic venous stasis changes  Will obtain a 48-hour Holter monitor, echocardiogram and some updated labs  She return to see a cardiologist in a short interval if hers is unavailable        Assessment  Diagnoses and all orders for this visit:    Coronary artery disease involving native coronary artery of native heart without angina pectoris  -     POCT ECG  -     Echo complete w/ contrast if indicated; Future  -     B-Type Natriuretic Peptide(BNP); Future  -     Basic metabolic panel; Future    Essential hypertension  -     Echo complete w/ contrast if indicated; Future    Diabetic peripheral neuropathy associated with type 2 diabetes mellitus (HCC)    Mixed hyperlipidemia  -     Echo complete w/ contrast if indicated; Future    Dizziness  -     Holter monitor; Future  -     B-Type Natriuretic Peptide(BNP); Future  -     Basic metabolic panel; Future    Left bundle branch block    Bilateral lower extremity edema  -     VAS upper limb venous duplex scan, complete, bilateral; Future  -     Echo complete w/ contrast if indicated; Future  -     B-Type Natriuretic Peptide(BNP); Future  -     Basic metabolic panel; Future            Past Medical History:   Diagnosis Date    Colon polyp     Coronary artery disease     Depression     Diabetes mellitus (HCC)     Dizziness     Edema     Hyperlipidemia     Hypertension     Ischemic cardiomyopathy     Left bundle branch block     Type 2 diabetes mellitus (HCC)     Vitamin D deficiency     Wears glasses        Review of Systems   Constitutional: Negative for chills.   Cardiovascular:  Positive for dyspnea on exertion and leg swelling. Negative for chest pain,  claudication, cyanosis, irregular heartbeat, near-syncope, orthopnea, palpitations, paroxysmal nocturnal dyspnea and syncope.   Respiratory:  Negative for cough and shortness of breath.    Gastrointestinal:  Negative for heartburn and nausea.   Neurological:  Positive for dizziness. Negative for focal weakness, headaches, light-headedness and weakness.   All other systems reviewed and are negative.      Allergies   Allergen Reactions    Dust Mite Extract Other (See Comments)     .    Current Outpatient Medications:     aspirin 81 MG tablet, Take 1 tablet by mouth daily, Disp: , Rfl:     atorvastatin (LIPITOR) 80 mg tablet, Take 1 tablet (80 mg total) by mouth daily with dinner, Disp: 90 tablet, Rfl: 1    CALCIUM PO, 1 po daily, Disp: , Rfl:     carvedilol (COREG) 6.25 mg tablet, TAKE 0.5 TABLETS (3.125 MG TOTAL) BY MOUTH 2 (TWO) TIMES A DAY WITH MEALS, Disp: 90 tablet, Rfl: 1    Cholecalciferol (Vitamin D3) 50 MCG (2000 UT) capsule, Take 1 capsule (2,000 Units total) by mouth daily, Disp: 30 capsule, Rfl: 5    clopidogrel (PLAVIX) 75 mg tablet, TAKE 1 TABLET BY MOUTH EVERY DAY, Disp: 90 tablet, Rfl: 3    Continuous Blood Gluc Sensor (FreeStyle Feng 3 Sensor) MISC, USE 1 UNITS EVERY 14 (FOURTEEN) DAYS, Disp: 6 each, Rfl: 2    Empagliflozin (Jardiance) 10 MG TABS tablet, Take 1 tablet (10 mg total) by mouth every morning, Disp: 90 tablet, Rfl: 1    furosemide (LASIX) 20 mg tablet, TAKE 1 TABLET BY MOUTH EVERY DAY, Disp: 90 tablet, Rfl: 1    Insulin Pen Needle (BD Pen Needle Brittney U/F) 32G X 4 MM MISC, Use daily as needed with insulin pen, Disp: 100 each, Rfl: 0    Lantus SoloStar 100 units/mL SOPN, INJECT 30 UNITS UNDER THE SKIN DAILY AT BEDTIME, Disp: 30 mL, Rfl: 1    metFORMIN (GLUCOPHAGE) 1000 MG tablet, TAKE 1 TABLET BY MOUTH TWICE A DAY WITH MEALS, Disp: 180 tablet, Rfl: 1    sacubitril-valsartan (Entresto) 49-51 MG TABS, Take 1 tablet by mouth 2 (two) times a day, Disp: 180 tablet, Rfl: 4    tirzepatide  (Mounjaro) 5 MG/0.5ML, INJECT 0.5 ML (5 MG TOTAL) UNDER THE SKIN ONE TIME PER WEEK, Disp: 6 mL, Rfl: 1        Social History     Socioeconomic History    Marital status: Single     Spouse name: Not on file    Number of children: Not on file    Years of education: Not on file    Highest education level: Not on file   Occupational History    Occupation: deed recorder   Tobacco Use    Smoking status: Never    Smokeless tobacco: Never   Vaping Use    Vaping status: Never Used   Substance and Sexual Activity    Alcohol use: No    Drug use: No    Sexual activity: Not Currently     Birth control/protection: Post-menopausal   Other Topics Concern    Not on file   Social History Narrative    Do you currently or have you served in the [employer]: No    Were you activated, into active duty, as a member of the [employer] or as a Reservist: No    Occupation:     Marital status: Single    Exercise level: None    Diet: Regular    General stress level: Medium    Alcohol intake: None    Caffeine intake: Moderate    1 cup of coffee in the morning    Chewing tobacco: none    Illicit drugs: none    Guns present in home: No    Seat belts used routinely: Yes    Sunscreen used routinely: Yes    Smoke alarm in home: Yes    Advance directive: Yes     Social Determinants of Health     Financial Resource Strain: Not on file   Food Insecurity: Not on file   Transportation Needs: Not on file   Physical Activity: Not on file   Stress: Not on file   Social Connections: Not on file   Intimate Partner Violence: Not on file   Housing Stability: Not on file       Family History   Problem Relation Age of Onset    Hypertension Mother     Sudden death Mother         SCD    Hyperlipidemia Mother     Heart attack Mother     Breast cancer Mother 70    Diabetes type II Mother     Arrhythmia Father         pacemaker/ defib    Clotting disorder Father         eliquis    Heart failure Father     Heart disease Father     Melanoma  "Father     Cancer Father 60        bladder cancer    Diabetes type II Father     Asthma Father     BRCA1 Negative Sister     Breast cancer Sister 39    Thyroid cancer Sister     No Known Problems Sister     No Known Problems Sister     Thyroid cancer Sister     No Known Problems Maternal Grandmother     No Known Problems Maternal Grandfather     No Known Problems Paternal Grandmother     No Known Problems Paternal Grandfather     Breast cancer Maternal Aunt         unknown age    Esophageal cancer Maternal Aunt 60    Cancer Maternal Aunt         unknown age or type    No Known Problems Maternal Aunt     Colon cancer Maternal Uncle         unknwon age    Pancreatic cancer Maternal Uncle 59    Lung cancer Paternal Aunt         unknonw age- in her 80s    Anuerysm Neg Hx        Physical Exam  Vitals and nursing note reviewed.   Constitutional:       General: She is not in acute distress.     Appearance: She is not diaphoretic.   HENT:      Head: Normocephalic and atraumatic.   Eyes:      Conjunctiva/sclera: Conjunctivae normal.   Cardiovascular:      Rate and Rhythm: Normal rate and regular rhythm.      Pulses: Intact distal pulses.      Heart sounds: Normal heart sounds.   Pulmonary:      Effort: Pulmonary effort is normal.      Breath sounds: Normal breath sounds.   Abdominal:      General: Bowel sounds are normal.      Palpations: Abdomen is soft.   Musculoskeletal:         General: Normal range of motion.      Cervical back: Normal range of motion and neck supple.      Right lower leg: Edema present.      Left lower leg: Edema present.      Comments: Right greater than left   Skin:     General: Skin is warm and dry.   Neurological:      Mental Status: She is alert and oriented to person, place, and time.         Vitals: Blood pressure 116/72, pulse 78, height 5' 5\" (1.651 m), weight 66 kg (145 lb 9.6 oz), SpO2 98%, not currently breastfeeding.   Wt Readings from Last 3 Encounters:   10/24/24 66 kg (145 lb 9.6 " "oz)   09/27/24 68 kg (150 lb)   08/22/24 68.3 kg (150 lb 9.6 oz)         Labs & Results:  Lab Results   Component Value Date    WBC 5.50 07/12/2023    HGB 12.7 07/12/2023    HCT 38.4 07/12/2023    MCV 95 07/12/2023     07/12/2023     No results found for: \"BNP\"  No components found for: \"CHEM\"  Troponin I   Date Value Ref Range Status   08/30/2021 <0.02 <=0.04 ng/mL Final     Comment:     Siemens Chemistry analyzer 99% cutoff is > 0.04 ng/mL in network labs     o cTnI 99% cutoff is useful only when applied to patients in the clinical setting of myocardial ischemia   o cTnI 99% cutoff should be interpreted in the context of clinical history, ECG findings and possibly cardiac imaging to establish correct diagnosis.   o cTnI 99% cutoff may be suggestive but clearly not indicative of a coronary event without the clinical setting of myocardial ischemia.     08/29/2021 <0.02 <=0.04 ng/mL Final     Comment:     Autovalidation override  Siemens Chemistry analyzer 99% cutoff is > 0.04 ng/mL in network labs     o cTnI 99% cutoff is useful only when applied to patients in the clinical setting of myocardial ischemia   o cTnI 99% cutoff should be interpreted in the context of clinical history, ECG findings and possibly cardiac imaging to establish correct diagnosis.   o cTnI 99% cutoff may be suggestive but clearly not indicative of a coronary event without the clinical setting of myocardial ischemia.     08/29/2021 <0.02 <=0.04 ng/mL Final     Comment:     Siemens Chemistry analyzer 99% cutoff is > 0.04 ng/mL in network labs     o cTnI 99% cutoff is useful only when applied to patients in the clinical setting of myocardial ischemia   o cTnI 99% cutoff should be interpreted in the context of clinical history, ECG findings and possibly cardiac imaging to establish correct diagnosis.   o cTnI 99% cutoff may be suggestive but clearly not indicative of a coronary event without the clinical setting of myocardial ischemia.   "     Results for orders placed during the hospital encounter of 21    Echo complete with contrast if indicated    [facility]  [address], [place] [id number] [phone]    Transthoracic Echocardiogram  2D, M-mode, Doppler, and Color Doppler    Study date:  30-Aug-2021    Patient: ANGELIQUE LE  MR number: [id number]  Account number: [id number]  : 1960  Age: 61 years  Gender: Female  Status: Outpatient  Location: Emergency room  Height: 66 in  Weight: 195 lb  BP: 166/ 77 mmHg    Indications: CVA.    Diagnoses: I63.9 - Cerebral infarction, unspecified    Sonographer:  JESSIE Jara  Primary Physician:  Annia Sumner MD  Referring Physician:  Shamar Davila  Group:  [facility]  Interpreting Physician:  Wei Moreno MD    SUMMARY    LEFT VENTRICLE:  Systolic function was moderately reduced. Ejection fraction was estimated to be 37 %.  There was hypokinesis of the mid anterior, basal-mid anteroseptal, basal-mid inferoseptal, entire inferior, and apical septal wall(s).  Doppler parameters were consistent with abnormal left ventricular relaxation (grade 1 diastolic dysfunction).    VENTRICULAR SEPTUM:  There was mild dyssynergic motion. These changes are consistent with LBBB.    LEFT ATRIUM:  The atrium was mildly dilated.    MITRAL VALVE:  There was mild to moderate regurgitation.    AORTIC VALVE:  There was trace regurgitation.    TRICUSPID VALVE:  There was trace regurgitation.    PERICARDIUM:  A very small pericardial effusion was identified anterior to the heart. The fluid had no internal echoes.    HISTORY: PRIOR HISTORY: Hyperlipidemia, LBBB, Abnormal Stress Echo, Hypertension, Lower Leg Edema, Diabetes Mellitus II.    PROCEDURE: The procedure was performed in the emergency room. This was a routine study. The transthoracic approach was used. The study included complete 2D imaging, M-mode, complete spectral Doppler, and color Doppler. The  heart rate was  70 bpm, at the start of the study. Images were obtained from the parasternal, apical, subcostal, and suprasternal notch acoustic windows. Image quality was adequate.    LEFT VENTRICLE: Size was normal. Systolic function was moderately reduced. Ejection fraction was estimated to be 37 %. There was hypokinesis of the mid anterior, basal-mid anteroseptal, basal-mid inferoseptal, entire inferior, and apical  septal wall(s). Wall thickness was normal. DOPPLER: Doppler parameters were consistent with abnormal left ventricular relaxation (grade 1 diastolic dysfunction).    VENTRICULAR SEPTUM: There was mild dyssynergic motion. These changes are consistent with LBBB.    RIGHT VENTRICLE: The size was normal. Systolic function was normal. Wall thickness was normal.    LEFT ATRIUM: The atrium was mildly dilated.    RIGHT ATRIUM: Size was normal.    MITRAL VALVE: Valve structure was normal. There was normal leaflet separation. DOPPLER: The transmitral velocity was within the normal range. There was no evidence for stenosis. There was mild to moderate regurgitation.    AORTIC VALVE: The valve was trileaflet. Leaflets exhibited normal thickness and normal cuspal separation. DOPPLER: Transaortic velocity was within the normal range. There was no evidence for stenosis. There was trace regurgitation.    TRICUSPID VALVE: The valve structure was normal. There was normal leaflet separation. DOPPLER: The transtricuspid velocity was within the normal range. There was no evidence for stenosis. There was trace regurgitation.    PULMONIC VALVE: Leaflets exhibited normal thickness, no calcification, and normal cuspal separation. DOPPLER: The transpulmonic velocity was within the normal range. There was no significant regurgitation.    PERICARDIUM: A very small pericardial effusion was identified anterior to the heart. The fluid had no internal echoes. The pericardium was normal in appearance.    AORTA: The root exhibited  normal size.    SYSTEMIC VEINS: IVC: The inferior vena cava was normal in size.    SYSTEM MEASUREMENT TABLES    2D  %FS: 17.9 %  Ao Diam: 2.86 cm  EDV(Teich): 106.05 ml  EF(Teich): 37.18 %  ESV(Teich): 66.62 ml  HR_2Ch_Q: 69.77 BPM  HR_4Ch_Q: 69.1 BPM  IVSd: 0.91 cm  LA Area: 18.23 cm2  LA Diam: 3.96 cm  LVCO_2Ch_Q: 3.91 L/min  LVCO_4Ch_Q: 3.24 L/min  LVCO_BiP_Q: 3.58 L/min  LVEDV MOD A4C: 122.92 ml  LVEF MOD A4C: 38.87 %  LVEF_2Ch_Q: 36.48 %  LVEF_4Ch_Q: 37.23 %  LVEF_BiP_Q: 37.25 %  LVESV MOD A4C: 75.14 ml  LVIDd: 4.77 cm  LVIDs: 3.92 cm  LVLd A4C: 8.14 cm  LVLd_2Ch_Q: 8.14 cm  LVLd_4Ch_Q: 7.56 cm  LVLs A4C: 6.97 cm  LVLs_2Ch_Q: 7.04 cm  LVLs_4Ch_Q: 6.71 cm  LVPWd: 0.91 cm  LVSV_2Ch_Q: 56 ml  LVSV_4Ch_Q: 46.95 ml  LVSV_BiP_Q: 52.19 ml  LVVED_2Ch_Q: 153.53 ml  LVVED_4Ch_Q: 126.09 ml  LVVED_BiP_Q: 140.11 ml  LVVES_2Ch_Q: 97.52 ml  LVVES_4Ch_Q: 79.14 ml  LVVES_BiP_Q: 87.91 ml  RA Area: 14 cm2  RVIDd: 3.29 cm  SV MOD A4C: 47.78 ml  SV(Teich): 39.44 ml    CF  MR Als.Marko: 0.42 m/s  MR Flow: 40.81 ml/s  MR Rad: 0.39 cm    CW  AV MaxP.23 mmHg  AV Vmax: 1.34 m/s  MR VTI: 234.05 cm  MR Vmax: 5.73 m/s  MR Vmean: 4.59 m/s  MR maxP.57 mmHg  MR meanP.94 mmHg  TR MaxP.21 mmHg  TR Vmax: 1.82 m/s    MM  TAPSE: 2.59 cm    PW  E' Sept: 0.05 m/s  E/E' Sept: 13.24  LVOT Vmax: 0.85 m/s  LVOT maxP.89 mmHg  MV A Marko: 1.13 m/s  MV Dec Spencer: 3.87 m/s2  MV DecT: 168.95 ms  MV E Marko: 0.65 m/s  MV E/A Ratio: 0.58  MV PHT: 49 ms  MVA By PHT: 4.49 cm2    [employer] Accredited Echocardiography Laboratory    Prepared and electronically signed by    Wei Moreno MD  Signed 31-Aug-2021 08:45:43    No results found for this or any previous visit.      This note was completed in part utilizing [employer] direct voice recognition software.   Grammatical errors, random word insertion, spelling mistakes, and incomplete sentences may be an occasional consequence of the system secondary to software limitations,  ambient noise and hardware issues. At the time of dictation, efforts were made to edit, clarify and /or correct errors.  Please read the chart carefully and recognize, using context, where substitutions have occurred.  If you have any questions or concerns about the context, text or information contained within the body of this dictation, please contact myself, the provider, for further clarification

## 2024-10-24 ENCOUNTER — OFFICE VISIT (OUTPATIENT)
Dept: CARDIOLOGY CLINIC | Facility: CLINIC | Age: 64
End: 2024-10-24
Payer: COMMERCIAL

## 2024-10-24 VITALS
WEIGHT: 145.6 LBS | HEART RATE: 78 BPM | SYSTOLIC BLOOD PRESSURE: 116 MMHG | OXYGEN SATURATION: 98 % | BODY MASS INDEX: 24.26 KG/M2 | DIASTOLIC BLOOD PRESSURE: 72 MMHG | HEIGHT: 65 IN

## 2024-10-24 DIAGNOSIS — E11.42 DIABETIC PERIPHERAL NEUROPATHY ASSOCIATED WITH TYPE 2 DIABETES MELLITUS (HCC): ICD-10-CM

## 2024-10-24 DIAGNOSIS — I10 ESSENTIAL HYPERTENSION: ICD-10-CM

## 2024-10-24 DIAGNOSIS — R42 DIZZINESS: ICD-10-CM

## 2024-10-24 DIAGNOSIS — I25.10 CORONARY ARTERY DISEASE INVOLVING NATIVE CORONARY ARTERY OF NATIVE HEART WITHOUT ANGINA PECTORIS: Primary | ICD-10-CM

## 2024-10-24 DIAGNOSIS — E78.2 MIXED HYPERLIPIDEMIA: ICD-10-CM

## 2024-10-24 DIAGNOSIS — R60.0 BILATERAL LOWER EXTREMITY EDEMA: ICD-10-CM

## 2024-10-24 DIAGNOSIS — I44.7 LEFT BUNDLE BRANCH BLOCK: ICD-10-CM

## 2024-10-24 PROCEDURE — 99215 OFFICE O/P EST HI 40 MIN: CPT | Performed by: NURSE PRACTITIONER

## 2024-10-24 PROCEDURE — 93000 ELECTROCARDIOGRAM COMPLETE: CPT | Performed by: NURSE PRACTITIONER

## 2024-10-24 NOTE — LETTER
October 24, 2024     Annia Sumner MD  [address]  [address]  [place] [address]    Patient: Bruce Acosta   YOB: 1960   Date of Visit: 10/24/2024       Dear Dr. Sumner:    Thank you for referring Bruce Acosta to me for evaluation. Below are my notes for this consultation.    If you have questions, please do not hesitate to call me. I look forward to following your patient along with you.         Sincerely,        ASH Casas        CC: No Recipients    ASH Casas  10/24/2024  2:37 PM  Sign when Signing Visit  Cardiology  Follow Up   Office Visit Note  Bruce Acosta   64 y.o.   female   MRN: [id number]  [facility] [place]  [address]  [address]  [place] [id number] [phone]    PCP: Annia Sumner MD  Cardiologist: Dr. Licea            Summary of recommendations  48 hr Monitor re: frequent dizziness  Echo cardiogram  BMP, BNP  Venous duplex - re: сергей LE edema, R > L  Follow up will be scheduled with Dr Licea 1 mo, an alternative provider if he is unavailable        Assessment/plan  Dizziness. Did have some episodes that were more pronounced than others.  She had one spell at [employer] that she had near syncope  Given the frequency of symptoms, will start with a 48-hour Holter monitor.  If this is unremarkable she may benefit from an event recorder  Bilateral lower extremity edema right greater than left.  Will order venous duplex, rule out DVT.  Will also obtain an updated echocardiogram  Shortness of breath.  Unclear etiology will await testing as above  CAD,S/p CAD with calcified 90% mid LAD lesion,  s/p overlapping HUNTER 9/2021 residual 70%-80% ostial 2nd diagonal, medically managed  On aspirin,  Plavix ,statin, beta-blocker  ICM with a recovered EF  HFpEF  Wt Readings from Last 3 Encounters:   10/24/24 66 kg (145 lb 9.6 oz)   09/27/24 68 kg (150 lb)   08/22/24 68.3 kg (150 lb 9.6 oz)     --Beta-blocker:   Carvedilol 6.25 mg  b.i.d.  --Diuretic:   Lasix 20 mg daily   --ACE/ARB/ARNI:   Entresto 24/26 q.12h  --MRA  Hypertension, essential.  BP  116.72 on carvedilol 6.25 mg twice daily, Lasix, Entresto  Hyperlipidemia, mixed  Now on atorvastatin 80 mg daily .  Calculated LDL 29.  At goal, continue   Latest Reference Range & Units 10/28/21 06:09 08/18/22 06:26 07/12/23 06:20   Cholesterol See Comment mg/dL 70 89 74   Triglycerides See Comment mg/dL 72 80 78   HDL >=50 mg/dL 28 (L) 30 (L) 29 (L)   Non-HDL Cholesterol mg/dl 42 59 45   LDL Calculated 0 - 100 mg/dL 28 43 29     Type 2 diabetes mellitus.  Hemoglobin A1c 6.9 , improved from 9.6 per endocrinology  Family history premature CAD father MI 38  Right paraclinoid meningioma  Cardiac testing  Stress echocardiogram 09/28/2017:  Cannot rule out stress-induced ischemia of the mid-distal inferoseptal wall.  EKG stress test 11/11/2019:  Doni protocol x 6 mins;  achieving 7 Mets and 87% of MPHR.   no ischemia after maximal exercise.  No reproduction of symptoms.  TTE  8/30/21 EF 37% with hypokinesis of the mid anterior, basal-mid anteroseptal, basal-mid inferoseptal, entire inferior and apical septal wall, G1DD.  Normal RV size and function.  Mild-moderate MR.  Cardiac catheterization 9/1/21  Left main: The vessel was large sized. Angiography showed minor luminal irregularities.  LAD: The vessel was medium sized.  Mid LAD: There was a tubular 90 % stenosis just after D1. The lesion was heavily calcified and without evidence of thrombus. There was BANDAR grade 3 flow through the vessel (brisk flow). This is a likely culprit for the patient's clinical presentation. It appears amenable to percutaneous intervention. In a second lesion, there was a discrete 80 % stenosis at the origin of S2. The lesion was mildly calcified and without evidence of thrombus. There was BANDAR grade 3 flow through the vessel (brisk flow).  2nd diagonal: There was a tubular 70 % stenosis at the ostium of the vessel  segment.  Circumflex: The vessel was medium sized. Angiography showed mild atherosclerosis.  RCA: The vessel was medium to large sized. Angiography showed mild atherosclerosis.   cardiac catheterization 9/2/21 (Right radial access)  Successful OCT-guided PCI, mid LAD, with successful treatment of two lesions mid LAD with overlapping drug-eluting stents.  TTE 12/8/2022.  EF 55%.  Wall motion normal.  Grade 1 DD.  Mild TR.            HPI  Bruce Acosta is a 62 yo female with uncontrolled type 2 diabetes, current Hg A1c 10.2, mixed hyperlipidemia, hypertension and a strong family history premature CAD;  She is a lifelong nonsmoker.Home medication regimen includes aspirin 81 mg daily, Lasix 20 mg PRN, lisinopril 5 mg daily, simvastatin 40 mg daily. She follows with Dr. Licea.  She underwent an EKG stress test November 2019 that showed no ischemia after maximal exercise.      Adm 8/29-9/3/21 she initially presented to [facility] with dizziness, with a background of dyspnea on exertion.  She was severely hypertensive, mildly volume overloaded. She underwent a stroke pathway on admission.  Brain imaging revealed a benign meningioma, for which neurosurgery recommended outpatient follow-up, no urgent surgery..  An echocardiogram demonstrated a reduced ejection fraction, 37% with global hypokinesis with regional variations, presumed new onset.  Her NT proBNP was 1033. She was followed by Cardiology.  She was diuresed. Her ACE-inhibitor was held  Her EKG demonstrated sinus rhythm with a new left bundle branch block.  Troponins were unremarkable.  Hemoglobin A1c 10.2.     Initial left heart catheterization at [place] revealed a high risk LAD lesion, and an 80% ostial D2 lesion..  She was transferred to [facility] for high risk PCI.  September 2nd she underwent PCI deployment of 2 overlapping drug-eluting stents to the mid LAD with good result.  She was placed on dual antiplatelet therapy with aspirin and  Plavix.  Lisinopril and Januvia were discontinued.  She was started on guideline directed medical therapy with Entresto,   Carvedilol and Jardiance.  Aggressive risk factor modification was recommended; she was followed by endocrinology for her uncontrolled diabetes.  Neurosurgery recommended visual field testing as an outpatient with ophthalmology before Neurosurgery follow-up in a few weeks.  Discharge weight 185lb September 1st  Discharge creatinine  0.81  Discharge diuretic Lasix 20 mg PRN    9/15/21  Hospital follow-up.  She is accompanied by her sister.      Overall she is feeling better.  No chest pain, shortness of breath, lightheadedness or dizziness.  Historically she has daytime somnolence.  She is going to assess whether this is improved and discussed with her cardiologist if it persists.    She is compliant with her medications-we were reviewed them.   In the past she was on aspirin 81 mg every other day.  She is now taking it daily.  She has Lasix 20 mg to use p.r.n. for lower extremity edema/ weight gain.  She does have some volume on board today.  I think she may benefit from using it twice a week for a few weeks.  She has normal renal function baseline    She is amenable to a CBC, BMP today. Wt 179 lb  We discussed cardiac rehab.  She is engaged.    We discussed importance of a heart healthy diet, low sodium.  We reviewed how to read food labels to facilitate adherence.  In the past, she was eating Ramen noodles.    Interval history  11/4/21; 10/31/22; 10/17/2023 OV Dr. Licea  Per his last note  Discussion/Summary:All of her assessed cardiac problems are stable. I have reviewed her medications and made no changes. I advised her to take an extra 20 mg of Lasix in the afternoon every 4 days if needed for LE edema / weight gain. No cardiac testing was ordered.  RTO 9 months.        Interval History: She has not had any cardiac problems since her last office visit. She overall feels good and denies  CP, SOB, significant LE edema.  Her weight is up from 156 to 160 lbs.     She has CAD with an LAD stent placed in 9/2021. EF was 37 % at that time.     She has a chronic LBBB.     ECHO 12/2022 - EF 55%.  /62  A1C 11.0  LDL 29  Creatinine 0.85     She is not very active. She is on Lasix 20 mg daily.    10/24/24  PMH: HTN, LBBB, hx ICM with a recovered EF, DM2  She is here for a routine visit  She is sedentary  Her weight today is 145 pounds  Her EKG shows normal sinus rhythm left axis deviation left bundle branch block seen previously  She conveys that she has intermittent dizziness.  She was at [employer] and was passed out.  She has episodes frequently a couple times a week.  She has bilateral lower extremity edema right greater than left.  She was scanned for DVT but this was several years ago.  She is sedentary.  She also has shortness of breath.  On exam she has chronic venous stasis changes  Will obtain a 48-hour Holter monitor, echocardiogram and some updated labs  She return to see a cardiologist in a short interval if hers is unavailable        Assessment  Diagnoses and all orders for this visit:    Coronary artery disease involving native coronary artery of native heart without angina pectoris  -     POCT ECG  -     Echo complete w/ contrast if indicated; Future  -     B-Type Natriuretic Peptide(BNP); Future  -     Basic metabolic panel; Future    Essential hypertension  -     Echo complete w/ contrast if indicated; Future    Diabetic peripheral neuropathy associated with type 2 diabetes mellitus (HCC)    Mixed hyperlipidemia  -     Echo complete w/ contrast if indicated; Future    Dizziness  -     Holter monitor; Future  -     B-Type Natriuretic Peptide(BNP); Future  -     Basic metabolic panel; Future    Left bundle branch block    Bilateral lower extremity edema  -     VAS upper limb venous duplex scan, complete, bilateral; Future  -     Echo complete w/ contrast if indicated; Future  -     B-Type  Natriuretic Peptide(BNP); Future  -     Basic metabolic panel; Future            Past Medical History:   Diagnosis Date   • Colon polyp    • Coronary artery disease    • Depression    • Diabetes mellitus (HCC)    • Dizziness    • Edema    • Hyperlipidemia    • Hypertension    • Ischemic cardiomyopathy    • Left bundle branch block    • Type 2 diabetes mellitus (HCC)    • Vitamin D deficiency    • Wears glasses        Review of Systems   Constitutional: Negative for chills.   Cardiovascular:  Positive for dyspnea on exertion and leg swelling. Negative for chest pain, claudication, cyanosis, irregular heartbeat, near-syncope, orthopnea, palpitations, paroxysmal nocturnal dyspnea and syncope.   Respiratory:  Negative for cough and shortness of breath.    Gastrointestinal:  Negative for heartburn and nausea.   Neurological:  Positive for dizziness. Negative for focal weakness, headaches, light-headedness and weakness.   All other systems reviewed and are negative.      Allergies   Allergen Reactions   • Dust Mite Extract Other (See Comments)     .    Current Outpatient Medications:   •  aspirin 81 MG tablet, Take 1 tablet by mouth daily, Disp: , Rfl:   •  atorvastatin (LIPITOR) 80 mg tablet, Take 1 tablet (80 mg total) by mouth daily with dinner, Disp: 90 tablet, Rfl: 1  •  CALCIUM PO, 1 po daily, Disp: , Rfl:   •  carvedilol (COREG) 6.25 mg tablet, TAKE 0.5 TABLETS (3.125 MG TOTAL) BY MOUTH 2 (TWO) TIMES A DAY WITH MEALS, Disp: 90 tablet, Rfl: 1  •  Cholecalciferol (Vitamin D3) 50 MCG (2000 UT) capsule, Take 1 capsule (2,000 Units total) by mouth daily, Disp: 30 capsule, Rfl: 5  •  clopidogrel (PLAVIX) 75 mg tablet, TAKE 1 TABLET BY MOUTH EVERY DAY, Disp: 90 tablet, Rfl: 3  •  Continuous Blood Gluc Sensor (FreeStyle Feng 3 Sensor) MISC, USE 1 UNITS EVERY 14 (FOURTEEN) DAYS, Disp: 6 each, Rfl: 2  •  Empagliflozin (Jardiance) 10 MG TABS tablet, Take 1 tablet (10 mg total) by mouth every morning, Disp: 90 tablet, Rfl:  1  •  furosemide (LASIX) 20 mg tablet, TAKE 1 TABLET BY MOUTH EVERY DAY, Disp: 90 tablet, Rfl: 1  •  Insulin Pen Needle (BD Pen Needle Brittney U/F) 32G X 4 MM MISC, Use daily as needed with insulin pen, Disp: 100 each, Rfl: 0  •  Lantus SoloStar 100 units/mL SOPN, INJECT 30 UNITS UNDER THE SKIN DAILY AT BEDTIME, Disp: 30 mL, Rfl: 1  •  metFORMIN (GLUCOPHAGE) 1000 MG tablet, TAKE 1 TABLET BY MOUTH TWICE A DAY WITH MEALS, Disp: 180 tablet, Rfl: 1  •  sacubitril-valsartan (Entresto) 49-51 MG TABS, Take 1 tablet by mouth 2 (two) times a day, Disp: 180 tablet, Rfl: 4  •  tirzepatide (Mounjaro) 5 MG/0.5ML, INJECT 0.5 ML (5 MG TOTAL) UNDER THE SKIN ONE TIME PER WEEK, Disp: 6 mL, Rfl: 1        Social History     Socioeconomic History   • Marital status: Single     Spouse name: Not on file   • Number of children: Not on file   • Years of education: Not on file   • Highest education level: Not on file   Occupational History   • Occupation: deed recorder   Tobacco Use   • Smoking status: Never   • Smokeless tobacco: Never   Vaping Use   • Vaping status: Never Used   Substance and Sexual Activity   • Alcohol use: No   • Drug use: No   • Sexual activity: Not Currently     Birth control/protection: Post-menopausal   Other Topics Concern   • Not on file   Social History Narrative    Do you currently or have you served in the [employer] Forces: No    Were you activated, into active duty, as a member of the [employer] or as a Reservist: No    Occupation:     Marital status: Single    Exercise level: None    Diet: Regular    General stress level: Medium    Alcohol intake: None    Caffeine intake: Moderate    1 cup of coffee in the morning    Chewing tobacco: none    Illicit drugs: none    Guns present in home: No    Seat belts used routinely: Yes    Sunscreen used routinely: Yes    Smoke alarm in home: Yes    Advance directive: Yes     Social Determinants of Health     Financial Resource Strain: Not on file   Food  Insecurity: Not on file   Transportation Needs: Not on file   Physical Activity: Not on file   Stress: Not on file   Social Connections: Not on file   Intimate Partner Violence: Not on file   Housing Stability: Not on file       Family History   Problem Relation Age of Onset   • Hypertension Mother    • Sudden death Mother         SCD   • Hyperlipidemia Mother    • Heart attack Mother    • Breast cancer Mother 70   • Diabetes type II Mother    • Arrhythmia Father         pacemaker/ defib   • Clotting disorder Father         eliquis   • Heart failure Father    • Heart disease Father    • Melanoma Father    • Cancer Father 60        bladder cancer   • Diabetes type II Father    • Asthma Father    • BRCA1 Negative Sister    • Breast cancer Sister 39   • Thyroid cancer Sister    • No Known Problems Sister    • No Known Problems Sister    • Thyroid cancer Sister    • No Known Problems Maternal Grandmother    • No Known Problems Maternal Grandfather    • No Known Problems Paternal Grandmother    • No Known Problems Paternal Grandfather    • Breast cancer Maternal Aunt         unknown age   • Esophageal cancer Maternal Aunt 60   • Cancer Maternal Aunt         unknown age or type   • No Known Problems Maternal Aunt    • Colon cancer Maternal Uncle         unknwon age   • Pancreatic cancer Maternal Uncle 59   • Lung cancer Paternal Aunt         unknonw age- in her 80s   • Anuerysm Neg Hx        Physical Exam  Vitals and nursing note reviewed.   Constitutional:       General: She is not in acute distress.     Appearance: She is not diaphoretic.   HENT:      Head: Normocephalic and atraumatic.   Eyes:      Conjunctiva/sclera: Conjunctivae normal.   Cardiovascular:      Rate and Rhythm: Normal rate and regular rhythm.      Pulses: Intact distal pulses.      Heart sounds: Normal heart sounds.   Pulmonary:      Effort: Pulmonary effort is normal.      Breath sounds: Normal breath sounds.   Abdominal:      General: Bowel  "sounds are normal.      Palpations: Abdomen is soft.   Musculoskeletal:         General: Normal range of motion.      Cervical back: Normal range of motion and neck supple.      Right lower leg: Edema present.      Left lower leg: Edema present.      Comments: Right greater than left   Skin:     General: Skin is warm and dry.   Neurological:      Mental Status: She is alert and oriented to person, place, and time.         Vitals: Blood pressure 116/72, pulse 78, height 5' 5\" (1.651 m), weight 66 kg (145 lb 9.6 oz), SpO2 98%, not currently breastfeeding.   Wt Readings from Last 3 Encounters:   10/24/24 66 kg (145 lb 9.6 oz)   09/27/24 68 kg (150 lb)   08/22/24 68.3 kg (150 lb 9.6 oz)         Labs & Results:  Lab Results   Component Value Date    WBC 5.50 07/12/2023    HGB 12.7 07/12/2023    HCT 38.4 07/12/2023    MCV 95 07/12/2023     07/12/2023     No results found for: \"BNP\"  No components found for: \"CHEM\"  Troponin I   Date Value Ref Range Status   08/30/2021 <0.02 <=0.04 ng/mL Final     Comment:     Siemens Chemistry analyzer 99% cutoff is > 0.04 ng/mL in network labs     o cTnI 99% cutoff is useful only when applied to patients in the clinical setting of myocardial ischemia   o cTnI 99% cutoff should be interpreted in the context of clinical history, ECG findings and possibly cardiac imaging to establish correct diagnosis.   o cTnI 99% cutoff may be suggestive but clearly not indicative of a coronary event without the clinical setting of myocardial ischemia.     08/29/2021 <0.02 <=0.04 ng/mL Final     Comment:     Autovalidation override  Siemens Chemistry analyzer 99% cutoff is > 0.04 ng/mL in network labs     o cTnI 99% cutoff is useful only when applied to patients in the clinical setting of myocardial ischemia   o cTnI 99% cutoff should be interpreted in the context of clinical history, ECG findings and possibly cardiac imaging to establish correct diagnosis.   o cTnI 99% cutoff may be suggestive but " clearly not indicative of a coronary event without the clinical setting of myocardial ischemia.     2021 <0.02 <=0.04 ng/mL Final     Comment:     Siemens Chemistry analyzer 99% cutoff is > 0.04 ng/mL in network labs     o cTnI 99% cutoff is useful only when applied to patients in the clinical setting of myocardial ischemia   o cTnI 99% cutoff should be interpreted in the context of clinical history, ECG findings and possibly cardiac imaging to establish correct diagnosis.   o cTnI 99% cutoff may be suggestive but clearly not indicative of a coronary event without the clinical setting of myocardial ischemia.       Results for orders placed during the hospital encounter of 21    Echo complete with contrast if indicated    [id number] [place], [place] [id number] [phone]    Transthoracic Echocardiogram  2D, M-mode, Doppler, and Color Doppler    Study date:  30-Aug-2021    Patient: ANGELIQUE LE  MR number: [id number]  Account number: [id number]  : 1960  Age: 61 years  Gender: Female  Status: Outpatient  Location: Emergency room  Height: 66 in  Weight: 195 lb  BP: 166/ 77 mmHg    Indications: CVA.    Diagnoses: I63.9 - Cerebral infarction, unspecified    Sonographer:  JESSIE Jara  Primary Physician:  Annia Sumner MD  Referring Physician:  Shamar Davila  Group:  [facility]  Interpreting Physician:  Wei Moreno MD    SUMMARY    LEFT VENTRICLE:  Systolic function was moderately reduced. Ejection fraction was estimated to be 37 %.  There was hypokinesis of the mid anterior, basal-mid anteroseptal, basal-mid inferoseptal, entire inferior, and apical septal wall(s).  Doppler parameters were consistent with abnormal left ventricular relaxation (grade 1 diastolic dysfunction).    VENTRICULAR SEPTUM:  There was mild dyssynergic motion. These changes are consistent with LBBB.    LEFT ATRIUM:  The atrium was mildly dilated.    MITRAL  VALVE:  There was mild to moderate regurgitation.    AORTIC VALVE:  There was trace regurgitation.    TRICUSPID VALVE:  There was trace regurgitation.    PERICARDIUM:  A very small pericardial effusion was identified anterior to the heart. The fluid had no internal echoes.    HISTORY: PRIOR HISTORY: Hyperlipidemia, LBBB, Abnormal Stress Echo, Hypertension, Lower Leg Edema, Diabetes Mellitus II.    PROCEDURE: The procedure was performed in the emergency room. This was a routine study. The transthoracic approach was used. The study included complete 2D imaging, M-mode, complete spectral Doppler, and color Doppler. The heart rate was  70 bpm, at the start of the study. Images were obtained from the parasternal, apical, subcostal, and suprasternal notch acoustic windows. Image quality was adequate.    LEFT VENTRICLE: Size was normal. Systolic function was moderately reduced. Ejection fraction was estimated to be 37 %. There was hypokinesis of the mid anterior, basal-mid anteroseptal, basal-mid inferoseptal, entire inferior, and apical  septal wall(s). Wall thickness was normal. DOPPLER: Doppler parameters were consistent with abnormal left ventricular relaxation (grade 1 diastolic dysfunction).    VENTRICULAR SEPTUM: There was mild dyssynergic motion. These changes are consistent with LBBB.    RIGHT VENTRICLE: The size was normal. Systolic function was normal. Wall thickness was normal.    LEFT ATRIUM: The atrium was mildly dilated.    RIGHT ATRIUM: Size was normal.    MITRAL VALVE: Valve structure was normal. There was normal leaflet separation. DOPPLER: The transmitral velocity was within the normal range. There was no evidence for stenosis. There was mild to moderate regurgitation.    AORTIC VALVE: The valve was trileaflet. Leaflets exhibited normal thickness and normal cuspal separation. DOPPLER: Transaortic velocity was within the normal range. There was no evidence for stenosis. There was trace  regurgitation.    TRICUSPID VALVE: The valve structure was normal. There was normal leaflet separation. DOPPLER: The transtricuspid velocity was within the normal range. There was no evidence for stenosis. There was trace regurgitation.    PULMONIC VALVE: Leaflets exhibited normal thickness, no calcification, and normal cuspal separation. DOPPLER: The transpulmonic velocity was within the normal range. There was no significant regurgitation.    PERICARDIUM: A very small pericardial effusion was identified anterior to the heart. The fluid had no internal echoes. The pericardium was normal in appearance.    AORTA: The root exhibited normal size.    SYSTEMIC VEINS: IVC: The inferior vena cava was normal in size.    SYSTEM MEASUREMENT TABLES    2D  %FS: 17.9 %  Ao Diam: 2.86 cm  EDV(Teich): 106.05 ml  EF(Teich): 37.18 %  ESV(Teich): 66.62 ml  HR_2Ch_Q: 69.77 BPM  HR_4Ch_Q: 69.1 BPM  IVSd: 0.91 cm  LA Area: 18.23 cm2  LA Diam: 3.96 cm  LVCO_2Ch_Q: 3.91 L/min  LVCO_4Ch_Q: 3.24 L/min  LVCO_BiP_Q: 3.58 L/min  LVEDV MOD A4C: 122.92 ml  LVEF MOD A4C: 38.87 %  LVEF_2Ch_Q: 36.48 %  LVEF_4Ch_Q: 37.23 %  LVEF_BiP_Q: 37.25 %  LVESV MOD A4C: 75.14 ml  LVIDd: 4.77 cm  LVIDs: 3.92 cm  LVLd A4C: 8.14 cm  LVLd_2Ch_Q: 8.14 cm  LVLd_4Ch_Q: 7.56 cm  LVLs A4C: 6.97 cm  LVLs_2Ch_Q: 7.04 cm  LVLs_4Ch_Q: 6.71 cm  LVPWd: 0.91 cm  LVSV_2Ch_Q: 56 ml  LVSV_4Ch_Q: 46.95 ml  LVSV_BiP_Q: 52.19 ml  LVVED_2Ch_Q: 153.53 ml  LVVED_4Ch_Q: 126.09 ml  LVVED_BiP_Q: 140.11 ml  LVVES_2Ch_Q: 97.52 ml  LVVES_4Ch_Q: 79.14 ml  LVVES_BiP_Q: 87.91 ml  RA Area: 14 cm2  RVIDd: 3.29 cm  SV MOD A4C: 47.78 ml  SV(Teich): 39.44 ml    CF  MR Als.Marko: 0.42 m/s  MR Flow: 40.81 ml/s  MR Rad: 0.39 cm    CW  AV MaxP.23 mmHg  AV Vmax: 1.34 m/s  MR VTI: 234.05 cm  MR Vmax: 5.73 m/s  MR Vmean: 4.59 m/s  MR maxP.57 mmHg  MR meanP.94 mmHg  TR MaxP.21 mmHg  TR Vmax: 1.82 m/s    MM  TAPSE: 2.59 cm    PW  E' Sept: 0.05 m/s  E/E' Sept: 13.24  LVOT Vmax: 0.85  m/s  LVOT maxP.89 mmHg  MV A Marko: 1.13 m/s  MV Dec [place]: 3.87 m/s2  MV DecT: 168.95 ms  MV E Marko: 0.65 m/s  MV E/A Ratio: 0.58  MV PHT: 49 ms  MVA By PHT: 4.49 cm2    [facility] Accredited Echocardiography Laboratory    Prepared and electronically signed by    Wei Moreno MD  Signed 31-Aug-2021 08:45:43    No results found for this or any previous visit.      This note was completed in part utilizing [employer] direct voice recognition software.   Grammatical errors, random word insertion, spelling mistakes, and incomplete sentences may be an occasional consequence of the system secondary to software limitations, ambient noise and hardware issues. At the time of dictation, efforts were made to edit, clarify and /or correct errors.  Please read the chart carefully and recognize, using context, where substitutions have occurred.  If you have any questions or concerns about the context, text or information contained within the body of this dictation, please contact myself, the provider, for further clarification

## 2024-10-26 ENCOUNTER — HOSPITAL ENCOUNTER (OUTPATIENT)
Dept: VASCULAR ULTRASOUND | Facility: HOSPITAL | Age: 64
Discharge: HOME | End: 2024-10-26
Payer: COMMERCIAL

## 2024-10-26 DIAGNOSIS — R60.0 BILATERAL LOWER EXTREMITY EDEMA: ICD-10-CM

## 2024-10-26 PROCEDURE — 93970 EXTREMITY STUDY: CPT

## 2024-10-27 PROCEDURE — 93970 EXTREMITY STUDY: CPT | Performed by: SURGERY

## 2024-11-01 ENCOUNTER — VBI (OUTPATIENT)
Dept: ADMINISTRATIVE | Facility: OTHER | Age: 64
End: 2024-11-01

## 2024-11-01 NOTE — TELEPHONE ENCOUNTER
11/01/24 1:34 PM     Chart reviewed for Hemoglobin A1c was/were submitted to the patient's insurance.     BETHEL RUIZ MA   [facility]

## 2024-11-19 NOTE — TELEPHONE ENCOUNTER
08/05/21 1:42 PM     See documentation in the [employer] SmartForm       [facility] 

## 2024-11-21 ENCOUNTER — RESULTS FOLLOW-UP (OUTPATIENT)
Dept: CARDIOLOGY CLINIC | Facility: CLINIC | Age: 64
End: 2024-11-21

## 2024-11-21 ENCOUNTER — HOSPITAL ENCOUNTER (OUTPATIENT)
Dept: NON INVASIVE DIAGNOSTICS | Facility: CLINIC | Age: 64
Discharge: HOME | End: 2024-11-21
Payer: COMMERCIAL

## 2024-11-21 VITALS
HEART RATE: 78 BPM | DIASTOLIC BLOOD PRESSURE: 72 MMHG | BODY MASS INDEX: 24.16 KG/M2 | WEIGHT: 145 LBS | HEIGHT: 65 IN | SYSTOLIC BLOOD PRESSURE: 116 MMHG

## 2024-11-21 DIAGNOSIS — R60.0 BILATERAL LOWER EXTREMITY EDEMA: ICD-10-CM

## 2024-11-21 DIAGNOSIS — I10 ESSENTIAL HYPERTENSION: ICD-10-CM

## 2024-11-21 DIAGNOSIS — E78.2 MIXED HYPERLIPIDEMIA: ICD-10-CM

## 2024-11-21 DIAGNOSIS — I25.10 CORONARY ARTERY DISEASE INVOLVING NATIVE CORONARY ARTERY OF NATIVE HEART WITHOUT ANGINA PECTORIS: ICD-10-CM

## 2024-11-21 DIAGNOSIS — R42 DIZZINESS: ICD-10-CM

## 2024-11-21 LAB
AORTIC ROOT: 2.8 CM
APICAL FOUR CHAMBER EJECTION FRACTION: 54 %
ASCENDING AORTA: 3.2 CM
BSA FOR ECHO PROCEDURE: 1.73 M2
E WAVE DECELERATION TIME: 200 MS
E/A RATIO: 0.7
FRACTIONAL SHORTENING: 31 (ref 28–44)
INTERVENTRICULAR SEPTUM IN DIASTOLE (PARASTERNAL SHORT AXIS VIEW): 0.9 CM
INTERVENTRICULAR SEPTUM: 0.9 CM (ref 0.6–1.1)
LAAS-AP2: 15 CM2
LAAS-AP4: 15.8 CM2
LEFT ATRIUM SIZE: 2.8 CM
LEFT ATRIUM VOLUME (MOD BIPLANE): 42 ML
LEFT ATRIUM VOLUME INDEX (MOD BIPLANE): 24.3 ML/M2
LEFT INTERNAL DIMENSION IN SYSTOLE: 2.7 CM (ref 2.1–4)
LEFT VENTRICULAR INTERNAL DIMENSION IN DIASTOLE: 3.9 CM (ref 3.5–6)
LEFT VENTRICULAR POSTERIOR WALL IN END DIASTOLE: 0.9 CM
LEFT VENTRICULAR STROKE VOLUME: 39 ML
LVSV (TEICH): 39 ML
MV E'TISSUE VEL-SEP: 6 CM/S
MV PEAK A VEL: 0.93 M/S
MV PEAK E VEL: 65 CM/S
MV STENOSIS PRESSURE HALF TIME: 58 MS
MV VALVE AREA P 1/2 METHOD: 3.79
RIGHT ATRIAL 2D VOLUME: 27 ML
RIGHT ATRIUM AREA SYSTOLE A4C: 13.1 CM2
RIGHT VENTRICLE ID DIMENSION: 3.5 CM
SL CV LEFT ATRIUM LENGTH A2C: 4.5 CM
SL CV LV EF: 55
SL CV PED ECHO LEFT VENTRICLE DIASTOLIC VOLUME (MOD BIPLANE) 2D: 66 ML
SL CV PED ECHO LEFT VENTRICLE SYSTOLIC VOLUME (MOD BIPLANE) 2D: 28 ML
TR MAX PG: 25 MMHG
TR PEAK VELOCITY: 2.5 M/S
TRICUSPID ANNULAR PLANE SYSTOLIC EXCURSION: 1.9 CM
TRICUSPID VALVE PEAK REGURGITATION VELOCITY: 2.52 M/S

## 2024-11-21 PROCEDURE — 93306 TTE W/DOPPLER COMPLETE: CPT | Performed by: INTERNAL MEDICINE

## 2024-11-21 PROCEDURE — 93225 XTRNL ECG REC<48 HRS REC: CPT

## 2024-11-21 PROCEDURE — 93306 TTE W/DOPPLER COMPLETE: CPT

## 2024-11-21 PROCEDURE — 93226 XTRNL ECG REC<48 HR SCAN A/R: CPT

## 2024-11-29 ENCOUNTER — OFFICE VISIT (OUTPATIENT)
Dept: URGENT CARE | Facility: CLINIC | Age: 64
End: 2024-11-29
Payer: COMMERCIAL

## 2024-11-29 VITALS
DIASTOLIC BLOOD PRESSURE: 70 MMHG | HEART RATE: 76 BPM | RESPIRATION RATE: 20 BRPM | SYSTOLIC BLOOD PRESSURE: 150 MMHG | OXYGEN SATURATION: 97 % | TEMPERATURE: 96.8 F

## 2024-11-29 DIAGNOSIS — B96.89 ACUTE BACTERIAL BRONCHITIS: Primary | ICD-10-CM

## 2024-11-29 DIAGNOSIS — J20.8 ACUTE BACTERIAL BRONCHITIS: Primary | ICD-10-CM

## 2024-11-29 PROCEDURE — S9083 URGENT CARE CENTER GLOBAL: HCPCS | Performed by: PHYSICIAN ASSISTANT

## 2024-11-29 PROCEDURE — G0382 LEV 3 HOSP TYPE B ED VISIT: HCPCS | Performed by: PHYSICIAN ASSISTANT

## 2024-11-29 RX ORDER — AZITHROMYCIN 250 MG/1
TABLET, FILM COATED ORAL
Qty: 6 TABLET | Refills: 0 | Status: SHIPPED | OUTPATIENT
Start: 2024-11-29 | End: 2024-12-03

## 2024-11-29 NOTE — PROGRESS NOTES
[facility]        NAME: Bruce Acosta is a 64 y.o. female  : 1960    MRN: [id number]  DATE: 2024  TIME: 2:25 PM    Assessment and Plan   Acute bacterial bronchitis [J20.8, B96.89]  1. Acute bacterial bronchitis  azithromycin (ZITHROMAX) 250 mg tablet            Patient Instructions       Follow up with PCP in 3-5 days.  Proceed to  ER if symptoms worsen.    If tests have been performed at [facility], our office will contact you with results if changes need to be made to the care plan discussed with you at the visit.  You can review your full results on [facility].    Chief Complaint     Chief Complaint   Patient presents with    Cough     Cough and lost voice since Friday.         History of Present Illness       Here for evaluation of persistent cough, chest congestion which started a week ago.  Patient lost her voice around Tuesday.  She is bringing up some mucus with the cough.  She is taking some over-the-counter meds which helped for short period time.    Cough  Pertinent negatives include no ear pain, postnasal drip, rhinorrhea, sore throat, shortness of breath or wheezing.       Review of Systems   Review of Systems   Constitutional: Negative.    HENT:  Positive for congestion and voice change (Laryngitis). Negative for ear discharge, ear pain, postnasal drip, rhinorrhea, sinus pressure, sinus pain, sore throat and trouble swallowing.    Eyes: Negative.    Respiratory:  Positive for cough. Negative for shortness of breath and wheezing.    Cardiovascular: Negative.    Gastrointestinal: Negative.    Musculoskeletal: Negative.    Skin: Negative.    Neurological: Negative.          Current Medications       Current Outpatient Medications:     azithromycin (ZITHROMAX) 250 mg tablet, Take 2 tablets today then 1 tablet daily x 4 days, Disp: 6 tablet, Rfl: 0    aspirin 81 MG tablet, Take 1 tablet by mouth daily, Disp: , Rfl:     atorvastatin (LIPITOR) 80 mg tablet, Take 1 tablet  (80 mg total) by mouth daily with dinner, Disp: 90 tablet, Rfl: 1    CALCIUM PO, 1 po daily, Disp: , Rfl:     carvedilol (COREG) 6.25 mg tablet, TAKE 0.5 TABLETS (3.125 MG TOTAL) BY MOUTH 2 (TWO) TIMES A DAY WITH MEALS, Disp: 90 tablet, Rfl: 1    Cholecalciferol (Vitamin D3) 50 MCG (2000 UT) capsule, Take 1 capsule (2,000 Units total) by mouth daily, Disp: 30 capsule, Rfl: 5    clopidogrel (PLAVIX) 75 mg tablet, TAKE 1 TABLET BY MOUTH EVERY DAY, Disp: 90 tablet, Rfl: 3    Continuous Blood Gluc Sensor (FreeStyle Feng 3 Sensor) MISC, USE 1 UNITS EVERY 14 (FOURTEEN) DAYS, Disp: 6 each, Rfl: 2    Empagliflozin (Jardiance) 10 MG TABS tablet, Take 1 tablet (10 mg total) by mouth every morning, Disp: 90 tablet, Rfl: 1    furosemide (LASIX) 20 mg tablet, TAKE 1 TABLET BY MOUTH EVERY DAY, Disp: 90 tablet, Rfl: 1    Insulin Pen Needle (BD Pen Needle Brittney U/F) 32G X 4 MM MISC, Use daily as needed with insulin pen, Disp: 100 each, Rfl: 0    Lantus SoloStar 100 units/mL SOPN, INJECT 30 UNITS UNDER THE SKIN DAILY AT BEDTIME, Disp: 30 mL, Rfl: 1    metFORMIN (GLUCOPHAGE) 1000 MG tablet, TAKE 1 TABLET BY MOUTH TWICE A DAY WITH MEALS, Disp: 180 tablet, Rfl: 1    sacubitril-valsartan (Entresto) 49-51 MG TABS, Take 1 tablet by mouth 2 (two) times a day, Disp: 180 tablet, Rfl: 4    tirzepatide (Mounjaro) 5 MG/0.5ML, INJECT 0.5 ML (5 MG TOTAL) UNDER THE SKIN ONE TIME PER WEEK, Disp: 6 mL, Rfl: 1    Current Allergies     Allergies as of 11/29/2024 - Reviewed 11/29/2024   Allergen Reaction Noted    Dust mite extract Other (See Comments) 07/17/2023            The following portions of the patient's history were reviewed and updated as appropriate: allergies, current medications, past family history, past medical history, past social history, past surgical history and problem list.     Past Medical History:   Diagnosis Date    Colon polyp     Coronary artery disease     Depression     Diabetes mellitus (HCC)     Dizziness     Edema      Hyperlipidemia     Hypertension     Ischemic cardiomyopathy     Left bundle branch block     Type 2 diabetes mellitus (HCC)     Vitamin D deficiency     Wears glasses        Past Surgical History:   Procedure Laterality Date    BREAST EXCISIONAL BIOPSY Right     benign    CARDIAC SURGERY  2021    COLONOSCOPY  2018    Colonoscpoy due in 3 years    EGD  2018    MAMMO (HISTORICAL) Bilateral 2020    MAMMO (HISTORICAL) Bilateral 2022    MAMMO NEEDLE LOCALIZATION RIGHT (ALL INC) Right 2009    WISDOM TOOTH EXTRACTION         Family History   Problem Relation Age of Onset    Hypertension Mother     Sudden death Mother         SCD    Hyperlipidemia Mother     Heart attack Mother     Breast cancer Mother 70    Diabetes type II Mother     Arrhythmia Father         pacemaker/ defib    Clotting disorder Father         eliquis    Heart failure Father     Heart disease Father     Melanoma Father     Cancer Father 60        bladder cancer    Diabetes type II Father     Asthma Father     BRCA1 Negative Sister     Breast cancer Sister 39    Thyroid cancer Sister     No Known Problems Sister     No Known Problems Sister     Thyroid cancer Sister     No Known Problems Maternal Grandmother     No Known Problems Maternal Grandfather     No Known Problems Paternal Grandmother     No Known Problems Paternal Grandfather     Breast cancer Maternal Aunt         unknown age    Esophageal cancer Maternal Aunt 60    Cancer Maternal Aunt         unknown age or type    No Known Problems Maternal Aunt     Colon cancer Maternal Uncle         unknwon age    Pancreatic cancer Maternal Uncle 59    Lung cancer Paternal Aunt         unknonw age- in her 80s    Anuerysm Neg Hx          Medications have been verified.        Objective   /70   Pulse 76   Temp (!) 96.8 °F (36 °C)   Resp 20   SpO2 97%   No LMP recorded. Patient is postmenopausal.       Physical Exam     Physical Exam  Vitals and  nursing note reviewed.   Constitutional:       General: She is not in acute distress.     Appearance: Normal appearance. She is well-developed. She is not ill-appearing, toxic-appearing or diaphoretic.   HENT:      Head: Normocephalic and atraumatic.      Right Ear: Tympanic membrane and ear canal normal.      Left Ear: Tympanic membrane and ear canal normal.      Nose: Nose normal. No congestion or rhinorrhea.      Mouth/Throat:      Mouth: Mucous membranes are moist.      Pharynx: No oropharyngeal exudate or posterior oropharyngeal erythema.   Eyes:      General:         Right eye: No discharge.         Left eye: No discharge.      Extraocular Movements: Extraocular movements intact.      Conjunctiva/sclera: Conjunctivae normal.      Pupils: Pupils are equal, round, and reactive to light.   Cardiovascular:      Rate and Rhythm: Normal rate and regular rhythm.      Heart sounds: Normal heart sounds. No murmur heard.  Pulmonary:      Effort: Pulmonary effort is normal. No respiratory distress.      Breath sounds: No stridor. Rhonchi present. No wheezing or rales.      Comments: Coarse breath sounds bilateral upper airway.  Lymphadenopathy:      Cervical: No cervical adenopathy.   Skin:     General: Skin is warm and dry.      Findings: No rash.   Neurological:      General: No focal deficit present.      Mental Status: She is alert and oriented to person, place, and time.   Psychiatric:         Mood and Affect: Mood normal.         Behavior: Behavior normal.         Thought Content: Thought content normal.         Judgment: Judgment normal.

## 2024-11-29 NOTE — PATIENT INSTRUCTIONS
1.  Drink plenty fluids.    2.  Take probiotics [i.e. Yogurt, Acidophilus, Florastor (liquid)] daily.    3.  Over-the-counter medications as needed for symptomatic care.    4.   Advance activities as tolerated.    5.   Follow-up with your primary care physician in 3-4 days.    6.  Go to emergency room if symptoms are worsening.    7.  Use a humidifier at bedtime

## 2024-12-02 ENCOUNTER — DOCUMENTATION (OUTPATIENT)
Dept: ADMINISTRATIVE | Facility: OTHER | Age: 64
End: 2024-12-02

## 2024-12-02 NOTE — PROGRESS NOTES
12/02/24 11:48 AM    Patient was called after the Urgent Care visit ; a message was left for the patient to return the call    Thank you.  Dayana Mendiola MA  [facility]            Blood pressure elevated  Appointment department: [facility]  Appointment provider: Chandu Deras PA-C  Blood pressure   11/29/24 1422 150/70   11/29/24 1407 (!) 177/76

## 2024-12-12 ENCOUNTER — ESTABLISHED COMPREHENSIVE EXAM (OUTPATIENT)
Dept: URBAN - METROPOLITAN AREA CLINIC 6 | Facility: CLINIC | Age: 64
End: 2024-12-12

## 2024-12-12 DIAGNOSIS — Z79.4: ICD-10-CM

## 2024-12-12 DIAGNOSIS — H25.813: ICD-10-CM

## 2024-12-12 DIAGNOSIS — E11.3293: ICD-10-CM

## 2024-12-12 DIAGNOSIS — H43.812: ICD-10-CM

## 2024-12-12 PROCEDURE — 92014 COMPRE OPH EXAM EST PT 1/>: CPT

## 2024-12-12 ASSESSMENT — VISUAL ACUITY
OD_CC: 20/30
OS_CC: 20/30-2

## 2024-12-12 ASSESSMENT — TONOMETRY
OS_IOP_MMHG: 16
OD_IOP_MMHG: 15

## 2024-12-19 ENCOUNTER — OFFICE VISIT (OUTPATIENT)
Dept: CARDIOLOGY CLINIC | Facility: CLINIC | Age: 64
End: 2024-12-19
Payer: COMMERCIAL

## 2024-12-19 VITALS
DIASTOLIC BLOOD PRESSURE: 60 MMHG | WEIGHT: 147 LBS | HEART RATE: 76 BPM | BODY MASS INDEX: 24.46 KG/M2 | OXYGEN SATURATION: 99 % | SYSTOLIC BLOOD PRESSURE: 104 MMHG

## 2024-12-19 DIAGNOSIS — R42 DIZZINESS: ICD-10-CM

## 2024-12-19 DIAGNOSIS — I44.7 LEFT BUNDLE BRANCH BLOCK: ICD-10-CM

## 2024-12-19 DIAGNOSIS — E78.2 MIXED HYPERLIPIDEMIA: ICD-10-CM

## 2024-12-19 DIAGNOSIS — I25.10 CORONARY ARTERY DISEASE INVOLVING NATIVE CORONARY ARTERY OF NATIVE HEART WITHOUT ANGINA PECTORIS: Primary | ICD-10-CM

## 2024-12-19 DIAGNOSIS — I10 ESSENTIAL HYPERTENSION: ICD-10-CM

## 2024-12-19 PROBLEM — I42.8 NONISCHEMIC CARDIOMYOPATHY (HCC): Status: RESOLVED | Noted: 2023-10-18 | Resolved: 2024-12-19

## 2024-12-19 PROBLEM — I25.5 ISCHEMIC CARDIOMYOPATHY: Status: RESOLVED | Noted: 2021-09-01 | Resolved: 2024-12-19

## 2024-12-19 PROCEDURE — 99214 OFFICE O/P EST MOD 30 MIN: CPT | Performed by: INTERNAL MEDICINE

## 2024-12-19 NOTE — PROGRESS NOTES
Cardiology Follow Up    Bruce Acosta  1960  [id number]  [facility]  [address]  [place] [address] [phone]    1. Coronary artery disease involving native coronary artery of native heart without angina pectoris        2. Essential hypertension        3. Left bundle branch block        4. Mixed hyperlipidemia        5. Dizziness              Discussion/Summary: I will decrease her Entresto to 24 - 26 mg BID. The rest of her medications remain the same. No cardiac testing is ordered.  RTO 1 year.      Interval History: She is doing OK but has been taking in too much salt and has some LE edema.  Her weight is up from 145 to 147 lbs.    She continues to get occasional lightheadedness when standing.  /60    Recent echo - EF 50 - 55%  Recent Holter - no significant arrhythmias    She has CAD with an LAD stent placed in 9/2021. EF was 37 % at that time.     She has a chronic LBBB.     She remains on Lasix 20 mg daily.        Patient Active Problem List   Diagnosis    Fatigue due to excessive exertion    Mixed hyperlipidemia    Abnormal stress echo    Type 2 diabetes mellitus (HCC)    Bilateral leg edema    Colon polyps    Gastroesophageal reflux disease with esophagitis without hemorrhage    Dizziness    Left bundle branch block    CAD (coronary artery disease)    Right paraclinoid meningioma    Essential hypertension    Wound of left leg    Vitamin D deficiency    Diabetic peripheral neuropathy associated with type 2 diabetes mellitus (HCC)    Osteopenia of multiple sites    Diastolic dysfunction     Past Medical History:   Diagnosis Date    Colon polyp     Coronary artery disease     Depression     Diabetes mellitus (HCC)     Dizziness     Edema     Hyperlipidemia     Hypertension     Ischemic cardiomyopathy     Left bundle branch block     Type 2 diabetes mellitus (HCC)     Vitamin D deficiency     Wears glasses       Social History     Socioeconomic History    Marital status: Single     Spouse name: Not on file    Number of children: Not on file    Years of education: Not on file    Highest education level: Not on file   Occupational History    Occupation: deed recorder   Tobacco Use    Smoking status: Never    Smokeless tobacco: Never   Vaping Use    Vaping status: Never Used   Substance and Sexual Activity    Alcohol use: No    Drug use: No    Sexual activity: Not Currently     Birth control/protection: Post-menopausal   Other Topics Concern    Not on file   Social History Narrative    Do you currently or have you served in the [employer]: No    Were you activated, into active duty, as a member of the [employer] or as a Reservist: No    Occupation:     Marital status: Single    Exercise level: None    Diet: Regular    General stress level: Medium    Alcohol intake: None    Caffeine intake: Moderate    1 cup of coffee in the morning    Chewing tobacco: none    Illicit drugs: none    Guns present in home: No    Seat belts used routinely: Yes    Sunscreen used routinely: Yes    Smoke alarm in home: Yes    Advance directive: Yes     Social Drivers of Health     Financial Resource Strain: Not on file   Food Insecurity: Not on file   Transportation Needs: Not on file   Physical Activity: Not on file   Stress: Not on file   Social Connections: Not on file   Intimate Partner Violence: Not on file   Housing Stability: Not on file      Family History   Problem Relation Age of Onset    Hypertension Mother     Sudden death Mother         SCD    Hyperlipidemia Mother     Heart attack Mother     Breast cancer Mother 70    Diabetes type II Mother     Arrhythmia Father         pacemaker/ defib    Clotting disorder Father         eliquis    Heart failure Father     Heart disease Father     Melanoma Father     Cancer Father 60        bladder cancer    Diabetes type II Father     Asthma Father     BRCA1 Negative  Sister     Breast cancer Sister 39    Thyroid cancer Sister     No Known Problems Sister     No Known Problems Sister     Thyroid cancer Sister     No Known Problems Maternal Grandmother     No Known Problems Maternal Grandfather     No Known Problems Paternal Grandmother     No Known Problems Paternal Grandfather     Breast cancer Maternal Aunt         unknown age    Esophageal cancer Maternal Aunt 60    Cancer Maternal Aunt         unknown age or type    No Known Problems Maternal Aunt     Colon cancer Maternal Uncle         unknwon age    Pancreatic cancer Maternal Uncle 59    Lung cancer Paternal Aunt         unknonw age- in her 80s    Anuerysm Neg Hx      Past Surgical History:   Procedure Laterality Date    BREAST EXCISIONAL BIOPSY Right     benign    CARDIAC SURGERY  2021    COLONOSCOPY  2018    Colonoscpoy due in 3 years    EGD  2018    MAMMO (HISTORICAL) Bilateral 2020    MAMMO (HISTORICAL) Bilateral 2022    MAMMO NEEDLE LOCALIZATION RIGHT (ALL INC) Right 2009    WISDOM TOOTH EXTRACTION         Current Outpatient Medications:     aspirin 81 MG tablet, Take 1 tablet by mouth daily, Disp: , Rfl:     atorvastatin (LIPITOR) 80 mg tablet, Take 1 tablet (80 mg total) by mouth daily with dinner, Disp: 90 tablet, Rfl: 1    CALCIUM PO, 1 po daily, Disp: , Rfl:     carvedilol (COREG) 6.25 mg tablet, TAKE 0.5 TABLETS (3.125 MG TOTAL) BY MOUTH 2 (TWO) TIMES A DAY WITH MEALS, Disp: 90 tablet, Rfl: 1    Cholecalciferol (Vitamin D3) 50 MCG (2000 UT) capsule, Take 1 capsule (2,000 Units total) by mouth daily, Disp: 30 capsule, Rfl: 5    clopidogrel (PLAVIX) 75 mg tablet, TAKE 1 TABLET BY MOUTH EVERY DAY, Disp: 90 tablet, Rfl: 3    Continuous Blood Gluc Sensor (FreeStyle Feng 3 Sensor) MISC, USE 1 UNITS EVERY 14 (FOURTEEN) DAYS, Disp: 6 each, Rfl: 2    Empagliflozin (Jardiance) 10 MG TABS tablet, Take 1 tablet (10 mg total) by mouth every morning, Disp: 90 tablet, Rfl: 1     furosemide (LASIX) 20 mg tablet, TAKE 1 TABLET BY MOUTH EVERY DAY, Disp: 90 tablet, Rfl: 1    Insulin Pen Needle (BD Pen Needle Brittney U/F) 32G X 4 MM MISC, Use daily as needed with insulin pen, Disp: 100 each, Rfl: 0    Lantus SoloStar 100 units/mL SOPN, INJECT 30 UNITS UNDER THE SKIN DAILY AT BEDTIME, Disp: 30 mL, Rfl: 1    metFORMIN (GLUCOPHAGE) 1000 MG tablet, TAKE 1 TABLET BY MOUTH TWICE A DAY WITH MEALS, Disp: 180 tablet, Rfl: 1    sacubitril-valsartan (Entresto) 49-51 MG TABS, Take 1 tablet by mouth 2 (two) times a day (Patient taking differently: Take 0.5 tablets by mouth 2 (two) times a day), Disp: 180 tablet, Rfl: 4    tirzepatide (Mounjaro) 5 MG/0.5ML, INJECT 0.5 ML (5 MG TOTAL) UNDER THE SKIN ONE TIME PER WEEK, Disp: 6 mL, Rfl: 1  Allergies   Allergen Reactions    Dust Mite Extract Other (See Comments)     Vitals:    12/19/24 0816   BP: 104/60   BP Location: Left arm   Patient Position: Sitting   Cuff Size: Large   Pulse: 76   SpO2: 99%   Weight: 66.7 kg (147 lb)     Weight (last 2 days)       Date/Time Weight    12/19/24 0816 66.7 (147)           Blood pressure 104/60, pulse 76, weight 66.7 kg (147 lb), SpO2 99%, not currently breastfeeding., Body mass index is 24.46 kg/m².    Labs:  Hospital Outpatient Visit on 11/21/2024   Component Date Value    Triscuspid Valve Regurgi* 11/21/2024 25.0     RAA A4C 11/21/2024 13.1     LA Volume Index (BP) 11/21/2024 24.3     MV Peak A Marko 11/21/2024 0.93     MV stenosis pressure 1/2* 11/21/2024 58     MV Peak E Marko 11/21/2024 65     E wave deceleration time 11/21/2024 200     E/A ratio 11/21/2024 0.70     MV valve area p 1/2 meth* 11/21/2024 3.79     RA 2D Volume 11/21/2024 27.0     TR Peak Marko 11/21/2024 2.5     RVID d 11/21/2024 3.5     A4C EF 11/21/2024 54     Tricuspid valve peak reg* 11/21/2024 2.52     Left ventricular stroke * 11/21/2024 39.00     IVSd 11/21/2024 0.90     Tricuspid annular plane * 11/21/2024 1.90     Ao root 11/21/2024 2.80     LVPWd 11/21/2024  0.90     LA size 2024 2.8     Asc Ao 2024 3.2     LA volume (BP) 2024 42     FS 2024 31     LVIDS 2024 2.70     IVS 2024 0.9     LVIDd 2024 3.90     LA length (A2C) 2024 4.50     LEFT VENTRICLE SYSTOLIC * 2024 28     LV DIASTOLIC VOLUME (MOD* 2024 66     Left Atrium Area-systoli* 2024 15.8     Left Atrium Area-systoli* 2024 15     MV E' Tissue Velocity Se* 2024 6     LVSV, 2D 2024 39     BSA 2024 1.73     LV EF 2024 55    Office Visit on 2024   Component Date Value    Hemoglobin A1C 2024 6.9 (A)      Imaging: Holter monitor  Result Date: 12/3/2024  Narrative: PT NAME: Bruce Acosta : 1960  AGE: 64 y.o.  GENDER: female MRN: [id number]   PROCEDURE: Holter monitor INDICATIONS: Dizziness FINDINGS: 1. A 48 hour holter monitor demonstrated normal sinus rhythm with an average rate of 75 BPM; a minimum rate of 61 BPM; and a maximum rate of 98 BPM. 2. There was one premature ventricular contraction. 3. There were 40 supraventricular ectopic beats, the majority of which were premature atrial contractions.  There were three atrial couplets, and a 7 beat run of likely atrial tachycardia. 4. The longest R-R interval was 1.1 seconds. 5. The patient kept a diary during holter monitor.  It demonstrated no symptoms.     Impression: Abnormal 48 hour holter monitor. Patient in normal sinus rhythm throughout holter monitoring. Rare premature ventricular contractions with no non-sustained ventricular tachycardia. Rare premature atrial contractions with a 7 beat run of atrial tachycardia. No significant pauses. No symptoms noted in diary.    Echo complete w/ contrast if indicated  Result Date: 2024  Narrative:   Left Ventricle: Left ventricular cavity size is normal. Wall thickness is normal. The left ventricular ejection fraction is 50-55%. Systolic function is low normal. Wall motion is normal. Diastolic function is  mildly abnormal, consistent with grade I (abnormal) relaxation.   IVS: There is abnormal septal motion consistent with left bundle branch block.   Aortic Valve: There is trace regurgitation.   Mitral Valve: There is trace regurgitation.   Tricuspid Valve: There is mild regurgitation.   Pericardium: There is a trivial pericardial effusion.   Pulmonary Artery: The pulmonary artery systolic pressure is normal.   Prior TTE study available for comparison. Prior study date: 12/8/2022. No significant changes noted compared to the prior study.       Review of Systems:  Review of Systems   Constitutional:  Negative for diaphoresis, fatigue, fever and unexpected weight change.   HENT: Negative.     Respiratory:  Negative for cough, shortness of breath and wheezing.    Cardiovascular:  Positive for leg swelling. Negative for chest pain and palpitations.   Gastrointestinal:  Negative for abdominal pain, diarrhea and nausea.   Musculoskeletal:  Negative for gait problem and myalgias.   Skin:  Negative for rash.   Neurological:  Positive for light-headedness. Negative for dizziness and numbness.   Psychiatric/Behavioral: Negative.         Physical Exam:  Physical Exam  Constitutional:       Appearance: She is well-developed.   HENT:      Head: Normocephalic and atraumatic.   Eyes:      Pupils: Pupils are equal, round, and reactive to light.   Neck:      Vascular: No JVD.   Cardiovascular:      Rate and Rhythm: Regular rhythm.      Pulses: Normal pulses.           Carotid pulses are 2+ on the right side and 2+ on the left side.     Heart sounds: S1 normal and S2 normal.   Pulmonary:      Effort: Pulmonary effort is normal.      Breath sounds: Normal breath sounds. No wheezing or rales.   Abdominal:      General: Bowel sounds are normal.      Palpations: Abdomen is soft.   Musculoskeletal:         General: Swelling present. No tenderness. Normal range of motion.      Cervical back: Normal range of motion and neck supple.   Skin:      General: Skin is warm.   Neurological:      Mental Status: She is alert and oriented to person, place, and time.      Cranial Nerves: No cranial nerve deficit.      Deep Tendon Reflexes: Reflexes are normal and symmetric.

## 2024-12-27 DIAGNOSIS — Z79.4 TYPE 2 DIABETES MELLITUS WITHOUT COMPLICATION, WITH LONG-TERM CURRENT USE OF INSULIN (HCC): ICD-10-CM

## 2024-12-27 DIAGNOSIS — I25.5 ISCHEMIC CARDIOMYOPATHY: ICD-10-CM

## 2024-12-27 DIAGNOSIS — E11.9 TYPE 2 DIABETES MELLITUS WITHOUT COMPLICATION, WITH LONG-TERM CURRENT USE OF INSULIN (HCC): ICD-10-CM

## 2024-12-27 RX ORDER — EMPAGLIFLOZIN 10 MG/1
10 TABLET, FILM COATED ORAL EVERY MORNING
Qty: 90 TABLET | Refills: 1 | Status: SHIPPED | OUTPATIENT
Start: 2024-12-27

## 2025-01-03 ENCOUNTER — APPOINTMENT (OUTPATIENT)
Dept: LAB | Facility: HOSPITAL | Age: 65
End: 2025-01-03
Attending: INTERNAL MEDICINE
Payer: COMMERCIAL

## 2025-01-03 DIAGNOSIS — E11.65 TYPE 2 DIABETES MELLITUS WITH HYPERGLYCEMIA, WITH LONG-TERM CURRENT USE OF INSULIN (HCC): ICD-10-CM

## 2025-01-03 DIAGNOSIS — R60.0 BILATERAL LOWER EXTREMITY EDEMA: ICD-10-CM

## 2025-01-03 DIAGNOSIS — Z79.4 TYPE 2 DIABETES MELLITUS WITH HYPERGLYCEMIA, WITH LONG-TERM CURRENT USE OF INSULIN (HCC): ICD-10-CM

## 2025-01-03 DIAGNOSIS — I25.10 CORONARY ARTERY DISEASE INVOLVING NATIVE CORONARY ARTERY OF NATIVE HEART WITHOUT ANGINA PECTORIS: ICD-10-CM

## 2025-01-03 DIAGNOSIS — D32.9 MENINGIOMA (HCC): ICD-10-CM

## 2025-01-03 DIAGNOSIS — R42 DIZZINESS: ICD-10-CM

## 2025-01-03 LAB
ALBUMIN SERPL BCG-MCNC: 4 G/DL (ref 3.5–5)
ALP SERPL-CCNC: 55 U/L (ref 34–104)
ALT SERPL W P-5'-P-CCNC: 14 U/L (ref 7–52)
ANION GAP SERPL CALCULATED.3IONS-SCNC: 8 MMOL/L (ref 4–13)
AST SERPL W P-5'-P-CCNC: 15 U/L (ref 13–39)
BASOPHILS # BLD AUTO: 0.03 THOUSANDS/ΜL (ref 0–0.1)
BASOPHILS NFR BLD AUTO: 1 % (ref 0–1)
BILIRUB SERPL-MCNC: 1.02 MG/DL (ref 0.2–1)
BNP SERPL-MCNC: 39 PG/ML (ref 0–100)
BUN SERPL-MCNC: 15 MG/DL (ref 5–25)
CALCIUM SERPL-MCNC: 9.3 MG/DL (ref 8.4–10.2)
CHLORIDE SERPL-SCNC: 103 MMOL/L (ref 96–108)
CHOLEST SERPL-MCNC: 150 MG/DL (ref ?–200)
CO2 SERPL-SCNC: 27 MMOL/L (ref 21–32)
CREAT SERPL-MCNC: 0.74 MG/DL (ref 0.6–1.3)
CREAT UR-MCNC: 111.2 MG/DL
EOSINOPHIL # BLD AUTO: 0.06 THOUSAND/ΜL (ref 0–0.61)
EOSINOPHIL NFR BLD AUTO: 1 % (ref 0–6)
ERYTHROCYTE [DISTWIDTH] IN BLOOD BY AUTOMATED COUNT: 12.3 % (ref 11.6–15.1)
EST. AVERAGE GLUCOSE BLD GHB EST-MCNC: 157 MG/DL
GFR SERPL CREATININE-BSD FRML MDRD: 84 ML/MIN/1.73SQ M
GLUCOSE P FAST SERPL-MCNC: 148 MG/DL (ref 65–99)
HBA1C MFR BLD: 7.1 %
HCT VFR BLD AUTO: 41.4 % (ref 34.8–46.1)
HDLC SERPL-MCNC: 42 MG/DL
HGB BLD-MCNC: 13.6 G/DL (ref 11.5–15.4)
IMM GRANULOCYTES # BLD AUTO: 0.02 THOUSAND/UL (ref 0–0.2)
IMM GRANULOCYTES NFR BLD AUTO: 0 % (ref 0–2)
LDLC SERPL CALC-MCNC: 84 MG/DL (ref 0–100)
LYMPHOCYTES # BLD AUTO: 1.63 THOUSANDS/ΜL (ref 0.6–4.47)
LYMPHOCYTES NFR BLD AUTO: 35 % (ref 14–44)
MCH RBC QN AUTO: 31.1 PG (ref 26.8–34.3)
MCHC RBC AUTO-ENTMCNC: 32.9 G/DL (ref 31.4–37.4)
MCV RBC AUTO: 95 FL (ref 82–98)
MICROALBUMIN UR-MCNC: 15.5 MG/L
MICROALBUMIN/CREAT 24H UR: 14 MG/G CREATININE (ref 0–30)
MONOCYTES # BLD AUTO: 0.3 THOUSAND/ΜL (ref 0.17–1.22)
MONOCYTES NFR BLD AUTO: 7 % (ref 4–12)
NEUTROPHILS # BLD AUTO: 2.58 THOUSANDS/ΜL (ref 1.85–7.62)
NEUTS SEG NFR BLD AUTO: 56 % (ref 43–75)
NONHDLC SERPL-MCNC: 108 MG/DL
NRBC BLD AUTO-RTO: 0 /100 WBCS
PLATELET # BLD AUTO: 179 THOUSANDS/UL (ref 149–390)
PMV BLD AUTO: 10.1 FL (ref 8.9–12.7)
POTASSIUM SERPL-SCNC: 3.7 MMOL/L (ref 3.5–5.3)
PROT SERPL-MCNC: 6.8 G/DL (ref 6.4–8.4)
RBC # BLD AUTO: 4.37 MILLION/UL (ref 3.81–5.12)
SODIUM SERPL-SCNC: 138 MMOL/L (ref 135–147)
TRIGL SERPL-MCNC: 119 MG/DL (ref ?–150)
TSH SERPL DL<=0.05 MIU/L-ACNC: 3.48 UIU/ML (ref 0.45–4.5)
WBC # BLD AUTO: 4.62 THOUSAND/UL (ref 4.31–10.16)

## 2025-01-03 PROCEDURE — 82570 ASSAY OF URINE CREATININE: CPT

## 2025-01-03 PROCEDURE — 83880 ASSAY OF NATRIURETIC PEPTIDE: CPT

## 2025-01-03 PROCEDURE — 83036 HEMOGLOBIN GLYCOSYLATED A1C: CPT

## 2025-01-03 PROCEDURE — 85025 COMPLETE CBC W/AUTO DIFF WBC: CPT

## 2025-01-03 PROCEDURE — 82043 UR ALBUMIN QUANTITATIVE: CPT

## 2025-01-03 PROCEDURE — 84443 ASSAY THYROID STIM HORMONE: CPT

## 2025-01-03 PROCEDURE — 36415 COLL VENOUS BLD VENIPUNCTURE: CPT

## 2025-01-03 PROCEDURE — 80061 LIPID PANEL: CPT

## 2025-01-03 PROCEDURE — 80053 COMPREHEN METABOLIC PANEL: CPT

## 2025-01-06 ENCOUNTER — OFFICE VISIT (OUTPATIENT)
Dept: ENDOCRINOLOGY | Facility: CLINIC | Age: 65
End: 2025-01-06
Payer: COMMERCIAL

## 2025-01-06 VITALS
HEART RATE: 80 BPM | WEIGHT: 149 LBS | HEIGHT: 65 IN | SYSTOLIC BLOOD PRESSURE: 126 MMHG | DIASTOLIC BLOOD PRESSURE: 72 MMHG | BODY MASS INDEX: 24.83 KG/M2 | OXYGEN SATURATION: 95 % | TEMPERATURE: 97.8 F

## 2025-01-06 DIAGNOSIS — E11.65 TYPE 2 DIABETES MELLITUS WITH HYPERGLYCEMIA, WITH LONG-TERM CURRENT USE OF INSULIN (HCC): Primary | ICD-10-CM

## 2025-01-06 DIAGNOSIS — E11.42 DIABETIC PERIPHERAL NEUROPATHY ASSOCIATED WITH TYPE 2 DIABETES MELLITUS (HCC): ICD-10-CM

## 2025-01-06 DIAGNOSIS — Z79.4 TYPE 2 DIABETES MELLITUS WITH HYPERGLYCEMIA, WITH LONG-TERM CURRENT USE OF INSULIN (HCC): Primary | ICD-10-CM

## 2025-01-06 DIAGNOSIS — M85.89 OSTEOPENIA OF MULTIPLE SITES: ICD-10-CM

## 2025-01-06 DIAGNOSIS — E55.9 VITAMIN D DEFICIENCY: ICD-10-CM

## 2025-01-06 DIAGNOSIS — E78.2 MIXED HYPERLIPIDEMIA: ICD-10-CM

## 2025-01-06 DIAGNOSIS — M81.0 AGE RELATED OSTEOPOROSIS, UNSPECIFIED PATHOLOGICAL FRACTURE PRESENCE: ICD-10-CM

## 2025-01-06 PROCEDURE — 99214 OFFICE O/P EST MOD 30 MIN: CPT | Performed by: INTERNAL MEDICINE

## 2025-01-06 NOTE — ASSESSMENT & PLAN NOTE
She has risk factors and prior DXA showed osteopenia. Will repeat DXA 9/25 with plan to initiate medications based on FRAX score.

## 2025-01-06 NOTE — PROGRESS NOTES
"    Follow-up Patient Progress Note      CC: diabetes     History of Present Illness:   61yr female with type 2 diabetes since 2009, retinopathy, microalbuminuria, HTN, HLD, CAD s/p HUNTER 9/21, ischemic CMpathy, rt paraclinoid meningioma diagnosed during recent hospitalization and fatigue. Last visit was 8/22/24.     Since last visit, she lost 7 lbs. She could not get trulicity due to national shortage. Also diet and lifestyle indiscretions.     CGM review: Has not been using her sensor.  Device: torito    dates 11/23/23 -11/29/23          Usage: 80%     Av glu: 136 mg/dL        CV 35%  TIR 88%          TAR 12%         TBR 0%  G;ycemic patterns: mild postprandial hyperglycemia but mostly normoglycemia.     Current meds:  Jardiance 10 mg qdaily  Metformin 1000mg PO BID  Lantus 30u qhs  Mounjaro 5mg weekly      Opthamology: Follows [facility]  Podiatry: yes  Influenza: yes, COVID - yes  Dental: No issues  Pancreatitis: No     Ace/ARB: entresto  Statin:lipitor  Thyroid issues: no  FH: brother and 2 sisters, mother and father have diabetes.     9/26/23 DXA: osteopenia. FRAX 1.1% hip and 9.4% major osteoporotic fractures.         Physical Exam:  Body mass index is 24.79 kg/m².  /72 (BP Location: Right arm, Patient Position: Sitting, Cuff Size: Standard)   Pulse 80   Temp 97.8 °F (36.6 °C) (Temporal)   Ht 5' 5\" (1.651 m)   Wt 67.6 kg (149 lb)   SpO2 95%   BMI 24.79 kg/m²    Vitals:    01/06/25 0914   Weight: 67.6 kg (149 lb)        Physical Exam  Constitutional:       General: She is not in acute distress.     Appearance: She is well-developed.   HENT:      Head: Normocephalic and atraumatic.      Nose: Nose normal.   Eyes:      Conjunctiva/sclera: Conjunctivae normal.   Pulmonary:      Effort: Pulmonary effort is normal.   Abdominal:      General: There is no distension.   Musculoskeletal:      Cervical back: Normal range of motion and neck supple.   Skin:     Findings: No rash.      Comments: No icterus "   Neurological:      Mental Status: She is alert and oriented to person, place, and time.       Labs:   Lab Results   Component Value Date    HGBA1C 7.1 (H) 01/03/2025       Lab Results   Component Value Date    HEY9POYLCBYG 3.482 01/03/2025       Lab Results   Component Value Date    CREATININE 0.74 01/03/2025    CREATININE 0.91 03/01/2024    CREATININE 0.85 03/22/2023    BUN 15 01/03/2025    K 3.7 01/03/2025     01/03/2025    CO2 27 01/03/2025     eGFR   Date Value Ref Range Status   01/03/2025 84 ml/min/1.73sq m Final       Lab Results   Component Value Date    ALT 14 01/03/2025    AST 15 01/03/2025    ALKPHOS 55 01/03/2025       Lab Results   Component Value Date    CHOLESTEROL 150 01/03/2025    CHOLESTEROL 74 07/12/2023    CHOLESTEROL 89 08/18/2022     Lab Results   Component Value Date    HDL 42 (L) 01/03/2025    HDL 29 (L) 07/12/2023    HDL 30 (L) 08/18/2022     Lab Results   Component Value Date    TRIG 119 01/03/2025    TRIG 78 07/12/2023    TRIG 80 08/18/2022     Lab Results   Component Value Date    NONHDLC 108 01/03/2025    NONHDLC 45 07/12/2023    NONHDLC 59 08/18/2022         Assessment/Plan:    1. Type 2 diabetes mellitus with hyperglycemia, with long-term current use of insulin (HCC)  Assessment & Plan:  She seems stable but A1c 7.1% from 6.9 %. She just started a personal cgm - will wait data in 2 weeks.    Today we agreed to continue Lantus 30u qhs, metformin 1000mg po BID, Jardiance 10mg qdaily and restart mounjaro 5mg weekly. Will aim to cut back lantus after review of cgm data.    Share Growish 3 data in 2 weeks.    Follow up in 3 months.      Lab Results   Component Value Date    HGBA1C 7.1 (H) 01/03/2025     Orders:  -     Hemoglobin A1C; Future  -     Albumin / creatinine urine ratio; Future  2. Mixed hyperlipidemia  Assessment & Plan:  Lipid profile is acceptable. Continue lipitor.  3. Vitamin D deficiency  Assessment & Plan:  Take vit D3 2000IU daily OTC  4. Age related osteoporosis,  unspecified pathological fracture presence  Assessment & Plan:  She has risk factors and prior DXA showed osteopenia. Will repeat DXA 9/25 with plan to initiate medications based on FRAX score.  Orders:  -     DXA bone density spine hip and pelvis; Future; Expected date: 01/06/2025  -     Phosphorus; Future  -     Vitamin D 25 hydroxy; Future  -     PTH, intact; Future  -     Comprehensive metabolic panel; Future  5. Diabetic peripheral neuropathy associated with type 2 diabetes mellitus (HCC)  6. Osteopenia of multiple sites        I have spent a total time of  minutes on 01/06/25 in caring for this patient including greater than 50% of this time was spent in counseling/coordination of care as listed above.       Discussed with the patient and all questioned fully answered. She will contact me with concerns.    Grant Arellano

## 2025-01-06 NOTE — ASSESSMENT & PLAN NOTE
She seems stable but A1c 7.1% from 6.9 %. She just started a personal cgm - will wait data in 2 weeks.    Today we agreed to continue Lantus 30u qhs, metformin 1000mg po BID, Jardiance 10mg qdaily and restart mounjaro 5mg weekly. Will aim to cut back lantus after review of cgm data.    Share Claro 3 data in 2 weeks.    Follow up in 3 months.      Lab Results   Component Value Date    HGBA1C 7.1 (H) 01/03/2025

## 2025-01-27 ENCOUNTER — TELEPHONE (OUTPATIENT)
Dept: FAMILY MEDICINE CLINIC | Facility: CLINIC | Age: 65
End: 2025-01-27

## 2025-02-23 DIAGNOSIS — I25.5 ISCHEMIC CARDIOMYOPATHY: ICD-10-CM

## 2025-02-23 DIAGNOSIS — I25.110 CORONARY ARTERY DISEASE INVOLVING NATIVE HEART WITH UNSTABLE ANGINA PECTORIS, UNSPECIFIED VESSEL OR LESION TYPE (HCC): ICD-10-CM

## 2025-02-24 RX ORDER — CLOPIDOGREL BISULFATE 75 MG/1
75 TABLET ORAL DAILY
Qty: 90 TABLET | Refills: 1 | Status: SHIPPED | OUTPATIENT
Start: 2025-02-24

## 2025-03-17 ENCOUNTER — RA CDI HCC (OUTPATIENT)
Dept: OTHER | Facility: HOSPITAL | Age: 65
End: 2025-03-17

## 2025-03-17 PROBLEM — E11.42 DIABETIC PERIPHERAL NEUROPATHY ASSOCIATED WITH TYPE 2 DIABETES MELLITUS (HCC): Status: ACTIVE | Noted: 2020-10-15

## 2025-03-17 NOTE — PROGRESS NOTES
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

## 2025-03-24 ENCOUNTER — OFFICE VISIT (OUTPATIENT)
Dept: FAMILY MEDICINE CLINIC | Facility: CLINIC | Age: 65
End: 2025-03-24
Payer: COMMERCIAL

## 2025-03-24 VITALS
SYSTOLIC BLOOD PRESSURE: 116 MMHG | BODY MASS INDEX: 26.82 KG/M2 | HEIGHT: 65 IN | OXYGEN SATURATION: 99 % | WEIGHT: 161 LBS | HEART RATE: 54 BPM | DIASTOLIC BLOOD PRESSURE: 64 MMHG | RESPIRATION RATE: 16 BRPM | TEMPERATURE: 97.7 F

## 2025-03-24 DIAGNOSIS — I10 ESSENTIAL HYPERTENSION: ICD-10-CM

## 2025-03-24 DIAGNOSIS — Z79.4 TYPE 2 DIABETES MELLITUS WITHOUT COMPLICATION, WITH LONG-TERM CURRENT USE OF INSULIN (HCC): ICD-10-CM

## 2025-03-24 DIAGNOSIS — E11.42 DIABETIC PERIPHERAL NEUROPATHY ASSOCIATED WITH TYPE 2 DIABETES MELLITUS (HCC): ICD-10-CM

## 2025-03-24 DIAGNOSIS — M81.0 AGE RELATED OSTEOPOROSIS, UNSPECIFIED PATHOLOGICAL FRACTURE PRESENCE: ICD-10-CM

## 2025-03-24 DIAGNOSIS — E78.2 MIXED HYPERLIPIDEMIA: ICD-10-CM

## 2025-03-24 DIAGNOSIS — Z12.31 ENCOUNTER FOR SCREENING MAMMOGRAM FOR BREAST CANCER: ICD-10-CM

## 2025-03-24 DIAGNOSIS — E11.9 ENCOUNTER FOR DIABETIC FOOT EXAM (HCC): ICD-10-CM

## 2025-03-24 DIAGNOSIS — I51.89 DIASTOLIC DYSFUNCTION: ICD-10-CM

## 2025-03-24 DIAGNOSIS — I25.10 CORONARY ARTERY DISEASE INVOLVING NATIVE CORONARY ARTERY OF NATIVE HEART WITHOUT ANGINA PECTORIS: Primary | ICD-10-CM

## 2025-03-24 DIAGNOSIS — D32.9 MENINGIOMA (HCC): ICD-10-CM

## 2025-03-24 DIAGNOSIS — Z12.11 COLON CANCER SCREENING: ICD-10-CM

## 2025-03-24 DIAGNOSIS — R60.0 BILATERAL LEG EDEMA: ICD-10-CM

## 2025-03-24 DIAGNOSIS — E11.9 TYPE 2 DIABETES MELLITUS WITHOUT COMPLICATION, WITH LONG-TERM CURRENT USE OF INSULIN (HCC): ICD-10-CM

## 2025-03-24 PROCEDURE — 99214 OFFICE O/P EST MOD 30 MIN: CPT | Performed by: INTERNAL MEDICINE

## 2025-03-24 NOTE — PROGRESS NOTES
Assessment/Plan:         Problem List Items Addressed This Visit       Mixed hyperlipidemia    Bilateral leg edema    Elevated legs, watch sodium, has known varicosities, and diastolic dysfunction-uses furosemide prn         CAD (coronary artery disease) - Primary    On ASA, Plavix, and lipitor-also on Entresto and Carvedilol-last EF from Nov was 55%         Right paraclinoid meningioma    Essential hypertension    BP on the lower side today-stay hydrated and continue meds         Diabetic peripheral neuropathy associated with type 2 diabetes mellitus (HCC)    Diastolic dysfunction    Age related osteoporosis    Type 2 diabetes mellitus without complication, with long-term current use of insulin (Formerly [facility])      Lab Results   Component Value Date    HGBA1C 7.1 (H) 01/03/2025   A1c went up a bit to 7.1%, watch diet, followed by Rufino, is on Jardiance and metformin and lantus and was on Mounjaro although that's on hold at the moment          Other Visit Diagnoses         Encounter for diabetic foot exam (Formerly [facility])        Relevant Orders    Diabetic foot exam      Colon cancer screening        Had scope in 2021 due again in 2026      Encounter for screening mammogram for breast cancer        Mammogram utd had in Sept          Recent Results (from the past 52 weeks)   POCT hemoglobin A1c    Collection Time: 08/02/24  9:58 AM   Result Value Ref Range    Hemoglobin A1C 6.9 (A) <=6.5   Echo complete w/ contrast if indicated    Collection Time: 11/21/24  7:45 AM   Result Value Ref Range    Triscuspid Valve Regurgitation Peak Gradient 25.0 mmHg    RAA A4C 13.1 cm2    LA Volume Index (BP) 24.3 mL/m2    MV Peak A Marko 0.93 m/s    MV stenosis pressure 1/2 time 58 ms    MV Peak E Marko 65 cm/s    E wave deceleration time 200 ms    E/A ratio 0.70     MV valve area p 1/2 method 3.79     RA 2D Volume 27.0 mL    TR Peak Marko 2.5 m/s    RVID d 3.5 cm    A4C EF 54 %    Tricuspid valve peak regurgitation velocity 2.52 m/s    Left ventricular stroke  volume (2D) 39.00 mL    IVSd 0.90 cm    Tricuspid annular plane systolic excursion 1.90 cm    Ao root 2.80 cm    LVPWd 0.90 cm    LA size 2.8 cm    Asc Ao 3.2 cm    LA volume (BP) 42 mL    FS 31 28 - 44    LVIDS 2.70 cm    IVS 0.9 cm    LVIDd 3.90 cm    LA length (A2C) 4.50 cm    LEFT VENTRICLE SYSTOLIC VOLUME (MOD BIPLANE) 2D 28 mL    LV DIASTOLIC VOLUME (MOD BIPLANE) 2D 66 mL    Left Atrium Area-systolic Four Chamber 15.8 cm2    Left Atrium Area-systolic Apical Two Chamber 15 cm2    MV E' Tissue Velocity Septal 6 cm/s    LVSV, 2D 39 mL    BSA 1.73 m2    LV EF 55    Albumin / creatinine urine ratio    Collection Time: 01/03/25  8:07 AM   Result Value Ref Range    Creatinine, Ur 111.2 Reference range not established. mg/dL    Albumin,U,Random 15.5 <20.0 mg/L    Albumin Creat Ratio 14 0 - 30 mg/g creatinine   CBC and differential    Collection Time: 01/03/25  8:07 AM   Result Value Ref Range    WBC 4.62 4.31 - 10.16 Thousand/uL    RBC 4.37 3.81 - 5.12 Million/uL    Hemoglobin 13.6 11.5 - 15.4 g/dL    Hematocrit 41.4 34.8 - 46.1 %    MCV 95 82 - 98 fL    MCH 31.1 26.8 - 34.3 pg    MCHC 32.9 31.4 - 37.4 g/dL    RDW 12.3 11.6 - 15.1 %    MPV 10.1 8.9 - 12.7 fL    Platelets 179 149 - 390 Thousands/uL    nRBC 0 /100 WBCs    Segmented % 56 43 - 75 %    Immature Grans % 0 0 - 2 %    Lymphocytes % 35 14 - 44 %    Monocytes % 7 4 - 12 %    Eosinophils Relative 1 0 - 6 %    Basophils Relative 1 0 - 1 %    Absolute Neutrophils 2.58 1.85 - 7.62 Thousands/µL    Absolute Immature Grans 0.02 0.00 - 0.20 Thousand/uL    Absolute Lymphocytes 1.63 0.60 - 4.47 Thousands/µL    Absolute Monocytes 0.30 0.17 - 1.22 Thousand/µL    Eosinophils Absolute 0.06 0.00 - 0.61 Thousand/µL    Basophils Absolute 0.03 0.00 - 0.10 Thousands/µL   Comprehensive metabolic panel    Collection Time: 01/03/25  8:07 AM   Result Value Ref Range    Sodium 138 135 - 147 mmol/L    Potassium 3.7 3.5 - 5.3 mmol/L    Chloride 103 96 - 108 mmol/L    CO2 27 21 - 32  mmol/L    ANION GAP 8 4 - 13 mmol/L    BUN 15 5 - 25 mg/dL    Creatinine 0.74 0.60 - 1.30 mg/dL    Glucose, Fasting 148 (H) 65 - 99 mg/dL    Calcium 9.3 8.4 - 10.2 mg/dL    AST 15 13 - 39 U/L    ALT 14 7 - 52 U/L    Alkaline Phosphatase 55 34 - 104 U/L    Total Protein 6.8 6.4 - 8.4 g/dL    Albumin 4.0 3.5 - 5.0 g/dL    Total Bilirubin 1.02 (H) 0.20 - 1.00 mg/dL    eGFR 84 ml/min/1.73sq m   Lipid panel    Collection Time: 01/03/25  8:07 AM   Result Value Ref Range    Cholesterol 150 See Comment mg/dL    Triglycerides 119 See Comment mg/dL    HDL, Direct 42 (L) >=50 mg/dL    LDL Calculated 84 0 - 100 mg/dL    Non-HDL-Chol (CHOL-HDL) 108 mg/dl   TSH, 3rd generation    Collection Time: 01/03/25  8:07 AM   Result Value Ref Range    TSH 3RD GENERATON 3.482 0.450 - 4.500 uIU/mL   Hemoglobin A1C    Collection Time: 01/03/25  8:07 AM   Result Value Ref Range    Hemoglobin A1C 7.1 (H) Normal 4.0-5.6%; PreDiabetic 5.7-6.4%; Diabetic >=6.5%; Glycemic control for adults with diabetes <7.0% %     mg/dl   B-Type Natriuretic Peptide(BNP)    Collection Time: 01/03/25  8:07 AM   Result Value Ref Range    BNP 39 0 - 100 pg/mL       Laboratory Results: I have personally reviewed the pertinent laboratory results/reports     Radiology/Other Diagnostic Testing Results: Results Review Statement: No pertinent imaging studies reviewed.     Subjective:      Patient ID: Bruce Acosta is a 64 y.o. female.    Bruce here with DM II, CHF, CAD, HTN, HL, meningioma, edema-doing well, still working-had a recent episode of dizziness and presyncope, but thinks she may have been dehydrated-went over her labwork from Jan-A1c% was 7.1%-not too bad      The following portions of the patient's history were reviewed and updated as appropriate:   Past Medical History:  She has a past medical history of Colon polyp, Coronary artery disease, Depression, Diabetes mellitus (HCC), Dizziness, Edema, Hyperlipidemia, Hypertension, Ischemic cardiomyopathy,  Left bundle branch block, Type 2 diabetes mellitus (HCC), Vitamin D deficiency, and Wears glasses.,  _______________________________________________________________________  Medical Problems:  does not have any pertinent problems on file.,  _______________________________________________________________________  Past Surgical History:   has a past surgical history that includes Colonoscopy (04/11/2018); Mammo (historical) (Bilateral, 05/04/2020); Nov[place] tooth extraction; EGD (04/11/2018); Mammo needle localization right (all inc) (Right, 07/08/2009); Breast excisional biopsy (Right, 2009); Mammo (historical) (Bilateral, 09/14/2022); and Cardiac surgery (9/2/2021).,  _______________________________________________________________________  Family History:  family history includes Arrhythmia in her father; Asthma in her father; BRCA1 Negative in her sister; Breast cancer in her maternal aunt; Breast cancer (age of onset: 39) in her sister; Breast cancer (age of onset: 70) in her mother; Cancer in her maternal aunt; Cancer (age of onset: 60) in her father; Clotting disorder in her father; Colon cancer in her maternal uncle; Diabetes type II in her father and mother; Esophageal cancer (age of onset: 60) in her maternal aunt; Heart attack in her mother; Heart disease in her father; Heart failure in her father; Hyperlipidemia in her mother; Hypertension in her mother; Lung cancer in her paternal aunt; Melanoma in her father; No Known Problems in her maternal aunt, maternal grandfather, maternal grandmother, paternal grandfather, paternal grandmother, sister, and sister; Pancreatic cancer (age of onset: 59) in her maternal uncle; Sudden death in her mother; Thyroid cancer in her sister and sister.,  _______________________________________________________________________  Social History:   reports that she has never smoked. She has never used smokeless tobacco. She reports that she does not drink alcohol and does not use  drugs.,  _______________________________________________________________________  Allergies:  is allergic to dust mite extract..  _______________________________________________________________________  Current Outpatient Medications   Medication Sig Dispense Refill   • aspirin 81 MG tablet Take 1 tablet by mouth daily     • atorvastatin (LIPITOR) 80 mg tablet Take 1 tablet (80 mg total) by mouth daily with dinner 90 tablet 1   • CALCIUM PO 1 po daily     • carvedilol (COREG) 6.25 mg tablet TAKE 0.5 TABLETS (3.125 MG TOTAL) BY MOUTH 2 (TWO) TIMES A DAY WITH MEALS 90 tablet 1   • Cholecalciferol (Vitamin D3) 50 MCG (2000 UT) capsule Take 1 capsule (2,000 Units total) by mouth daily 30 capsule 5   • clopidogrel (PLAVIX) 75 mg tablet TAKE 1 TABLET BY MOUTH EVERY DAY 90 tablet 1   • Continuous Blood Gluc Sensor (FreeStyle Feng 3 Sensor) MISC USE 1 UNITS EVERY 14 (FOURTEEN) DAYS 6 each 2   • furosemide (LASIX) 20 mg tablet TAKE 1 TABLET BY MOUTH EVERY DAY 90 tablet 1   • Insulin Pen Needle (BD Pen Needle Brittney U/F) 32G X 4 MM MISC Use daily as needed with insulin pen 100 each 0   • Jardiance 10 MG TABS tablet TAKE 1 TABLET (10 MG TOTAL) BY MOUTH EVERY MORNING. 90 tablet 1   • Lantus SoloStar 100 units/mL SOPN INJECT 30 UNITS UNDER THE SKIN DAILY AT BEDTIME 30 mL 1   • metFORMIN (GLUCOPHAGE) 1000 MG tablet TAKE 1 TABLET BY MOUTH TWICE A DAY WITH MEALS 180 tablet 1   • sacubitril-valsartan (Entresto) 49-51 MG TABS Take 1 tablet by mouth 2 (two) times a day (Patient taking differently: Take 0.5 tablets by mouth 2 (two) times a day) 180 tablet 4   • tirzepatide (Mounjaro) 5 MG/0.5ML INJECT 0.5 ML (5 MG TOTAL) UNDER THE SKIN ONE TIME PER WEEK 6 mL 1     No current facility-administered medications for this visit.     _______________________________________________________________________  Review of Systems   Constitutional: Negative.    HENT: Negative.     Respiratory: Negative.     Cardiovascular:  Positive for leg swelling.  "  Musculoskeletal: Negative.    Neurological:  Positive for dizziness.   Hematological: Negative.    Psychiatric/Behavioral: Negative.           Objective:  Vitals:    03/24/25 0901   BP: 116/64   BP Location: Left arm   Patient Position: Sitting   Cuff Size: Standard   Pulse: (!) 54   Resp: 16   Temp: 97.7 °F (36.5 °C)   TempSrc: Tympanic   SpO2: 99%   Weight: 73 kg (161 lb)   Height: 5' 5\" (1.651 m)     Body mass index is 26.79 kg/m².     Physical Exam  Constitutional:       Appearance: Normal appearance.   HENT:      Head: Normocephalic and atraumatic.      Right Ear: External ear normal.      Left Ear: External ear normal.      Nose: Nose normal.      Mouth/Throat:      Mouth: Mucous membranes are moist.   Eyes:      Pupils: Pupils are equal, round, and reactive to light.   Cardiovascular:      Rate and Rhythm: Normal rate.      Pulses: no weak pulses.           Dorsalis pedis pulses are 2+ on the right side and 2+ on the left side.   Pulmonary:      Effort: Pulmonary effort is normal.   Abdominal:      General: Abdomen is flat.   Musculoskeletal:      Cervical back: Normal range of motion.      Right lower leg: Edema present.      Left lower leg: Edema present.   Feet:      Right foot:      Skin integrity: No ulcer, skin breakdown, erythema, warmth, callus or dry skin.      Left foot:      Skin integrity: No ulcer, skin breakdown, erythema, warmth, callus or dry skin.   Skin:     General: Skin is warm.      Capillary Refill: Capillary refill takes less than 2 seconds.   Neurological:      General: No focal deficit present.      Mental Status: She is alert and oriented to person, place, and time.   Psychiatric:         Mood and Affect: Mood normal.         Thought Content: Thought content normal.     Patient's shoes and socks removed.    Right Foot/Ankle   Right Foot Inspection  Skin Exam: skin normal and skin intact. No dry skin, no warmth, no callus, no erythema, no maceration, no abnormal color, no pre-ulcer, " no ulcer and no callus.     Toe Exam: ROM and strength within normal limits.     Sensory   Proprioception: intact  Monofilament testing: intact    Vascular  The right DP pulse is 2+.     Left Foot/Ankle  Left Foot Inspection  Skin Exam: skin normal and skin intact. No dry skin, no warmth, no erythema, no maceration, normal color, no pre-ulcer, no ulcer and no callus.     Toe Exam: ROM and strength within normal limits.     Sensory   Proprioception: intact  Monofilament testing: intact    Vascular  The left DP pulse is 2+.     Assign Risk Category  No deformity present  No loss of protective sensation  No weak pulses  Risk: 0

## 2025-03-24 NOTE — ASSESSMENT & PLAN NOTE
Elevated legs, watch sodium, has known varicosities, and diastolic dysfunction-uses furosemide prn

## 2025-03-24 NOTE — ASSESSMENT & PLAN NOTE
Lab Results   Component Value Date    HGBA1C 7.1 (H) 01/03/2025   A1c went up a bit to 7.1%, watch diet, followed by Rufino, is on Jardiance and metformin and lantus and was on Mounjaro although that's on hold at the moment

## 2025-05-02 ENCOUNTER — HOSPITAL ENCOUNTER (OUTPATIENT)
Dept: MRI IMAGING | Facility: HOSPITAL | Age: 65
Discharge: HOME | End: 2025-05-02
Payer: COMMERCIAL

## 2025-05-02 DIAGNOSIS — D32.9 MENINGIOMA (HCC): ICD-10-CM

## 2025-05-02 PROCEDURE — A9585 GADOBUTROL INJECTION: HCPCS | Performed by: PHYSICIAN ASSISTANT

## 2025-05-02 PROCEDURE — 70553 MRI BRAIN STEM W/O & W/DYE: CPT

## 2025-05-02 RX ORDER — GADOBUTROL 604.72 MG/ML
7 INJECTION INTRAVENOUS
Status: COMPLETED | OUTPATIENT
Start: 2025-05-02 | End: 2025-05-02

## 2025-05-02 RX ADMIN — GADOBUTROL 7 ML: 604.72 INJECTION INTRAVENOUS at 12:24

## 2025-05-09 ENCOUNTER — OFFICE VISIT (OUTPATIENT)
Dept: NEUROSURGERY | Facility: CLINIC | Age: 65
End: 2025-05-09
Payer: COMMERCIAL

## 2025-05-09 VITALS
TEMPERATURE: 97.4 F | BODY MASS INDEX: 26.82 KG/M2 | SYSTOLIC BLOOD PRESSURE: 138 MMHG | HEART RATE: 67 BPM | HEIGHT: 65 IN | DIASTOLIC BLOOD PRESSURE: 80 MMHG | WEIGHT: 161 LBS | OXYGEN SATURATION: 94 % | RESPIRATION RATE: 16 BRPM

## 2025-05-09 DIAGNOSIS — D32.9 MENINGIOMA (HCC): Primary | ICD-10-CM

## 2025-05-09 PROCEDURE — 99213 OFFICE O/P EST LOW 20 MIN: CPT | Performed by: PHYSICIAN ASSISTANT

## 2025-05-09 NOTE — PROGRESS NOTES
Name: Bruce Acosta      : 1960      MRN: [id number]  Encounter Provider: Rigoberto Carrillo MD  Encounter Date: 2025   Encounter department: [facility] NEUROSURGICAL [facility]  :  Assessment & Plan  Meningioma (HCC)  Patient is a 65 years old pleasant woman with history of LBBB, coronary disease, hypertension, GERD, diabetic peripheral neuropathy, osteoporosis, hyperlipidemia, and systolic dysfunction, and cerebral meningioma that was detected during workup of lightheadedness in .    Patient is here today for annual follow-up with brain MRI.  Image shows stable 1.5 x 1.7 cm enhancing right paraclinoid lesion likely meningioma.  Minimal surrounding vasogenic edema    Patient reports doing fine, denies any headache, but reported right more than left vision issues setting of most likely diabetic retinopathy , following up with ophthalmology gets injection periodically.  No seizures, speech or swallowing problem, gait dysfunction, or bowel/bladder issues.       Plan:       I reviewed image with the patient and I discussed management plan.  Stable lesion likely meningioma, would recommend 1 year follow-up with brain MRI with and without contrast.    Advised patient to call office or go to emergency room with interval development of neurological symptoms.    Questions and concerns were answered to patient satisfaction.  Patient verbalized understanding's and agreed with the plan.    Call with question or concern.     Orders:    MRI brain w wo contrast; Future    BUN/Creatinine Ratio; Future      History of Present Illness     Bruce Acosta is a 65 y.o. female here today for annual follow-up of cerebral meningioma    HPI   See detailed discussion above  Review of Systems   Constitutional: Negative.    HENT: Negative.     Eyes: Negative.    Respiratory: Negative.     Cardiovascular: Negative.    Gastrointestinal: Negative.    Endocrine: Negative.    Genitourinary: Negative.    Musculoskeletal: Negative.     Skin: Negative.    Allergic/Immunologic: Negative.    Neurological: Negative.           1 year f/u Eric/Gerardo w. MRI Brain 5/2/25 for Benign neoplasm of meninges  LOV 5/3/24    Hx of right paraclinoid meningioma       Hematological: Negative.    Psychiatric/Behavioral: Negative.       I have personally reviewed the MA's review of systems and made changes as necessary.    Past Medical History   Past Medical History:   Diagnosis Date    Colon polyp     Coronary artery disease     Depression     Diabetes mellitus (HCC)     Dizziness     Edema     Hyperlipidemia     Hypertension     Ischemic cardiomyopathy     Left bundle branch block     Type 2 diabetes mellitus (HCC)     Vitamin D deficiency     Wears glasses      Past Surgical History:   Procedure Laterality Date    BREAST EXCISIONAL BIOPSY Right 2009    benign    CARDIAC SURGERY  9/2/2021    COLONOSCOPY  04/11/2018    Colonoscpoy due in 3 years    EGD  04/11/2018    MAMMO (HISTORICAL) Bilateral 05/04/2020 2018    MAMMO (HISTORICAL) Bilateral 09/14/2022    MAMMO NEEDLE LOCALIZATION RIGHT (ALL INC) Right 07/08/2009    WISDOM TOOTH EXTRACTION       Family History   Problem Relation Age of Onset    Hypertension Mother     Sudden death Mother         SCD    Hyperlipidemia Mother     Heart attack Mother     Breast cancer Mother 70    Diabetes type II Mother     Arrhythmia Father         pacemaker/ defib    Clotting disorder Father         eliquis    Heart failure Father     Heart disease Father     Melanoma Father     Cancer Father 60        bladder cancer    Diabetes type II Father     Asthma Father     BRCA1 Negative Sister     Breast cancer Sister 39    Thyroid cancer Sister     No Known Problems Sister     No Known Problems Sister     Thyroid cancer Sister     No Known Problems Maternal Grandmother     No Known Problems Maternal Grandfather     No Known Problems Paternal Grandmother     No Known Problems Paternal Grandfather     Breast cancer Maternal Aunt        "  unknown age    Esophageal cancer Maternal Aunt 60    Cancer Maternal Aunt         unknown age or type    No Known Problems Maternal Aunt     Colon cancer Maternal Uncle         unknwon age    Pancreatic cancer Maternal Uncle 59    Lung cancer Paternal Aunt         unknonw age- in her 80s    Anuerysm Neg Hx      she reports that she has never smoked. She has never used smokeless tobacco. She reports that she does not drink alcohol and does not use drugs.  Current Outpatient Medications   Medication Instructions    aspirin 81 MG tablet 1 tablet, Daily    atorvastatin (LIPITOR) 80 mg, Oral, Daily with dinner    CALCIUM PO 1 po daily    carvedilol (COREG) 3.125 mg, Oral, 2 times daily with meals    clopidogrel (PLAVIX) 75 mg, Oral, Daily    Continuous Blood Gluc Sensor (FreeStyle Feng 3 Sensor) MISC 1 Units, Does not apply, Every 14 days    furosemide (LASIX) 20 mg tablet TAKE 1 TABLET BY MOUTH EVERY DAY    Insulin Pen Needle (BD Pen Needle Brittney U/F) 32G X 4 MM MISC Use daily as needed with insulin pen    Jardiance 10 mg, Oral, Every morning    Lantus SoloStar 100 units/mL SOPN INJECT 30 UNITS UNDER THE SKIN DAILY AT BEDTIME    metFORMIN (GLUCOPHAGE) 1,000 mg, Oral, 2 times daily with meals    sacubitril-valsartan (Entresto) 49-51 MG TABS 1 tablet, Oral, 2 times daily    tirzepatide (Mounjaro) 5 MG/0.5ML INJECT 0.5 ML (5 MG TOTAL) UNDER THE SKIN ONE TIME PER WEEK    Vitamin D3 2,000 Units, Oral, Daily     Allergies   Allergen Reactions    Dust Mite Extract Other (See Comments)      Objective   /80 (BP Location: Left arm, Patient Position: Sitting, Cuff Size: Standard)   Pulse 67   Temp (!) 97.4 °F (36.3 °C)   Resp 16   Ht 5' 5\" (1.651 m)   Wt 73 kg (161 lb)   SpO2 94%   BMI 26.79 kg/m²     Physical Exam  Constitutional:       Appearance: Normal appearance.   HENT:      Head: Normocephalic and atraumatic.   Eyes:      General: Lids are normal.      Extraocular Movements: Extraocular movements intact. "      Pupils: Pupils are equal, round, and reactive to light.   Pulmonary:      Effort: Pulmonary effort is normal.   Musculoskeletal:         General: Normal range of motion.      Cervical back: Normal range of motion.   Neurological:      General: No focal deficit present.      Mental Status: She is alert and oriented to person, place, and time.      Motor: Motor strength is normal.     Deep Tendon Reflexes:      Reflex Scores:       Tricep reflexes are 2+ on the right side and 2+ on the left side.       Bicep reflexes are 2+ on the right side and 2+ on the left side.       Brachioradialis reflexes are 2+ on the right side and 2+ on the left side.       Patellar reflexes are 2+ on the right side and 2+ on the left side.       Achilles reflexes are 2+ on the right side and 2+ on the left side.      Neurological Exam  Mental Status  Alert. Oriented to person, place, and time.    Cranial Nerves  CN II: Visual acuity is normal. Visual fields full to confrontation.  CN III, IV, VI: Extraocular movements intact bilaterally. Normal lids and orbits bilaterally. Pupils equal round and reactive to light bilaterally.  CN V: Facial sensation is normal.  CN VII: Full and symmetric facial movement.  CN VIII: Hearing is normal.  CN IX, X: Palate elevates symmetrically. Normal gag reflex.  CN XI: Shoulder shrug strength is normal.  CN XII: Tongue midline without atrophy or fasciculations.    Motor  Normal muscle bulk throughout. No fasciculations present. Normal muscle tone. No abnormal involuntary movements. Strength is 5/5 throughout all four extremities.    Sensory  Light touch is normal in upper and lower extremities.     Reflexes                                            Right                      Left  Brachioradialis                    2+                         2+  Biceps                                 2+                         2+  Triceps                                2+                         2+  Patellar                                 2+                         2+  Achilles                                2+                         2+    Right pathological reflexes: Maritza's absent. Ankle clonus absent.  Left pathological reflexes: Maritza's absent. Ankle clonus absent.    Coordination  Right: Finger-to-nose normal.Left: Finger-to-nose normal.    Gait  Casual gait is normal including stance, stride, and arm swing.      Radiology Results Review: I personally reviewed the following image studies in PACS and associated radiology reports: MRI brain. My interpretation of the radiology images/reports is: See discussion above.

## 2025-05-09 NOTE — ASSESSMENT & PLAN NOTE
Patient is a 65 years old pleasant woman with history of LBBB, coronary disease, hypertension, GERD, diabetic peripheral neuropathy, osteoporosis, hyperlipidemia, and systolic dysfunction, and cerebral meningioma that was detected during workup of lightheadedness in 2021.    Patient is here today for annual follow-up with brain MRI.  Image shows stable 1.5 x 1.7 cm enhancing right paraclinoid lesion likely meningioma.  Minimal surrounding vasogenic edema    Patient reports doing fine, denies any headache, but reported right more than left vision issues setting of most likely diabetic retinopathy , following up with ophthalmology gets injection periodically.  No seizures, speech or swallowing problem, gait dysfunction, or bowel/bladder issues.       Plan:       I reviewed image with the patient and I discussed management plan.  Stable lesion likely meningioma, would recommend 1 year follow-up with brain MRI with and without contrast.    Advised patient to call office or go to emergency room with interval development of neurological symptoms.    Questions and concerns were answered to patient satisfaction.  Patient verbalized understanding's and agreed with the plan.    Call with question or concern.     Orders:    MRI brain w wo contrast; Future    BUN/Creatinine Ratio; Future

## 2025-06-29 DIAGNOSIS — Z79.4 TYPE 2 DIABETES MELLITUS WITHOUT COMPLICATION, WITH LONG-TERM CURRENT USE OF INSULIN (HCC): ICD-10-CM

## 2025-06-29 DIAGNOSIS — R60.0 LOCALIZED EDEMA: ICD-10-CM

## 2025-06-29 DIAGNOSIS — I25.5 ISCHEMIC CARDIOMYOPATHY: ICD-10-CM

## 2025-06-29 DIAGNOSIS — E11.9 TYPE 2 DIABETES MELLITUS WITHOUT COMPLICATION, WITH LONG-TERM CURRENT USE OF INSULIN (HCC): ICD-10-CM

## 2025-06-30 ENCOUNTER — TELEPHONE (OUTPATIENT)
Age: 65
End: 2025-06-30

## 2025-06-30 NOTE — TELEPHONE ENCOUNTER
PA for EMPAGLIFLOZIN 10MG APPROVED     Date(s) approved 5/31/25-6/30/26    Case # [id number]     Patient advised by          [x]Dimereshart Message  []Phone call   []LMOM  []L/M to call office as no active Communication consent on file  []Unable to leave detailed message as VM not approved on Communication consent       Pharmacy advised by    []Fax  [x]Phone call  []Secure Chat    Specialty Pharmacy    []     Approval letter scanned into Media No

## 2025-06-30 NOTE — TELEPHONE ENCOUNTER
PA for EMPAGLIFLOZIN 10MG SUBMITTED to [employer]    via    []CMM-KEY:   [x]Surescripts-Case ID #[id number]    []Availity-Auth ID # NDC #   []Faxed to plan   []Other website   []Phone call Case ID #     [x]PA sent as URGENT    All office notes, labs and other pertaining documents and studies sent. Clinical questions answered. Awaiting determination from insurance company.     Turnaround time for your insurance to make a decision on your Prior Authorization can take 7-21 business days.

## 2025-07-01 RX ORDER — FUROSEMIDE 20 MG/1
20 TABLET ORAL DAILY
Qty: 90 TABLET | Refills: 0 | Status: SHIPPED | OUTPATIENT
Start: 2025-07-01